# Patient Record
Sex: MALE | Race: WHITE | Employment: FULL TIME | URBAN - METROPOLITAN AREA
[De-identification: names, ages, dates, MRNs, and addresses within clinical notes are randomized per-mention and may not be internally consistent; named-entity substitution may affect disease eponyms.]

---

## 2017-01-06 ENCOUNTER — OFFICE VISIT (OUTPATIENT)
Dept: FAMILY MEDICINE CLINIC | Age: 62
End: 2017-01-06

## 2017-01-06 VITALS
WEIGHT: 217 LBS | SYSTOLIC BLOOD PRESSURE: 130 MMHG | TEMPERATURE: 97.5 F | BODY MASS INDEX: 32.99 KG/M2 | DIASTOLIC BLOOD PRESSURE: 90 MMHG

## 2017-01-06 DIAGNOSIS — J40 BRONCHITIS: Primary | ICD-10-CM

## 2017-01-06 PROCEDURE — 99213 OFFICE O/P EST LOW 20 MIN: CPT | Performed by: PHYSICIAN ASSISTANT

## 2017-01-06 RX ORDER — LEVOFLOXACIN 500 MG/1
500 TABLET, FILM COATED ORAL DAILY
Qty: 10 TABLET | Refills: 0 | Status: SHIPPED | OUTPATIENT
Start: 2017-01-06 | End: 2017-01-16

## 2017-01-06 RX ORDER — GLIMEPIRIDE 4 MG/1
4 TABLET ORAL DAILY
Qty: 30 TABLET | Refills: 2 | Status: SHIPPED | OUTPATIENT
Start: 2017-01-06 | End: 2017-03-28 | Stop reason: SDUPTHER

## 2017-01-06 ASSESSMENT — ENCOUNTER SYMPTOMS
RHINORRHEA: 0
SORE THROAT: 1
TROUBLE SWALLOWING: 0
VOMITING: 0
NAUSEA: 0
SINUS PRESSURE: 1
SHORTNESS OF BREATH: 0
COUGH: 1
WHEEZING: 0
DIARRHEA: 0

## 2017-01-10 ENCOUNTER — TELEPHONE (OUTPATIENT)
Dept: SURGERY | Age: 62
End: 2017-01-10

## 2017-01-19 ENCOUNTER — TELEPHONE (OUTPATIENT)
Dept: SLEEP MEDICINE | Age: 62
End: 2017-01-19

## 2017-01-20 ENCOUNTER — TELEPHONE (OUTPATIENT)
Dept: FAMILY MEDICINE CLINIC | Age: 62
End: 2017-01-20

## 2017-02-01 ENCOUNTER — PROCEDURE VISIT (OUTPATIENT)
Dept: SURGERY | Age: 62
End: 2017-02-01

## 2017-02-01 VITALS
SYSTOLIC BLOOD PRESSURE: 118 MMHG | HEART RATE: 54 BPM | BODY MASS INDEX: 33.6 KG/M2 | TEMPERATURE: 97.7 F | OXYGEN SATURATION: 94 % | DIASTOLIC BLOOD PRESSURE: 69 MMHG | WEIGHT: 221 LBS

## 2017-02-01 DIAGNOSIS — D03.72 MELANOMA IN SITU OF LOWER LEG, LEFT (HCC): Primary | ICD-10-CM

## 2017-02-01 PROBLEM — D03.70 MELANOMA IN SITU OF LOWER LEG (HCC): Status: ACTIVE | Noted: 2017-02-01

## 2017-02-01 PROCEDURE — 11621 EXC S/N/H/F/G MAL+MRG 0.6-1: CPT | Performed by: DERMATOLOGY

## 2017-02-02 ENCOUNTER — TELEPHONE (OUTPATIENT)
Dept: SURGERY | Age: 62
End: 2017-02-02

## 2017-02-03 ENCOUNTER — TELEPHONE (OUTPATIENT)
Dept: SURGERY | Age: 62
End: 2017-02-03

## 2017-02-06 ENCOUNTER — PROCEDURE VISIT (OUTPATIENT)
Dept: SURGERY | Age: 62
End: 2017-02-06

## 2017-02-06 DIAGNOSIS — D03.72 MELANOMA IN SITU OF LOWER LEG, LEFT (HCC): Primary | ICD-10-CM

## 2017-02-06 PROCEDURE — 99024 POSTOP FOLLOW-UP VISIT: CPT | Performed by: DERMATOLOGY

## 2017-02-08 ENCOUNTER — TELEPHONE (OUTPATIENT)
Dept: FAMILY MEDICINE CLINIC | Age: 62
End: 2017-02-08

## 2017-02-15 ENCOUNTER — OFFICE VISIT (OUTPATIENT)
Dept: FAMILY MEDICINE CLINIC | Age: 62
End: 2017-02-15

## 2017-02-15 VITALS
DIASTOLIC BLOOD PRESSURE: 78 MMHG | SYSTOLIC BLOOD PRESSURE: 135 MMHG | BODY MASS INDEX: 33.63 KG/M2 | WEIGHT: 221.2 LBS | HEART RATE: 60 BPM

## 2017-02-15 DIAGNOSIS — M11.20 PSEUDOGOUT: ICD-10-CM

## 2017-02-15 DIAGNOSIS — M15.9 GENERALIZED OSTEOARTHROSIS, INVOLVING MULTIPLE SITES: ICD-10-CM

## 2017-02-15 DIAGNOSIS — I10 ESSENTIAL HYPERTENSION: ICD-10-CM

## 2017-02-15 DIAGNOSIS — E11.9 TYPE 2 DIABETES MELLITUS WITHOUT COMPLICATION, WITHOUT LONG-TERM CURRENT USE OF INSULIN (HCC): Primary | ICD-10-CM

## 2017-02-15 PROCEDURE — 99214 OFFICE O/P EST MOD 30 MIN: CPT | Performed by: FAMILY MEDICINE

## 2017-02-15 RX ORDER — BLOOD-GLUCOSE METER
1 KIT MISCELLANEOUS 2 TIMES DAILY
Qty: 1 DEVICE | Refills: 0 | Status: SHIPPED | OUTPATIENT
Start: 2017-02-15

## 2017-02-15 RX ORDER — IPRATROPIUM BROMIDE 21 UG/1
2 SPRAY, METERED NASAL EVERY 12 HOURS
Qty: 1 BOTTLE | Refills: 5 | Status: SHIPPED | OUTPATIENT
Start: 2017-02-15 | End: 2019-03-04

## 2017-02-15 RX ORDER — LANCETS 28 GAUGE
1 EACH MISCELLANEOUS DAILY
Qty: 100 EACH | Refills: 3 | Status: SHIPPED | OUTPATIENT
Start: 2017-02-15

## 2017-02-16 ENCOUNTER — TELEPHONE (OUTPATIENT)
Dept: FAMILY MEDICINE CLINIC | Age: 62
End: 2017-02-16

## 2017-02-20 ENCOUNTER — OFFICE VISIT (OUTPATIENT)
Dept: SURGERY | Age: 62
End: 2017-02-20

## 2017-02-20 DIAGNOSIS — Z51.89 VISIT FOR WOUND CHECK: Primary | ICD-10-CM

## 2017-02-20 DIAGNOSIS — S91.109D OPEN WOUND OF TOE, SUBSEQUENT ENCOUNTER: ICD-10-CM

## 2017-02-20 PROCEDURE — 99212 OFFICE O/P EST SF 10 MIN: CPT | Performed by: DERMATOLOGY

## 2017-02-28 DIAGNOSIS — I10 ESSENTIAL HYPERTENSION: ICD-10-CM

## 2017-02-28 DIAGNOSIS — E78.00 PURE HYPERCHOLESTEROLEMIA: ICD-10-CM

## 2017-03-01 RX ORDER — RAMIPRIL 2.5 MG/1
CAPSULE ORAL
Qty: 90 CAPSULE | Refills: 1 | Status: SHIPPED | OUTPATIENT
Start: 2017-03-01 | End: 2017-08-24 | Stop reason: SDUPTHER

## 2017-03-01 RX ORDER — SIMVASTATIN 40 MG
TABLET ORAL
Qty: 90 TABLET | Refills: 1 | Status: SHIPPED | OUTPATIENT
Start: 2017-03-01 | End: 2017-08-24 | Stop reason: SDUPTHER

## 2017-03-09 DIAGNOSIS — M15.9 GENERALIZED OSTEOARTHROSIS, INVOLVING MULTIPLE SITES: ICD-10-CM

## 2017-03-09 RX ORDER — TRAMADOL HYDROCHLORIDE 50 MG/1
TABLET ORAL
Qty: 120 TABLET | Refills: 0 | OUTPATIENT
Start: 2017-03-09 | End: 2017-04-21 | Stop reason: SDUPTHER

## 2017-03-28 ENCOUNTER — INITIAL CONSULT (OUTPATIENT)
Dept: SLEEP MEDICINE | Age: 62
End: 2017-03-28

## 2017-03-28 VITALS
OXYGEN SATURATION: 98 % | DIASTOLIC BLOOD PRESSURE: 76 MMHG | HEART RATE: 48 BPM | SYSTOLIC BLOOD PRESSURE: 116 MMHG | BODY MASS INDEX: 32.29 KG/M2 | WEIGHT: 218 LBS | HEIGHT: 69 IN

## 2017-03-28 DIAGNOSIS — J30.9 ALLERGIC RHINITIS, UNSPECIFIED ALLERGIC RHINITIS TRIGGER, UNSPECIFIED RHINITIS SEASONALITY: Chronic | ICD-10-CM

## 2017-03-28 DIAGNOSIS — E11.9 TYPE 2 DIABETES MELLITUS WITHOUT COMPLICATION, WITHOUT LONG-TERM CURRENT USE OF INSULIN (HCC): Chronic | ICD-10-CM

## 2017-03-28 DIAGNOSIS — I10 ESSENTIAL HYPERTENSION: Chronic | ICD-10-CM

## 2017-03-28 DIAGNOSIS — G47.33 OBSTRUCTIVE SLEEP APNEA SYNDROME: Primary | Chronic | ICD-10-CM

## 2017-03-28 PROCEDURE — 99214 OFFICE O/P EST MOD 30 MIN: CPT | Performed by: INTERNAL MEDICINE

## 2017-03-28 ASSESSMENT — SLEEP AND FATIGUE QUESTIONNAIRES
ESS TOTAL SCORE: 1
HOW LIKELY ARE YOU TO NOD OFF OR FALL ASLEEP WHILE SITTING QUIETLY AFTER LUNCH WITHOUT ALCOHOL: 0
HOW LIKELY ARE YOU TO NOD OFF OR FALL ASLEEP WHILE LYING DOWN TO REST IN THE AFTERNOON WHEN CIRCUMSTANCES PERMIT: 0
HOW LIKELY ARE YOU TO NOD OFF OR FALL ASLEEP WHILE SITTING AND READING: 0
HOW LIKELY ARE YOU TO NOD OFF OR FALL ASLEEP WHILE SITTING INACTIVE IN A PUBLIC PLACE: 0
HOW LIKELY ARE YOU TO NOD OFF OR FALL ASLEEP WHILE WATCHING TV: 1
HOW LIKELY ARE YOU TO NOD OFF OR FALL ASLEEP WHEN YOU ARE A PASSENGER IN A CAR FOR AN HOUR WITHOUT A BREAK: 0
HOW LIKELY ARE YOU TO NOD OFF OR FALL ASLEEP WHILE SITTING AND TALKING TO SOMEONE: 0
NECK CIRCUMFERENCE (INCHES): 18.5
HOW LIKELY ARE YOU TO NOD OFF OR FALL ASLEEP IN A CAR, WHILE STOPPED FOR A FEW MINUTES IN TRAFFIC: 0

## 2017-03-28 ASSESSMENT — ENCOUNTER SYMPTOMS
PHOTOPHOBIA: 0
SINUS PRESSURE: 0
EYE PAIN: 0
CHEST TIGHTNESS: 0
SHORTNESS OF BREATH: 0
STRIDOR: 0

## 2017-03-29 RX ORDER — GLIMEPIRIDE 4 MG/1
TABLET ORAL
Qty: 30 TABLET | Refills: 3 | Status: SHIPPED | OUTPATIENT
Start: 2017-03-29 | End: 2017-08-24 | Stop reason: SDUPTHER

## 2017-04-04 ENCOUNTER — TELEPHONE (OUTPATIENT)
Dept: FAMILY MEDICINE CLINIC | Age: 62
End: 2017-04-04

## 2017-04-21 ENCOUNTER — OFFICE VISIT (OUTPATIENT)
Dept: FAMILY MEDICINE CLINIC | Age: 62
End: 2017-04-21

## 2017-04-21 VITALS
WEIGHT: 219.25 LBS | OXYGEN SATURATION: 97 % | HEART RATE: 61 BPM | DIASTOLIC BLOOD PRESSURE: 78 MMHG | RESPIRATION RATE: 16 BRPM | SYSTOLIC BLOOD PRESSURE: 118 MMHG | BODY MASS INDEX: 32.38 KG/M2

## 2017-04-21 DIAGNOSIS — J45.40 MODERATE PERSISTENT ASTHMA WITHOUT COMPLICATION: ICD-10-CM

## 2017-04-21 DIAGNOSIS — M15.9 GENERALIZED OSTEOARTHROSIS, INVOLVING MULTIPLE SITES: ICD-10-CM

## 2017-04-21 DIAGNOSIS — J30.9 ALLERGIC RHINITIS, UNSPECIFIED ALLERGIC RHINITIS TRIGGER, UNSPECIFIED RHINITIS SEASONALITY: Chronic | ICD-10-CM

## 2017-04-21 DIAGNOSIS — I10 ESSENTIAL HYPERTENSION: Chronic | ICD-10-CM

## 2017-04-21 DIAGNOSIS — E11.9 TYPE 2 DIABETES MELLITUS WITHOUT COMPLICATION, WITHOUT LONG-TERM CURRENT USE OF INSULIN (HCC): Primary | Chronic | ICD-10-CM

## 2017-04-21 PROCEDURE — 99214 OFFICE O/P EST MOD 30 MIN: CPT | Performed by: FAMILY MEDICINE

## 2017-04-21 RX ORDER — METFORMIN HYDROCHLORIDE 500 MG/1
1000 TABLET, EXTENDED RELEASE ORAL 2 TIMES DAILY
Qty: 360 TABLET | Refills: 3 | Status: SHIPPED | OUTPATIENT
Start: 2017-04-21 | End: 2018-04-26 | Stop reason: SDUPTHER

## 2017-04-21 RX ORDER — LEVOCETIRIZINE DIHYDROCHLORIDE 5 MG/1
5 TABLET, FILM COATED ORAL NIGHTLY
COMMUNITY
End: 2018-05-21

## 2017-04-21 RX ORDER — IPRATROPIUM BROMIDE AND ALBUTEROL SULFATE 2.5; .5 MG/3ML; MG/3ML
1 SOLUTION RESPIRATORY (INHALATION) EVERY 4 HOURS
Qty: 25 VIAL | Refills: 0 | Status: SHIPPED | OUTPATIENT
Start: 2017-04-21 | End: 2018-05-21

## 2017-04-21 RX ORDER — TRAMADOL HYDROCHLORIDE 50 MG/1
TABLET ORAL
Qty: 120 TABLET | Refills: 2 | Status: SHIPPED | OUTPATIENT
Start: 2017-04-21 | End: 2017-08-11 | Stop reason: SDUPTHER

## 2017-04-21 RX ORDER — IPRATROPIUM BROMIDE AND ALBUTEROL SULFATE 2.5; .5 MG/3ML; MG/3ML
1 SOLUTION RESPIRATORY (INHALATION) EVERY 4 HOURS
COMMUNITY
End: 2017-04-21 | Stop reason: SDUPTHER

## 2017-04-21 ASSESSMENT — PATIENT HEALTH QUESTIONNAIRE - PHQ9
2. FEELING DOWN, DEPRESSED OR HOPELESS: 0
SUM OF ALL RESPONSES TO PHQ QUESTIONS 1-9: 0
1. LITTLE INTEREST OR PLEASURE IN DOING THINGS: 0
SUM OF ALL RESPONSES TO PHQ9 QUESTIONS 1 & 2: 0

## 2017-04-24 ENCOUNTER — TELEPHONE (OUTPATIENT)
Dept: FAMILY MEDICINE CLINIC | Age: 62
End: 2017-04-24

## 2017-05-01 ENCOUNTER — HOSPITAL ENCOUNTER (OUTPATIENT)
Dept: PULMONOLOGY | Age: 62
Discharge: HOME | End: 2017-05-01
Attending: FAMILY MEDICINE | Admitting: FAMILY MEDICINE

## 2017-05-01 VITALS — HEART RATE: 52 BPM | OXYGEN SATURATION: 94 % | RESPIRATION RATE: 16 BRPM

## 2017-05-01 RX ORDER — ALBUTEROL SULFATE 90 UG/1
4 AEROSOL, METERED RESPIRATORY (INHALATION) ONCE
Status: COMPLETED | OUTPATIENT
Start: 2017-05-01 | End: 2017-05-01

## 2017-05-01 RX ADMIN — ALBUTEROL SULFATE 4 PUFF: 90 AEROSOL, METERED RESPIRATORY (INHALATION) at 17:15

## 2017-05-04 ENCOUNTER — TELEPHONE (OUTPATIENT)
Dept: FAMILY MEDICINE CLINIC | Age: 62
End: 2017-05-04

## 2017-06-05 RX ORDER — CEPHALEXIN 500 MG/1
500 CAPSULE ORAL 3 TIMES DAILY
Qty: 30 CAPSULE | Refills: 0 | Status: SHIPPED | OUTPATIENT
Start: 2017-06-05 | End: 2017-06-15

## 2017-06-30 ENCOUNTER — OFFICE VISIT (OUTPATIENT)
Dept: FAMILY MEDICINE CLINIC | Age: 62
End: 2017-06-30

## 2017-06-30 VITALS
DIASTOLIC BLOOD PRESSURE: 86 MMHG | BODY MASS INDEX: 31.54 KG/M2 | WEIGHT: 213.6 LBS | OXYGEN SATURATION: 98 % | HEART RATE: 60 BPM | SYSTOLIC BLOOD PRESSURE: 134 MMHG

## 2017-06-30 DIAGNOSIS — L02.93 CARBUNCLE: Primary | ICD-10-CM

## 2017-06-30 PROCEDURE — 99213 OFFICE O/P EST LOW 20 MIN: CPT | Performed by: FAMILY MEDICINE

## 2017-06-30 RX ORDER — CLINDAMYCIN HYDROCHLORIDE 300 MG/1
300 CAPSULE ORAL 3 TIMES DAILY
Qty: 30 CAPSULE | Refills: 0 | Status: SHIPPED | OUTPATIENT
Start: 2017-06-30 | End: 2017-07-10

## 2017-07-11 ENCOUNTER — TELEPHONE (OUTPATIENT)
Dept: FAMILY MEDICINE CLINIC | Age: 62
End: 2017-07-11

## 2017-07-11 DIAGNOSIS — I10 ESSENTIAL HYPERTENSION: Primary | Chronic | ICD-10-CM

## 2017-07-11 DIAGNOSIS — E78.00 PURE HYPERCHOLESTEROLEMIA: Chronic | ICD-10-CM

## 2017-07-11 DIAGNOSIS — E11.9 TYPE 2 DIABETES MELLITUS WITHOUT COMPLICATION, WITHOUT LONG-TERM CURRENT USE OF INSULIN (HCC): Chronic | ICD-10-CM

## 2017-08-08 LAB
A/G RATIO: 1.6 (CALC) (ref 1–2.5)
ALBUMIN SERPL-MCNC: 3.9 G/DL (ref 3.6–5.1)
ALP BLD-CCNC: 72 U/L (ref 40–115)
ALT SERPL-CCNC: 37 U/L (ref 9–46)
AST SERPL-CCNC: 27 U/L (ref 10–35)
BILIRUB SERPL-MCNC: 0.6 MG/DL (ref 0.2–1.2)
BUN / CREAT RATIO: 27 (CALC) (ref 6–22)
BUN BLDV-MCNC: 26 MG/DL (ref 7–25)
CALCIUM SERPL-MCNC: 9 MG/DL (ref 8.6–10.3)
CHLORIDE BLD-SCNC: 104 MMOL/L (ref 98–110)
CHOLESTEROL, TOTAL: 163 MG/DL (ref 125–200)
CHOLESTEROL/HDL RATIO: 4.7 (CALC)
CHOLESTEROL: 128 MG/DL (CALC)
CO2: 30 MMOL/L (ref 20–31)
CREAT SERPL-MCNC: 0.97 MG/DL (ref 0.7–1.25)
GFR AFRICAN AMERICAN: 97 ML/MIN/1.73M2
GFR SERPL CREATININE-BSD FRML MDRD: 83 ML/MIN/1.73M2
GLOBULIN: 2.4 G/DL (CALC) (ref 1.9–3.7)
GLUCOSE BLD-MCNC: 152 MG/DL (ref 65–99)
HBA1C MFR BLD: 6.7 % OF TOTAL HGB
HDLC SERPL-MCNC: 35 MG/DL
LDL CHOLESTEROL CALCULATED: 104 MG/DL (CALC)
POTASSIUM SERPL-SCNC: 3.9 MMOL/L (ref 3.5–5.3)
SODIUM BLD-SCNC: 141 MMOL/L (ref 135–146)
TOTAL PROTEIN: 6.3 G/DL (ref 6.1–8.1)
TRIGL SERPL-MCNC: 122 MG/DL

## 2017-08-11 ENCOUNTER — PROCEDURE VISIT (OUTPATIENT)
Dept: FAMILY MEDICINE CLINIC | Age: 62
End: 2017-08-11

## 2017-08-11 VITALS
DIASTOLIC BLOOD PRESSURE: 72 MMHG | HEART RATE: 48 BPM | WEIGHT: 216 LBS | SYSTOLIC BLOOD PRESSURE: 110 MMHG | BODY MASS INDEX: 31.9 KG/M2

## 2017-08-11 DIAGNOSIS — M15.9 GENERALIZED OSTEOARTHROSIS, INVOLVING MULTIPLE SITES: ICD-10-CM

## 2017-08-11 DIAGNOSIS — E11.42 TYPE 2 DIABETES MELLITUS WITH DIABETIC POLYNEUROPATHY, WITHOUT LONG-TERM CURRENT USE OF INSULIN (HCC): Primary | ICD-10-CM

## 2017-08-11 DIAGNOSIS — I10 ESSENTIAL HYPERTENSION: Chronic | ICD-10-CM

## 2017-08-11 DIAGNOSIS — M51.36 DDD (DEGENERATIVE DISC DISEASE), LUMBAR: ICD-10-CM

## 2017-08-11 RX ORDER — TRAMADOL HYDROCHLORIDE 50 MG/1
TABLET ORAL
Qty: 120 TABLET | Refills: 2 | Status: SHIPPED | OUTPATIENT
Start: 2017-08-11 | End: 2018-05-14 | Stop reason: SDUPTHER

## 2017-08-11 RX ORDER — SILDENAFIL 50 MG/1
50 TABLET, FILM COATED ORAL PRN
Qty: 6 TABLET | Refills: 2 | Status: SHIPPED | OUTPATIENT
Start: 2017-08-11 | End: 2017-08-24

## 2017-08-11 RX ORDER — PANTOPRAZOLE SODIUM 40 MG/1
40 TABLET, DELAYED RELEASE ORAL DAILY
Qty: 30 TABLET | Refills: 5 | Status: SHIPPED | OUTPATIENT
Start: 2017-08-11 | End: 2018-03-02 | Stop reason: SDUPTHER

## 2017-08-12 ENCOUNTER — TELEPHONE (OUTPATIENT)
Dept: FAMILY MEDICINE CLINIC | Age: 62
End: 2017-08-12

## 2017-08-12 DIAGNOSIS — M54.5 LOW BACK PAIN, UNSPECIFIED BACK PAIN LATERALITY, UNSPECIFIED CHRONICITY, WITH SCIATICA PRESENCE UNSPECIFIED: Primary | ICD-10-CM

## 2017-08-15 ENCOUNTER — TELEPHONE (OUTPATIENT)
Dept: FAMILY MEDICINE CLINIC | Age: 62
End: 2017-08-15

## 2017-08-17 ENCOUNTER — TELEPHONE (OUTPATIENT)
Dept: FAMILY MEDICINE CLINIC | Age: 62
End: 2017-08-17

## 2017-08-18 ENCOUNTER — TELEPHONE (OUTPATIENT)
Dept: FAMILY MEDICINE CLINIC | Age: 62
End: 2017-08-18

## 2017-08-22 ENCOUNTER — HOSPITAL ENCOUNTER (OUTPATIENT)
Dept: PHYSICAL THERAPY | Age: 62
Discharge: HOME | End: 2017-08-31
Admitting: FAMILY MEDICINE

## 2017-08-22 ASSESSMENT — PAIN DESCRIPTION - LOCATION: LOCATION: BACK;HIP

## 2017-08-22 ASSESSMENT — PAIN DESCRIPTION - PAIN TYPE: TYPE: CHRONIC PAIN

## 2017-08-22 ASSESSMENT — PAIN DESCRIPTION - DESCRIPTORS: DESCRIPTORS: ACHING;SHARP

## 2017-08-22 ASSESSMENT — PAIN DESCRIPTION - ORIENTATION: ORIENTATION: RIGHT;LEFT;LOWER

## 2017-08-22 ASSESSMENT — PAIN SCALES - GENERAL: PAINLEVEL_OUTOF10: 4

## 2017-08-24 ENCOUNTER — OFFICE VISIT (OUTPATIENT)
Dept: FAMILY MEDICINE CLINIC | Age: 62
End: 2017-08-24

## 2017-08-24 VITALS
BODY MASS INDEX: 32.19 KG/M2 | OXYGEN SATURATION: 98 % | SYSTOLIC BLOOD PRESSURE: 100 MMHG | HEART RATE: 46 BPM | WEIGHT: 218 LBS | DIASTOLIC BLOOD PRESSURE: 70 MMHG

## 2017-08-24 DIAGNOSIS — E11.9 TYPE 2 DIABETES MELLITUS WITHOUT COMPLICATION, WITHOUT LONG-TERM CURRENT USE OF INSULIN (HCC): Primary | Chronic | ICD-10-CM

## 2017-08-24 DIAGNOSIS — E78.00 PURE HYPERCHOLESTEROLEMIA: ICD-10-CM

## 2017-08-24 DIAGNOSIS — I10 ESSENTIAL HYPERTENSION: ICD-10-CM

## 2017-08-24 PROCEDURE — 99214 OFFICE O/P EST MOD 30 MIN: CPT | Performed by: FAMILY MEDICINE

## 2017-08-24 RX ORDER — RAMIPRIL 2.5 MG/1
CAPSULE ORAL
Qty: 30 CAPSULE | Refills: 5 | Status: SHIPPED | OUTPATIENT
Start: 2017-08-24 | End: 2018-02-16 | Stop reason: SDUPTHER

## 2017-08-24 RX ORDER — GLIMEPIRIDE 4 MG/1
TABLET ORAL
Qty: 30 TABLET | Refills: 5 | Status: SHIPPED | OUTPATIENT
Start: 2017-08-24 | End: 2018-08-16 | Stop reason: SDUPTHER

## 2017-08-24 RX ORDER — SIMVASTATIN 40 MG
TABLET ORAL
Qty: 30 TABLET | Refills: 5 | Status: SHIPPED | OUTPATIENT
Start: 2017-08-24 | End: 2018-09-04 | Stop reason: SDUPTHER

## 2017-12-13 ENCOUNTER — OFFICE VISIT (OUTPATIENT)
Dept: FAMILY MEDICINE CLINIC | Age: 62
End: 2017-12-13

## 2017-12-13 VITALS
RESPIRATION RATE: 16 BRPM | HEIGHT: 69 IN | BODY MASS INDEX: 32.46 KG/M2 | SYSTOLIC BLOOD PRESSURE: 130 MMHG | HEART RATE: 44 BPM | DIASTOLIC BLOOD PRESSURE: 80 MMHG | WEIGHT: 219.13 LBS | OXYGEN SATURATION: 98 %

## 2017-12-13 DIAGNOSIS — I10 ESSENTIAL HYPERTENSION: Chronic | ICD-10-CM

## 2017-12-13 DIAGNOSIS — E11.42 TYPE 2 DIABETES MELLITUS WITH DIABETIC POLYNEUROPATHY, WITHOUT LONG-TERM CURRENT USE OF INSULIN (HCC): Primary | ICD-10-CM

## 2017-12-13 DIAGNOSIS — M15.9 GENERALIZED OSTEOARTHROSIS, INVOLVING MULTIPLE SITES: ICD-10-CM

## 2017-12-13 DIAGNOSIS — H00.015 HORDEOLUM EXTERNUM OF LEFT LOWER EYELID: ICD-10-CM

## 2017-12-13 PROCEDURE — 99214 OFFICE O/P EST MOD 30 MIN: CPT | Performed by: FAMILY MEDICINE

## 2017-12-13 NOTE — PATIENT INSTRUCTIONS
ointment:  ¨ Tilt your head back, and pull your lower eyelid down with one finger. ¨ Drop or squirt the medicine inside the lower lid. ¨ Close your eye for 30 to 60 seconds to let the drops or ointment move around. ¨ Do not touch the ointment or dropper tip to your eyelashes or any other surface. · Do not wear eye makeup or contact lenses until the stye or chalazion heals. · Do not share towels, pillows, or washcloths while you have a stye. When should you call for help? Call your doctor now or seek immediate medical care if:  ? · You have pain in your eye.   ? · You have a change in vision or loss of vision. ? · Redness and swelling get much worse. ? Watch closely for changes in your health, and be sure to contact your doctor if:  ? · Your stye does not get better in 1 week. ? · Your chalazion does not start to get better after several weeks. Where can you learn more? Go to [email or web address]. org and sign in to your [employer] account. Enter S881 in the [facility] box to learn more about \"Styes and Chalazia: Care Instructions. \"     If you do not have an account, please click on the \"Sign Up Now\" link. Current as of: March 3, 2017  Content Version: 11.4  © 0690-4673 Healthwise, Incorporated. Care instructions adapted under license by [facility] ([facility]). If you have questions about a medical condition or this instruction, always ask your healthcare professional. [place] [address] any warranty or liability for your use of this information.

## 2017-12-13 NOTE — PROGRESS NOTES
Subjective:      Patient ID: Janine Can is a 58 y.o. male. HPI Patient presents for re-evaluation of chronic health problems. Pt will like to discuss a sore left eye ongoing for a week. Patient has been off the by medication now for over 6 months so that because of the cost.  He's been monitor blood sugars closely at home and for fasting blood sugar ranges between 1:30140. He's had no hypoglycemic episodes. Patient denies any chest pain or shortness of breath symptoms. His arthritis symptoms seem to be much better controlled with occasional use of tramadol and he rarely has to use an inhaler for asthma. Patient continues with the CPAP nightly. Patient states his insurance plan is required to change diabetic supply and for her January. Eye exam current (within one year): Yes    Checks sugars at home: yes  Home blood sugar records: patient tests 1 time(s) per week avg 134  Any episodes of hypoglycemia?  No    Current medication use: taking as prescribed  Medication side effects: none     Current diet: well balanced  Current exercise:moderately active    Review of Systems  Patient Active Problem List   Diagnosis    Essential hypertension    Generalized osteoarthrosis, involving multiple sites    Type 2 diabetes mellitus without complication ([address]    Pure hypercholesterolemia    Sleep apnea    Allergic rhinitis    Pseudogout    Melanoma in situ of lower leg ([address]    Type 2 diabetes mellitus with diabetic polyneuropathy, without long-term current use of insulin ([address]       Outpatient Prescriptions Marked as Taking for the 12/13/17 encounter (Office Visit) with Rachele Love MD   Medication Sig Dispense Refill    glimepiride (AMARYL) 4 MG tablet TAKE ONE TABLET BY MOUTH DAILY 30 tablet 5    ramipril (ALTACE) 2.5 MG capsule TAKE ONE CAPSULE BY MOUTH DAILY 30 capsule 5    diclofenac (VOLTAREN) 50 MG EC tablet TAKE ONE TABLET BY MOUTH TWICE A DAY 60 tablet 5    simvastatin (ZOCOR) 40 MG tablet TAKE ONE TABLET BY MOUTH EVERY EVENING 30 tablet 5    glucose blood VI test strips (FREESTYLE LITE) strip 1 each by In Vitro route 2 times daily As needed. 100 each 3    traMADol (ULTRAM) 50 MG tablet TAKE ONE TABLET BY MOUTH EVERY 6 HOURS AS NEEDED FOR PAIN 120 tablet 2    pantoprazole (PROTONIX) 40 MG tablet Take 1 tablet by mouth daily 30 tablet 5    ipratropium-albuterol (DUONEB) 0.5-2.5 (3) MG/3ML SOLN nebulizer solution Inhale 3 mLs into the lungs every 4 hours 25 vial 0    metFORMIN (GLUCOPHAGE-XR) 500 MG extended release tablet Take 2 tablets by mouth 2 times daily 360 tablet 3    levocetirizine (XYZAL) 5 MG tablet Take 5 mg by mouth nightly      FREESTYLE LANCETS MISC 1 each by Does not apply route daily 100 each 3    Blood Glucose Monitoring Suppl (FREESTYLE LITE) CLAUDINE 1 Device by Does not apply route 2 times daily 1 Device 0    ipratropium (ATROVENT) 0.03 % nasal spray 2 sprays by Nasal route every 12 hours 1 Bottle 5    pioglitazone (ACTOS) 30 MG tablet Take 1 tablet by mouth daily 30 tablet 5    colchicine (COLCRYS) 0.6 MG tablet Take 1 tablet by mouth daily 30 tablet 5    albuterol sulfate  (90 BASE) MCG/ACT inhaler Inhale 2 puffs into the lungs every 6 hours as needed for Wheezing         No Known Allergies    Social History   Substance Use Topics    Smoking status: Former Smoker    Smokeless tobacco: Never Used      Comment: quit 25 years ago    Alcohol use No       /80 (Site: Right Arm, Position: Sitting, Cuff Size: Large Adult)   Pulse (!) 44   Resp 16   Ht 5' 9\" (1.753 m)   Wt 219 lb 2 oz (99.4 kg)   SpO2 98%   BMI 32.36 kg/m²     Objective:   Physical Exam   Constitutional: He appears well-developed and well-nourished. He is cooperative. Eyes: Conjunctivae and EOM are normal. Pupils are equal, round, and reactive to light. Left eye exhibits hordeolum (Left outer lower lid). Left eye exhibits no discharge. Neck: Carotid bruit is not present.    Cardiovascular:

## 2018-02-16 DIAGNOSIS — I10 ESSENTIAL HYPERTENSION: ICD-10-CM

## 2018-02-16 RX ORDER — RAMIPRIL 2.5 MG/1
CAPSULE ORAL
Qty: 30 CAPSULE | Refills: 4 | Status: SHIPPED | OUTPATIENT
Start: 2018-02-16 | End: 2018-07-18 | Stop reason: SDUPTHER

## 2018-03-02 RX ORDER — PANTOPRAZOLE SODIUM 40 MG/1
40 TABLET, DELAYED RELEASE ORAL DAILY
Qty: 30 TABLET | Refills: 5 | Status: SHIPPED | OUTPATIENT
Start: 2018-03-02 | End: 2018-08-19 | Stop reason: SDUPTHER

## 2018-04-03 ENCOUNTER — OFFICE VISIT (OUTPATIENT)
Dept: SLEEP MEDICINE | Age: 63
End: 2018-04-03

## 2018-04-03 VITALS
HEIGHT: 69 IN | OXYGEN SATURATION: 96 % | SYSTOLIC BLOOD PRESSURE: 142 MMHG | BODY MASS INDEX: 31.99 KG/M2 | WEIGHT: 216 LBS | HEART RATE: 56 BPM | DIASTOLIC BLOOD PRESSURE: 70 MMHG

## 2018-04-03 DIAGNOSIS — I10 ESSENTIAL HYPERTENSION: Chronic | ICD-10-CM

## 2018-04-03 DIAGNOSIS — E11.9 TYPE 2 DIABETES MELLITUS WITHOUT COMPLICATION, WITHOUT LONG-TERM CURRENT USE OF INSULIN (HCC): Chronic | ICD-10-CM

## 2018-04-03 DIAGNOSIS — E66.9 CLASS 1 OBESITY WITHOUT SERIOUS COMORBIDITY WITH BODY MASS INDEX (BMI) OF 31.0 TO 31.9 IN ADULT, UNSPECIFIED OBESITY TYPE: Chronic | ICD-10-CM

## 2018-04-03 DIAGNOSIS — G47.33 OBSTRUCTIVE SLEEP APNEA SYNDROME: Primary | Chronic | ICD-10-CM

## 2018-04-03 PROCEDURE — 99214 OFFICE O/P EST MOD 30 MIN: CPT | Performed by: NURSE PRACTITIONER

## 2018-04-03 ASSESSMENT — SLEEP AND FATIGUE QUESTIONNAIRES
HOW LIKELY ARE YOU TO NOD OFF OR FALL ASLEEP WHEN YOU ARE A PASSENGER IN A CAR FOR AN HOUR WITHOUT A BREAK: 1
HOW LIKELY ARE YOU TO NOD OFF OR FALL ASLEEP WHILE SITTING INACTIVE IN A PUBLIC PLACE: 0
HOW LIKELY ARE YOU TO NOD OFF OR FALL ASLEEP WHILE LYING DOWN TO REST IN THE AFTERNOON WHEN CIRCUMSTANCES PERMIT: 0
HOW LIKELY ARE YOU TO NOD OFF OR FALL ASLEEP IN A CAR, WHILE STOPPED FOR A FEW MINUTES IN TRAFFIC: 0
HOW LIKELY ARE YOU TO NOD OFF OR FALL ASLEEP WHILE SITTING AND READING: 0
ESS TOTAL SCORE: 2
HOW LIKELY ARE YOU TO NOD OFF OR FALL ASLEEP WHILE WATCHING TV: 1
HOW LIKELY ARE YOU TO NOD OFF OR FALL ASLEEP WHILE SITTING QUIETLY AFTER LUNCH WITHOUT ALCOHOL: 0
HOW LIKELY ARE YOU TO NOD OFF OR FALL ASLEEP WHILE SITTING AND TALKING TO SOMEONE: 0

## 2018-04-03 ASSESSMENT — ENCOUNTER SYMPTOMS
SHORTNESS OF BREATH: 0
SINUS PRESSURE: 0
ABDOMINAL PAIN: 0
COUGH: 0
ABDOMINAL DISTENTION: 0
RHINORRHEA: 0
APNEA: 0

## 2018-04-04 ENCOUNTER — OFFICE VISIT (OUTPATIENT)
Dept: FAMILY MEDICINE CLINIC | Age: 63
End: 2018-04-04

## 2018-04-04 VITALS
WEIGHT: 219 LBS | OXYGEN SATURATION: 97 % | SYSTOLIC BLOOD PRESSURE: 132 MMHG | TEMPERATURE: 97.7 F | DIASTOLIC BLOOD PRESSURE: 88 MMHG | HEART RATE: 55 BPM | BODY MASS INDEX: 32.34 KG/M2

## 2018-04-04 DIAGNOSIS — L02.93 CARBUNCLE: Primary | ICD-10-CM

## 2018-04-04 DIAGNOSIS — L30.9 ECZEMA, UNSPECIFIED TYPE: ICD-10-CM

## 2018-04-04 PROCEDURE — 99214 OFFICE O/P EST MOD 30 MIN: CPT | Performed by: FAMILY MEDICINE

## 2018-04-04 RX ORDER — CEPHALEXIN 500 MG/1
500 CAPSULE ORAL 3 TIMES DAILY
Qty: 21 CAPSULE | Refills: 0 | Status: SHIPPED | OUTPATIENT
Start: 2018-04-04 | End: 2019-10-17 | Stop reason: SDUPTHER

## 2018-04-13 ENCOUNTER — OFFICE VISIT (OUTPATIENT)
Dept: FAMILY MEDICINE CLINIC | Age: 63
End: 2018-04-13

## 2018-04-13 VITALS
SYSTOLIC BLOOD PRESSURE: 152 MMHG | DIASTOLIC BLOOD PRESSURE: 78 MMHG | HEART RATE: 50 BPM | WEIGHT: 213 LBS | BODY MASS INDEX: 31.45 KG/M2 | OXYGEN SATURATION: 97 %

## 2018-04-13 DIAGNOSIS — L08.9 FINGER INFECTION: Primary | ICD-10-CM

## 2018-04-13 PROCEDURE — 99212 OFFICE O/P EST SF 10 MIN: CPT | Performed by: PHYSICIAN ASSISTANT

## 2018-04-26 ENCOUNTER — TELEPHONE (OUTPATIENT)
Dept: FAMILY MEDICINE CLINIC | Age: 63
End: 2018-04-26

## 2018-04-26 DIAGNOSIS — M11.20 PSEUDOGOUT: ICD-10-CM

## 2018-04-26 RX ORDER — METFORMIN HYDROCHLORIDE 500 MG/1
TABLET, EXTENDED RELEASE ORAL
Qty: 360 TABLET | Refills: 1 | Status: SHIPPED | OUTPATIENT
Start: 2018-04-26 | End: 2019-03-04

## 2018-04-26 RX ORDER — COLCHICINE 0.6 MG/1
0.6 TABLET ORAL DAILY
Qty: 30 TABLET | Refills: 5 | Status: SHIPPED | OUTPATIENT
Start: 2018-04-26 | End: 2019-03-04 | Stop reason: SDUPTHER

## 2018-05-09 DIAGNOSIS — M15.9 GENERALIZED OSTEOARTHROSIS, INVOLVING MULTIPLE SITES: ICD-10-CM

## 2018-05-10 RX ORDER — TRAMADOL HYDROCHLORIDE 50 MG/1
TABLET ORAL
Qty: 120 TABLET | Refills: 1 | OUTPATIENT
Start: 2018-05-10

## 2018-05-12 DIAGNOSIS — M15.9 GENERALIZED OSTEOARTHROSIS, INVOLVING MULTIPLE SITES: ICD-10-CM

## 2018-05-14 DIAGNOSIS — M15.9 GENERALIZED OSTEOARTHROSIS, INVOLVING MULTIPLE SITES: ICD-10-CM

## 2018-05-14 RX ORDER — TRAMADOL HYDROCHLORIDE 50 MG/1
TABLET ORAL
Qty: 120 TABLET | Refills: 1 | OUTPATIENT
Start: 2018-05-14

## 2018-05-14 RX ORDER — TRAMADOL HYDROCHLORIDE 50 MG/1
TABLET ORAL
Qty: 32 TABLET | Refills: 0 | Status: SHIPPED | OUTPATIENT
Start: 2018-05-14 | End: 2018-05-21 | Stop reason: SDUPTHER

## 2018-05-21 ENCOUNTER — OFFICE VISIT (OUTPATIENT)
Dept: FAMILY MEDICINE CLINIC | Age: 63
End: 2018-05-21

## 2018-05-21 VITALS
WEIGHT: 212.2 LBS | DIASTOLIC BLOOD PRESSURE: 90 MMHG | BODY MASS INDEX: 31.34 KG/M2 | SYSTOLIC BLOOD PRESSURE: 140 MMHG | HEART RATE: 75 BPM | OXYGEN SATURATION: 96 %

## 2018-05-21 DIAGNOSIS — M15.9 GENERALIZED OSTEOARTHROSIS, INVOLVING MULTIPLE SITES: Primary | ICD-10-CM

## 2018-05-21 DIAGNOSIS — I10 ESSENTIAL HYPERTENSION: Chronic | ICD-10-CM

## 2018-05-21 DIAGNOSIS — G47.30 SLEEP APNEA, UNSPECIFIED TYPE: Chronic | ICD-10-CM

## 2018-05-21 DIAGNOSIS — E11.9 TYPE 2 DIABETES MELLITUS WITHOUT COMPLICATION, WITHOUT LONG-TERM CURRENT USE OF INSULIN (HCC): Chronic | ICD-10-CM

## 2018-05-21 DIAGNOSIS — E78.00 PURE HYPERCHOLESTEROLEMIA: Chronic | ICD-10-CM

## 2018-05-21 PROCEDURE — 99214 OFFICE O/P EST MOD 30 MIN: CPT | Performed by: FAMILY MEDICINE

## 2018-05-21 RX ORDER — TRAMADOL HYDROCHLORIDE 50 MG/1
TABLET ORAL
Qty: 120 TABLET | Refills: 2 | Status: SHIPPED | OUTPATIENT
Start: 2018-05-21 | End: 2018-08-29 | Stop reason: SDUPTHER

## 2018-05-21 ASSESSMENT — PATIENT HEALTH QUESTIONNAIRE - PHQ9
SUM OF ALL RESPONSES TO PHQ9 QUESTIONS 1 & 2: 0
SUM OF ALL RESPONSES TO PHQ QUESTIONS 1-9: 0
1. LITTLE INTEREST OR PLEASURE IN DOING THINGS: 0
2. FEELING DOWN, DEPRESSED OR HOPELESS: 0

## 2018-06-09 LAB
A/G RATIO: 1.8 (CALC) (ref 1–2.5)
ALBUMIN SERPL-MCNC: 4.2 G/DL (ref 3.6–5.1)
ALP BLD-CCNC: 78 U/L (ref 40–115)
ALT SERPL-CCNC: 31 U/L (ref 9–46)
AST SERPL-CCNC: 24 U/L (ref 10–35)
BILIRUB SERPL-MCNC: 0.7 MG/DL (ref 0.2–1.2)
BUN / CREAT RATIO: ABNORMAL (CALC) (ref 6–22)
BUN BLDV-MCNC: 21 MG/DL (ref 7–25)
CALCIUM SERPL-MCNC: 9.3 MG/DL (ref 8.6–10.3)
CHLORIDE BLD-SCNC: 105 MMOL/L (ref 98–110)
CHOLESTEROL, TOTAL: 171 MG/DL
CHOLESTEROL/HDL RATIO: 4.3 (CALC)
CHOLESTEROL: 131 MG/DL (CALC)
CO2: 30 MMOL/L (ref 20–31)
COMMENT: NORMAL
CREAT SERPL-MCNC: 0.86 MG/DL (ref 0.7–1.25)
CREATININE URINE: 156 MG/DL (ref 20–370)
GFR AFRICAN AMERICAN: 107 ML/MIN/1.73M2
GFR SERPL CREATININE-BSD FRML MDRD: 92 ML/MIN/1.73M2
GLOBULIN: 2.3 G/DL (CALC) (ref 1.9–3.7)
GLUCOSE BLD-MCNC: 150 MG/DL (ref 65–99)
HBA1C MFR BLD: 6.7 % OF TOTAL HGB
HDLC SERPL-MCNC: 40 MG/DL
LDL CHOLESTEROL CALCULATED: 112 MG/DL (CALC)
MICROALBUMIN UR-MCNC: 4 MG/DL
MICROALBUMIN/CREAT UR-RTO: 26 MCG/MG CREAT
POTASSIUM SERPL-SCNC: 4.2 MMOL/L (ref 3.5–5.3)
SODIUM BLD-SCNC: 142 MMOL/L (ref 135–146)
TOTAL PROTEIN: 6.5 G/DL (ref 6.1–8.1)
TRIGL SERPL-MCNC: 88 MG/DL

## 2018-07-18 ENCOUNTER — OFFICE VISIT (OUTPATIENT)
Dept: ORTHOPEDIC SURGERY | Age: 63
End: 2018-07-18

## 2018-07-18 VITALS
HEIGHT: 69 IN | WEIGHT: 216 LBS | SYSTOLIC BLOOD PRESSURE: 138 MMHG | DIASTOLIC BLOOD PRESSURE: 74 MMHG | BODY MASS INDEX: 31.99 KG/M2 | HEART RATE: 53 BPM

## 2018-07-18 DIAGNOSIS — M79.642 HAND PAIN, LEFT: Primary | ICD-10-CM

## 2018-07-18 DIAGNOSIS — I10 ESSENTIAL HYPERTENSION: ICD-10-CM

## 2018-07-18 PROCEDURE — 99203 OFFICE O/P NEW LOW 30 MIN: CPT | Performed by: ORTHOPAEDIC SURGERY

## 2018-07-19 RX ORDER — RAMIPRIL 2.5 MG/1
CAPSULE ORAL
Qty: 30 CAPSULE | Refills: 3 | Status: SHIPPED | OUTPATIENT
Start: 2018-07-19 | End: 2019-03-04

## 2018-07-30 NOTE — PROGRESS NOTES
by Does not apply route daily 100 each 3    Blood Glucose Monitoring Suppl (FREESTYLE LITE) CLAUDINE 1 Device by Does not apply route 2 times daily 1 Device 0    ipratropium (ATROVENT) 0.03 % nasal spray 2 sprays by Nasal route every 12 hours 1 Bottle 5    pioglitazone (ACTOS) 30 MG tablet Take 1 tablet by mouth daily 30 tablet 5    albuterol sulfate  (90 BASE) MCG/ACT inhaler Inhale 2 puffs into the lungs every 6 hours as needed for Wheezing      ramipril (ALTACE) 2.5 MG capsule TAKE 1 CAPSULE BY MOUTH ONE TIME A DAY  30 capsule 3     No current facility-administered medications for this visit. Past Medical History:   Diagnosis Date    Abdominal pain, LLQ     Allergic rhinitis     Atopic dermatitis     [facility]     melanoma    Diabetes mellitus (HCC)     Erectile dysfunction     Generalized osteoarthrosis, involving multiple sites     GERD (gastroesophageal reflux disease)     Gynecomastia     Hyperlipidemia     Nasal congestion     Nipple pain     Pseudo-gout     Unspecified essential hypertension      Past Surgical History:   Procedure Laterality Date    CARPAL TUNNEL RELEASE Bilateral 2013    FINGER SURGERY Left 3-1-2013    Incision & Drainage of left Thumb Infection    FOOT SURGERY Right     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 Review of Systems:  Relevant review of systems reviewed and available in the his chart    Vital Signs:  /74   Pulse 53   Ht 5' 9\" (1.753 m)   Wt 216 lb (98 kg)   BMI 31.90 kg/m²     General Exam:   Constitutional: Patient is adequately groomed with no evidence of malnutrition  Mental Status: The patient is oriented to time, place and person. The patient's mood and affect are appropriate. Neurological: The patient has good coordination. There is no focal weakness or sensory deficit.     Left

## 2018-08-01 ENCOUNTER — OFFICE VISIT (OUTPATIENT)
Dept: FAMILY MEDICINE CLINIC | Age: 63
End: 2018-08-01

## 2018-08-01 VITALS
SYSTOLIC BLOOD PRESSURE: 138 MMHG | OXYGEN SATURATION: 98 % | HEART RATE: 53 BPM | WEIGHT: 215 LBS | TEMPERATURE: 97.8 F | BODY MASS INDEX: 31.75 KG/M2 | DIASTOLIC BLOOD PRESSURE: 80 MMHG

## 2018-08-01 DIAGNOSIS — N39.0 URINARY TRACT INFECTION WITH HEMATURIA, SITE UNSPECIFIED: ICD-10-CM

## 2018-08-01 DIAGNOSIS — R31.9 URINARY TRACT INFECTION WITH HEMATURIA, SITE UNSPECIFIED: ICD-10-CM

## 2018-08-01 DIAGNOSIS — R10.30 LOWER ABDOMINAL PAIN: Primary | ICD-10-CM

## 2018-08-01 LAB
BACTERIA URINE, POC: 0
BILIRUBIN URINE: 0 MG/DL
BLOOD, URINE: POSITIVE
CASTS URINE, POC: 0
CLARITY: CLEAR
COLOR: YELLOW
CRYSTALS URINE, POC: 0
EPI CELLS URINE, POC: ABNORMAL
GLUCOSE URINE: NEGATIVE
KETONES, URINE: NEGATIVE
LEUKOCYTE EST, POC: NEGATIVE
NITRITE, URINE: NEGATIVE
PH UA: 5.5 (ref 4.5–8)
PROTEIN UA: POSITIVE
RBC URINE, POC: 0
SPECIFIC GRAVITY UA: 1.02 (ref 1–1.03)
UROBILINOGEN, URINE: NORMAL
WBC URINE, POC: 0
YEAST URINE, POC: 0

## 2018-08-01 PROCEDURE — 99213 OFFICE O/P EST LOW 20 MIN: CPT | Performed by: PHYSICIAN ASSISTANT

## 2018-08-01 PROCEDURE — 81000 URINALYSIS NONAUTO W/SCOPE: CPT | Performed by: PHYSICIAN ASSISTANT

## 2018-08-01 RX ORDER — PHENAZOPYRIDINE HYDROCHLORIDE 200 MG/1
200 TABLET, FILM COATED ORAL 3 TIMES DAILY PRN
Qty: 15 TABLET | Refills: 0 | Status: SHIPPED | OUTPATIENT
Start: 2018-08-01 | End: 2020-01-02 | Stop reason: SDUPTHER

## 2018-08-01 RX ORDER — CIPROFLOXACIN 500 MG/1
500 TABLET, FILM COATED ORAL 2 TIMES DAILY
Qty: 20 TABLET | Refills: 0 | Status: SHIPPED | OUTPATIENT
Start: 2018-08-01 | End: 2018-08-11

## 2018-08-01 ASSESSMENT — ENCOUNTER SYMPTOMS
SHORTNESS OF BREATH: 0
ABDOMINAL PAIN: 1
DIARRHEA: 0
CONSTIPATION: 0
BACK PAIN: 0
NAUSEA: 0
VOMITING: 0

## 2018-08-02 LAB — URINE CULTURE, ROUTINE: NORMAL

## 2018-08-10 ENCOUNTER — NURSE ONLY (OUTPATIENT)
Dept: FAMILY MEDICINE CLINIC | Age: 63
End: 2018-08-10

## 2018-08-10 DIAGNOSIS — N39.0 URINARY TRACT INFECTION WITH HEMATURIA, SITE UNSPECIFIED: Primary | ICD-10-CM

## 2018-08-10 DIAGNOSIS — R31.9 URINARY TRACT INFECTION WITH HEMATURIA, SITE UNSPECIFIED: Primary | ICD-10-CM

## 2018-08-10 LAB
BACTERIA URINE, POC: 0
BILIRUBIN URINE: 1 MG/DL
BLOOD, URINE: POSITIVE
CASTS URINE, POC: 0
CLARITY: CLEAR
COLOR: YELLOW
CRYSTALS URINE, POC: 0
EPI CELLS URINE, POC: 0
GLUCOSE URINE: ABNORMAL
KETONES, URINE: POSITIVE
LEUKOCYTE EST, POC: 0
NITRITE, URINE: NEGATIVE
PH UA: 5.5 (ref 4.5–8)
PROTEIN UA: POSITIVE
RBC URINE, POC: 0
SPECIFIC GRAVITY UA: 1.03 (ref 1–1.03)
UROBILINOGEN, URINE: NORMAL
WBC URINE, POC: 0
YEAST URINE, POC: 0

## 2018-08-10 PROCEDURE — 81000 URINALYSIS NONAUTO W/SCOPE: CPT | Performed by: PHYSICIAN ASSISTANT

## 2018-08-10 NOTE — PROGRESS NOTES
Patient is here for a recheck urine, not having any symptoms, ATB completed. Per AJ- urine shows trace of blood, nothing else to do at this point, but next time in for a follow up with WM can have  Urine rechecked to make sure it is completley clear.

## 2018-08-14 ENCOUNTER — TELEPHONE (OUTPATIENT)
Dept: FAMILY MEDICINE CLINIC | Age: 63
End: 2018-08-14

## 2018-08-14 DIAGNOSIS — M54.5 LOW BACK PAIN, UNSPECIFIED BACK PAIN LATERALITY, UNSPECIFIED CHRONICITY, WITH SCIATICA PRESENCE UNSPECIFIED: ICD-10-CM

## 2018-08-14 DIAGNOSIS — M25.551 RIGHT HIP PAIN: Primary | ICD-10-CM

## 2018-08-17 RX ORDER — GLIMEPIRIDE 4 MG/1
TABLET ORAL
Qty: 30 TABLET | Refills: 4 | Status: SHIPPED | OUTPATIENT
Start: 2018-08-17 | End: 2019-01-15 | Stop reason: SDUPTHER

## 2018-08-20 RX ORDER — PANTOPRAZOLE SODIUM 40 MG/1
TABLET, DELAYED RELEASE ORAL
Qty: 30 TABLET | Refills: 4 | Status: SHIPPED | OUTPATIENT
Start: 2018-08-20 | End: 2018-12-21 | Stop reason: SDUPTHER

## 2018-08-22 ENCOUNTER — TELEPHONE (OUTPATIENT)
Dept: FAMILY MEDICINE CLINIC | Age: 63
End: 2018-08-22

## 2018-08-22 NOTE — TELEPHONE ENCOUNTER
Jerrodalina Mom called; would not give details; states last time Mr. Hightower saw Ann Chimera they were \"charged\" and wants to speak with Kristal Monroe only. Please call.

## 2018-08-28 ENCOUNTER — OFFICE VISIT (OUTPATIENT)
Dept: ORTHOPEDIC SURGERY | Age: 63
End: 2018-08-28

## 2018-08-28 VITALS
DIASTOLIC BLOOD PRESSURE: 81 MMHG | HEART RATE: 79 BPM | WEIGHT: 215 LBS | BODY MASS INDEX: 31.84 KG/M2 | HEIGHT: 69 IN | SYSTOLIC BLOOD PRESSURE: 132 MMHG

## 2018-08-28 DIAGNOSIS — M41.125 ADOLESCENT IDIOPATHIC SCOLIOSIS OF THORACOLUMBAR REGION: ICD-10-CM

## 2018-08-28 DIAGNOSIS — M54.50 PAIN OF LUMBAR SPINE: ICD-10-CM

## 2018-08-28 DIAGNOSIS — M47.816 SPONDYLOSIS OF LUMBAR REGION WITHOUT MYELOPATHY OR RADICULOPATHY: Primary | ICD-10-CM

## 2018-08-28 DIAGNOSIS — M51.36 DDD (DEGENERATIVE DISC DISEASE), LUMBAR: ICD-10-CM

## 2018-08-28 PROCEDURE — 99243 OFF/OP CNSLTJ NEW/EST LOW 30: CPT | Performed by: PHYSICAL MEDICINE & REHABILITATION

## 2018-08-28 RX ORDER — TRAMADOL HYDROCHLORIDE 50 MG/1
50 TABLET ORAL 4 TIMES DAILY PRN
COMMUNITY
End: 2019-03-04

## 2018-08-28 RX ORDER — METHYLPREDNISOLONE 4 MG/1
TABLET ORAL
Qty: 1 KIT | Refills: 0 | Status: SHIPPED | OUTPATIENT
Start: 2018-08-28 | End: 2018-09-03

## 2018-08-28 NOTE — PROGRESS NOTES
negative. Fortunato's testing is negative bilaterally. FADIR's testing is negative bilaterally. · Skin: There are no rashes, ulcerations or lesions. · Reflexes: Reflexes are symmetrically 2+ at the patellar and ankle tendons. Clonus absent bilaterally at the feet. · Gait & station: normal, patient ambulates without assistance  · Additional Examinations:  · RIGHT LOWER EXTREMITY: Inspection/examination of the right lower extremity does not show any tenderness, deformity or injury. Range of motion is unremarkable. There is no gross instability. There are no rashes, ulcerations or lesions. Strength and tone are normal. No atrophy or abnormal movements are noted. · LEFT LOWER EXTREMITY:  Inspection/examination of the left lower extremity does not show any tenderness, deformity or injury. Range of motion is unremarkable. There is no gross instability. There are no rashes, ulcerations or lesions. Strength and tone are normal. No atrophy or abnormal movements are noted. Diagnostic Testing:    Xrays:   AP and lateral of the lumbar spine taken today in the office show dextroscoliosis with L4-L5 degenerative disc disease and lumbar facet hypertrophy and lower lumbar spine  MRI or CT:  None  EMG:  None  Results for orders placed or performed in visit on 08/10/18   POC URINE with Microscopic   Result Value Ref Range    Color, UA Yellow     Clarity, UA Clear Clear    Glucose, Ur 100 mg /dl     Bilirubin Urine 1 mg/dL    Ketones, Urine Positive     Specific Gravity, UA 1.030 1.005 - 1.030    Blood, Urine Positive     pH, UA 5.5 4.5 - 8.0    Protein, UA Positive (A) Negative    Nitrite, Urine Negative     Leukocytes, UA 0     Urobilinogen, Urine Normal     rbc urine, poc 0     wbc urine, poc 0     bacteria urine, poc 0     yeast urine, poc 0     casts urine, poc 0     epi cells urine, poc 0     crystals urine, poc 0        Impression (Medical Decision Making):       1.  Spondylosis of lumbar region without myelopathy or

## 2018-08-29 ENCOUNTER — TELEPHONE (OUTPATIENT)
Dept: ORTHOPEDIC SURGERY | Age: 63
End: 2018-08-29

## 2018-08-29 DIAGNOSIS — M15.9 GENERALIZED OSTEOARTHROSIS, INVOLVING MULTIPLE SITES: ICD-10-CM

## 2018-08-29 RX ORDER — TRAMADOL HYDROCHLORIDE 50 MG/1
TABLET ORAL
Qty: 120 TABLET | Refills: 0 | Status: SHIPPED | OUTPATIENT
Start: 2018-08-29 | End: 2018-09-05

## 2018-09-04 ENCOUNTER — TELEPHONE (OUTPATIENT)
Dept: FAMILY MEDICINE CLINIC | Age: 63
End: 2018-09-04

## 2018-09-04 DIAGNOSIS — E78.00 PURE HYPERCHOLESTEROLEMIA: ICD-10-CM

## 2018-09-04 RX ORDER — SIMVASTATIN 40 MG
TABLET ORAL
Qty: 30 TABLET | Refills: 1 | Status: SHIPPED | OUTPATIENT
Start: 2018-09-04 | End: 2018-10-17 | Stop reason: SDUPTHER

## 2018-09-14 ENCOUNTER — OFFICE VISIT (OUTPATIENT)
Dept: ORTHOPEDIC SURGERY | Age: 63
End: 2018-09-14

## 2018-09-14 VITALS
DIASTOLIC BLOOD PRESSURE: 74 MMHG | SYSTOLIC BLOOD PRESSURE: 135 MMHG | BODY MASS INDEX: 31.84 KG/M2 | WEIGHT: 215 LBS | HEIGHT: 69 IN

## 2018-09-14 DIAGNOSIS — M51.36 DDD (DEGENERATIVE DISC DISEASE), LUMBAR: ICD-10-CM

## 2018-09-14 DIAGNOSIS — M47.816 SPONDYLOSIS OF LUMBAR REGION WITHOUT MYELOPATHY OR RADICULOPATHY: ICD-10-CM

## 2018-09-14 DIAGNOSIS — M51.26 HNP (HERNIATED NUCLEUS PULPOSUS), LUMBAR: Primary | ICD-10-CM

## 2018-09-14 PROCEDURE — 99213 OFFICE O/P EST LOW 20 MIN: CPT | Performed by: PHYSICAL MEDICINE & REHABILITATION

## 2018-09-14 NOTE — LETTER
Please schedule the following with:     Date:   2018 @ 11:20   Account: [de-identified]  Patient: Micah Quinteros    : 1955  Address:  [address]    Phone (H):  [phone] (home) [phone] (work)     ----------------------------------------------------------------------------------------------  Diagnosis:     ICD-10-CM ICD-9-CM    1. HNP (herniated nucleus pulposus), lumbar M51.26 722.10    2. DDD (degenerative disc disease), lumbar M51.36 722.52    3. Spondylosis of lumbar region without myelopathy or radiculopathy M47.816 721.3          Levels: L4/L5  on the right  Interlaminar DAVID  CPT Codes 89999    ----------------------------------------------------------------------------------------------  Injection #   [id number]    Attending Physician       Roger Fowler MD.      ----------------------------------------------------------------------------------------------  Injection Scheduled For:    At:    [address] [facility] [facility]    Pre-Cert#    2nd Insurance     Pre-Cert#    Comments:    · Infection control  · Tested positive for MRSA in past 12 months:  no  · Tested positive for MSSA \"staph infection\" in past 12 months: no  · Tested positive for VRE (Vancomycin Resistant Enterococci) in past 12 months:   no  · Currently on any antibiotics for an infection: no  · Anticoagulants:  · On a blood thinner:  no   · Any history of bleeding disorder: no   · Advanced Liver disease: no   · Advanced Renal disease: no   · Glaucoma: no   · Diabetes: yes     Sedation:  No  -----------------------------------------------------------------------------------------------  No Known Allergies

## 2018-09-17 ENCOUNTER — TELEPHONE (OUTPATIENT)
Dept: ORTHOPEDIC SURGERY | Age: 63
End: 2018-09-17

## 2018-09-17 NOTE — TELEPHONE ENCOUNTER
Auth: NPR  Date: 9/24/18  CPT: 34353, 36685 83, 10096  DX: M51.26, M51.36, M47.816   Outpatient  Procedure: DAVID  SX Location: [facility]    Insurance: [address] [place]  Physician: ODESSA

## 2018-09-24 ENCOUNTER — HOSPITAL ENCOUNTER (OUTPATIENT)
Age: 63
Discharge: HOME | End: 2018-09-24
Attending: PHYSICAL MEDICINE & REHABILITATION | Admitting: PHYSICAL MEDICINE & REHABILITATION
Payer: COMMERCIAL

## 2018-09-24 ENCOUNTER — APPOINTMENT (OUTPATIENT)
Dept: GENERAL RADIOLOGY | Age: 63
End: 2018-09-24
Attending: PHYSICAL MEDICINE & REHABILITATION
Payer: COMMERCIAL

## 2018-09-24 VITALS
SYSTOLIC BLOOD PRESSURE: 154 MMHG | RESPIRATION RATE: 16 BRPM | TEMPERATURE: 97.8 F | WEIGHT: 215 LBS | HEIGHT: 69 IN | DIASTOLIC BLOOD PRESSURE: 84 MMHG | BODY MASS INDEX: 31.84 KG/M2 | OXYGEN SATURATION: 99 % | HEART RATE: 48 BPM

## 2018-09-24 LAB
GLUCOSE BLD-MCNC: 150 MG/DL (ref 70–99)
HCT VFR BLD CALC: 46.5 % (ref 40.5–52.5)
HEMOGLOBIN: 16 G/DL (ref 13.5–17.5)
INR BLD: 1.08 (ref 0.86–1.14)
MCH RBC QN AUTO: 33 PG (ref 26–34)
MCHC RBC AUTO-ENTMCNC: 34.5 G/DL (ref 31–36)
MCV RBC AUTO: 95.7 FL (ref 80–100)
PDW BLD-RTO: 13.7 % (ref 12.4–15.4)
PERFORMED ON: ABNORMAL
PLATELET # BLD: 156 K/UL (ref 135–450)
PMV BLD AUTO: 8.4 FL (ref 5–10.5)
PROTHROMBIN TIME: 12.3 SEC (ref 9.8–13)
RBC # BLD: 4.86 M/UL (ref 4.2–5.9)
WBC # BLD: 5.9 K/UL (ref 4–11)

## 2018-09-24 PROCEDURE — 7100000011 HC PHASE II RECOVERY - ADDTL 15 MIN: Performed by: PHYSICAL MEDICINE & REHABILITATION

## 2018-09-24 PROCEDURE — 2580000003 HC RX 258: Performed by: PHYSICAL MEDICINE & REHABILITATION

## 2018-09-24 PROCEDURE — 7100000010 HC PHASE II RECOVERY - FIRST 15 MIN: Performed by: PHYSICAL MEDICINE & REHABILITATION

## 2018-09-24 PROCEDURE — 3610000054 HC PAIN LEVEL 3 BASE (NON-OR): Performed by: PHYSICAL MEDICINE & REHABILITATION

## 2018-09-24 PROCEDURE — 77003 FLUOROGUIDE FOR SPINE INJECT: CPT

## 2018-09-24 PROCEDURE — 36415 COLL VENOUS BLD VENIPUNCTURE: CPT

## 2018-09-24 PROCEDURE — 85027 COMPLETE CBC AUTOMATED: CPT

## 2018-09-24 PROCEDURE — 99152 MOD SED SAME PHYS/QHP 5/>YRS: CPT | Performed by: PHYSICAL MEDICINE & REHABILITATION

## 2018-09-24 PROCEDURE — 6360000004 HC RX CONTRAST MEDICATION: Performed by: PHYSICAL MEDICINE & REHABILITATION

## 2018-09-24 PROCEDURE — 2709999900 HC NON-CHARGEABLE SUPPLY: Performed by: PHYSICAL MEDICINE & REHABILITATION

## 2018-09-24 PROCEDURE — 2500000003 HC RX 250 WO HCPCS: Performed by: PHYSICAL MEDICINE & REHABILITATION

## 2018-09-24 PROCEDURE — 6360000002 HC RX W HCPCS: Performed by: PHYSICAL MEDICINE & REHABILITATION

## 2018-09-24 PROCEDURE — 85610 PROTHROMBIN TIME: CPT

## 2018-09-24 RX ORDER — LIDOCAINE HYDROCHLORIDE 10 MG/ML
INJECTION, SOLUTION INFILTRATION; PERINEURAL
Status: COMPLETED | OUTPATIENT
Start: 2018-09-24 | End: 2018-09-24

## 2018-09-24 RX ORDER — METHYLPREDNISOLONE ACETATE 80 MG/ML
INJECTION, SUSPENSION INTRA-ARTICULAR; INTRALESIONAL; INTRAMUSCULAR; SOFT TISSUE
Status: COMPLETED | OUTPATIENT
Start: 2018-09-24 | End: 2018-09-24

## 2018-09-24 RX ORDER — MIDAZOLAM HYDROCHLORIDE 1 MG/ML
INJECTION INTRAMUSCULAR; INTRAVENOUS
Status: COMPLETED | OUTPATIENT
Start: 2018-09-24 | End: 2018-09-24

## 2018-09-24 RX ORDER — 0.9 % SODIUM CHLORIDE 0.9 %
VIAL (ML) INJECTION
Status: COMPLETED | OUTPATIENT
Start: 2018-09-24 | End: 2018-09-24

## 2018-09-24 RX ADMIN — IOPAMIDOL 1 ML: 612 INJECTION, SOLUTION INTRATHECAL at 11:41

## 2018-09-24 ASSESSMENT — PAIN SCALES - GENERAL
PAINLEVEL_OUTOF10: 0
PAINLEVEL_OUTOF10: 0

## 2018-09-24 ASSESSMENT — PAIN - FUNCTIONAL ASSESSMENT: PAIN_FUNCTIONAL_ASSESSMENT: 0-10

## 2018-09-24 ASSESSMENT — PAIN DESCRIPTION - DESCRIPTORS: DESCRIPTORS: ACHING

## 2018-10-12 ENCOUNTER — OFFICE VISIT (OUTPATIENT)
Dept: ORTHOPEDIC SURGERY | Age: 63
End: 2018-10-12
Payer: COMMERCIAL

## 2018-10-12 VITALS
HEIGHT: 69 IN | WEIGHT: 210 LBS | BODY MASS INDEX: 31.1 KG/M2 | SYSTOLIC BLOOD PRESSURE: 129 MMHG | DIASTOLIC BLOOD PRESSURE: 80 MMHG

## 2018-10-12 DIAGNOSIS — M54.16 LUMBAR RADICULOPATHY: ICD-10-CM

## 2018-10-12 DIAGNOSIS — M51.26 LUMBAR DISC HERNIATION: Primary | ICD-10-CM

## 2018-10-12 PROCEDURE — 99213 OFFICE O/P EST LOW 20 MIN: CPT | Performed by: PHYSICAL MEDICINE & REHABILITATION

## 2018-10-12 NOTE — PROGRESS NOTES
Pt presents to ED ambulatory with complaint(s) of left-sided chest tightness and dry coughing since this morning. Pt denies any nausea, vomiting, dizziness, or shortness of breath. Pt reports she took ibuprofen this afternoon with no relief of pain. Pt is alert and oriented x4 and in no apparent distress. Emergency Department Nursing Plan of Care       The Nursing Plan of Care is developed from the Nursing assessment and Emergency Department Attending provider initial evaluation. The plan of care may be reviewed in the ED Provider note.     The Plan of Care was developed with the following considerations:   Patient / Family readiness to learn indicated by:verbalized understanding  Persons(s) to be included in education: patient  Barriers to Learning/Limitations:No    Signed     Yuki Segundo RN    6/18/2017   8:06 PM tenderness bilaterally at the paraspinal or trochanters. There is no paraspinal spasm. · Range of Motion: limited by 25% in all planes due to pain  · Strength:   Strength testing is 5/5 in all muscle groups tested. · Special Tests:   Straight leg raise and crossed SLR negative. · Skin: There are no rashes, ulcerations or lesions. · Reflexes: Reflexes are symmetrically 2+ at the patellar and ankle tendons. Clonus absent bilaterally at the feet. · Gait & station: normal, patient ambulates without assistance  · Additional Examinations:  · RIGHT LOWER EXTREMITY: Inspection/examination of the right lower extremity does not show any tenderness, deformity or injury. Range of motion is normal and pain-free. There is no gross instability. There are no rashes, ulcerations or lesions. Strength and tone are normal. No atrophy or abnormal movements are noted. · LEFT LOWER EXTREMITY:  Inspection/examination of the left lower extremity does not show any tenderness, deformity or injury. Range of motion is normal and pain-free. There is no gross instability. There are no rashes, ulcerations or lesions. Strength and tone are normal. No atrophy or abnormal movements are noted. Diagnostic Testing:    MR Lumbar spine shows  9/7/18  1. No evidence of metastatic disease is identified. 2. Broad-based disc displacement with rightward predominance, a 1.2 cm thin inferiorly    migrating right paracentral soft disc extrusion at the L4-5 level and moderate facet    hypertrophy without compressive discopathy.    3. Shallow disc displacement at the L2-3 level and a broad-based disc displacement at the L3-4    level again without compressive discopathy.           Results for orders placed or performed during the hospital encounter of 09/24/18   Protime-INR   Result Value Ref Range    Protime 12.3 9.8 - 13.0 sec    INR 1.08 0.86 - 1.14   CBC   Result Value Ref Range    WBC 5.9 4.0 - 11.0 K/uL    RBC 4.86 4.20 - 5.90 M/uL

## 2018-10-17 DIAGNOSIS — E78.00 PURE HYPERCHOLESTEROLEMIA: ICD-10-CM

## 2018-10-17 RX ORDER — SIMVASTATIN 40 MG
TABLET ORAL
Qty: 30 TABLET | Refills: 0 | Status: SHIPPED | OUTPATIENT
Start: 2018-10-17 | End: 2019-03-04 | Stop reason: SDUPTHER

## 2018-11-28 ENCOUNTER — OFFICE VISIT (OUTPATIENT)
Dept: ORTHOPEDIC SURGERY | Age: 63
End: 2018-11-28
Payer: COMMERCIAL

## 2018-11-28 VITALS
DIASTOLIC BLOOD PRESSURE: 77 MMHG | HEART RATE: 52 BPM | WEIGHT: 210.1 LBS | BODY MASS INDEX: 31.12 KG/M2 | SYSTOLIC BLOOD PRESSURE: 142 MMHG | HEIGHT: 69 IN

## 2018-11-28 DIAGNOSIS — M51.26 HNP (HERNIATED NUCLEUS PULPOSUS), LUMBAR: Primary | ICD-10-CM

## 2018-11-28 DIAGNOSIS — M51.36 DDD (DEGENERATIVE DISC DISEASE), LUMBAR: ICD-10-CM

## 2018-11-28 DIAGNOSIS — I10 ESSENTIAL HYPERTENSION: ICD-10-CM

## 2018-11-28 DIAGNOSIS — M54.16 LUMBAR RADICULOPATHY: ICD-10-CM

## 2018-11-28 PROCEDURE — 99214 OFFICE O/P EST MOD 30 MIN: CPT | Performed by: PHYSICAL MEDICINE & REHABILITATION

## 2018-11-28 NOTE — PROGRESS NOTES
any tenderness, deformity or injury. Range of motion is normal and pain-free. There is no gross instability. There are no rashes, ulcerations or lesions. Strength and tone are normal. No atrophy or abnormal movements are noted. · LEFT LOWER EXTREMITY:  Inspection/examination of the left lower extremity does not show any tenderness, deformity or injury. Range of motion is normal and pain-free. There is no gross instability. There are no rashes, ulcerations or lesions. Strength and tone are normal. No atrophy or abnormal movements are noted. Diagnostic Testing:    MR Lumbar spine shows    1. No evidence of metastatic disease is identified. 2. Broad-based disc displacement with rightward predominance, a 1.2 cm thin inferiorly    migrating right paracentral soft disc extrusion at the L4-5 level and moderate facet    hypertrophy without compressive discopathy. 3. Shallow disc displacement at the L2-3 level and a broad-based disc displacement at the L3-4    level again without compressive discopathy. Results for orders placed or performed during the hospital encounter of 09/24/18   Protime-INR   Result Value Ref Range    Protime 12.3 9.8 - 13.0 sec    INR 1.08 0.86 - 1.14   CBC   Result Value Ref Range    WBC 5.9 4.0 - 11.0 K/uL    RBC 4.86 4.20 - 5.90 M/uL    Hemoglobin 16.0 13.5 - 17.5 g/dL    Hematocrit 46.5 40.5 - 52.5 %    MCV 95.7 80.0 - 100.0 fL    MCH 33.0 26.0 - 34.0 pg    MCHC 34.5 31.0 - 36.0 g/dL    RDW 13.7 12.4 - 15.4 %    Platelets 385 571 - 170 K/uL    MPV 8.4 5.0 - 10.5 fL   POCT Glucose   Result Value Ref Range    POC Glucose 150 (H) 70 - 99 mg/dl    Performed on ACCU-CHEK      Impression:       1. HNP (herniated nucleus pulposus), lumbar    2. Lumbar radiculopathy    3. DDD (degenerative disc disease), lumbar        Plan:  Clinical Course: Above diagnoses are worsening    I discussed the diagnosis and the treatment options with Tristin Verma today.      In Summary:  The various treatment

## 2018-11-28 NOTE — LETTER
Please schedule the following with:     Date:   2018 @ 10:30     Account: [de-identified]  Patient: Shanna Pedersen    : 1955  Address:  [address] [place] [address]    Phone (H):  [phone] (home)      ----------------------------------------------------------------------------------------------  Diagnosis:     ICD-10-CM    1. HNP (herniated nucleus pulposus), lumbar M51.26    2. Lumbar radiculopathy M54.16    3. DDD (degenerative disc disease), lumbar M51.36          Levels: L4/L5  on the right  Interlaminar DAVID  CPT Codes 78530    ----------------------------------------------------------------------------------------------  Injection #   [id number]    Attending Physician       Kiki Salas.  Alessandro Taylor MD.      ----------------------------------------------------------------------------------------------  Injection Scheduled For:    At:    [address]    Pre-Cert#    2nd Insurance     Pre-Cert#    Comments:    · Infection control  · Tested positive for MRSA in past 12 months:  no  · Tested positive for MSSA \"staph infection\" in past 12 months: no  · Tested positive for VRE (Vancomycin Resistant Enterococci) in past 12 months:   no  · Currently on any antibiotics for an infection: no  · Anticoagulants:  · On a blood thinner:  no   · Any history of bleeding disorder: no   · Advanced Liver disease: no   · Advanced Renal disease: no   · Glaucoma: no   · Diabetes: YES    Sedation:  No  -----------------------------------------------------------------------------------------------  No Known Allergies

## 2018-11-29 RX ORDER — RAMIPRIL 2.5 MG/1
CAPSULE ORAL
Qty: 30 CAPSULE | Refills: 1 | OUTPATIENT
Start: 2018-11-29

## 2018-12-04 ENCOUNTER — TELEPHONE (OUTPATIENT)
Dept: ORTHOPEDIC SURGERY | Age: 63
End: 2018-12-04

## 2018-12-07 ENCOUNTER — OFFICE VISIT (OUTPATIENT)
Dept: ORTHOPEDIC SURGERY | Age: 63
End: 2018-12-07
Payer: COMMERCIAL

## 2018-12-07 VITALS — WEIGHT: 210 LBS | BODY MASS INDEX: 31.1 KG/M2 | HEIGHT: 69 IN

## 2018-12-07 DIAGNOSIS — M51.26 LUMBAR DISC HERNIATION: Primary | ICD-10-CM

## 2018-12-07 DIAGNOSIS — M51.36 DDD (DEGENERATIVE DISC DISEASE), LUMBAR: ICD-10-CM

## 2018-12-07 DIAGNOSIS — M54.16 LUMBAR RADICULOPATHY: ICD-10-CM

## 2018-12-07 DIAGNOSIS — G89.4 CHRONIC PAIN SYNDROME: ICD-10-CM

## 2018-12-07 PROCEDURE — 99214 OFFICE O/P EST MOD 30 MIN: CPT | Performed by: PHYSICAL MEDICINE & REHABILITATION

## 2018-12-07 RX ORDER — TRAMADOL HYDROCHLORIDE 50 MG/1
50 TABLET ORAL 2 TIMES DAILY PRN
Qty: 60 TABLET | Refills: 0 | Status: SHIPPED | OUTPATIENT
Start: 2018-12-07 | End: 2019-01-06

## 2018-12-07 NOTE — LETTER
supplements and oral decongestants. Dependence withdrawal symptoms may include depressed mood, loss of interest, suicidal thoughts, anxiety, fatigue, appetite changes and agitation. Testosterone replacement therapy:  Potential side effects include increased risk of stroke and heart attack, blood clots, increased blood pressure, increased cholesterol, enlarged prostate, sleep apnea, irritability/aggression and other mood disorders, and decreased fertility. Other:     1. I understand that I have the following responsibilities:  · I will take medications at the dose and frequency prescribed. · I will not increase or change how I take my medications without the approval of the health care provider who signs this Medication Agreement. · I will arrange for refills at the prescribed interval ONLY during regular office hours. I will not ask for refills earlier than agreed, after-hours, on holidays or on weekends. · I will obtain all refills for these medications at [address] [place] [address] ([phone]), with full consent for my provider and pharmacist to exchange information in writing or verbally. · I will not request any pain medications or controlled substances from other providers and will inform this provider of all other medications I am taking. · I will inform my other health care providers that I am taking these medications and of the existence of this [address]. In the event of an emergency, I will provide the same information to the emergency department providers. · I will protect my prescriptions and medications. I understand that lost or misplaced prescriptions will not be replaced. · I will keep medications only for my own use and will not share them with others. I will keep all medications away from children. · I agree to participate in any medical, psychological or psychiatric assessments recommended by my provider.

## 2018-12-12 ENCOUNTER — HOSPITAL ENCOUNTER (OUTPATIENT)
Age: 63
Setting detail: OUTPATIENT SURGERY
Discharge: HOME | End: 2018-12-12
Attending: PHYSICAL MEDICINE & REHABILITATION | Admitting: PHYSICAL MEDICINE & REHABILITATION
Payer: COMMERCIAL

## 2018-12-12 ENCOUNTER — APPOINTMENT (OUTPATIENT)
Dept: GENERAL RADIOLOGY | Age: 63
End: 2018-12-12
Attending: PHYSICAL MEDICINE & REHABILITATION
Payer: COMMERCIAL

## 2018-12-12 ENCOUNTER — TELEPHONE (OUTPATIENT)
Dept: ORTHOPEDIC SURGERY | Age: 63
End: 2018-12-12

## 2018-12-12 VITALS
HEIGHT: 69 IN | HEART RATE: 44 BPM | DIASTOLIC BLOOD PRESSURE: 72 MMHG | TEMPERATURE: 97 F | OXYGEN SATURATION: 98 % | RESPIRATION RATE: 16 BRPM | BODY MASS INDEX: 31.84 KG/M2 | SYSTOLIC BLOOD PRESSURE: 132 MMHG | WEIGHT: 215 LBS

## 2018-12-12 LAB
GLUCOSE BLD-MCNC: 163 MG/DL (ref 70–99)
PERFORMED ON: ABNORMAL

## 2018-12-12 PROCEDURE — 3610000054 HC PAIN LEVEL 3 BASE (NON-OR): Performed by: PHYSICAL MEDICINE & REHABILITATION

## 2018-12-12 PROCEDURE — 6360000002 HC RX W HCPCS: Performed by: PHYSICAL MEDICINE & REHABILITATION

## 2018-12-12 PROCEDURE — 77003 FLUOROGUIDE FOR SPINE INJECT: CPT

## 2018-12-12 PROCEDURE — 2709999900 HC NON-CHARGEABLE SUPPLY: Performed by: PHYSICAL MEDICINE & REHABILITATION

## 2018-12-12 PROCEDURE — 2500000003 HC RX 250 WO HCPCS: Performed by: PHYSICAL MEDICINE & REHABILITATION

## 2018-12-12 PROCEDURE — 99152 MOD SED SAME PHYS/QHP 5/>YRS: CPT | Performed by: PHYSICAL MEDICINE & REHABILITATION

## 2018-12-12 RX ORDER — MIDAZOLAM HYDROCHLORIDE 1 MG/ML
INJECTION INTRAMUSCULAR; INTRAVENOUS
Status: COMPLETED | OUTPATIENT
Start: 2018-12-12 | End: 2018-12-12

## 2018-12-12 RX ORDER — BUPIVACAINE HYDROCHLORIDE 5 MG/ML
INJECTION, SOLUTION PERINEURAL
Status: COMPLETED | OUTPATIENT
Start: 2018-12-12 | End: 2018-12-12

## 2018-12-12 RX ORDER — METHYLPREDNISOLONE ACETATE 80 MG/ML
INJECTION, SUSPENSION INTRA-ARTICULAR; INTRALESIONAL; INTRAMUSCULAR; SOFT TISSUE
Status: COMPLETED | OUTPATIENT
Start: 2018-12-12 | End: 2018-12-12

## 2018-12-12 RX ORDER — LIDOCAINE HYDROCHLORIDE 10 MG/ML
INJECTION, SOLUTION INFILTRATION; PERINEURAL
Status: COMPLETED | OUTPATIENT
Start: 2018-12-12 | End: 2018-12-12

## 2018-12-12 ASSESSMENT — PAIN - FUNCTIONAL ASSESSMENT: PAIN_FUNCTIONAL_ASSESSMENT: 0-10

## 2018-12-12 ASSESSMENT — PAIN SCALES - GENERAL: PAINLEVEL_OUTOF10: 1

## 2018-12-12 ASSESSMENT — PAIN DESCRIPTION - DESCRIPTORS: DESCRIPTORS: ACHING;CONSTANT

## 2018-12-12 NOTE — OP NOTE
Patient:  Мария Rand  YOB: 1955  Medical Record #:  [id number]   Place:  [address] [place] [address]  Date:  12/12/2018   Physician:  Syeda Hopper MD, JOAQUIN    Procedure:  Transforaminal Lumbar Epidural Steroid Injection -  right L4     Pre-Procedure Diagnosis: Lumbar HNP, Lumbar radiculopathy    Post-Procedure Diagnosis: Same    Sedation: Local with 1% Lidocaine 2.5 ml and 2 mg of IV Versed    EBL: None    Complications: U4/4 IL approach attempted first, patient was unable to stay still for the procedure. Possible intrathecal entry with needle alone. No CSF was aspirated. The approach was changed to the right L4 TF. Procedure Summary:    The patient was seen in the office for complaints of low back and right leg pain. Review of the imaging and physical exam of the patient confirmed the pre-procedure diagnosis. After a thorough discussion of risks, benefits and alternatives informed consent was obtained. The patient was brought to the procedure suite and placed in the prone position. The skin overlying the lumbar spine was prepped and draped in the usual sterile fashion. Using fluoroscopic guidance, the right L4 foramen was identified. Through anesthetized skin, a 22 gauge 3.5 inch curved tip spinal needle was advanced into the foramen. Isovue M 300 was instilled showing an epidurogram/nerve root outline pattern without evidence of vascular or intrathecal spread. Following which, 80 mg of depomedrol mixed with 0.5% Marcaine was instilled. The needle was removed and a band-aid applied. He was asked to watch for signs of a post dural puncture headache. The patient was transferred to the post-operative area in stable condition.

## 2018-12-18 RX ORDER — PANTOPRAZOLE SODIUM 40 MG/1
TABLET, DELAYED RELEASE ORAL
Qty: 30 TABLET | Refills: 3 | OUTPATIENT
Start: 2018-12-18

## 2018-12-21 RX ORDER — PANTOPRAZOLE SODIUM 40 MG/1
TABLET, DELAYED RELEASE ORAL
Qty: 30 TABLET | Refills: 3 | Status: SHIPPED | OUTPATIENT
Start: 2018-12-21 | End: 2019-03-04 | Stop reason: SDUPTHER

## 2018-12-27 ENCOUNTER — OFFICE VISIT (OUTPATIENT)
Dept: ORTHOPEDIC SURGERY | Age: 63
End: 2018-12-27
Payer: COMMERCIAL

## 2018-12-27 VITALS
DIASTOLIC BLOOD PRESSURE: 87 MMHG | HEIGHT: 69 IN | WEIGHT: 214.95 LBS | BODY MASS INDEX: 31.84 KG/M2 | SYSTOLIC BLOOD PRESSURE: 134 MMHG

## 2018-12-27 DIAGNOSIS — M51.26 LUMBAR DISC HERNIATION: Primary | ICD-10-CM

## 2018-12-27 DIAGNOSIS — M51.36 DDD (DEGENERATIVE DISC DISEASE), LUMBAR: ICD-10-CM

## 2018-12-27 DIAGNOSIS — G89.4 CHRONIC PAIN SYNDROME: ICD-10-CM

## 2018-12-27 DIAGNOSIS — M54.16 LUMBAR RADICULOPATHY: ICD-10-CM

## 2018-12-27 PROCEDURE — 99213 OFFICE O/P EST LOW 20 MIN: CPT | Performed by: PHYSICAL MEDICINE & REHABILITATION

## 2018-12-27 RX ORDER — TRAMADOL HYDROCHLORIDE 50 MG/1
50 TABLET ORAL 2 TIMES DAILY PRN
Qty: 60 TABLET | Refills: 1 | Status: SHIPPED | OUTPATIENT
Start: 2019-01-06 | End: 2019-02-05

## 2019-01-15 RX ORDER — GLIMEPIRIDE 4 MG/1
TABLET ORAL
Qty: 30 TABLET | Refills: 1 | Status: SHIPPED | OUTPATIENT
Start: 2019-01-15 | End: 2019-03-04

## 2019-01-23 ENCOUNTER — TELEPHONE (OUTPATIENT)
Dept: ORTHOPEDIC SURGERY | Age: 64
End: 2019-01-23

## 2019-02-12 ENCOUNTER — OFFICE VISIT (OUTPATIENT)
Dept: ORTHOPEDIC SURGERY | Age: 64
End: 2019-02-12
Payer: COMMERCIAL

## 2019-02-12 VITALS
DIASTOLIC BLOOD PRESSURE: 79 MMHG | HEART RATE: 49 BPM | HEIGHT: 69 IN | WEIGHT: 214 LBS | SYSTOLIC BLOOD PRESSURE: 138 MMHG | BODY MASS INDEX: 31.7 KG/M2

## 2019-02-12 DIAGNOSIS — M51.26 HNP (HERNIATED NUCLEUS PULPOSUS), LUMBAR: Primary | ICD-10-CM

## 2019-02-12 PROCEDURE — 99243 OFF/OP CNSLTJ NEW/EST LOW 30: CPT | Performed by: PHYSICIAN ASSISTANT

## 2019-02-26 ENCOUNTER — HOSPITAL ENCOUNTER (OUTPATIENT)
Dept: PHYSICAL THERAPY | Age: 64
Setting detail: THERAPIES SERIES
Discharge: HOME | End: 2019-02-26
Payer: COMMERCIAL

## 2019-02-26 ENCOUNTER — OFFICE VISIT (OUTPATIENT)
Dept: ORTHOPEDIC SURGERY | Age: 64
End: 2019-02-26
Payer: COMMERCIAL

## 2019-02-26 VITALS — BODY MASS INDEX: 31.71 KG/M2 | HEIGHT: 69 IN | WEIGHT: 214.07 LBS

## 2019-02-26 DIAGNOSIS — G89.4 CHRONIC PAIN SYNDROME: ICD-10-CM

## 2019-02-26 DIAGNOSIS — M48.061 LUMBAR FORAMINAL STENOSIS: ICD-10-CM

## 2019-02-26 DIAGNOSIS — M51.36 DEGENERATIVE DISC DISEASE, LUMBAR: ICD-10-CM

## 2019-02-26 DIAGNOSIS — M54.16 LUMBAR RADICULOPATHY: Primary | ICD-10-CM

## 2019-02-26 PROCEDURE — 99214 OFFICE O/P EST MOD 30 MIN: CPT | Performed by: PHYSICAL MEDICINE & REHABILITATION

## 2019-02-26 PROCEDURE — 97012 MECHANICAL TRACTION THERAPY: CPT

## 2019-02-26 PROCEDURE — 97162 PT EVAL MOD COMPLEX 30 MIN: CPT

## 2019-02-26 PROCEDURE — 97530 THERAPEUTIC ACTIVITIES: CPT

## 2019-02-26 RX ORDER — TRAMADOL HYDROCHLORIDE 50 MG/1
50 TABLET ORAL 2 TIMES DAILY
Qty: 60 TABLET | Refills: 0 | Status: SHIPPED | OUTPATIENT
Start: 2019-02-26 | End: 2019-03-04

## 2019-02-26 ASSESSMENT — PAIN DESCRIPTION - DESCRIPTORS: DESCRIPTORS: ACHING

## 2019-02-26 ASSESSMENT — PAIN DESCRIPTION - PAIN TYPE: TYPE: CHRONIC PAIN

## 2019-02-26 ASSESSMENT — PAIN DESCRIPTION - ONSET: ONSET: ON-GOING

## 2019-02-26 ASSESSMENT — PAIN DESCRIPTION - LOCATION: LOCATION: BACK

## 2019-02-26 ASSESSMENT — PAIN - FUNCTIONAL ASSESSMENT: PAIN_FUNCTIONAL_ASSESSMENT: PREVENTS OR INTERFERES SOME ACTIVE ACTIVITIES AND ADLS

## 2019-02-26 ASSESSMENT — PAIN SCALES - GENERAL: PAINLEVEL_OUTOF10: 6

## 2019-02-26 ASSESSMENT — PAIN DESCRIPTION - PROGRESSION: CLINICAL_PROGRESSION: GRADUALLY WORSENING

## 2019-02-26 ASSESSMENT — PAIN DESCRIPTION - FREQUENCY: FREQUENCY: INTERMITTENT

## 2019-02-26 ASSESSMENT — PAIN DESCRIPTION - ORIENTATION: ORIENTATION: RIGHT;LEFT

## 2019-03-04 ENCOUNTER — OFFICE VISIT (OUTPATIENT)
Dept: FAMILY MEDICINE CLINIC | Age: 64
End: 2019-03-04
Payer: COMMERCIAL

## 2019-03-04 VITALS
OXYGEN SATURATION: 97 % | RESPIRATION RATE: 16 BRPM | SYSTOLIC BLOOD PRESSURE: 130 MMHG | WEIGHT: 221.5 LBS | HEART RATE: 52 BPM | DIASTOLIC BLOOD PRESSURE: 84 MMHG | BODY MASS INDEX: 32.69 KG/M2

## 2019-03-04 DIAGNOSIS — M51.36 DDD (DEGENERATIVE DISC DISEASE), LUMBAR: ICD-10-CM

## 2019-03-04 DIAGNOSIS — E78.00 PURE HYPERCHOLESTEROLEMIA: ICD-10-CM

## 2019-03-04 DIAGNOSIS — I10 ESSENTIAL HYPERTENSION: ICD-10-CM

## 2019-03-04 DIAGNOSIS — E11.42 TYPE 2 DIABETES MELLITUS WITH DIABETIC POLYNEUROPATHY, WITHOUT LONG-TERM CURRENT USE OF INSULIN (HCC): Primary | ICD-10-CM

## 2019-03-04 DIAGNOSIS — M11.20 PSEUDOGOUT: ICD-10-CM

## 2019-03-04 PROCEDURE — 99214 OFFICE O/P EST MOD 30 MIN: CPT | Performed by: FAMILY MEDICINE

## 2019-03-04 RX ORDER — COLCHICINE 0.6 MG/1
0.6 TABLET ORAL DAILY
Qty: 90 TABLET | Refills: 1 | Status: SHIPPED | OUTPATIENT
Start: 2019-03-04 | End: 2020-01-02 | Stop reason: SDUPTHER

## 2019-03-04 RX ORDER — SIMVASTATIN 40 MG
TABLET ORAL
Qty: 90 TABLET | Refills: 1 | Status: SHIPPED | OUTPATIENT
Start: 2019-03-04 | End: 2019-03-04 | Stop reason: SDUPTHER

## 2019-03-04 RX ORDER — COLCHICINE 0.6 MG/1
0.6 TABLET ORAL DAILY
Qty: 30 TABLET | Refills: 5 | Status: SHIPPED | OUTPATIENT
Start: 2019-03-04 | End: 2019-03-04 | Stop reason: SDUPTHER

## 2019-03-04 RX ORDER — COLCHICINE 0.6 MG/1
0.6 TABLET ORAL DAILY
Qty: 90 TABLET | Refills: 1 | Status: SHIPPED | OUTPATIENT
Start: 2019-03-04 | End: 2019-03-04 | Stop reason: SDUPTHER

## 2019-03-04 RX ORDER — PANTOPRAZOLE SODIUM 40 MG/1
40 TABLET, DELAYED RELEASE ORAL DAILY
Qty: 90 TABLET | Refills: 1 | Status: SHIPPED | OUTPATIENT
Start: 2019-03-04 | End: 2019-03-04 | Stop reason: SDUPTHER

## 2019-03-04 RX ORDER — GLIMEPIRIDE 1 MG/1
1 TABLET ORAL
Qty: 90 TABLET | Refills: 1 | Status: SHIPPED | OUTPATIENT
Start: 2019-03-04 | End: 2019-03-04 | Stop reason: SDUPTHER

## 2019-03-04 RX ORDER — METFORMIN HYDROCHLORIDE 500 MG/1
TABLET, EXTENDED RELEASE ORAL
Qty: 360 TABLET | Refills: 1 | Status: CANCELLED | OUTPATIENT
Start: 2019-03-04

## 2019-03-04 RX ORDER — GLIMEPIRIDE 1 MG/1
1 TABLET ORAL
COMMUNITY
End: 2019-03-04 | Stop reason: SDUPTHER

## 2019-03-04 RX ORDER — GLIMEPIRIDE 1 MG/1
1 TABLET ORAL
Qty: 90 TABLET | Refills: 1 | Status: SHIPPED | OUTPATIENT
Start: 2019-03-04 | End: 2020-01-02 | Stop reason: SDUPTHER

## 2019-03-04 RX ORDER — SIMVASTATIN 40 MG
TABLET ORAL
Qty: 90 TABLET | Refills: 1 | Status: SHIPPED | OUTPATIENT
Start: 2019-03-04 | End: 2019-07-17 | Stop reason: SDUPTHER

## 2019-03-04 RX ORDER — RAMIPRIL 2.5 MG/1
2.5 CAPSULE ORAL DAILY
COMMUNITY
End: 2020-01-02 | Stop reason: ALTCHOICE

## 2019-03-04 RX ORDER — PANTOPRAZOLE SODIUM 40 MG/1
40 TABLET, DELAYED RELEASE ORAL DAILY
Qty: 90 TABLET | Refills: 1 | Status: SHIPPED | OUTPATIENT
Start: 2019-03-04 | End: 2020-01-02 | Stop reason: SDUPTHER

## 2019-03-04 ASSESSMENT — PATIENT HEALTH QUESTIONNAIRE - PHQ9
SUM OF ALL RESPONSES TO PHQ QUESTIONS 1-9: 0
1. LITTLE INTEREST OR PLEASURE IN DOING THINGS: 0
SUM OF ALL RESPONSES TO PHQ QUESTIONS 1-9: 0
SUM OF ALL RESPONSES TO PHQ9 QUESTIONS 1 & 2: 0
2. FEELING DOWN, DEPRESSED OR HOPELESS: 0

## 2019-03-12 ENCOUNTER — APPOINTMENT (OUTPATIENT)
Dept: PHYSICAL THERAPY | Age: 64
End: 2019-03-12
Payer: COMMERCIAL

## 2019-03-19 ENCOUNTER — TELEPHONE (OUTPATIENT)
Dept: ORTHOPEDIC SURGERY | Age: 64
End: 2019-03-19

## 2019-03-19 ENCOUNTER — HOSPITAL ENCOUNTER (OUTPATIENT)
Dept: PHYSICAL THERAPY | Age: 64
Setting detail: THERAPIES SERIES
Discharge: HOME | End: 2019-03-19
Payer: COMMERCIAL

## 2019-03-19 PROCEDURE — G0283 ELEC STIM OTHER THAN WOUND: HCPCS

## 2019-03-19 PROCEDURE — 97110 THERAPEUTIC EXERCISES: CPT

## 2019-03-26 ENCOUNTER — HOSPITAL ENCOUNTER (OUTPATIENT)
Dept: PHYSICAL THERAPY | Age: 64
Setting detail: THERAPIES SERIES
Discharge: HOME | End: 2019-03-26
Payer: COMMERCIAL

## 2019-03-26 ENCOUNTER — OFFICE VISIT (OUTPATIENT)
Dept: PULMONOLOGY | Age: 64
End: 2019-03-26
Payer: COMMERCIAL

## 2019-03-26 VITALS
BODY MASS INDEX: 32.58 KG/M2 | HEIGHT: 69 IN | HEART RATE: 48 BPM | DIASTOLIC BLOOD PRESSURE: 76 MMHG | WEIGHT: 220 LBS | SYSTOLIC BLOOD PRESSURE: 130 MMHG | OXYGEN SATURATION: 98 %

## 2019-03-26 DIAGNOSIS — G47.33 OBSTRUCTIVE SLEEP APNEA SYNDROME: Primary | Chronic | ICD-10-CM

## 2019-03-26 DIAGNOSIS — E11.42 TYPE 2 DIABETES MELLITUS WITH DIABETIC POLYNEUROPATHY, WITHOUT LONG-TERM CURRENT USE OF INSULIN (HCC): ICD-10-CM

## 2019-03-26 DIAGNOSIS — J30.9 ALLERGIC RHINITIS, UNSPECIFIED SEASONALITY, UNSPECIFIED TRIGGER: Chronic | ICD-10-CM

## 2019-03-26 DIAGNOSIS — I10 ESSENTIAL HYPERTENSION: Chronic | ICD-10-CM

## 2019-03-26 PROCEDURE — 99214 OFFICE O/P EST MOD 30 MIN: CPT | Performed by: NURSE PRACTITIONER

## 2019-03-26 PROCEDURE — G0283 ELEC STIM OTHER THAN WOUND: HCPCS

## 2019-03-26 PROCEDURE — 97110 THERAPEUTIC EXERCISES: CPT

## 2019-03-26 ASSESSMENT — ENCOUNTER SYMPTOMS
EYE REDNESS: 0
SINUS PRESSURE: 0
SHORTNESS OF BREATH: 0
RHINORRHEA: 0
EYE PAIN: 0
ABDOMINAL DISTENTION: 0
ABDOMINAL PAIN: 0
APNEA: 0
COUGH: 0

## 2019-03-26 ASSESSMENT — SLEEP AND FATIGUE QUESTIONNAIRES
HOW LIKELY ARE YOU TO NOD OFF OR FALL ASLEEP WHILE SITTING AND READING: 0
HOW LIKELY ARE YOU TO NOD OFF OR FALL ASLEEP WHILE SITTING QUIETLY AFTER LUNCH WITHOUT ALCOHOL: 0
HOW LIKELY ARE YOU TO NOD OFF OR FALL ASLEEP WHILE WATCHING TV: 1
HOW LIKELY ARE YOU TO NOD OFF OR FALL ASLEEP WHILE LYING DOWN TO REST IN THE AFTERNOON WHEN CIRCUMSTANCES PERMIT: 0
HOW LIKELY ARE YOU TO NOD OFF OR FALL ASLEEP WHILE SITTING INACTIVE IN A PUBLIC PLACE: 0
HOW LIKELY ARE YOU TO NOD OFF OR FALL ASLEEP IN A CAR, WHILE STOPPED FOR A FEW MINUTES IN TRAFFIC: 0
HOW LIKELY ARE YOU TO NOD OFF OR FALL ASLEEP WHEN YOU ARE A PASSENGER IN A CAR FOR AN HOUR WITHOUT A BREAK: 1
HOW LIKELY ARE YOU TO NOD OFF OR FALL ASLEEP WHILE SITTING AND TALKING TO SOMEONE: 0
ESS TOTAL SCORE: 2

## 2019-04-02 ENCOUNTER — HOSPITAL ENCOUNTER (OUTPATIENT)
Dept: PHYSICAL THERAPY | Age: 64
Setting detail: THERAPIES SERIES
Discharge: HOME | End: 2019-04-02
Payer: COMMERCIAL

## 2019-04-02 PROCEDURE — G0283 ELEC STIM OTHER THAN WOUND: HCPCS

## 2019-04-02 PROCEDURE — 97110 THERAPEUTIC EXERCISES: CPT

## 2019-04-02 NOTE — PROGRESS NOTES
Physical Therapy      Physical Therapy Daily Treatment Note  Date:  2019    Patient Name:  Dank Hatch    :  1955  MRN: [id number]  Restrictions/Precautions:  None  Medical/Treatment Diagnosis Information:   · Diagnosis: HNP lumbar spine  · Treatment Diagnosis: limited lumbar ROM, decreased core strength, pain with ADL's, decreased postural awareness and improper body mechanics    Tracking Information:  Physician Information Referring Practitioner: Freddie Franco MD     Plan of Care Sent Date: 19 Signed Received:    Visit Count / Total Visits      Insurance Approved Visits  /  Approved Dates:     Insurance Information PT Insurance Information: [employer] [place]  *If only certain CPT codes approved use smart phrase CPT calculator . OPPTINSURANCECPTCODECALCULATOR   Progress Note/G-codes   []  Yes  [x]  No Next Due:      Pain level: 0/10     Subjective:  :  Pt states he felt good after last session; had some soreness last night after work. 3/26:  Pt reports very minor pain on L side of low back this date. States he felt good after last session. 3/19: Pt reports no pain at the present time. States his right hip was sore for a week after last session. Pt reports he has eliminated as much heavy lifting from his job as he can.       Objective:  Observation: :  Pt demonstrating good technique with exercises this date; lumbar extension improving slightly  Test measurements:  :  Formal measurements not taken this date    Exercises:  Exercise/Equipment Resistance/Repetitions Other comments   Prone  x 1 min    KANDICE X 1 min    Press ups X 10  2 X 10 with belt stab after P/A mobs    cables Multifidus walkouts with antirotation press x 5 each rep (3 reps 3 plates)    bridging     Quadruped    Nu step   x 4 min L4   IB/HR  2 x 30\"/2 x 10    Standing hamstring stretch 2 x 30\" B     standing hip flexor stretch 2 x 20\" B                          Other Therapeutic Activities:  :  None this date Evaluation completed this date. Discussed risks and benefits of tx with pt - pt agreed with POC. Reviewed mechanics of spine using model and discussed proper posture and body mechanics. Educated pt on centralization principle. Pt instructed on proper sitting posture with towel roll. Written HEP provided for pt. Home Exercise Program:  4/2:  No new additions this date    Written HEP provided with pictures for prone, prone on elbow, press ups, standing lumbar extension, and sitting with lumbar towel roll. Centralization principle written out for pt. Manual Treatments: 4/2:   P/A mobs to lumbar spine x 5 at each level; 6 reps  3/19: P/A mobs to lumbar spine x 5 at each level; 5 sets. Pt tender to palpation over R PSIS    Modalities:  4/2:  Prone with CP and IFC (80-150pps) x 15 min to low back  3/19:  Held traction this date as pt reports hip pain after last session; Prone with MHP and IFC (80-150pps) x 15 min to low back    Prone traction 65#/50# x 10 min - shut off and bell provided for pt.   Seated with MHP to low back x 15 min after session    Timed Code Treatment Minutes:  30    Total Treatment Minutes:  47    Treatment/Activity Tolerance:  [x] Patient tolerated treatment well [] Patient limited by fatigue  [] Patient limited by pain  [] Patient limited by other medical complications  [] Other:     Prognosis: [x] Good [] Fair  [] Poor    Patient Requires Follow-up: [x] Yes  [] No    Plan:   [x] Continue per plan of care [] Alter current plan (see comments)  [] Plan of care initiated [] Hold pending MD visit [] Discharge  Plan for Next Session:  Continue with prone progression, standing lumbar extension, core strengthening when tolerated, continue with modalities as needed for pain, manual therapy     Electronically signed by:  Brendan Villagomez, PT

## 2019-04-10 ENCOUNTER — OFFICE VISIT (OUTPATIENT)
Dept: FAMILY MEDICINE CLINIC | Age: 64
End: 2019-04-10
Payer: COMMERCIAL

## 2019-04-10 VITALS
OXYGEN SATURATION: 96 % | WEIGHT: 215.2 LBS | DIASTOLIC BLOOD PRESSURE: 82 MMHG | HEART RATE: 48 BPM | BODY MASS INDEX: 31.78 KG/M2 | SYSTOLIC BLOOD PRESSURE: 124 MMHG

## 2019-04-10 DIAGNOSIS — H57.89 IRRITATION OF LEFT EYE: Primary | ICD-10-CM

## 2019-04-10 PROCEDURE — 99213 OFFICE O/P EST LOW 20 MIN: CPT | Performed by: FAMILY MEDICINE

## 2019-04-10 RX ORDER — ERYTHROMYCIN 5 MG/G
OINTMENT OPHTHALMIC
Qty: 1 TUBE | Refills: 0 | Status: SHIPPED | OUTPATIENT
Start: 2019-04-10 | End: 2019-09-11

## 2019-04-10 ASSESSMENT — ENCOUNTER SYMPTOMS
EYE REDNESS: 1
PHOTOPHOBIA: 1
EYE PAIN: 1
BLURRED VISION: 1

## 2019-04-16 ENCOUNTER — HOSPITAL ENCOUNTER (OUTPATIENT)
Dept: PHYSICAL THERAPY | Age: 64
Setting detail: THERAPIES SERIES
Discharge: HOME | End: 2019-04-16
Payer: COMMERCIAL

## 2019-04-16 PROCEDURE — 97110 THERAPEUTIC EXERCISES: CPT

## 2019-04-16 NOTE — PROGRESS NOTES
Physical Therapy      Physical Therapy Daily Treatment Note  Date:  2019    Patient Name:  Kelli De Leon    :  1955  MRN: [id number]  Restrictions/Precautions:  None  Medical/Treatment Diagnosis Information:   · Diagnosis: HNP lumbar spine  · Treatment Diagnosis: limited lumbar ROM, decreased core strength, pain with ADL's, decreased postural awareness and improper body mechanics    Tracking Information:  Physician Information Referring Practitioner: Lani Lopez MD     Plan of Care Sent Date: 19 Signed Received:    Visit Count / Total Visits      Insurance Approved Visits  /  Approved Dates:     Insurance Information PT Insurance Information: [employer] [place]  *If only certain CPT codes approved use smart phrase CPT calculator . OPPTINSURANCECPTCODECALCULATOR   Progress Note/G-codes   []  Yes  [x]  No Next Due:      Pain level: 5/10 R hip     Subjective:  :  Pt reports pain R hip this date; states he has been lifting a lot at work this week. States he felt good after last PT visit for about a week prior to pain returning. :  Pt states he felt good after last session; had some soreness last night after work. 3/26:  Pt reports very minor pain on L side of low back this date. States he felt good after last session. 3/19: Pt reports no pain at the present time. States his right hip was sore for a week after last session. Pt reports he has eliminated as much heavy lifting from his job as he can.       Objective:  Observation: :  Pt demonstrating good technique with exercises this date; lumbar extension improving slightly  Test measurements:  :  Formal measurements not taken this date    Exercises:  Exercise/Equipment Resistance/Repetitions Other comments   Prone  x 1 min    KANDICE X 1 min    Press ups X 10  2 X 10 with belt stab after P/A mobs    cables Multifidus walkouts with antirotation press x 5 each rep (3 reps 3 plates)    Diagonals low to high 2 plates x 10 B    bridging Quadruped    Nu step   x 4 min L5   IB/HR  2 x 30\"/2 x 10    Standing hamstring stretch 2 x 30\" B     standing hip flexor stretch 2 x 20\" B                          Other Therapeutic Activities:  4/16:  None this date     Evaluation completed this date. Discussed risks and benefits of tx with pt - pt agreed with POC. Reviewed mechanics of spine using model and discussed proper posture and body mechanics. Educated pt on centralization principle. Pt instructed on proper sitting posture with towel roll. Written HEP provided for pt. Home Exercise Program:  4/16:  No new additions this date    Written HEP provided with pictures for prone, prone on elbow, press ups, standing lumbar extension, and sitting with lumbar towel roll. Centralization principle written out for pt. Manual Treatments: 4/16:   P/A mobs to lumbar spine x 5 at each level; 6 reps  3/19: P/A mobs to lumbar spine x 5 at each level; 5 sets. Pt tender to palpation over R PSIS    Modalities:  4/16:  None this date; pt declined  Prone with CP and IFC (80-150pps) x 15 min to low back  3/19:  Held traction this date as pt reports hip pain after last session; Prone with MHP and IFC (80-150pps) x 15 min to low back    Prone traction 65#/50# x 10 min - shut off and bell provided for pt.   Seated with MHP to low back x 15 min after session    Timed Code Treatment Minutes:  30    Total Treatment Minutes:  30    Treatment/Activity Tolerance:  [x] Patient tolerated treatment well [] Patient limited by fatigue  [] Patient limited by pain  [] Patient limited by other medical complications  [] Other:     Prognosis: [x] Good [] Fair  [] Poor    Patient Requires Follow-up: [x] Yes  [] No    Plan:   [x] Continue per plan of care [] Alter current plan (see comments)  [] Plan of care initiated [] Hold pending MD visit [] Discharge  Plan for Next Session:  Continue with prone progression, standing lumbar extension, core strengthening when tolerated, continue with modalities as needed for pain, manual therapy     Electronically signed by:  Enrique Santillan, PT

## 2019-04-22 DIAGNOSIS — M48.061 LUMBAR FORAMINAL STENOSIS: ICD-10-CM

## 2019-04-22 DIAGNOSIS — M51.36 DEGENERATIVE DISC DISEASE, LUMBAR: ICD-10-CM

## 2019-04-22 DIAGNOSIS — M54.16 LUMBAR RADICULOPATHY: ICD-10-CM

## 2019-04-22 RX ORDER — TRAMADOL HYDROCHLORIDE 50 MG/1
50 TABLET ORAL 2 TIMES DAILY
Qty: 60 TABLET | Refills: 0 | Status: SHIPPED | OUTPATIENT
Start: 2019-04-22 | End: 2019-05-22

## 2019-04-22 NOTE — TELEPHONE ENCOUNTER
traMADol Crescencio Oliver) 50 MG tablet        Pharmacy:  [facility] [address] [facility], [facility] [phone]

## 2019-04-22 NOTE — TELEPHONE ENCOUNTER
Medication:   Requested Prescriptions     Pending Prescriptions Disp Refills    traMADol (ULTRAM) 50 MG tablet 60 tablet 0     Sig: Take 1 tablet by mouth 2 times daily for 30 days. Last Filled:  2/26/19    Patient Phone Number: [phone] (home)     Last appt: 4/10/2019   Next appt: 6/6/2019    Last OARRS:   RX Monitoring 3/4/2019   Attestation The Prescription Monitoring Report for this patient was reviewed today.    Chronic Pain Routine Monitoring -       Preferred Pharmacy:   [facility] [address] [place] [address], [address] [place] [address] [phone]  [facility]  Phone: [phone] Fax: [phone]

## 2019-04-23 ENCOUNTER — HOSPITAL ENCOUNTER (OUTPATIENT)
Dept: PHYSICAL THERAPY | Age: 64
Setting detail: THERAPIES SERIES
Discharge: HOME | End: 2019-04-23
Payer: COMMERCIAL

## 2019-04-23 PROCEDURE — 97110 THERAPEUTIC EXERCISES: CPT

## 2019-04-23 PROCEDURE — G0283 ELEC STIM OTHER THAN WOUND: HCPCS

## 2019-04-23 NOTE — PROGRESS NOTES
Physical Therapy      Physical Therapy Daily Treatment Note  Date:  2019    Patient Name:  Wojciech Arora    :  1955  MRN: [id number]  Restrictions/Precautions:  None  Medical/Treatment Diagnosis Information:   · Diagnosis: HNP lumbar spine  · Treatment Diagnosis: limited lumbar ROM, decreased core strength, pain with ADL's, decreased postural awareness and improper body mechanics    Tracking Information:  Physician Information Referring Practitioner: Jones Fuentes MD     Plan of Care Sent Date: 19 Signed Received:    Visit Count / Total Visits      Insurance Approved Visits  /  Approved Dates:     Insurance Information PT Insurance Information: [employer]  *If only certain CPT codes approved use smart phrase CPT calculator . OPPTINSURANCECPTCODECALCULATOR   Progress Note/G-codes   []  Yes  [x]  No Next Due:      Pain level: 8/10 B hips     Subjective:  :  Pt reports pain B hips this date; states it has hurt on both sides for the past few days. States he has been lifting quite a bit at work. :  Pt reports pain R hip this date; states he has been lifting a lot at work this week. States he felt good after last PT visit for about a week prior to pain returning. :  Pt states he felt good after last session; had some soreness last night after work. 3/26:  Pt reports very minor pain on L side of low back this date. States he felt good after last session. 3/19: Pt reports no pain at the present time. States his right hip was sore for a week after last session. Pt reports he has eliminated as much heavy lifting from his job as he can.       Objective:  Observation: :  Pt demonstrating good technique with exercises this date; lumbar extension improving overall  Test measurements:  :  Formal measurements not taken this date    Exercises:  Exercise/Equipment Resistance/Repetitions Other comments   Prone  x 1 min    KANDICE X 1 min    Press ups X 10  3 X 10 with belt stab; x 5 with sag (no belt)    cables       bridging     Quadruped     Bike  x 3 min    IB/HR  2 x 30\"/2 x 10    Standing hamstring stretch 2 x 30\" B     standing hip flexor stretch 2 x 20\" B                          Other Therapeutic Activities:  4/23:  Discussed the importance of stopping pain before it starts; doing exercises before he hurts and consistently. Encouraged press ups 3 times daily. Evaluation completed this date. Discussed risks and benefits of tx with pt - pt agreed with POC. Reviewed mechanics of spine using model and discussed proper posture and body mechanics. Educated pt on centralization principle. Pt instructed on proper sitting posture with towel roll. Written HEP provided for pt. Home Exercise Program:  4/23:  No new additions this date; see above    Written HEP provided with pictures for prone, prone on elbow, press ups, standing lumbar extension, and sitting with lumbar towel roll. Centralization principle written out for pt. Manual Treatments: 4/23:   P/A mobs to lumbar spine x 5 at each level; 6 reps. Prone lumbar traction 3 x 30\" hold  3/19: P/A mobs to lumbar spine x 5 at each level; 5 sets. Pt tender to palpation over R PSIS    Modalities:  4/23: Prone with CP and IFC (80-150pps) x 15 min to low back  3/19:  Held traction this date as pt reports hip pain after last session; Prone with MHP and IFC (80-150pps) x 15 min to low back    Prone traction 65#/50# x 10 min - shut off and bell provided for pt.   Seated with MHP to low back x 15 min after session    Timed Code Treatment Minutes:  31    Total Treatment Minutes:  46    Treatment/Activity Tolerance:  [x] Patient tolerated treatment well [] Patient limited by fatigue  [] Patient limited by pain  [] Patient limited by other medical complications  [] Other:     Prognosis: [x] Good [] Fair  [] Poor    Patient Requires Follow-up: [x] Yes  [] No    Plan:   [x] Continue per plan of care [] Alter current plan (see comments)  [] Plan of care initiated [] Hold pending MD visit [] Discharge  Plan for Next Session:  Continue with prone progression, standing lumbar extension, core strengthening when tolerated, continue with modalities as needed for pain, manual therapy     Electronically signed by:  Emmett Verduzco PT

## 2019-04-30 ENCOUNTER — HOSPITAL ENCOUNTER (OUTPATIENT)
Dept: PHYSICAL THERAPY | Age: 64
Setting detail: THERAPIES SERIES
Discharge: HOME | End: 2019-04-30
Payer: COMMERCIAL

## 2019-04-30 PROCEDURE — G0283 ELEC STIM OTHER THAN WOUND: HCPCS

## 2019-04-30 PROCEDURE — 97110 THERAPEUTIC EXERCISES: CPT

## 2019-04-30 NOTE — PROGRESS NOTES
Physical Therapy      Physical Therapy Daily Treatment Note  Date:  2019    Patient Name:  Kaylie Louie    :  1955  MRN: [id number]  Restrictions/Precautions:  None  Medical/Treatment Diagnosis Information:   · Diagnosis: HNP lumbar spine  · Treatment Diagnosis: limited lumbar ROM, decreased core strength, pain with ADL's, decreased postural awareness and improper body mechanics    Tracking Information:  Physician Information Referring Practitioner: Susannah Silva MD     Plan of Care Sent Date: 19 Signed Received:    Visit Count / Total Visits      Insurance Approved Visits  /  Approved Dates:     Insurance Information PT Insurance Information: [employer]  *If only certain CPT codes approved use smart phrase CPT calculator . OPPTINSURANCECPTCODECALCULATOR   Progress Note/G-codes   []  Yes  [x]  No Next Due:      Pain level: 4/10 center of low back     Subjective:  :  Pt reports minimal pain in center of low back this date. States he did a lot of lifting yesterday and tolerated it quite well. :  Pt reports pain B hips this date; states it has hurt on both sides for the past few days. States he has been lifting quite a bit at work. :  Pt reports pain R hip this date; states he has been lifting a lot at work this week. States he felt good after last PT visit for about a week prior to pain returning. :  Pt states he felt good after last session; had some soreness last night after work. 3/26:  Pt reports very minor pain on L side of low back this date. States he felt good after last session. 3/19: Pt reports no pain at the present time. States his right hip was sore for a week after last session. Pt reports he has eliminated as much heavy lifting from his job as he can.       Objective:  Observation: :  Pt demonstrating good technique with exercises this date; core strength improving  Test measurements:  :  Formal measurements not taken this date    Exercises:  Exercise/Equipment Resistance/Repetitions Other comments   Prone  x 1 min    KANDICE X 1 min    Press ups X 10  2 X 10 with manual stab; x 5 with sag (no belt)    cables Multifidus walkouts with antirotation press x 5 each rep (3 reps 3 plates)      bridging     Quadruped    Nu step  x 4 min    IB/HR  2 x 30\"/2 x 10 Second set of HR's in SLS   Standing hamstring stretch 2 x 30\" B     standing hip flexor stretch 2 x 20\" B                          Other Therapeutic Activities:  4/30:  None this date  4/23:  Discussed the importance of stopping pain before it starts; doing exercises before he hurts and consistently. Encouraged press ups 3 times daily. Evaluation completed this date. Discussed risks and benefits of tx with pt - pt agreed with POC. Reviewed mechanics of spine using model and discussed proper posture and body mechanics. Educated pt on centralization principle. Pt instructed on proper sitting posture with towel roll. Written HEP provided for pt. Home Exercise Program:  4/30:  No new additions this date    Written HEP provided with pictures for prone, prone on elbow, press ups, standing lumbar extension, and sitting with lumbar towel roll. Centralization principle written out for pt. Manual Treatments: 4/30:   P/A mobs to lumbar spine x 5 at each level; 6 reps. Prone lumbar traction 5 x 30\" hold  3/19: P/A mobs to lumbar spine x 5 at each level; 5 sets. Pt tender to palpation over R PSIS    Modalities:  4/30: Prone with CP and IFC (80-150pps) x 15 min to low back  3/19:  Held traction this date as pt reports hip pain after last session; Prone with MHP and IFC (80-150pps) x 15 min to low back    Prone traction 65#/50# x 10 min - shut off and bell provided for pt.   Seated with MHP to low back x 15 min after session    Timed Code Treatment Minutes:  32    Total Treatment Minutes:  47    Treatment/Activity Tolerance:  [x] Patient tolerated treatment well [] Patient limited by fatigue  [] Patient limited by pain  [] Patient limited by other medical complications  [] Other:     Prognosis: [x] Good [] Fair  [] Poor    Patient Requires Follow-up: [x] Yes  [] No    Plan:   [x] Continue per plan of care [] Alter current plan (see comments)  [] Plan of care initiated [] Hold pending MD visit [] Discharge  Plan for Next Session:  Continue with prone progression, standing lumbar extension, core strengthening when tolerated, continue with modalities as needed for pain, manual therapy     Electronically signed by:  Tamiko Park PT

## 2019-05-07 ENCOUNTER — HOSPITAL ENCOUNTER (OUTPATIENT)
Dept: PHYSICAL THERAPY | Age: 64
Setting detail: THERAPIES SERIES
Discharge: HOME | End: 2019-05-07
Payer: COMMERCIAL

## 2019-05-07 NOTE — FLOWSHEET NOTE
Physical Therapy  Cancellation/No-show Note  Patient Name:  Lazara Cruz  :  1955   Date:  2019  Cancelled visits to date: 0  No-shows to date: 1    Patient status for today's appointment patient:  []  Cancelled  []  Rescheduled appointment  [x]  No-show 19     Reason given by patient:  []  Patient ill  []  Conflicting appointment  []  No transportation    []  Conflict with work  [x]  No reason given  []  Other:     Comments:      Phone call information:   []  Phone call made today to patient at _ time at number provided:      []  Patient answered, conversation as follows:    []  Patient did not answer, message left as follows:  [x]  Phone call not made today    Electronically signed by:  Tamiko Park PT

## 2019-05-23 ENCOUNTER — APPOINTMENT (OUTPATIENT)
Dept: PHYSICAL THERAPY | Age: 64
End: 2019-05-23
Payer: COMMERCIAL

## 2019-05-30 ENCOUNTER — HOSPITAL ENCOUNTER (OUTPATIENT)
Dept: PHYSICAL THERAPY | Age: 64
Setting detail: THERAPIES SERIES
Discharge: HOME | End: 2019-05-30
Payer: COMMERCIAL

## 2019-05-30 PROCEDURE — G0283 ELEC STIM OTHER THAN WOUND: HCPCS

## 2019-05-30 PROCEDURE — 97110 THERAPEUTIC EXERCISES: CPT

## 2019-05-30 NOTE — PROGRESS NOTES
Physical Therapy      Physical Therapy Daily Treatment Note  Date:  2019    Patient Name:  Martha Clancy    :  1955  MRN: [id number]  Restrictions/Precautions:  None  Medical/Treatment Diagnosis Information:   · Diagnosis: HNP lumbar spine  · Treatment Diagnosis: limited lumbar ROM, decreased core strength, pain with ADL's, decreased postural awareness and improper body mechanics    Tracking Information:  Physician Information Referring Practitioner: Renelle Cranker, MD     Plan of Care Sent Date: 19 Signed Received:    Visit Count / Total Visits      Insurance Approved Visits  /  Approved Dates:     Insurance Information PT Insurance Information: [employer] [place]  *If only certain CPT codes approved use smart phrase CPT calculator . OPPTINSURANCECPTCODECALCULATOR   Progress Note/G-codes   []  Yes  [x]  No Next Due:      Pain level: 0/10 center of low back     Subjective:  :  Pt reports no pain this date. States vacation was excellent and he has been feeling much better since. Reports they hired a new  at work so he has not had to lift as much. :  Pt reports minimal pain in center of low back this date. States he did a lot of lifting yesterday and tolerated it quite well. :  Pt reports pain B hips this date; states it has hurt on both sides for the past few days. States he has been lifting quite a bit at work. :  Pt reports pain R hip this date; states he has been lifting a lot at work this week. States he felt good after last PT visit for about a week prior to pain returning. :  Pt states he felt good after last session; had some soreness last night after work. 3/26:  Pt reports very minor pain on L side of low back this date. States he felt good after last session. 3/19: Pt reports no pain at the present time. States his right hip was sore for a week after last session. Pt reports he has eliminated as much heavy lifting from his job as he can. Objective:  Observation: 5/30:  Pt ambulating well and demonstrating good posture this date  Test measurements:  5/30:  Formal measurements not taken this date    Exercises:  Exercise/Equipment Resistance/Repetitions Other comments   Prone  x 1 min    KANDICE X 1 min    Press ups    cables Multifidus walkouts with antirotation press x 5 each rep (3 reps 3 plates)      bridging     Quadruped    Nu step  x 4 min    IB/HR  2 x 30\"/2 x 10  Soleus stretch x 30\"  Second set of HR's in SLS   Standing hamstring stretch 2 x 30\" B     standing hip flexor stretch 2 x 20\" B    supine Piriformis stretch 2 x 30\" B with overpressure    LTR x 10 B                     Other Therapeutic Activities:  5/30:  Discussed continuation in HP after discharge  4/23:  Discussed the importance of stopping pain before it starts; doing exercises before he hurts and consistently. Encouraged press ups 3 times daily. Evaluation completed this date. Discussed risks and benefits of tx with pt - pt agreed with POC. Reviewed mechanics of spine using model and discussed proper posture and body mechanics. Educated pt on centralization principle. Pt instructed on proper sitting posture with towel roll. Written HEP provided for pt. Home Exercise Program:  5/30:  No new additions this date    Written HEP provided with pictures for prone, prone on elbow, press ups, standing lumbar extension, and sitting with lumbar towel roll. Centralization principle written out for pt. Manual Treatments: 5/30:  None this date  4/30:   P/A mobs to lumbar spine x 5 at each level; 6 reps. Prone lumbar traction 5 x 30\" hold  3/19: P/A mobs to lumbar spine x 5 at each level; 5 sets.   Pt tender to palpation over R PSIS    Modalities:  5/30: Prone with MHP and IFC (80-150pps) x 15 min to low back  3/19:  Held traction this date as pt reports hip pain after last session; Prone with MHP and IFC (80-150pps) x 15 min to low back    Prone traction 65#/50# x 10 min -

## 2019-05-31 ENCOUNTER — TELEPHONE (OUTPATIENT)
Dept: FAMILY MEDICINE CLINIC | Age: 64
End: 2019-05-31

## 2019-05-31 DIAGNOSIS — E11.42 TYPE 2 DIABETES MELLITUS WITH DIABETIC POLYNEUROPATHY, WITHOUT LONG-TERM CURRENT USE OF INSULIN (HCC): ICD-10-CM

## 2019-05-31 DIAGNOSIS — E11.9 TYPE 2 DIABETES MELLITUS WITHOUT COMPLICATION, WITHOUT LONG-TERM CURRENT USE OF INSULIN (HCC): Primary | Chronic | ICD-10-CM

## 2019-05-31 DIAGNOSIS — I10 ESSENTIAL HYPERTENSION: Chronic | ICD-10-CM

## 2019-05-31 DIAGNOSIS — E78.00 PURE HYPERCHOLESTEROLEMIA: Chronic | ICD-10-CM

## 2019-05-31 NOTE — TELEPHONE ENCOUNTER
Patient wife calling to see if he needs to have blood work  done before his appointment with you on 06/06/19. If so can we order so they can  to have these drawn .

## 2019-06-04 ENCOUNTER — APPOINTMENT (OUTPATIENT)
Dept: PHYSICAL THERAPY | Age: 64
End: 2019-06-04
Payer: COMMERCIAL

## 2019-06-05 LAB
A/G RATIO: 1.7 (CALC) (ref 1–2.5)
ALBUMIN SERPL-MCNC: 4 G/DL (ref 3.6–5.1)
ALP BLD-CCNC: 82 U/L (ref 40–115)
ALT SERPL-CCNC: 28 U/L (ref 9–46)
AST SERPL-CCNC: 25 U/L (ref 10–35)
BILIRUB SERPL-MCNC: 0.5 MG/DL (ref 0.2–1.2)
BUN / CREAT RATIO: 35 (CALC) (ref 6–22)
BUN BLDV-MCNC: 27 MG/DL (ref 7–25)
CALCIUM SERPL-MCNC: 8.8 MG/DL (ref 8.6–10.3)
CHLORIDE BLD-SCNC: 104 MMOL/L (ref 98–110)
CHOLESTEROL, TOTAL: 193 MG/DL
CHOLESTEROL/HDL RATIO: 4.4 (CALC)
CHOLESTEROL: 149 MG/DL (CALC)
CO2: 30 MMOL/L (ref 20–32)
COMMENT: ABNORMAL
CREAT SERPL-MCNC: 0.77 MG/DL (ref 0.7–1.25)
CREATININE URINE: 170 MG/DL (ref 20–320)
GFR AFRICAN AMERICAN: 111 ML/MIN/1.73M2
GFR, ESTIMATED: 96 ML/MIN/1.73M2
GLOBULIN: 2.3 G/DL (CALC) (ref 1.9–3.7)
GLUCOSE BLD-MCNC: 164 MG/DL (ref 65–99)
HBA1C MFR BLD: 7.5 % OF TOTAL HGB
HDLC SERPL-MCNC: 44 MG/DL
LDL CHOLESTEROL CALCULATED: 129 MG/DL (CALC)
MICROALBUMIN UR-MCNC: 10.7 MG/DL
MICROALBUMIN/CREAT UR-RTO: 63 MCG/MG CREAT
POTASSIUM SERPL-SCNC: 4 MMOL/L (ref 3.5–5.3)
SODIUM BLD-SCNC: 141 MMOL/L (ref 135–146)
TOTAL PROTEIN: 6.3 G/DL (ref 6.1–8.1)
TRIGL SERPL-MCNC: 94 MG/DL

## 2019-06-06 ENCOUNTER — OFFICE VISIT (OUTPATIENT)
Dept: FAMILY MEDICINE CLINIC | Age: 64
End: 2019-06-06
Payer: COMMERCIAL

## 2019-06-06 ENCOUNTER — HOSPITAL ENCOUNTER (OUTPATIENT)
Dept: PHYSICAL THERAPY | Age: 64
Setting detail: THERAPIES SERIES
Discharge: HOME | End: 2019-06-06
Payer: COMMERCIAL

## 2019-06-06 VITALS
BODY MASS INDEX: 32.4 KG/M2 | OXYGEN SATURATION: 96 % | DIASTOLIC BLOOD PRESSURE: 74 MMHG | SYSTOLIC BLOOD PRESSURE: 126 MMHG | WEIGHT: 219.38 LBS | HEART RATE: 60 BPM | RESPIRATION RATE: 16 BRPM

## 2019-06-06 DIAGNOSIS — M15.9 GENERALIZED OSTEOARTHROSIS, INVOLVING MULTIPLE SITES: ICD-10-CM

## 2019-06-06 DIAGNOSIS — L30.1 DYSHIDROTIC ECZEMA: ICD-10-CM

## 2019-06-06 DIAGNOSIS — E11.42 TYPE 2 DIABETES MELLITUS WITH DIABETIC POLYNEUROPATHY, WITHOUT LONG-TERM CURRENT USE OF INSULIN (HCC): Primary | ICD-10-CM

## 2019-06-06 DIAGNOSIS — E78.00 PURE HYPERCHOLESTEROLEMIA: Chronic | ICD-10-CM

## 2019-06-06 DIAGNOSIS — I10 ESSENTIAL HYPERTENSION: Chronic | ICD-10-CM

## 2019-06-06 PROCEDURE — 99214 OFFICE O/P EST MOD 30 MIN: CPT | Performed by: FAMILY MEDICINE

## 2019-06-06 RX ORDER — PIOGLITAZONEHYDROCHLORIDE 30 MG/1
15 TABLET ORAL
COMMUNITY
Start: 2018-12-26 | End: 2019-06-06 | Stop reason: SDUPTHER

## 2019-06-06 RX ORDER — CETIRIZINE HYDROCHLORIDE 10 MG/1
10 TABLET ORAL DAILY
COMMUNITY
End: 2020-07-08

## 2019-06-06 RX ORDER — TRAMADOL HYDROCHLORIDE 50 MG/1
50 TABLET ORAL
COMMUNITY
End: 2019-06-06 | Stop reason: SDUPTHER

## 2019-06-06 RX ORDER — PIOGLITAZONEHYDROCHLORIDE 30 MG/1
30 TABLET ORAL DAILY
Qty: 90 TABLET | Refills: 1 | Status: SHIPPED | OUTPATIENT
Start: 2019-06-06 | End: 2019-12-18

## 2019-06-06 RX ORDER — TRAMADOL HYDROCHLORIDE 50 MG/1
50 TABLET ORAL 2 TIMES DAILY PRN
Qty: 60 TABLET | Refills: 2 | Status: SHIPPED | OUTPATIENT
Start: 2019-06-06 | End: 2019-09-04

## 2019-06-06 NOTE — FLOWSHEET NOTE
Physical Therapy  Cancellation/No-show Note  Patient Name:  Dank Hatch  :  1955   Date:  2019  Cancelled visits to date: 1  No-shows to date: 1    Patient status for today's appointment patient:  [x]  Cancelled   []  Rescheduled appointment  []  No-show 19     Reason given by patient:  []  Patient ill  []  Conflicting appointment  []  No transportation    []  Conflict with work  []  No reason given  [x]  Other:     Comments:  Pt called stating he was having car serviced and would be unable to get free for appt.       Phone call information:   []  Phone call made today to patient at _ time at number provided:      []  Patient answered, conversation as follows:    []  Patient did not answer, message left as follows:  [x]  Phone call not made today    Electronically signed by:  Meryl Meigs, PT

## 2019-06-06 NOTE — PROGRESS NOTES
Subjective:      Patient ID: Woo Mata is a 59 y.o. male. CC: Patient presents for re-evaluation of chronic health problems including diabetes mellitus, hand peeling, hyperlipidemia and osteoarthritis. HPI pt is here for a follow up, med refill, test results, and will like to discuss bilateral hand peeling. Pt states he has been using chlorine bleach for years and doesn't know if this is why. Patient has been using either over-the-counter cortisone cream for his wife's cortisone cream and he feels this is helped his pain significantly. In regards to his diabetes mellitus he continues take his blood sugar management basis and his ranges typically 160-180. He's had no hypoglycemic episodes he is very concerned about having hypoglycemic episodes. He denies any chest pain or shortness breath symptoms. Arthritis overall is controlled taking pain medication on a once or twice a day basis. Eye exam current (within one year): Yes    Checks sugars at home: yes  Home blood sugar records: patient tests 1 time(s) per day avg 185-285  Any episodes of hypoglycemia?  No    Current medication use: taking as prescribed  Medication side effects: none     Current diet: well balanced  Current exercise:moderately active    Review of Systems  Patient Active Problem List   Diagnosis    Essential hypertension    Generalized osteoarthrosis, involving multiple sites    Type 2 diabetes mellitus without complication ([address]    Pure hypercholesterolemia    Sleep apnea    Allergic rhinitis    Pseudogout    Melanoma in situ of lower leg ([address]    Type 2 diabetes mellitus with diabetic polyneuropathy, without long-term current use of insulin ([address]    Eczema       Outpatient Medications Marked as Taking for the 6/6/19 encounter (Office Visit) with Renata Pollard MD   Medication Sig Dispense Refill    diclofenac sodium 1 % GEL APPLY 2GM TOPICALLY TO AFFECTED AREA(S) TWICE DAILY  3    pioglitazone (ACTOS) 30 MG tablet Take 15 mg by mouth      traMADol (ULTRAM) 50 MG tablet Take 50 mg by mouth.  cetirizine (ZYRTEC) 10 MG tablet Take 10 mg by mouth daily      erythromycin (ROMYCIN) 5 MG/GM ophthalmic ointment Thin ribbon BID for 3 days 1 Tube 0    ramipril (ALTACE) 2.5 MG capsule Take 2.5 mg by mouth daily      metFORMIN (GLUCOPHAGE) 1000 MG tablet Take 1 tablet by mouth 2 times daily 180 tablet 1    pantoprazole (PROTONIX) 40 MG tablet Take 1 tablet by mouth daily 90 tablet 1    simvastatin (ZOCOR) 40 MG tablet TAKE 1 TABLET BY MOUTH AT BEDTIME 90 tablet 1    colchicine (COLCRYS) 0.6 MG tablet Take 1 tablet by mouth daily 90 tablet 1    glimepiride (AMARYL) 1 MG tablet Take 1 tablet by mouth every morning (before breakfast) 90 tablet 1    diclofenac (VOLTAREN) 50 MG EC tablet Take 1 tablet by mouth 2 times daily 180 tablet 1    glucose blood VI test strips (ONETOUCH VERIO) strip 1 each by In Vitro route daily 100 each 3    FREESTYLE LANCETS MISC 1 each by Does not apply route daily 100 each 3    Blood Glucose Monitoring Suppl (FREESTYLE LITE) CLAUDINE 1 Device by Does not apply route 2 times daily 1 Device 0    albuterol sulfate  (90 BASE) MCG/ACT inhaler Inhale 2 puffs into the lungs every 6 hours as needed for Wheezing         No Known Allergies    Social History     Tobacco Use    Smoking status: Former Smoker     Packs/day: 1.00     Years: 10.00     Pack years: 10.00     Last attempt to quit: 4/3/1993     Years since quittin.1    Smokeless tobacco: Never Used    Tobacco comment: quit 25 years ago   Substance Use Topics    Alcohol use: No       /74 (Site: Left Upper Arm, Position: Sitting, Cuff Size: Large Adult)   Pulse 60   Resp 16   Wt 219 lb 6 oz (99.5 kg)   SpO2 96%   BMI 32.40 kg/m²     Objective:   Physical Exam   Constitutional: He appears well-developed and well-nourished. He is cooperative. Neck: Carotid bruit is not present.    Cardiovascular: Normal rate, regular rhythm and prescribed medications. Barriers to medication compliance addressed. All patient questions answered. Pt voiced understanding. Patient needs RTC in 3 months. Please note that this chart was generated using Dragon dictation software. Although every effort was made to ensure the accuracy of this automated transcription, some errors in transcription may have occurred.

## 2019-06-11 ENCOUNTER — APPOINTMENT (OUTPATIENT)
Dept: PHYSICAL THERAPY | Age: 64
End: 2019-06-11
Payer: COMMERCIAL

## 2019-06-18 ENCOUNTER — APPOINTMENT (OUTPATIENT)
Dept: PHYSICAL THERAPY | Age: 64
End: 2019-06-18
Payer: COMMERCIAL

## 2019-06-20 ENCOUNTER — HOSPITAL ENCOUNTER (OUTPATIENT)
Dept: PHYSICAL THERAPY | Age: 64
Setting detail: THERAPIES SERIES
Discharge: HOME | End: 2019-06-20
Payer: COMMERCIAL

## 2019-06-20 PROCEDURE — 97110 THERAPEUTIC EXERCISES: CPT

## 2019-06-20 NOTE — PROGRESS NOTES
Physical Therapy      Physical Therapy Daily Treatment Note  Date:  2019    Patient Name:  Dasia Logan    :  1955  MRN: [id number]  Restrictions/Precautions:  None  Medical/Treatment Diagnosis Information:   · Diagnosis: HNP lumbar spine  · Treatment Diagnosis: limited lumbar ROM, decreased core strength, pain with ADL's, decreased postural awareness and improper body mechanics    Tracking Information:  Physician Information Referring Practitioner: Carl Moura MD     Plan of Care Sent Date: 19 Signed Received:    Visit Count / Total Visits      Insurance Approved Visits  /  Approved Dates:     Insurance Information PT Insurance Information: [employer]BS [place] PPO  *If only certain CPT codes approved use smart phrase CPT calculator . OPPTINSURANCECPTCODECALCULATOR   Progress Note/G-codes   []  Yes  [x]  No Next Due:      Pain level: 0/10 center of low back     Subjective:  :  Pt reports no pain this date; states he is very surprised because typically when it rains he is achy.    :  Pt reports no pain this date. States vacation was excellent and he has been feeling much better since. Reports they hired a new  at work so he has not had to lift as much. :  Pt reports minimal pain in center of low back this date. States he did a lot of lifting yesterday and tolerated it quite well. :  Pt reports pain B hips this date; states it has hurt on both sides for the past few days. States he has been lifting quite a bit at work. :  Pt reports pain R hip this date; states he has been lifting a lot at work this week. States he felt good after last PT visit for about a week prior to pain returning. :  Pt states he felt good after last session; had some soreness last night after work. 3/26:  Pt reports very minor pain on L side of low back this date. States he felt good after last session. 3/19: Pt reports no pain at the present time.   States his right hip was sore for a week after last session. Pt reports he has eliminated as much heavy lifting from his job as he can. Objective:  Observation: 6/20:  Pt ambulating well and demonstrating good posture this date  Test measurements:  6/20:  Formal measurements not taken this date    Exercises:  Exercise/Equipment Resistance/Repetitions Other comments   Prone  x 1 min    KANDICE X 1 min    Press ups 2 X 10; 2nd set with belt stab   cables Multifidus walkouts with antirotation press x 5 each rep (3 reps 3 plates)    3 way kicks:  2 plates x 10 ea direction  BLE's      bridging     Quadruped    Nu step  x 4 min    IB/HR  2 x 30\"/2 x 10  Soleus stretch x 30\"  Second set of HR's in SLS   Standing hamstring stretch 2 x 30\" B     standing hip flexor stretch  x 20\" B    supine Piriformis stretch 2 x 30\" B with overpressure    LTR x 10 B  Hip abduction with blue band x 10                      Other Therapeutic Activities: 6/20:  None this date   5/30:  Discussed continuation in HP after discharge  4/23:  Discussed the importance of stopping pain before it starts; doing exercises before he hurts and consistently. Encouraged press ups 3 times daily. Evaluation completed this date. Discussed risks and benefits of tx with pt - pt agreed with POC. Reviewed mechanics of spine using model and discussed proper posture and body mechanics. Educated pt on centralization principle. Pt instructed on proper sitting posture with towel roll. Written HEP provided for pt. Home Exercise Program:  6/20:  No new additions this date    Written HEP provided with pictures for prone, prone on elbow, press ups, standing lumbar extension, and sitting with lumbar towel roll. Centralization principle written out for pt. Manual Treatments: 6/20:  None this date  4/30:   P/A mobs to lumbar spine x 5 at each level; 6 reps. Prone lumbar traction 5 x 30\" hold  3/19: P/A mobs to lumbar spine x 5 at each level; 5 sets.   Pt tender to palpation over R PSIS    Modalities:  6/20:  None this date  5/30: Prone with MHP and IFC (80-150pps) x 15 min to low back  3/19:  Held traction this date as pt reports hip pain after last session; Prone with MHP and IFC (80-150pps) x 15 min to low back    Prone traction 65#/50# x 10 min - shut off and bell provided for pt.   Seated with MHP to low back x 15 min after session    Timed Code Treatment Minutes:  32    Total Treatment Minutes:  32    Treatment/Activity Tolerance:  [x] Patient tolerated treatment well [] Patient limited by fatigue  [] Patient limited by pain  [] Patient limited by other medical complications  [] Other:     Prognosis: [x] Good [] Fair  [] Poor    Patient Requires Follow-up: [x] Yes  [] No    Plan:   [x] Continue per plan of care [] Alter current plan (see comments)  [] Plan of care initiated [] Hold pending MD visit [] Discharge  Plan for Next Session:  Continue with prone progression, standing lumbar extension, core strengthening when tolerated, continue with modalities as needed for pain, manual therapy     Electronically signed by:  Yolanda Ventura, PT

## 2019-06-21 ENCOUNTER — TELEPHONE (OUTPATIENT)
Dept: FAMILY MEDICINE CLINIC | Age: 64
End: 2019-06-21

## 2019-07-02 ENCOUNTER — APPOINTMENT (OUTPATIENT)
Dept: PHYSICAL THERAPY | Age: 64
End: 2019-07-02
Payer: COMMERCIAL

## 2019-07-03 ENCOUNTER — HOSPITAL ENCOUNTER (OUTPATIENT)
Dept: PHYSICAL THERAPY | Age: 64
Setting detail: THERAPIES SERIES
Discharge: HOME | End: 2019-07-03
Payer: COMMERCIAL

## 2019-07-03 NOTE — FLOWSHEET NOTE
Physical Therapy  Cancellation/No-show Note  Patient Name:  Wilder Record  :  1955   Date:  7/3/2019  Cancelled visits to date: 2  No-shows to date: 1    Patient status for today's appointment patient:  [x]  Cancelled , 7/3  []  Rescheduled appointment  []  No-show 19     Reason given by patient:  []  Patient ill  []  Conflicting appointment  []  No transportation    []  Conflict with work  []  No reason given  [x]  Other:     Comments:  Pt's wife is ill this date      Phone call information:   []  Phone call made today to patient at _ time at number provided:      []  Patient answered, conversation as follows:    []  Patient did not answer, message left as follows:  [x]  Phone call not made today    Electronically signed by:  Shawn Robb PT

## 2019-07-09 ENCOUNTER — APPOINTMENT (OUTPATIENT)
Dept: PHYSICAL THERAPY | Age: 64
End: 2019-07-09
Payer: COMMERCIAL

## 2019-07-11 ENCOUNTER — HOSPITAL ENCOUNTER (OUTPATIENT)
Dept: PHYSICAL THERAPY | Age: 64
Setting detail: THERAPIES SERIES
Discharge: HOME | End: 2019-07-11
Payer: COMMERCIAL

## 2019-07-11 PROCEDURE — 97110 THERAPEUTIC EXERCISES: CPT

## 2019-07-11 PROCEDURE — G0283 ELEC STIM OTHER THAN WOUND: HCPCS

## 2019-07-11 NOTE — PROGRESS NOTES
Physical Therapy      Outpatient Physical Therapy    Phone: [phone] Fax: [phone]    Physical Therapy Progress/Discharge Note  Date: 2019        Patient Name:  Aman Arriaga    :  1955  MRN: [id number]  Restrictions/Precautions:  None  Medical/Treatment Diagnosis Information:   · Diagnosis: HNP lumbar spine  · Treatment Diagnosis: limited lumbar ROM, decreased core strength, pain with ADL's, decreased postural awareness and improper body mechanics     Tracking Information:       Physician Information Referring Practitioner: Ellouise Dance, MD     Plan of Care Sent Date: 19 Signed Received:    Visit Count / Total Visits  10/12     Insurance Approved Visits  /  Approved Dates:     Insurance Information PT Insurance Information: [employer] [place]PO  *If only certain CPT codes approved use smart phrase CPT calculator . OPPTINSURANCECPTCODECALCULATOR   Progress Note/G-codes   [x]  Yes                 []  No Next Due:       Pain level:      0/10 center of low back               Time Period for Report:  From initial eval to 19  Cancels/No-shows to date:  2    Plan of Care/Treatment to date:  [x] Therapeutic Exercise      [x] Modalities:  [x] Therapeutic Activity       [] Ultrasound    [] Gait Training        [] Cervical Traction   [] Neuromuscular Re-education      [x] Cold/hotpack    [x] Instruction in HEP        [x] Lumbar Traction  [x] Manual Therapy        [x] Electrical Stimulation            [] Aquatic Therapy        [] Iontophoresis        ? [] Lymphedema management  [] Women's Health     Other:  [] Vestibular Rehab        []              ? Significant Findings At Last Visit/Comments:    Subjective:  Pt reports 80% improvement since initiation of PT. States pain is intermittent at this point and primarily occurs when lifting heavy things at work. Reports no pain at the present time. Feels that he will be ready for discharge at the completion of current script. Objective:  Observation: Pt demonstrating good posture however still with slightly flattened lumbar spine. Lumbar ROM improved from initial eval.    Test measurements:  Lumbar ROM:  Flexion to 75%, extension to 50%, SB B to 75%  MMT BLE's:  5/5 throughout     Assessment:  Summary: Pt progressing well overall  Patient's response to treatment: Tolerates tx well without complaints; painfree after sessions    Progress towards goals:    Short term goals  Time Frame for Short term goals: 3 weeks  Short term goal 1: Pt will be independent with HEP in order to improve lumbar ROM and decrease overall pain. - MET   Long term goals  Time Frame for Long term goals : 6 weeks  Long term goal 1: Pt will report an overall decrease of pain of at least 50% in order to tolerate work day without increased pain. - MET  Long term goal 2: Pt will demonstrate increased ROM of lumbar spine to at least 75% of full ROM in order to decrease pain and improve body mechanics - MET  Long term goal 3: Pt will report the ability to stand through his workday without increased pain. - MET at times. Patient Goals   Patient goals : To decrease pain and improve endurance           Current Frequency/Duration:  # Days per week: [] 1 day # Weeks: [] 1 week [] 4 weeks      [x] 2 days? [] 2 weeks [] 5 weeks      [] 3 days   [] 3 weeks [] 6 weeks     Rehab Potential: [x] Excellent [] Good [] Fair  [] Poor     Goal Status:  [] Achieved [x] Partially Achieved  [] Not Achieved     Patient Status: [x] Continue per initial plan of Care     [] Patient now discharged     [] Additional visits requested, Please re-certify for additional visits:      Requested frequency/duration:  X/week for weeks    Electronically signed by: Jesus Gutierrez, PT, DPT  [id number]            If you have any questions or concerns, please don't hesitate to call.   Thank you for your referral.    Physician Signature:________________________________Date:__________________  By signing above, therapists plan is approved by physician

## 2019-07-17 DIAGNOSIS — E78.00 PURE HYPERCHOLESTEROLEMIA: ICD-10-CM

## 2019-07-17 RX ORDER — SIMVASTATIN 40 MG
TABLET ORAL
Qty: 90 TABLET | Refills: 1 | Status: SHIPPED | OUTPATIENT
Start: 2019-07-17 | End: 2019-12-18

## 2019-07-25 ENCOUNTER — HOSPITAL ENCOUNTER (OUTPATIENT)
Dept: PHYSICAL THERAPY | Age: 64
Setting detail: THERAPIES SERIES
Discharge: HOME | End: 2019-07-25
Payer: COMMERCIAL

## 2019-07-25 PROCEDURE — 97110 THERAPEUTIC EXERCISES: CPT

## 2019-07-25 NOTE — PROGRESS NOTES
returning. 4/2:  Pt states he felt good after last session; had some soreness last night after work. 3/26:  Pt reports very minor pain on L side of low back this date. States he felt good after last session. 3/19: Pt reports no pain at the present time. States his right hip was sore for a week after last session. Pt reports he has eliminated as much heavy lifting from his job as he can. Objective:  Observation: 7/25:  Pt ambulating well and demonstrating good posture this date; good technique with exercises this date. Test measurements: 7/25: Formal measurements not taken this date    Exercises:  Exercise/Equipment Resistance/Repetitions Other comments   Prone  x 1 min    KANDICE X 1 min    Press ups 2 X 10; 2nd set with manual stab      cables       bridging     Quadruped    Nu step  x 5 min    IB/HR  2 x 30\"/2 x 10  Soleus stretch x 30\"  Second set of HR's in SLS   Standing hamstring stretch 2 x 30\" B     standing hip flexor stretch  x 20\" B    supine Piriformis stretch 2 x 30\" B with overpressure     LTR x 5 B                       Other Therapeutic Activities: 7/25:  Reviewed stretches for home and stressed importance of performing daily. Pt interested in dry needling - had PT that performs dry needling come and talk to pt and wife after session. 7/11: MMT and ROM assessed this date for PN   5/30:  Discussed continuation in HP after discharge  4/23:  Discussed the importance of stopping pain before it starts; doing exercises before he hurts and consistently. Encouraged press ups 3 times daily. Evaluation completed this date. Discussed risks and benefits of tx with pt - pt agreed with POC. Reviewed mechanics of spine using model and discussed proper posture and body mechanics. Educated pt on centralization principle. Pt instructed on proper sitting posture with towel roll. Written HEP provided for pt.        Home Exercise Program:  7/25:  Reviewed stretches and stressed importance of

## 2019-08-06 ENCOUNTER — TELEPHONE (OUTPATIENT)
Dept: ORTHOPEDIC SURGERY | Age: 64
End: 2019-08-06

## 2019-08-07 ENCOUNTER — OFFICE VISIT (OUTPATIENT)
Dept: FAMILY MEDICINE CLINIC | Age: 64
End: 2019-08-07
Payer: COMMERCIAL

## 2019-08-07 VITALS
BODY MASS INDEX: 32.54 KG/M2 | OXYGEN SATURATION: 96 % | HEART RATE: 50 BPM | DIASTOLIC BLOOD PRESSURE: 78 MMHG | WEIGHT: 220.38 LBS | RESPIRATION RATE: 16 BRPM | SYSTOLIC BLOOD PRESSURE: 132 MMHG

## 2019-08-07 DIAGNOSIS — H61.23 BILATERAL IMPACTED CERUMEN: Primary | ICD-10-CM

## 2019-08-07 PROCEDURE — 99212 OFFICE O/P EST SF 10 MIN: CPT | Performed by: FAMILY MEDICINE

## 2019-08-07 NOTE — PROGRESS NOTES
Subjective:      Patient ID: Mignon Martinez is a 59 y.o. male. CC: Patient presents for acute medical problem-difficulty hearing of the right ear. Medical assistant notes reviewed. HPI pt is here to have his ear right ear look at as he thinks he has wax build up. Pt states he has not been able to hear for a few days out of that ear now. Pt states he has been using q-tips    Review of Systems  No Known Allergies  Objective:   Physical Exam   Constitutional: He appears well-developed and well-nourished. No distress. HENT:   Right Ear: Tympanic membrane normal.   Left Ear: Tympanic membrane normal.   Cerumen impaction bilaterally right greater than left. After removal of the cerumen mechanically tympanic membranes are normal.   Neurological: He is alert. Nursing note and vitals reviewed. Assessment:      Carmen Pereira was seen today for ear fullness.     Diagnoses and all orders for this visit:    Bilateral impacted cerumen            Plan:      Advised patient to stop using any Q-tips  RTC at regular appointment time

## 2019-08-20 ENCOUNTER — TELEPHONE (OUTPATIENT)
Dept: ORTHOPEDIC SURGERY | Age: 64
End: 2019-08-20

## 2019-09-04 ENCOUNTER — OFFICE VISIT (OUTPATIENT)
Dept: PULMONOLOGY | Age: 64
End: 2019-09-04
Payer: COMMERCIAL

## 2019-09-04 VITALS
BODY MASS INDEX: 32.44 KG/M2 | HEIGHT: 69 IN | DIASTOLIC BLOOD PRESSURE: 82 MMHG | WEIGHT: 219 LBS | OXYGEN SATURATION: 98 % | HEART RATE: 42 BPM | SYSTOLIC BLOOD PRESSURE: 123 MMHG

## 2019-09-04 DIAGNOSIS — G47.33 OBSTRUCTIVE SLEEP APNEA SYNDROME: Primary | Chronic | ICD-10-CM

## 2019-09-04 DIAGNOSIS — K21.9 GERD WITHOUT ESOPHAGITIS: ICD-10-CM

## 2019-09-04 DIAGNOSIS — E11.42 TYPE 2 DIABETES MELLITUS WITH DIABETIC POLYNEUROPATHY, WITHOUT LONG-TERM CURRENT USE OF INSULIN (HCC): Chronic | ICD-10-CM

## 2019-09-04 DIAGNOSIS — I10 ESSENTIAL HYPERTENSION: Chronic | ICD-10-CM

## 2019-09-04 DIAGNOSIS — E66.9 NON MORBID OBESITY, UNSPECIFIED OBESITY TYPE: ICD-10-CM

## 2019-09-04 PROCEDURE — 99214 OFFICE O/P EST MOD 30 MIN: CPT | Performed by: INTERNAL MEDICINE

## 2019-09-04 ASSESSMENT — ENCOUNTER SYMPTOMS
SHORTNESS OF BREATH: 0
STRIDOR: 0
CHEST TIGHTNESS: 0
SINUS PRESSURE: 0
EYE PAIN: 0
PHOTOPHOBIA: 0

## 2019-09-04 ASSESSMENT — SLEEP AND FATIGUE QUESTIONNAIRES
HOW LIKELY ARE YOU TO NOD OFF OR FALL ASLEEP WHILE SITTING QUIETLY AFTER LUNCH WITHOUT ALCOHOL: 0
HOW LIKELY ARE YOU TO NOD OFF OR FALL ASLEEP IN A CAR, WHILE STOPPED FOR A FEW MINUTES IN TRAFFIC: 0
HOW LIKELY ARE YOU TO NOD OFF OR FALL ASLEEP WHILE SITTING AND READING: 0
ESS TOTAL SCORE: 0
HOW LIKELY ARE YOU TO NOD OFF OR FALL ASLEEP WHILE SITTING INACTIVE IN A PUBLIC PLACE: 0
HOW LIKELY ARE YOU TO NOD OFF OR FALL ASLEEP WHILE WATCHING TV: 0
HOW LIKELY ARE YOU TO NOD OFF OR FALL ASLEEP WHILE SITTING AND TALKING TO SOMEONE: 0
HOW LIKELY ARE YOU TO NOD OFF OR FALL ASLEEP WHILE LYING DOWN TO REST IN THE AFTERNOON WHEN CIRCUMSTANCES PERMIT: 0
HOW LIKELY ARE YOU TO NOD OFF OR FALL ASLEEP WHEN YOU ARE A PASSENGER IN A CAR FOR AN HOUR WITHOUT A BREAK: 0

## 2019-09-04 NOTE — PROGRESS NOTES
of clubs or organizations: Not on file     Relationship status: Not on file    Intimate partner violence:     Fear of current or ex partner: Not on file     Emotionally abused: Not on file     Physically abused: Not on file     Forced sexual activity: Not on file   Other Topics Concern    Not on file   Social History Narrative    Not on file       Current Outpatient Medications   Medication Sig Dispense Refill    diclofenac sodium 1 % GEL APPLY 2GM TOPICALLY TO AFFECTED AREA(S) TWICE DAILY 1 Tube 3    simvastatin (ZOCOR) 40 MG tablet TAKE 1 TABLET BY MOUTH AT BEDTIME 90 tablet 1    cetirizine (ZYRTEC) 10 MG tablet Take 10 mg by mouth daily      pioglitazone (ACTOS) 30 MG tablet Take 1 tablet by mouth daily 90 tablet 1    traMADol (ULTRAM) 50 MG tablet Take 1 tablet by mouth 2 times daily as needed for Pain for up to 90 days. 60 tablet 2    erythromycin (ROMYCIN) 5 MG/GM ophthalmic ointment Thin ribbon BID for 3 days 1 Tube 0    ramipril (ALTACE) 2.5 MG capsule Take 2.5 mg by mouth daily      metFORMIN (GLUCOPHAGE) 1000 MG tablet Take 1 tablet by mouth 2 times daily 180 tablet 1    pantoprazole (PROTONIX) 40 MG tablet Take 1 tablet by mouth daily 90 tablet 1    colchicine (COLCRYS) 0.6 MG tablet Take 1 tablet by mouth daily 90 tablet 1    glimepiride (AMARYL) 1 MG tablet Take 1 tablet by mouth every morning (before breakfast) 90 tablet 1    diclofenac (VOLTAREN) 50 MG EC tablet Take 1 tablet by mouth 2 times daily 180 tablet 1    glucose blood VI test strips (ONETOUCH VERIO) strip 1 each by In Vitro route daily 100 each 3    FREESTYLE LANCETS MISC 1 each by Does not apply route daily 100 each 3    Blood Glucose Monitoring Suppl (FREESTYLE LITE) CLAUDINE 1 Device by Does not apply route 2 times daily 1 Device 0    albuterol sulfate  (90 BASE) MCG/ACT inhaler Inhale 2 puffs into the lungs every 6 hours as needed for Wheezing       No current facility-administered medications for this visit. Allergies as of 09/04/2019    (No Known Allergies)       Patient Active Problem List   Diagnosis    Essential hypertension    Generalized osteoarthrosis, involving multiple sites    Pure hypercholesterolemia    Obstructive sleep apnea syndrome    Allergic rhinitis    Pseudogout    [facility]anoma in situ of lower leg ([facility]    Type 2 diabetes mellitus with diabetic polyneuropathy, without long-term current use of insulin ([facility])    Eczema    Dyshidrotic eczema    GERD without esophagitis    Non morbid obesity, unspecified obesity type       Past Medical History:   Diagnosis Date    Abdominal pain, LLQ     Allergic rhinitis     Asthma     Atopic dermatitis     Cancer ([facility]     melanoma    Diabetes mellitus ([facility]     Erectile dysfunction     Generalized osteoarthrosis, involving multiple sites     GERD (gastroesophageal reflux disease)     Gynecomastia     Hyperlipidemia     Nasal congestion     Nipple pain     Obstructive sleep apnea syndrome 10/5/2012    Pseudo-gout     Unspecified essential hypertension        Past Surgical History:   Procedure Laterality Date    CARPAL TUNNEL RELEASE Bilateral 2013    FINGER SURGERY Left 3-1-2013    Incision & Drainage of left Thumb Infection    FOOT SURGERY Right     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    HAND SURGERY  2011    LUMBAR NERVE BLOCK Right 12/12/2018    RIGHT L4/L5 LUMBAR INTERLAMINAR EPIDURAL STEROID INJECTION WITH FLUOROSCOPY performed by Lashaun Bhatia MD at [facility]  2002    melenoma right side post    NH NJX DX/THER SBST INTRLMNR LMBR/SAC W/IMG GDN Right 9/24/2018    RIGHT L4/L5 INTERLAMINAR EPIDURAL STEROID INJECTION WITH FLUOROSCOPY performed by Lashaun Bhatia MD at [facility]       Family History   Problem Relation Age of Onset    Arthritis Mother     High Blood Pressure Mother     Emphysema Mother     Osteoporosis Mother     Diabetes Father     Heart Disease Father     High Cholesterol Father     Parkinsonism

## 2019-09-06 LAB
A/G RATIO: 1.6 (CALC) (ref 1–2.5)
ALBUMIN SERPL-MCNC: 3.9 G/DL (ref 3.6–5.1)
ALP BLD-CCNC: 84 U/L (ref 40–115)
ALT SERPL-CCNC: 28 U/L (ref 9–46)
AST SERPL-CCNC: 22 U/L (ref 10–35)
BILIRUB SERPL-MCNC: 0.4 MG/DL (ref 0.2–1.2)
BUN / CREAT RATIO: ABNORMAL (CALC) (ref 6–22)
BUN BLDV-MCNC: 23 MG/DL (ref 7–25)
CALCIUM SERPL-MCNC: 9.5 MG/DL (ref 8.6–10.3)
CHLORIDE BLD-SCNC: 107 MMOL/L (ref 98–110)
CHOLESTEROL, TOTAL: 172 MG/DL
CHOLESTEROL/HDL RATIO: 4.1 (CALC)
CHOLESTEROL: 130 MG/DL (CALC)
CO2: 27 MMOL/L (ref 20–32)
CREAT SERPL-MCNC: 0.85 MG/DL (ref 0.7–1.25)
GFR AFRICAN AMERICAN: 107 ML/MIN/1.73M2
GFR, ESTIMATED: 92 ML/MIN/1.73M2
GLOBULIN: 2.5 G/DL (CALC) (ref 1.9–3.7)
GLUCOSE BLD-MCNC: 124 MG/DL (ref 65–99)
HBA1C MFR BLD: 7.1 % OF TOTAL HGB
HDLC SERPL-MCNC: 42 MG/DL
LDL CHOLESTEROL CALCULATED: 101 MG/DL (CALC)
POTASSIUM SERPL-SCNC: 4.1 MMOL/L (ref 3.5–5.3)
PROSTATE SPECIFIC ANTIGEN: 0.6 NG/ML
SODIUM BLD-SCNC: 141 MMOL/L (ref 135–146)
TOTAL PROTEIN: 6.4 G/DL (ref 6.1–8.1)
TRIGL SERPL-MCNC: 172 MG/DL

## 2019-09-11 ENCOUNTER — OFFICE VISIT (OUTPATIENT)
Dept: FAMILY MEDICINE CLINIC | Age: 64
End: 2019-09-11
Payer: COMMERCIAL

## 2019-09-11 VITALS
BODY MASS INDEX: 32.36 KG/M2 | DIASTOLIC BLOOD PRESSURE: 72 MMHG | WEIGHT: 219.13 LBS | HEART RATE: 44 BPM | OXYGEN SATURATION: 94 % | SYSTOLIC BLOOD PRESSURE: 118 MMHG | RESPIRATION RATE: 12 BRPM

## 2019-09-11 DIAGNOSIS — M15.9 GENERALIZED OSTEOARTHROSIS, INVOLVING MULTIPLE SITES: Primary | ICD-10-CM

## 2019-09-11 DIAGNOSIS — G47.33 OBSTRUCTIVE SLEEP APNEA SYNDROME: Chronic | ICD-10-CM

## 2019-09-11 DIAGNOSIS — I10 ESSENTIAL HYPERTENSION: Chronic | ICD-10-CM

## 2019-09-11 DIAGNOSIS — E11.42 TYPE 2 DIABETES MELLITUS WITH DIABETIC POLYNEUROPATHY, WITHOUT LONG-TERM CURRENT USE OF INSULIN (HCC): Chronic | ICD-10-CM

## 2019-09-11 PROCEDURE — 99214 OFFICE O/P EST MOD 30 MIN: CPT | Performed by: FAMILY MEDICINE

## 2019-09-11 RX ORDER — TRAMADOL HYDROCHLORIDE 50 MG/1
50 TABLET ORAL 2 TIMES DAILY
Qty: 60 TABLET | Refills: 2 | Status: SHIPPED | OUTPATIENT
Start: 2019-09-11 | End: 2020-01-02 | Stop reason: SDUPTHER

## 2019-09-11 RX ORDER — TRAMADOL HYDROCHLORIDE 50 MG/1
50 TABLET ORAL EVERY 6 HOURS PRN
COMMUNITY
End: 2019-09-11 | Stop reason: SDUPTHER

## 2019-10-05 DIAGNOSIS — E11.42 TYPE 2 DIABETES MELLITUS WITH DIABETIC POLYNEUROPATHY, WITHOUT LONG-TERM CURRENT USE OF INSULIN (HCC): ICD-10-CM

## 2019-10-05 DIAGNOSIS — M11.20 PSEUDOGOUT: ICD-10-CM

## 2019-10-07 RX ORDER — COLCHICINE 0.6 MG/1
TABLET, FILM COATED ORAL
Qty: 30 TABLET | Refills: 2 | Status: SHIPPED | OUTPATIENT
Start: 2019-10-07 | End: 2019-12-17

## 2019-10-17 ENCOUNTER — OFFICE VISIT (OUTPATIENT)
Dept: FAMILY MEDICINE CLINIC | Age: 64
End: 2019-10-17
Payer: COMMERCIAL

## 2019-10-17 VITALS
HEART RATE: 47 BPM | OXYGEN SATURATION: 97 % | BODY MASS INDEX: 33.17 KG/M2 | SYSTOLIC BLOOD PRESSURE: 108 MMHG | TEMPERATURE: 98.2 F | DIASTOLIC BLOOD PRESSURE: 80 MMHG | WEIGHT: 224.6 LBS

## 2019-10-17 DIAGNOSIS — G90.01 CAROTIDYNIA: Primary | ICD-10-CM

## 2019-10-17 DIAGNOSIS — H66.001 ACUTE SUPPURATIVE OTITIS MEDIA OF RIGHT EAR WITHOUT SPONTANEOUS RUPTURE OF TYMPANIC MEMBRANE, RECURRENCE NOT SPECIFIED: ICD-10-CM

## 2019-10-17 PROCEDURE — 99213 OFFICE O/P EST LOW 20 MIN: CPT | Performed by: FAMILY MEDICINE

## 2019-10-17 RX ORDER — CEPHALEXIN 500 MG/1
500 CAPSULE ORAL 3 TIMES DAILY
Qty: 21 CAPSULE | Refills: 0 | Status: SHIPPED | OUTPATIENT
Start: 2019-10-17 | End: 2019-10-24

## 2019-11-26 RX ORDER — ALBUTEROL SULFATE 90 UG/1
2 AEROSOL, METERED RESPIRATORY (INHALATION) EVERY 6 HOURS PRN
Qty: 1 INHALER | Refills: 0 | Status: SHIPPED | OUTPATIENT
Start: 2019-11-26 | End: 2020-01-02 | Stop reason: SDUPTHER

## 2019-12-04 ENCOUNTER — OFFICE VISIT (OUTPATIENT)
Dept: FAMILY MEDICINE CLINIC | Age: 64
End: 2019-12-04
Payer: COMMERCIAL

## 2019-12-04 VITALS
TEMPERATURE: 98.3 F | SYSTOLIC BLOOD PRESSURE: 124 MMHG | DIASTOLIC BLOOD PRESSURE: 70 MMHG | BODY MASS INDEX: 33.46 KG/M2 | HEART RATE: 51 BPM | WEIGHT: 226.6 LBS | OXYGEN SATURATION: 97 %

## 2019-12-04 DIAGNOSIS — J20.9 ACUTE BRONCHITIS, UNSPECIFIED ORGANISM: Primary | ICD-10-CM

## 2019-12-04 PROCEDURE — 99213 OFFICE O/P EST LOW 20 MIN: CPT | Performed by: FAMILY MEDICINE

## 2019-12-04 RX ORDER — CEPHALEXIN 500 MG/1
500 CAPSULE ORAL 3 TIMES DAILY
Qty: 21 CAPSULE | Refills: 0 | Status: SHIPPED | OUTPATIENT
Start: 2019-12-04 | End: 2019-12-11

## 2019-12-04 RX ORDER — IPRATROPIUM BROMIDE AND ALBUTEROL SULFATE 2.5; .5 MG/3ML; MG/3ML
1 SOLUTION RESPIRATORY (INHALATION) EVERY 4 HOURS PRN
Qty: 120 VIAL | Refills: 0 | Status: SHIPPED | OUTPATIENT
Start: 2019-12-04 | End: 2020-01-02 | Stop reason: SDUPTHER

## 2019-12-04 ASSESSMENT — ENCOUNTER SYMPTOMS
SINUS PAIN: 1
COUGH: 1
WHEEZING: 1

## 2019-12-13 ENCOUNTER — TELEPHONE (OUTPATIENT)
Dept: FAMILY MEDICINE CLINIC | Age: 64
End: 2019-12-13

## 2019-12-16 ENCOUNTER — TELEPHONE (OUTPATIENT)
Dept: FAMILY MEDICINE CLINIC | Age: 64
End: 2019-12-16

## 2019-12-17 ENCOUNTER — HOSPITAL ENCOUNTER (OUTPATIENT)
Age: 64
Discharge: HOME | End: 2019-12-17
Payer: COMMERCIAL

## 2019-12-17 ENCOUNTER — OFFICE VISIT (OUTPATIENT)
Dept: FAMILY MEDICINE CLINIC | Age: 64
End: 2019-12-17
Payer: COMMERCIAL

## 2019-12-17 ENCOUNTER — HOSPITAL ENCOUNTER (OUTPATIENT)
Dept: GENERAL RADIOLOGY | Age: 64
Discharge: HOME | End: 2019-12-17
Payer: COMMERCIAL

## 2019-12-17 VITALS
SYSTOLIC BLOOD PRESSURE: 142 MMHG | WEIGHT: 223.2 LBS | OXYGEN SATURATION: 99 % | BODY MASS INDEX: 32.96 KG/M2 | HEART RATE: 59 BPM | TEMPERATURE: 97.6 F | DIASTOLIC BLOOD PRESSURE: 70 MMHG

## 2019-12-17 DIAGNOSIS — J40 BRONCHITIS: ICD-10-CM

## 2019-12-17 DIAGNOSIS — J40 BRONCHITIS: Primary | ICD-10-CM

## 2019-12-17 PROCEDURE — 99213 OFFICE O/P EST LOW 20 MIN: CPT | Performed by: PHYSICIAN ASSISTANT

## 2019-12-17 PROCEDURE — 71046 X-RAY EXAM CHEST 2 VIEWS: CPT

## 2019-12-17 RX ORDER — LEVOFLOXACIN 500 MG/1
500 TABLET, FILM COATED ORAL DAILY
Qty: 10 TABLET | Refills: 0 | Status: SHIPPED | OUTPATIENT
Start: 2019-12-17 | End: 2019-12-27

## 2019-12-17 ASSESSMENT — ENCOUNTER SYMPTOMS
VOMITING: 0
NAUSEA: 0
EYE PAIN: 0
DIARRHEA: 0
SORE THROAT: 0
SHORTNESS OF BREATH: 1
WHEEZING: 0
COUGH: 1

## 2019-12-18 DIAGNOSIS — E78.00 PURE HYPERCHOLESTEROLEMIA: ICD-10-CM

## 2019-12-18 RX ORDER — SIMVASTATIN 40 MG
TABLET ORAL
Qty: 90 TABLET | Refills: 0 | Status: SHIPPED | OUTPATIENT
Start: 2019-12-18 | End: 2020-01-02 | Stop reason: SDUPTHER

## 2019-12-18 RX ORDER — PIOGLITAZONEHYDROCHLORIDE 30 MG/1
TABLET ORAL
Qty: 90 TABLET | Refills: 0 | Status: SHIPPED | OUTPATIENT
Start: 2019-12-18 | End: 2020-01-02 | Stop reason: SDUPTHER

## 2019-12-26 ENCOUNTER — TELEPHONE (OUTPATIENT)
Dept: FAMILY MEDICINE CLINIC | Age: 64
End: 2019-12-26

## 2019-12-26 DIAGNOSIS — E11.42 TYPE 2 DIABETES MELLITUS WITH DIABETIC POLYNEUROPATHY, WITHOUT LONG-TERM CURRENT USE OF INSULIN (HCC): Chronic | ICD-10-CM

## 2019-12-26 DIAGNOSIS — I10 ESSENTIAL HYPERTENSION: Primary | Chronic | ICD-10-CM

## 2019-12-26 DIAGNOSIS — E78.00 PURE HYPERCHOLESTEROLEMIA: Chronic | ICD-10-CM

## 2020-01-01 LAB
BUN / CREAT RATIO: 36 (CALC) (ref 6–22)
BUN BLDV-MCNC: 29 MG/DL (ref 7–25)
CALCIUM SERPL-MCNC: 8.9 MG/DL (ref 8.6–10.3)
CHLORIDE BLD-SCNC: 105 MMOL/L (ref 98–110)
CO2: 28 MMOL/L (ref 20–32)
CREAT SERPL-MCNC: 0.8 MG/DL (ref 0.7–1.25)
GFR AFRICAN AMERICAN: 109 ML/MIN/1.73M2
GFR, ESTIMATED: 94 ML/MIN/1.73M2
GLUCOSE BLD-MCNC: 140 MG/DL (ref 65–99)
HBA1C MFR BLD: 7.2 % OF TOTAL HGB
POTASSIUM SERPL-SCNC: 4.2 MMOL/L (ref 3.5–5.3)
SODIUM BLD-SCNC: 141 MMOL/L (ref 135–146)

## 2020-01-02 ENCOUNTER — OFFICE VISIT (OUTPATIENT)
Dept: FAMILY MEDICINE CLINIC | Age: 65
End: 2020-01-02
Payer: COMMERCIAL

## 2020-01-02 VITALS
OXYGEN SATURATION: 96 % | HEART RATE: 50 BPM | WEIGHT: 223.6 LBS | BODY MASS INDEX: 33.12 KG/M2 | SYSTOLIC BLOOD PRESSURE: 110 MMHG | HEIGHT: 69 IN | DIASTOLIC BLOOD PRESSURE: 60 MMHG

## 2020-01-02 LAB
BACTERIA URINE, POC: ABNORMAL
BILIRUBIN URINE: 0 MG/DL
BLOOD, URINE: POSITIVE
CASTS URINE, POC: ABNORMAL
CLARITY: CLEAR
COLOR: YELLOW
CRYSTALS URINE, POC: ABNORMAL
EPI CELLS URINE, POC: ABNORMAL
GLUCOSE URINE: ABNORMAL
KETONES, URINE: NEGATIVE
LEUKOCYTE EST, POC: ABNORMAL
NITRITE, URINE: NEGATIVE
PH UA: 7 (ref 4.5–8)
PROTEIN UA: POSITIVE
RBC URINE, POC: ABNORMAL
SPECIFIC GRAVITY UA: 1.02 (ref 1–1.03)
UROBILINOGEN, URINE: NORMAL
WBC URINE, POC: ABNORMAL
YEAST URINE, POC: ABNORMAL

## 2020-01-02 PROCEDURE — 99214 OFFICE O/P EST MOD 30 MIN: CPT | Performed by: FAMILY MEDICINE

## 2020-01-02 PROCEDURE — 81000 URINALYSIS NONAUTO W/SCOPE: CPT | Performed by: FAMILY MEDICINE

## 2020-01-02 RX ORDER — GLIMEPIRIDE 1 MG/1
1 TABLET ORAL
Qty: 90 TABLET | Refills: 1 | Status: SHIPPED | OUTPATIENT
Start: 2020-01-02 | End: 2020-07-07

## 2020-01-02 RX ORDER — PIOGLITAZONEHYDROCHLORIDE 30 MG/1
TABLET ORAL
Qty: 90 TABLET | Refills: 1 | Status: SHIPPED | OUTPATIENT
Start: 2020-01-02 | End: 2020-09-15

## 2020-01-02 RX ORDER — IPRATROPIUM BROMIDE AND ALBUTEROL SULFATE 2.5; .5 MG/3ML; MG/3ML
1 SOLUTION RESPIRATORY (INHALATION) EVERY 4 HOURS PRN
Qty: 120 VIAL | Refills: 0 | Status: SHIPPED | OUTPATIENT
Start: 2020-01-02 | End: 2020-11-25 | Stop reason: SDUPTHER

## 2020-01-02 RX ORDER — CIPROFLOXACIN 500 MG/1
500 TABLET, FILM COATED ORAL 2 TIMES DAILY
Qty: 28 TABLET | Refills: 0 | Status: SHIPPED | OUTPATIENT
Start: 2020-01-02 | End: 2020-01-16

## 2020-01-02 RX ORDER — PHENAZOPYRIDINE HYDROCHLORIDE 200 MG/1
200 TABLET, FILM COATED ORAL 3 TIMES DAILY PRN
Qty: 15 TABLET | Refills: 0 | Status: SHIPPED | OUTPATIENT
Start: 2020-01-02 | End: 2020-01-07

## 2020-01-02 RX ORDER — PANTOPRAZOLE SODIUM 40 MG/1
40 TABLET, DELAYED RELEASE ORAL DAILY
Qty: 90 TABLET | Refills: 1 | Status: SHIPPED | OUTPATIENT
Start: 2020-01-02 | End: 2020-07-08 | Stop reason: SDUPTHER

## 2020-01-02 RX ORDER — ALBUTEROL SULFATE 90 UG/1
2 AEROSOL, METERED RESPIRATORY (INHALATION) EVERY 6 HOURS PRN
Qty: 1 INHALER | Refills: 0 | Status: SHIPPED | OUTPATIENT
Start: 2020-01-02 | End: 2020-10-16 | Stop reason: SDUPTHER

## 2020-01-02 RX ORDER — COLCHICINE 0.6 MG/1
0.6 TABLET ORAL DAILY
Qty: 90 TABLET | Refills: 1 | Status: SHIPPED | OUTPATIENT
Start: 2020-01-02 | End: 2020-04-08 | Stop reason: SDUPTHER

## 2020-01-02 RX ORDER — SIMVASTATIN 40 MG
TABLET ORAL
Qty: 90 TABLET | Refills: 0 | Status: SHIPPED | OUTPATIENT
Start: 2020-01-02 | End: 2020-03-31 | Stop reason: SDUPTHER

## 2020-01-02 RX ORDER — TRAMADOL HYDROCHLORIDE 50 MG/1
50 TABLET ORAL 2 TIMES DAILY
Qty: 60 TABLET | Refills: 2 | Status: SHIPPED | OUTPATIENT
Start: 2020-01-02 | End: 2020-01-30 | Stop reason: SDUPTHER

## 2020-01-02 RX ORDER — VALSARTAN 80 MG/1
80 TABLET ORAL DAILY
Qty: 30 TABLET | Refills: 3 | Status: SHIPPED | OUTPATIENT
Start: 2020-01-02 | End: 2020-01-30 | Stop reason: ALTCHOICE

## 2020-01-02 ASSESSMENT — PATIENT HEALTH QUESTIONNAIRE - PHQ9
1. LITTLE INTEREST OR PLEASURE IN DOING THINGS: 0
SUM OF ALL RESPONSES TO PHQ QUESTIONS 1-9: 0
SUM OF ALL RESPONSES TO PHQ QUESTIONS 1-9: 0
SUM OF ALL RESPONSES TO PHQ9 QUESTIONS 1 & 2: 0
2. FEELING DOWN, DEPRESSED OR HOPELESS: 0

## 2020-01-02 NOTE — PROGRESS NOTES
Subjective:      Patient ID: Ryan Mcmanus is a 59 y.o. male. CC: Patient presents for re-evaluation of chronic health problems including diabetes mellitus, urinary frequency and urgency, persistent cough, chronic back pain and sleep apnea. Cristi Winter HPI Patient presents today for a follow-up on chronic medications and medical conditions. Patient has had burning with urination and urinary frequency for the past 2 weeks. Patient denies any fevers or chills. He is having normal bowel movements. Patient and his wife want to talk about him starting up physical therapy for his back. Patient has known chronic back pain and typically does not do any back exercises. He takes tramadol about twice a day. Patient has known arthritis of the spine. He continues with sleep apnea CPAP treatment. Patient is had no flareup of pseudogout for some time. Patient is also having persistent cough that is been going on now for over a month. He has been on different antibiotic therapy with no improvement. Eye exam current (within one year): Yes- about 6 months ago    Checks sugars at home: yes  Home blood sugar records: patient tests 2 time(s) per day  Any episodes of hypoglycemia?  Yes- one time     Current medication use: taking as prescribed  Medication side effects: none     Current diet: well balanced, on average, 6 meals per day  Current exercise:6000 steps each day    Review of Systems     Patient Active Problem List   Diagnosis    Essential hypertension    Generalized osteoarthrosis, involving multiple sites    Pure hypercholesterolemia    Obstructive sleep apnea syndrome    Allergic rhinitis    Pseudogout    Melanoma in situ of lower leg ([facility] [address]    Type 2 diabetes mellitus with diabetic polyneuropathy, without long-term current use of insulin (HCC)    Eczema    Dyshidrotic eczema    GERD without esophagitis    Non morbid obesity, unspecified obesity type       Outpatient Medications Marked as Taking for the 20 encounter (Office Visit) with Frankey Buoy, MD   Medication Sig Dispense Refill    pioglitazone (ACTOS) 30 MG tablet TAKE 1 TABLET BY MOUTH ONE TIME A DAY  90 tablet 0    simvastatin (ZOCOR) 40 MG tablet TAKE 1 TABLET BY MOUTH AT BEDTIME  90 tablet 0    diclofenac sodium 1 % GEL APPLY 2 GRAMS TOPICALLY TO THE AFFECTED AREA(S) 2 TIMES DAILY 100 g 0    ipratropium-albuterol (DUONEB) 0.5-2.5 (3) MG/3ML SOLN nebulizer solution Inhale 3 mLs into the lungs every 4 hours as needed for Shortness of Breath 120 vial 0    albuterol sulfate  (90 Base) MCG/ACT inhaler Inhale 2 puffs into the lungs every 6 hours as needed for Wheezing 1 Inhaler 0    cetirizine (ZYRTEC) 10 MG tablet Take 10 mg by mouth daily      ramipril (ALTACE) 2.5 MG capsule Take 2.5 mg by mouth daily      metFORMIN (GLUCOPHAGE) 1000 MG tablet Take 1 tablet by mouth 2 times daily 180 tablet 1    pantoprazole (PROTONIX) 40 MG tablet Take 1 tablet by mouth daily 90 tablet 1    colchicine (COLCRYS) 0.6 MG tablet Take 1 tablet by mouth daily 90 tablet 1    glimepiride (AMARYL) 1 MG tablet Take 1 tablet by mouth every morning (before breakfast) 90 tablet 1    diclofenac (VOLTAREN) 50 MG EC tablet Take 1 tablet by mouth 2 times daily 180 tablet 1    glucose blood VI test strips (ONETOUCH VERIO) strip 1 each by In Vitro route daily 100 each 3    FREESTYLE LANCETS MISC 1 each by Does not apply route daily 100 each 3    Blood Glucose Monitoring Suppl (FREESTYLE LITE) CLAUDINE 1 Device by Does not apply route 2 times daily 1 Device 0       No Known Allergies    Social History     Tobacco Use    Smoking status: Former Smoker     Packs/day: 1.00     Years: 10.00     Pack years: 10.00     Last attempt to quit: 4/3/1993     Years since quittin.7    Smokeless tobacco: Never Used    Tobacco comment: quit 25 years ago   Substance Use Topics    Alcohol use: No       /60 (Site: Left Upper Arm, Position: Sitting, Cuff Size: Large simvastatin (ZOCOR) 40 MG tablet; TAKE 1 TABLET BY MOUTH AT BEDTIME    Pseudogout  -     colchicine (COLCRYS) 0.6 MG tablet; Take 1 tablet by mouth daily    Dysuria  -     POC URINE with Microscopic    Prostatitis, acute  -     phenazopyridine (PYRIDIUM) 200 MG tablet; Take 1 tablet by mouth 3 times daily as needed for Pain    Generalized osteoarthrosis, involving multiple sites  -     traMADol (ULTRAM) 50 MG tablet; Take 1 tablet by mouth 2 times daily for 90 days. -     [address] [place] [address]    Obstructive sleep apnea syndrome    Cough due to ACE inhibitor    Other orders  -     pioglitazone (ACTOS) 30 MG tablet; TAKE 1 TABLET BY MOUTH ONE TIME A DAY  -     diclofenac sodium 1 % GEL; APPLY 2 GRAMS TOPICALLY TO THE AFFECTED AREA(S) 2 TIMES DAILY  -     ipratropium-albuterol (DUONEB) 0.5-2.5 (3) MG/3ML SOLN nebulizer solution; Inhale 3 mLs into the lungs every 4 hours as needed for Shortness of Breath  -     albuterol sulfate  (90 Base) MCG/ACT inhaler; Inhale 2 puffs into the lungs every 6 hours as needed for Wheezing  -     metFORMIN (GLUCOPHAGE) 1000 MG tablet; Take 1 tablet by mouth 2 times daily  -     pantoprazole (PROTONIX) 40 MG tablet; Take 1 tablet by mouth daily  -     glimepiride (AMARYL) 1 MG tablet; Take 1 tablet by mouth every morning (before breakfast)  -     diclofenac (VOLTAREN) 50 MG EC tablet; Take 1 tablet by mouth 2 times daily  -     valsartan (DIOVAN) 80 MG tablet; Take 1 tablet by mouth daily  -     ciprofloxacin (CIPRO) 500 MG tablet; Take 1 tablet by mouth 2 times daily for 14 days    OARRS report checked          Plan:      Pt appears stable & reviewed labs with patient. Maintain current medications for diabetes mellitus. For the cough go ahead and discontinue Altace and start patient on valsartan for the next month. Patient request a physical therapy consultation for back pain.      Patient received counseling on the following healthy behaviors: nutrition and exercise     Patient given educational materials     Health maintenance updated    Discussed use, benefit, and side effects of prescribed medications. Barriers to medication compliance addressed. All patient questions answered. Pt voiced understanding. Patient needs RTC in 3 months. Please note that this chart was generated using Dragon dictation software. Although every effort was made to ensure the accuracy of this automated transcription, some errors in transcription may have occurred.

## 2020-01-16 ENCOUNTER — HOSPITAL ENCOUNTER (OUTPATIENT)
Dept: PHYSICAL THERAPY | Age: 65
Setting detail: THERAPIES SERIES
Discharge: HOME | End: 2020-01-16
Payer: COMMERCIAL

## 2020-01-16 PROCEDURE — 97110 THERAPEUTIC EXERCISES: CPT

## 2020-01-16 PROCEDURE — 97140 MANUAL THERAPY 1/> REGIONS: CPT

## 2020-01-16 PROCEDURE — 97162 PT EVAL MOD COMPLEX 30 MIN: CPT

## 2020-01-16 NOTE — FLOWSHEET NOTE
[address] [address], [address] [place] [address] Drive  Phone: [phone]   Fax: [phone]     Physical Therapy Certification    Dear Referring Practitioner: Clem Uribe MD,    We had the pleasure of evaluating the following patient for physical therapy services at [address] [place] [address]. A summary of our findings can be found in the initial assessment below. This includes our plan of care. If you have any questions or concerns regarding these findings, please do not hesitate to contact me at the office phone number checked above. Thank you for the referral.       Physician Signature:_______________________________Date:__________________  By signing above (or electronic signature), therapists plan is approved by physician      Patient: Sherman Lyles   : 1955   MRN: [id number]  Referring Physician: Referring Practitioner: Clem Uribe MD      Evaluation Date: 2020      Medical Diagnosis Information:  Diagnosis: OA spine       Treatment diagnosis:  Pain with ADL's                                             Insurance information: PT Insurance Information: BCBS PPO - medical necessity     Precautions/ Contra-indications: None  Latex Allergy:  [x]NO      []YES  Preferred Language for Healthcare:   [x]English       []other:    SUBJECTIVE: Pt reports having back pain for many years. States he had a flare up just before this past [place] - reports he was lifting heavy boxes at work. Pt is a  at [employer] and has to lift heavy cuts of meat. Most lifting goes from floor to waist height. Pt with pain currently in low back and extending down L leg to mid calf. Pain in leg primarily occurs when laying down per pt. Pt with no complaints of numbness or tingling. Pt states aching increases when sitting and if he is in the recliner with legs up it becomes even worse.   Pt states he continues to do exercises from previous PT sessions. Relevant Medical History:  Medical History     Diagnosis Date Comment Source   Unspecified essential hypertension   Provider   Pseudo-gout   Provider   Obstructive sleep apnea syndrome 10/5/2012  Provider   Nipple pain   Provider   Nasal congestion   Provider   Hyperlipidemia   Provider   Gynecomastia   Provider   GERD (gastroesophageal reflux disease)   Provider   Generalized osteoarthrosis, involving multiple sites   Provider   Erectile dysfunction   Provider   Diabetes mellitus ([facility] [address]   Provider   Cancer [facility])  melanoma Provider   Atopic dermatitis   Provider   Asthma   Provider   Allergic rhinitis   Provider   Abdominal pain, LLQ   Provider   Surgical History     Procedure Laterality Date Comment Source   IN NJX DX/THER SBST INTRLMNR LMBR/SAC W/IMG GDN Right 9/24/2018 RIGHT L4/L5 INTERLAMINAR EPIDURAL STEROID INJECTION WITH FLUOROSCOPY performed by Nery Coker MD at [address] Provider   OTHER SURGICAL HISTORY  2002 melenoma right side post Provider   LUMBAR NERVE BLOCK Right 12/12/2018 RIGHT L4/L5 LUMBAR INTERLAMINAR EPIDURAL STEROID INJECTION WITH FLUOROSCOPY performed by Nery Coker MD at [address] Provider   [address] SURGERY  2011  Provider   FOOT SURGERY Right  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 Provider   FINGER SURGERY Left 3-1-2013 Incision & Drainage of left Thumb Infection Provider   CARPAL TUNNEL RELEASE Bilateral 2013  Provider    Family History     Problem Relation Age of Onset Comments   Arthritis Mother     Diabetes Father     Diabetes Sister     Emphysema Mother     Heart Disease Father     High Blood Pressure Mother     High Cholesterol Father     Osteoporosis Mother     Parkinsonism Father     Tobacco Use     Former Smoker; Quit 4/3/1993; Smoked an average of 1 pack/day for 10 years. Smokeless Tobacco: Never used smokeless tobacco.   Comments: quit 25 years ago   Alcohol Use     No.   Drug Use     No.   Sexual Activity     Sexually active; Partners: Female.       E-Cigarettes Vaping or Juuling to/and or personal factors that will affect rehab potential:              []Age  []Sex    []Smoker              [x]Motivation/Lack of Motivation  - Pt motivated to participate with PT                      []Co-Morbidities              []Cognitive Function, education/learning barriers              []Environmental, home barriers              []profession/work barriers  []past PT/medical experience  []other:  Justification: Must continue with heavy lifting at workplace as he often has no assistance available    Falls Risk Assessment (30 days):   [x] Falls Risk assessed and no intervention required. [] Falls Risk assessed and Patient requires intervention due to being higher risk   TUG score (>12s at risk):     [] Falls education provided, including         ASSESSMENT: Pt with long history of low back pain with recent exacerbation secondary to lifting heavy loads. Will benefit from skilled PT for improved lumbar ROM, core strength and hamstring flexibility as well as decreased overall pain and improved posture and body mechanics.     Functional Impairments:     [x]Noted lumbar/proximal hip hypomobility   []Noted lumbosacral and/or generalized hypermobility   []Decreased Lumbosacral/hip/LE functional ROM   [x]Decreased core/proximal hip strength and neuromuscular control    []Decreased LE functional strength    []Abnormal reflexes/sensation/myotomal/dermatomal deficits  []Reduced balance/proprioceptive control    []other:      Functional Activity Limitations (from functional questionnaire and intake)   []Reduced ability to tolerate prolonged functional positions   []Reduced ability or difficulty with changes of positions or transfers between positions   [x]Reduced ability to maintain good posture and demonstrate good body mechanics with sitting, bending, and lifting   []Reduced ability to sleep   [] Reduced ability or tolerance with driving and/or computer work   [x]Reduced ability to perform lifting, reaching, carrying Patient will have a decrease in pain to facilitate improvement in movement, function, and ADLs as indicated by Functional Deficits. [] Progressing: [] Met: [] Not Met: [] Adjusted    Long Term Goals: To be achieved in: 6 weeks  1. Pt will report an overall decrease in pain of at least 50% in order to perform work duties without difficulty. [] Progressing: [] Met: [] Not Met: [] Adjusted  2. Patient will demonstrate increased AROM to WNL, good LS mobility, good hip ROM to allow for proper joint functioning as indicated by patients Functional Deficits. [] Progressing: [] Met: [] Not Met: [] Adjusted  3. Patient will demonstrate an increase in Strength to good proximal hip and core activation to allow for proper functional mobility as indicated by patients Functional Deficits. [] Progressing: [] Met: [] Not Met: [] Adjusted  4. Patient will return to functional activities including all work duties without increased symptoms or restriction.    [] Progressing: [] Met: [] Not Met: [] Adjusted         Electronically signed by:  Michael Rico, DPT  [id number]

## 2020-01-17 NOTE — FLOWSHEET NOTE
[address] [facility] Physical Therapy  Phone: [phone]   Fax: [phone]    Physical Therapy Daily Treatment Note  Date:  2020    Patient Name:  Mar Choi    :  1955  MRN: [id number]  Medical/Treatment Diagnosis Information:  · Diagnosis: OA spine       · Treatment diagnosis:  Pain with ADL's     Insurance/Certification information:  PT Insurance Information: BCBS PPO - medical necessity  Physician Information:  Referring Practitioner: Terrell Colvin MD  Plan of care signed (Y/N): faxed     Date of Patient follow up with Physician:     Functional scale[de-identified] Oswestry    Progress Note: [x]  Yes  []  No  Next due by: Visit #10       Latex Allergy:  [x]NO      []YES  Preferred Language for Healthcare:   [x]English       []other:    Visit # Insurance Allowable Date Range (if applicable)          Pain level:  2/10     SUBJECTIVE:  See yanelis    OBJECTIVE: See eval      RESTRICTIONS/PRECAUTIONS: None    Exercises/Interventions:     Therapeutic Exercises (63798) Resistance / level Sets/sec Reps Notes   Nu step       IB/HR       Standing hamstring stretch       Standing hip flexor stretch       Cables for core activation              Bridging   1 10    LTR  1 10 B    Hip adduction with ball squeeze  1 10    Hip abduction with blue band  1 10    Therapeutic Activities (35376)                                          Neuromuscular Re-ed (11236)                                                 Manual Intervention (25162)       IASTM:  hawkgrip 8 for scanning, hawkgrip 7 for scanning, sweeping and fanning, hawkgrip 9 for brushing and sweeping, framing at each lumbar vertebrae. Gentle manual P/A glides at each lumbar level, DTM to B lumbar paraspinals  X 25                                            Pt. Education: Evaluation completed this date. Discussed risks and benefits of tx with pt - pt agreed with POC.   Reviewed mechanics of spine using model and discussed proper posture and body mechanics. Educated pt on centralization principle. Pt instructed on proper sitting posture with towel roll. Written HEP provided for pt.        -patient educated on diagnosis, prognosis and expectations for rehab  -all patient questions were answered    HEP instruction:  -patient to continue performing prone, prone on elbows, press ups, standing lateral shift with R hand on wall and standing lumbar extension    Therapeutic Exercise and NMR EXR  [x] (52585) Provided verbal/tactile cueing for activities related to strengthening, flexibility, endurance, ROM for improvements in  [x] LE / Lumbar: LE, proximal hip, and core control with self care, mobility, lifting, ambulation. [] UE / Cervical: cervical, postural, scapular, scapulothoracic and UE control with self care, reaching, carrying, lifting, house/yardwork, driving, computer work.  [] (97125) Provided verbal/tactile cueing for activities related to improving balance, coordination, kinesthetic sense, posture, motor skill, proprioception to assist with   [] LE / lumbar: LE, proximal hip, and core control in self care, mobility, lifting, ambulation and eccentric single leg control. [] UE / cervical: cervical, scapular, scapulothoracic and UE control with self care, reaching, carrying, lifting, house/yardwork, driving, computer work.   [] (52418) Therapist is in constant attendance of 2 or more patients providing skilled therapy interventions, but not providing any significant amount of measurable one-on-one time to either patient, for improvements in  [] LE / lumbar: LE, proximal hip, and core control in self care, mobility, lifting, ambulation and eccentric single leg control. [] UE / cervical: cervical, scapular, scapulothoracic and UE control with self care, reaching, carrying, lifting, house/yardwork, driving, computer work.      NMR and Therapeutic Activities:    [x] (01268 or 39334) Provided verbal/tactile cueing for activities achieved in: 6 weeks  1. Pt will report an overall decrease in pain of at least 50% in order to perform work duties without difficulty. []? Progressing: []? Met: []? Not Met: []? Adjusted  2. Patient will demonstrate increased AROM to WNL, good LS mobility, good hip ROM to allow for proper joint functioning as indicated by patients Functional Deficits. []? Progressing: []? Met: []? Not Met: []? Adjusted  3. Patient will demonstrate an increase in Strength to good proximal hip and core activation to allow for proper functional mobility as indicated by patients Functional Deficits. []? Progressing: []? Met: []? Not Met: []? Adjusted  4. Patient will return to functional activities including all work duties without increased symptoms or restriction. []? Progressing: []? Met: []? Not Met: []? Adjusted         Overall Progression Towards Functional goals/ Treatment Progress Update:  [] Patient is progressing as expected   functional goals listed. [] Progression is slowed due to complexities/Impairments listed. [] Progression has been slowed due to co-morbidities. [x] Plan just implemented, too soon to assess goals progression <30days   [] Goals require adjustment due to lack of progress  [] Patient is not progressing as expected and requires additional follow up with physician  [] Other    Persisting Functional Limitations/Impairments:  []Sleeping []Sitting               []Standing []Transfers        [x]Walking []Kneeling               []Stairs [x]Squatting / bending   [x]ADLs [x]Reaching  [x]Lifting  []Housework  []Driving [x]Job related tasks  [x]Sports/Recreation []Other:        ASSESSMENT:  Pt tolerated IASTM well this date; will benefit from continuation.   Stress core strengthening and posture correction/proper body mechanics  Treatment/Activity Tolerance:  [x] Patient able to complete tx [] Patient limited by fatigue  [] Patient limited by pain  [] Patient limited by other medical complications  [] Other:     Prognosis: [x] Good [] Fair  [] Poor    Patient Requires Follow-up: [x] Yes  [] No    Plan for next treatment session: Begin per training diary above and progress as tolerated. Continue with IASTM and modalities as needed to decrease pain. PLAN: See eval. PT 1-2x / week for 6 weeks. [] Continue per plan of care [] Alter current plan (see comments)  [x] Plan of care initiated [] Hold pending MD visit [] Discharge    Electronically signed by: Oren Rutledge PT, DPT    Note: If patient does not return for scheduled/ recommended follow up visits, his note will serve as a discharge from care along with most recent update on progress.

## 2020-01-30 ENCOUNTER — HOSPITAL ENCOUNTER (OUTPATIENT)
Dept: PHYSICAL THERAPY | Age: 65
Setting detail: THERAPIES SERIES
Discharge: HOME | End: 2020-01-30
Payer: COMMERCIAL

## 2020-01-30 ENCOUNTER — OFFICE VISIT (OUTPATIENT)
Dept: FAMILY MEDICINE CLINIC | Age: 65
End: 2020-01-30
Payer: COMMERCIAL

## 2020-01-30 VITALS
HEART RATE: 51 BPM | DIASTOLIC BLOOD PRESSURE: 80 MMHG | BODY MASS INDEX: 33.77 KG/M2 | RESPIRATION RATE: 16 BRPM | OXYGEN SATURATION: 96 % | SYSTOLIC BLOOD PRESSURE: 148 MMHG | WEIGHT: 225.38 LBS

## 2020-01-30 PROCEDURE — 97110 THERAPEUTIC EXERCISES: CPT

## 2020-01-30 PROCEDURE — 99214 OFFICE O/P EST MOD 30 MIN: CPT | Performed by: FAMILY MEDICINE

## 2020-01-30 PROCEDURE — 97140 MANUAL THERAPY 1/> REGIONS: CPT

## 2020-01-30 RX ORDER — RAMIPRIL 2.5 MG/1
CAPSULE ORAL
Qty: 30 CAPSULE | Refills: 11 | Status: SHIPPED | OUTPATIENT
Start: 2020-01-30 | End: 2020-09-29 | Stop reason: SDUPTHER

## 2020-01-30 RX ORDER — TRAMADOL HYDROCHLORIDE 50 MG/1
50 TABLET ORAL 2 TIMES DAILY
Qty: 60 TABLET | Refills: 2 | Status: SHIPPED | OUTPATIENT
Start: 2020-01-30 | End: 2020-04-08 | Stop reason: SDUPTHER

## 2020-01-30 NOTE — PROGRESS NOTES
Subjective:      Patient ID: Jaden Simmons is a 59 y.o. male. CC: Patient presents for reevaluation of blood pressure and cough    HPI pt is here due to BP issues. Pt states he was taken of the ramipril and has been having elevated BP since then. Patient has been checking blood pressure at home typically having high systolic numbers where is in the past with the Altace medication his blood pressure is well controlled. Pt also states his cough has not gone away. Pt states this has been ongoing for months now. He has noted if he uses the DuoNeb nebulizer his cough symptoms improved for about 6 hours. He has noted certain smells and aromas will trigger his coughing episodes as well. He has had problems like this for quite some time but worsened in the last 3 months. He had pulmonary function testing done in 2017 demonstrating normal no obstructive disease but symptoms consistent with reactive airway disease. Patient states even when he was in [facility] he was given an Advair inhaler for cough problems. Review of Systems  No Known Allergies  Objective:   Physical Exam  Constitutional:       General: He is not in acute distress. Appearance: He is well-developed. HENT:      Right Ear: Tympanic membrane normal.      Left Ear: Tympanic membrane normal.      Nose: Nose normal.      Mouth/Throat:      Pharynx: Uvula midline. Neck:      Musculoskeletal: Neck supple. Vascular: No carotid bruit. Cardiovascular:      Rate and Rhythm: Normal rate and regular rhythm. Pulses:           Dorsalis pedis pulses are 2+ on the right side and 2+ on the left side. Posterior tibial pulses are 2+ on the right side and 2+ on the left side. Heart sounds: Normal heart sounds. No murmur. No friction rub. No gallop. Pulmonary:      Effort: Pulmonary effort is normal.      Breath sounds: Normal breath sounds. No decreased breath sounds, wheezing or rhonchi.    Musculoskeletal:      Right lower leg: No edema. Left lower leg: No edema. Lymphadenopathy:      Cervical: No cervical adenopathy. Neurological:      Mental Status: He is alert. Psychiatric:         Behavior: Behavior is cooperative. Assessment:      Rosario Lamb was seen today for hypertension and cough. Diagnoses and all orders for this visit:    Moderate persistent asthma, unspecified whether complicated  -     [facility]monology    Essential hypertension  -     ramipril (ALTACE) 2.5 MG capsule; TAKE 1 CAPSULE BY MOUTH ONE TIME A DAY    Generalized osteoarthrosis, involving multiple sites  -     traMADol (ULTRAM) 50 MG tablet; Take 1 tablet by mouth 2 times daily for 90 days. Other orders  -     fluticasone-salmeterol (ADVAIR) 250-50 MCG/DOSE AEPB; Inhale 1 puff into the lungs every 12 hours      Patient was instructed to discontinue valsartan medication restart Altace medication. Plan:      Patient was referred to pulmonology for consultation but in meantime start an Advair inhaler twice daily and he still may use a rescue inhaler on a as needed basis. Information are provided in regards to asthma  RTC at normal appointment time    Please note that this chart was generated using Dragon dictation software. Although every effort was made to ensure the accuracy of this automated transcription, some errors in transcription may have occurred.

## 2020-01-30 NOTE — FLOWSHEET NOTE
[facility] Physical Therapy  Phone: [phone]   Fax: [phone]    Physical Therapy Daily Treatment Note  Date:  2020    Patient Name:  Drake Becker    :  1955  MRN: [id number]  Medical/Treatment Diagnosis Information:  · Diagnosis: OA spine       · Treatment diagnosis:  Pain with ADL's     Insurance/Certification information:  PT Insurance Information: BCBS PPO - medical necessity  Physician Information:  Referring Practitioner: Adrián Lu MD  Plan of care signed (Y/N): faxed     Date of Patient follow up with Physician:     Functional scale[de-identified] Oswestry    Progress Note: [x]  Yes  []  No  Next due by: Visit #10       Latex Allergy:  [x]NO      []YES  Preferred Language for Healthcare:   [x]English       []other:    Visit # Insurance Allowable Date Range (if applicable)   3/76       Pain level:  610     SUBJECTIVE:  :  Pt reports soreness across low back and in R shoulder this date    OBJECTIVE: :  Demonstrating fair posture; able to correct with cues. RESTRICTIONS/PRECAUTIONS: None    Exercises/Interventions:     Therapeutic Exercises (30179) Resistance / level Sets/sec Reps Notes   Nu step   X 4 min    IB/HR  2 30\"/10    Standing hamstring stretch  2  30\"    Standing hip flexor stretch       Cables for core activation              Bridging   1 10    LTR  1 10 B    Hip adduction with ball squeeze  1 10    Hip abduction with blue band  1 10    Therapeutic Activities (99808)                                          Neuromuscular Re-ed (95170)                                                 Manual Intervention (21000)       IASTM:  hawkgrip 8 for scanning, hawkgrip 7 for scanning, sweeping and fanning, hawkgrip 9 for brushing and sweeping, framing at each lumbar vertebrae.   Gentle manual P/A glides at each lumbar level, DTM to B lumbar paraspinals  X 17                                            Pt. Education: :  Reviewed HEP control with self care, reaching, carrying, lifting, house/yardwork, driving, computer work. NMR and Therapeutic Activities:    [x] (59910 or 60072) Provided verbal/tactile cueing for activities related to improving balance, coordination, kinesthetic sense, posture, motor skill, proprioception and motor activation to allow for proper function of   [x] LE: / Lumbar core, proximal hip and LE with self care and ADLs  [] UE / Cervical: cervical, postural, scapular, scapulothoracic and UE control with self care, carrying, lifting, driving, computer work.   [] (84911) Gait Re-education- Provided training and instruction to the patient for proper LE, core and proximal hip recruitment and positioning and eccentric body weight control with ambulation re-education including up and down stairs     Home Management Training / Self Care:  [] (18183) Provided self-care/home management training related to activities of daily living and compensatory training, and/or use of adaptive equipment for improvement with: ADLs and compensatory training, meal preparation, safety procedures and instruction in use of adaptive equipment, including bathing, grooming, dressing, personal hygiene, basic household cleaning and chores.      Home Exercise Program:    [x] (24654) Reviewed/Progressed HEP activities related to strengthening, flexibility, endurance, ROM of   [x] LE / Lumbar: core, proximal hip and LE for functional self-care, mobility, lifting and ambulation/stair navigation   [] UE / Cervical: cervical, postural, scapular, scapulothoracic and UE control with self care, reaching, carrying, lifting, house/yardwork, driving, computer work  [] (01774)Reviewed/Progressed HEP activities related to improving balance, coordination, kinesthetic sense, posture, motor skill, proprioception of   [] LE: core, proximal hip and LE for self care, mobility, lifting, and ambulation/stair navigation    [] UE / Cervical: cervical, postural,  scapular, core strengthening and posture correction/proper body mechanics  Treatment/Activity Tolerance:  [x] Patient able to complete tx [] Patient limited by fatigue  [] Patient limited by pain  [] Patient limited by other medical complications  [] Other:     Prognosis: [x] Good [] Fair  [] Poor    Patient Requires Follow-up: [x] Yes  [] No    Plan for next treatment session: Begin per training diary above and progress as tolerated. Continue with IASTM and modalities as needed to decrease pain. PLAN: See eval. PT 1-2x / week for 6 weeks. [] Continue per plan of care [] Alter current plan (see comments)  [x] Plan of care initiated [] Hold pending MD visit [] Discharge    Electronically signed by: Raymundo Mcdonnell PT, DPT    Note: If patient does not return for scheduled/ recommended follow up visits, his note will serve as a discharge from care along with most recent update on progress.

## 2020-01-30 NOTE — PATIENT INSTRUCTIONS
Patient Education        Asthma in Adults: Care Instructions  Your Care Instructions    During an asthma attack, your airways swell and narrow as a reaction to certain things (triggers). This makes it hard to breathe. You may be able to prevent asthma attacks if you avoid the things that set off your asthma symptoms. Keeping your asthma under control and treating symptoms before they get bad can help you avoid severe attacks. If you can control your asthma, you may be able to do all of your normal daily activities. You may also avoid asthma attacks and trips to the hospital.  Follow-up care is a key part of your treatment and safety. Be sure to make and go to all appointments, and call your doctor if you are having problems. It's also a good idea to know your test results and keep a list of the medicines you take. How can you care for yourself at home? · Follow your asthma action plan so you can manage your symptoms at home. An asthma action plan will help you prevent and control airway reactions and will tell you what to do during an asthma attack. If you do not have an asthma action plan, work with your doctor to build one. · Take your asthma medicine exactly as prescribed. Medicine plays an important role in controlling asthma. Talk to your doctor right away if you have any questions about what to take and how to take it. ? Use your quick-relief medicine when you have symptoms of an attack. Quick-relief medicine often is an albuterol inhaler. Some people need to use quick-relief medicine before they exercise. ? Take your controller medicine every day, not just when you have symptoms. Controller medicine is usually an inhaled corticosteroid. The goal is to prevent problems before they occur. Do not use your controller medicine to try to treat an attack that has already started. It does not work fast enough to help.   ? If your doctor prescribed corticosteroid pills to use during an attack, take them as directed. They may take hours to work, but they may shorten the attack and help you breathe better. ? Keep your quick-relief medicine with you at all times. · Talk to your doctor before using other medicines. Some medicines, such as aspirin, can cause asthma attacks in some people. · Check yourself for asthma symptoms to know which step to follow in your action plan. Watch for things like being short of breath, having chest tightness, coughing, and wheezing. Also notice if symptoms wake you up at night or if you get tired quickly when you exercise. · If you have a peak flow meter, use it to check how well you are breathing. This can help you predict when an asthma attack is going to occur. Then you can take medicine to prevent the asthma attack or make it less severe. · See your doctor regularly. These visits will help you learn more about asthma and what you can do to control it. Your doctor will monitor your treatment to make sure the medicine is helping you. · Keep track of your asthma attacks and your treatment. After you have had an attack, write down what triggered it, what helped end it, and any concerns you have about your asthma action plan. Take your diary when you see your doctor. You can then review your asthma action plan and decide if it is working. · Do not smoke or allow others to smoke around you. Avoid smoky places. Smoking makes asthma worse. If you need help quitting, talk to your doctor about stop-smoking programs and medicines. These can increase your chances of quitting for good. · Learn what triggers an asthma attack for you, and avoid the triggers when you can. Common triggers include colds, smoke, air pollution, dust, pollen, mold, pets, cockroaches, stress, and cold air. · Avoid colds and the flu. Get a pneumococcal vaccine shot. If you have had one before, ask your doctor whether you need a second dose. Get a flu vaccine every fall.  If you must be around people with colds or the flu, wash your hands often. When should you call for help? Call 911 anytime you think you may need emergency care. For example, call if:    · You have severe trouble breathing.    Call your doctor now or seek immediate medical care if:    · Your symptoms do not get better after you have followed your asthma action plan.     · You cough up yellow, dark brown, or bloody mucus (sputum).    Watch closely for changes in your health, and be sure to contact your doctor if:    · Your coughing and wheezing get worse.     · You need to use quick-relief medicine on more than 2 days a week (unless it is just for exercise).     · You need help figuring out what is triggering your asthma attacks. Where can you learn more? Go to https://[email or web address] [place].GoPollGo. org and sign in to your [employer] account. Enter P597 in the Insticator box to learn more about \"Asthma in Adults: Care Instructions. \"     If you do not have an account, please click on the \"Sign Up Now\" link. Current as of: June 9, 2019  Content Version: 12.3  © 6216-1826 [employer]wise, Incorporated. Care instructions adapted under license by [facility] ([facility]). If you have questions about a medical condition or this instruction, always ask your healthcare professional. [address] any warranty or liability for your use of this information.

## 2020-02-05 ENCOUNTER — TELEPHONE (OUTPATIENT)
Dept: FAMILY MEDICINE CLINIC | Age: 65
End: 2020-02-05

## 2020-02-06 ENCOUNTER — HOSPITAL ENCOUNTER (OUTPATIENT)
Dept: PHYSICAL THERAPY | Age: 65
Setting detail: THERAPIES SERIES
Discharge: HOME | End: 2020-02-06
Payer: COMMERCIAL

## 2020-02-06 PROCEDURE — G0283 ELEC STIM OTHER THAN WOUND: HCPCS

## 2020-02-06 PROCEDURE — 97140 MANUAL THERAPY 1/> REGIONS: CPT

## 2020-02-06 NOTE — FLOWSHEET NOTE
leg control. [] UE / cervical: cervical, scapular, scapulothoracic and UE control with self care, reaching, carrying, lifting, house/yardwork, driving, computer work. NMR and Therapeutic Activities:    [x] (88235 or 98725) Provided verbal/tactile cueing for activities related to improving balance, coordination, kinesthetic sense, posture, motor skill, proprioception and motor activation to allow for proper function of   [x] LE: / Lumbar core, proximal hip and LE with self care and ADLs  [] UE / Cervical: cervical, postural, scapular, scapulothoracic and UE control with self care, carrying, lifting, driving, computer work.   [] (74299) Gait Re-education- Provided training and instruction to the patient for proper LE, core and proximal hip recruitment and positioning and eccentric body weight control with ambulation re-education including up and down stairs     Home Management Training / Self Care:  [] (88673) Provided self-care/home management training related to activities of daily living and compensatory training, and/or use of adaptive equipment for improvement with: ADLs and compensatory training, meal preparation, safety procedures and instruction in use of adaptive equipment, including bathing, grooming, dressing, personal hygiene, basic household cleaning and chores.      Home Exercise Program:    [x] (01962) Reviewed/Progressed HEP activities related to strengthening, flexibility, endurance, ROM of   [x] LE / Lumbar: core, proximal hip and LE for functional self-care, mobility, lifting and ambulation/stair navigation   [] UE / Cervical: cervical, postural, scapular, scapulothoracic and UE control with self care, reaching, carrying, lifting, house/yardwork, driving, computer work  [] (44881)Reviewed/Progressed HEP activities related to improving balance, coordination, kinesthetic sense, posture, motor skill, proprioception of   [] LE: core, proximal hip and LE for self care, mobility, lifting, and ambulation/stair navigation    [] UE / Cervical: cervical, postural,  scapular, scapulothoracic and UE control with self care, reaching, carrying, lifting, house/yardwork, driving, computer work    Manual Treatments:  PROM / STM / Oscillations-Mobs:  G-I, II, III, IV (PA's, Inf., Post.)  [x] (36295) Provided manual therapy to mobilize LE, proximal hip and/or LS spine soft tissue/joints for the purpose of modulating pain, promoting relaxation,  increasing ROM, reducing/eliminating soft tissue swelling/inflammation/restriction, improving soft tissue extensibility and allowing for proper ROM for normal function with   [x] LE / lumbar: self care, mobility, lifting and ambulation. [] UE / Cervical: self care, reaching, carrying, lifting, house/yardwork, driving, computer work. Modalities:  [] (60539) Vasopneumatic compression: Utilized vasopneumatic compression to decrease edema / swelling for the purpose of improving mobility and quad tone / recruitment which will allow for increased overall function including but not limited to self-care, transfers, ambulation, and ascending / descending stairs. Modalities:  2/6:  Prone with MHP and IFC(80-150pps) to low back x 15 min  Charges:  Timed Code Treatment Minutes: 28   Total Treatment Minutes: 46     [] EVAL - LOW (84422)   [] EVAL - MOD (98557)  [] EVAL - HIGH (06077)  [] RE-EVAL (82554)  [] OA(24605) x  1     [] Ionto  [] NMR (51451) x       [] Vaso  [x] Manual (26296) x   1   [] Ultrasound  [] TA x        [] Mech Traction (42835)  [] Aquatic Therapy x     [x] ES (un) (03153): 1  [] Home Management Training x  [] ES(attended) (72883)   [] Dry Needling 1-2 muscles (88092):  [] Dry Needling 3+ muscles (873200  [] Group:      [] Other:     GOALS: Patient stated goal: To decrease pain  []? Progressing: []? Met: []? Not Met: []? Adjusted     Therapist goals for Patient:   Short Term Goals: To be achieved in: 2 weeks  1.  Independent in HEP and progression per patient tolerance, in order to prevent re-injury. []? Progressing: []? Met: []? Not Met: []? Adjusted  2. Patient will have a decrease in pain to facilitate improvement in movement, function, and ADLs as indicated by Functional Deficits. []? Progressing: []? Met: []? Not Met: []? Adjusted     Long Term Goals: To be achieved in: 6 weeks  1. Pt will report an overall decrease in pain of at least 50% in order to perform work duties without difficulty. []? Progressing: []? Met: []? Not Met: []? Adjusted  2. Patient will demonstrate increased AROM to WNL, good LS mobility, good hip ROM to allow for proper joint functioning as indicated by patients Functional Deficits. []? Progressing: []? Met: []? Not Met: []? Adjusted  3. Patient will demonstrate an increase in Strength to good proximal hip and core activation to allow for proper functional mobility as indicated by patients Functional Deficits. []? Progressing: []? Met: []? Not Met: []? Adjusted  4. Patient will return to functional activities including all work duties without increased symptoms or restriction. []? Progressing: []? Met: []? Not Met: []? Adjusted         Overall Progression Towards Functional goals/ Treatment Progress Update:  [] Patient is progressing as expected   functional goals listed. [] Progression is slowed due to complexities/Impairments listed. [] Progression has been slowed due to co-morbidities.   [x] Plan just implemented, too soon to assess goals progression <30days   [] Goals require adjustment due to lack of progress  [] Patient is not progressing as expected and requires additional follow up with physician  [] Other    Persisting Functional Limitations/Impairments:  []Sleeping []Sitting               []Standing []Transfers        [x]Walking []Kneeling               []Stairs [x]Squatting / bending   [x]ADLs [x]Reaching  [x]Lifting  []Housework  []Driving [x]Job related tasks  [x]Sports/Recreation []Other:        ASSESSMENT:

## 2020-02-13 ENCOUNTER — OFFICE VISIT (OUTPATIENT)
Dept: FAMILY MEDICINE CLINIC | Age: 65
End: 2020-02-13
Payer: COMMERCIAL

## 2020-02-13 ENCOUNTER — HOSPITAL ENCOUNTER (OUTPATIENT)
Dept: PHYSICAL THERAPY | Age: 65
Setting detail: THERAPIES SERIES
Discharge: HOME | End: 2020-02-13
Payer: COMMERCIAL

## 2020-02-13 VITALS
WEIGHT: 221.4 LBS | DIASTOLIC BLOOD PRESSURE: 90 MMHG | BODY MASS INDEX: 33.17 KG/M2 | HEART RATE: 58 BPM | SYSTOLIC BLOOD PRESSURE: 138 MMHG | OXYGEN SATURATION: 98 %

## 2020-02-13 LAB
BACTERIA URINE, POC: 0
BILIRUBIN URINE: 0 MG/DL
BLOOD, URINE: POSITIVE
CASTS URINE, POC: 0
CLARITY: ABNORMAL
COLOR: YELLOW
CRYSTALS URINE, POC: 0
EPI CELLS URINE, POC: 0
GLUCOSE URINE: ABNORMAL
KETONES, URINE: NEGATIVE
LEUKOCYTE EST, POC: NEGATIVE
NITRITE, URINE: NEGATIVE
PH UA: 5.5 (ref 4.5–8)
PROTEIN UA: POSITIVE
RBC URINE, POC: 0
SPECIFIC GRAVITY UA: 1.02 (ref 1–1.03)
UROBILINOGEN, URINE: NORMAL
WBC URINE, POC: ABNORMAL
YEAST URINE, POC: 0

## 2020-02-13 PROCEDURE — 97140 MANUAL THERAPY 1/> REGIONS: CPT

## 2020-02-13 PROCEDURE — 81000 URINALYSIS NONAUTO W/SCOPE: CPT | Performed by: NURSE PRACTITIONER

## 2020-02-13 PROCEDURE — 99213 OFFICE O/P EST LOW 20 MIN: CPT | Performed by: NURSE PRACTITIONER

## 2020-02-13 PROCEDURE — 97110 THERAPEUTIC EXERCISES: CPT

## 2020-02-13 RX ORDER — SULFAMETHOXAZOLE AND TRIMETHOPRIM 800; 160 MG/1; MG/1
1 TABLET ORAL 2 TIMES DAILY
Qty: 28 TABLET | Refills: 0 | Status: SHIPPED | OUTPATIENT
Start: 2020-02-13 | End: 2020-02-27

## 2020-02-13 ASSESSMENT — ENCOUNTER SYMPTOMS
VOMITING: 0
CONSTIPATION: 0
RECTAL PAIN: 0
ABDOMINAL PAIN: 0
NAUSEA: 0
DIARRHEA: 0
BACK PAIN: 0

## 2020-02-13 NOTE — PROGRESS NOTES
Yarelis Hightower  : 1955  Encounter date: 2020    This javi 59 y.o. male who presents with  Chief Complaint   Patient presents with    Urinary Tract Infection     c/o painful urination. Patient states he has to go more than normal. Patient had this same issue in [address]. History of present illness:    HPI Pt is 59year old male reports painful urination that started one week ago. Pt reports 3rd complaint in less than one year. Last PSA- 0.6. Pt denies fevers, back pain or history of kidney stone. Pt denies nausea or diarrhea, denies constipation. Current Outpatient Medications on File Prior to Visit   Medication Sig Dispense Refill    diclofenac sodium 1 % GEL APPLY 2 GRAMS TOPICALLY TO THE AFFECTED AREA(S) 2 TIMES DAILY 100 g 0    ramipril (ALTACE) 2.5 MG capsule TAKE 1 CAPSULE BY MOUTH ONE TIME A DAY 30 capsule 11    fluticasone-salmeterol (ADVAIR) 250-50 MCG/DOSE AEPB Inhale 1 puff into the lungs every 12 hours 60 each 3    traMADol (ULTRAM) 50 MG tablet Take 1 tablet by mouth 2 times daily for 90 days.  60 tablet 2    pioglitazone (ACTOS) 30 MG tablet TAKE 1 TABLET BY MOUTH ONE TIME A DAY 90 tablet 1    simvastatin (ZOCOR) 40 MG tablet TAKE 1 TABLET BY MOUTH AT BEDTIME 90 tablet 0    ipratropium-albuterol (DUONEB) 0.5-2.5 (3) MG/3ML SOLN nebulizer solution Inhale 3 mLs into the lungs every 4 hours as needed for Shortness of Breath 120 vial 0    albuterol sulfate  (90 Base) MCG/ACT inhaler Inhale 2 puffs into the lungs every 6 hours as needed for Wheezing 1 Inhaler 0    metFORMIN (GLUCOPHAGE) 1000 MG tablet Take 1 tablet by mouth 2 times daily 180 tablet 1    pantoprazole (PROTONIX) 40 MG tablet Take 1 tablet by mouth daily 90 tablet 1    colchicine (COLCRYS) 0.6 MG tablet Take 1 tablet by mouth daily 90 tablet 1    glimepiride (AMARYL) 1 MG tablet Take 1 tablet by mouth every morning (before breakfast) 90 tablet 1    diclofenac (VOLTAREN) 50 MG EC tablet Take 1 tablet by mouth 2 times daily 180 tablet 1    cetirizine (ZYRTEC) 10 MG tablet Take 10 mg by mouth daily      glucose blood VI test strips (ONETOUCH VERIO) strip 1 each by In Vitro route daily 100 each 3    FREESTYLE LANCETS MISC 1 each by Does not apply route daily 100 each 3    Blood Glucose Monitoring Suppl (FREESTYLE LITE) CLAUDINE 1 Device by Does not apply route 2 times daily 1 Device 0     No current facility-administered medications on file prior to visit.        No Known Allergies  Past Medical History:   Diagnosis Date    Abdominal pain, LLQ     Allergic rhinitis     Asthma     Atopic dermatitis     Cancer ([facility] [address]     melanoma    Diabetes mellitus ([facility] [address]     Erectile dysfunction     Generalized osteoarthrosis, involving multiple sites     GERD (gastroesophageal reflux disease)     Gynecomastia     Hyperlipidemia     Nasal congestion     Nipple pain     Obstructive sleep apnea syndrome 10/5/2012    Pseudo-gout     Unspecified essential hypertension       Past Surgical History:   Procedure Laterality Date    CARPAL TUNNEL RELEASE Bilateral 2013    FINGER SURGERY Left 3-1-2013    Incision & Drainage of left Thumb Infection    FOOT SURGERY Right     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    HAND SURGERY  2011    LUMBAR NERVE BLOCK Right 12/12/2018    RIGHT L4/L5 LUMBAR INTERLAMINAR EPIDURAL STEROID INJECTION WITH FLUOROSCOPY performed by Barak Delarosa MD at [address] [place] [address]  2002    melenoma right side post    [facility] [address] [facility] DX/THER SBST INTRLMNR LMBR/SAC W/IMG GDN Right 9/24/2018    RIGHT L4/L5 INTERLAMINAR EPIDURAL STEROID INJECTION WITH FLUOROSCOPY performed by Barak Delarosa MD at [place] [address] History   Problem Relation Age of Onset    Arthritis Mother     High Blood Pressure Mother     Emphysema Mother     Osteoporosis Mother     Diabetes Father     Heart Disease Father     High Cholesterol Father     Parkinsonism Father     Diabetes Sister       Social History     Tobacco Use Mental Status: He is alert and oriented to person, place, and time. Assessment/Plan    1. Dysuria  Advised increased water  Discussed differentials including kidney stone, prostatitis  - POCT Urine with Microscopic  - sulfamethoxazole-trimethoprim (BACTRIM DS;SEPTRA DS) 800-160 MG per tablet; Take 1 tablet by mouth 2 times daily for 14 days  Dispense: 28 tablet; Refill: 0  - DULCE MARIA Lawler [facility], [facility]    2. Gross hematuria  - sulfamethoxazole-trimethoprim (BACTRIM DS;SEPTRA DS) 800-160 MG per tablet; Take 1 tablet by mouth 2 times daily for 14 days  Dispense: 28 tablet; Refill: 0  - DULCE MARIA Lawler [facility], [facility]      Return if symptoms worsen or fail to improve, for unresolved symptoms. This dictation was generated by voice recognition computer software. Although all attempts are made to edit the dictation for accuracy, there may be errors in the transcription that are not intended.

## 2020-02-13 NOTE — FLOWSHEET NOTE
[address] [facility] Physical Therapy  Phone: [phone]   Fax: [phone]    Physical Therapy Daily Treatment Note  Date:  2020    Patient Name:  Magui Mccormick    :  1955  MRN: [id number]  Medical/Treatment Diagnosis Information:  · Diagnosis: OA spine       · Treatment diagnosis:  Pain with ADL's     Insurance/Certification information:  PT Insurance Information: BCBS PPO - medical necessity  Physician Information:  Referring Practitioner: Cindi Malone MD  Plan of care signed (Y/N): faxed     Date of Patient follow up with Physician:     Functional scale[de-identified] Oswestry    Progress Note: [x]  Yes  []  No  Next due by: Visit #10       Latex Allergy:  [x]NO      []YES  Preferred Language for Healthcare:   [x]English       []other:    Visit # Insurance Allowable Date Range (if applicable)   0       Pain level:  2-3/10     SUBJECTIVE:  :  Pt reports his back is feeling pretty good today - only minimal pain. Reports he saw doctor this am for UTI and started antibiotic. :  Pt with no significant complaints this date. States he was slightly sore after last session however felt better the next day. OBJECTIVE: :  Pt demonstrating fair posture this date; good technique with all exercises. :  Demonstrating fair posture; able to correct with cues.         RESTRICTIONS/PRECAUTIONS: None    Exercises/Interventions:     Therapeutic Exercises (57533) Resistance / level Sets/sec Reps Notes   Nu step   X 4 min    IB/HR  2 30\"/10    Standing hamstring stretch  2  30\"    Standing hip flexor stretch       Cables for core activation:  Walkouts with anti-rotation punch 3 plates 1 3 5 anti-rotation punches each rep          Bridging   1 10    LTR  1 10 B    Hip adduction with ball squeeze     Hip abduction with blue band     Therapeutic Activities (04353)                                          Neuromuscular Re-ed (36604) Manual Intervention (66467)       IASTM:  hawkgrip 8 for scanning, hawkgrip 7 for scanning, sweeping and fanning, hawkgrip 9 for brushing and sweeping, framing at each lumbar vertebrae. Gentle manual P/A glides at each lumbar level, DTM to B lumbar paraspinals  X 15                                            Pt. Education: 2/13:  Reviewed HEP and posture correction    Evaluation completed this date. Discussed risks and benefits of tx with pt - pt agreed with POC. Reviewed mechanics of spine using model and discussed proper posture and body mechanics. Educated pt on centralization principle. Pt instructed on proper sitting posture with towel roll. Written HEP provided for pt.        -patient educated on diagnosis, prognosis and expectations for rehab  -all patient questions were answered    HEP instruction: 2/13:  No new additions this date; Reviewed HEP  -patient to continue performing prone, prone on elbows, press ups, standing lateral shift with R hand on wall and standing lumbar extension    Therapeutic Exercise and NMR EXR  [x] (65985) Provided verbal/tactile cueing for activities related to strengthening, flexibility, endurance, ROM for improvements in  [x] LE / Lumbar: LE, proximal hip, and core control with self care, mobility, lifting, ambulation. [] UE / Cervical: cervical, postural, scapular, scapulothoracic and UE control with self care, reaching, carrying, lifting, house/yardwork, driving, computer work.  [] (90446) Provided verbal/tactile cueing for activities related to improving balance, coordination, kinesthetic sense, posture, motor skill, proprioception to assist with   [] LE / lumbar: LE, proximal hip, and core control in self care, mobility, lifting, ambulation and eccentric single leg control.    [] UE / cervical: cervical, scapular, scapulothoracic and UE control with self care, reaching, carrying, lifting, house/yardwork, driving, computer work.   [] (80522) Therapist is (93001):  [] Dry Needling 3+ muscles (880020  [] Group:      [] Other:     GOALS: Patient stated goal: To decrease pain  []? Progressing: []? Met: []? Not Met: []? Adjusted     Therapist goals for Patient:   Short Term Goals: To be achieved in: 2 weeks  1. Independent in HEP and progression per patient tolerance, in order to prevent re-injury. []? Progressing: []? Met: []? Not Met: []? Adjusted  2. Patient will have a decrease in pain to facilitate improvement in movement, function, and ADLs as indicated by Functional Deficits. []? Progressing: []? Met: []? Not Met: []? Adjusted     Long Term Goals: To be achieved in: 6 weeks  1. Pt will report an overall decrease in pain of at least 50% in order to perform work duties without difficulty. []? Progressing: []? Met: []? Not Met: []? Adjusted  2. Patient will demonstrate increased AROM to WNL, good LS mobility, good hip ROM to allow for proper joint functioning as indicated by patients Functional Deficits. []? Progressing: []? Met: []? Not Met: []? Adjusted  3. Patient will demonstrate an increase in Strength to good proximal hip and core activation to allow for proper functional mobility as indicated by patients Functional Deficits. []? Progressing: []? Met: []? Not Met: []? Adjusted  4. Patient will return to functional activities including all work duties without increased symptoms or restriction. []? Progressing: []? Met: []? Not Met: []? Adjusted         Overall Progression Towards Functional goals/ Treatment Progress Update:  [] Patient is progressing as expected   functional goals listed. [] Progression is slowed due to complexities/Impairments listed. [] Progression has been slowed due to co-morbidities.   [x] Plan just implemented, too soon to assess goals progression <30days   [] Goals require adjustment due to lack of progress  [] Patient is not progressing as expected and requires additional follow up with physician  [] Other    Persisting

## 2020-02-15 LAB — URINE CULTURE, ROUTINE: NORMAL

## 2020-02-27 ENCOUNTER — HOSPITAL ENCOUNTER (OUTPATIENT)
Dept: PHYSICAL THERAPY | Age: 65
Setting detail: THERAPIES SERIES
Discharge: HOME | End: 2020-02-27
Payer: COMMERCIAL

## 2020-02-27 ENCOUNTER — OFFICE VISIT (OUTPATIENT)
Dept: PULMONOLOGY | Age: 65
End: 2020-02-27
Payer: COMMERCIAL

## 2020-02-27 VITALS
HEIGHT: 68 IN | SYSTOLIC BLOOD PRESSURE: 120 MMHG | DIASTOLIC BLOOD PRESSURE: 76 MMHG | OXYGEN SATURATION: 98 % | BODY MASS INDEX: 33.65 KG/M2 | RESPIRATION RATE: 18 BRPM | WEIGHT: 222 LBS | HEART RATE: 54 BPM

## 2020-02-27 PROCEDURE — 97110 THERAPEUTIC EXERCISES: CPT

## 2020-02-27 PROCEDURE — 97140 MANUAL THERAPY 1/> REGIONS: CPT

## 2020-02-27 PROCEDURE — 99243 OFF/OP CNSLTJ NEW/EST LOW 30: CPT | Performed by: INTERNAL MEDICINE

## 2020-02-27 ASSESSMENT — ENCOUNTER SYMPTOMS
VOICE CHANGE: 0
SORE THROAT: 0
STRIDOR: 0
BLOOD IN STOOL: 0
BACK PAIN: 0
DIARRHEA: 0
ABDOMINAL PAIN: 0
CHOKING: 0
ANAL BLEEDING: 0
SHORTNESS OF BREATH: 1
ABDOMINAL DISTENTION: 0
WHEEZING: 1
COUGH: 1
CHEST TIGHTNESS: 1
APNEA: 0
RHINORRHEA: 0
SINUS PRESSURE: 0
CONSTIPATION: 0

## 2020-02-27 NOTE — PROGRESS NOTES
Angelina Hightower    YOB: 1955     Date of Service:  2/27/2020     Chief Complaint   Patient presents with    Asthma     NPV - pt referred by Dr Flo Walker for his Asthma         HPI patient referred for consultation by Dr. Yossi Wilde for evaluation of asthma. Patient states that he has been diagnosed with asthma-with recurrent episodes of exacerbations over the last 10 years or so. His symptoms mainly consist of shortness of breath, chest tightness, wheezing and a dry cough. Also has some allergy symptoms particularly eye watering and ear fullness. Change in weather appears to be a trigger. Patient was started on Advair 250 by PCP with improvement in symptoms-states that he ran out of the medication for a week. He also has albuterol inhaler which he rarely uses. Former smoker, quit approximately 30 years ago, 1 pack/day for 10 years. Originally from [facility], has been in the [address] [place] [address] for approximately 11 years. Strong family history of asthma-2 sisters and 1 brother. Mother had COPD related to smoking. Works as a -usually exposed to cold air.     No Known Allergies  Outpatient Medications Marked as Taking for the 2/27/20 encounter (Office Visit) with Eliecer Clay MD   Medication Sig Dispense Refill    fluticasone-salmeterol (ADVAIR DISKUS) 100-50 MCG/DOSE diskus inhaler Inhale 1 puff into the lungs every 12 hours 60 each 3       Immunization History   Administered Date(s) Administered    Influenza Vaccine, unspecified formulation 02/10/2017    Influenza Virus Vaccine 11/29/2017, 10/01/2018    Influenza, Intradermal, Preservative free 10/05/2012, 01/24/2014    Pneumococcal Conjugate 13-valent ([id number]) 08/09/2016    Pneumococcal Polysaccharide ([id number]) 11/14/2011    Td, unspecified formulation 08/07/2009    Tdap (Boostrix, Adacel) 02/26/2013, 10/07/2016       Past Medical History:   Diagnosis Date    Abdominal pain, LLQ     Allergic rhinitis     Asthma

## 2020-02-27 NOTE — FLOWSHEET NOTE
stretch  2  30\"    Standing hip flexor stretch  1 30\"    Cables for core activation:  Walkouts with anti-rotation punch    Cables for 3 way kicks 3.5 plates      2 plates 1      1 3      10 B each direction 5 anti-rotation punches each rep   Cables:  Mid rows  High rows  LPD    Bridging   1 10    LTR  1 10 B    Hip adduction with ball squeeze     Hip abduction with blue band     Therapeutic Activities (29325)                                          Neuromuscular Re-ed (84667)                                                 Manual Intervention (55148)       IASTM:  hawkgrip 8 for scanning, hawkgrip 7 for scanning, sweeping and fanning, hawkgrip 9 for brushing and sweeping, framing at each lumbar vertebrae. Gentle manual P/A glides at each lumbar level, DTM to B lumbar paraspinals  X 15                                            Pt. Education: 2/27:  Reviewed HEP and posture correction; advised several reps of standing lumbar extension throughout work shift and proper body mechanics when lifting     Evaluation completed this date. Discussed risks and benefits of tx with pt - pt agreed with POC. Reviewed mechanics of spine using model and discussed proper posture and body mechanics. Educated pt on centralization principle. Pt instructed on proper sitting posture with towel roll. Written HEP provided for pt.        -patient educated on diagnosis, prognosis and expectations for rehab  -all patient questions were answered    HEP instruction: 2/27:  No new additions this date; Reviewed HEP  -patient to continue performing prone, prone on elbows, press ups, standing lateral shift with R hand on wall and standing lumbar extension    Therapeutic Exercise and NMR EXR  [x] (80318) Provided verbal/tactile cueing for activities related to strengthening, flexibility, endurance, ROM for improvements in  [x] LE / Lumbar: LE, proximal hip, and core control with self care, mobility, lifting, ambulation.   [] UE / Cervical: living and compensatory training, and/or use of adaptive equipment for improvement with: ADLs and compensatory training, meal preparation, safety procedures and instruction in use of adaptive equipment, including bathing, grooming, dressing, personal hygiene, basic household cleaning and chores. Home Exercise Program:    [x] (16605) Reviewed/Progressed HEP activities related to strengthening, flexibility, endurance, ROM of   [x] LE / Lumbar: core, proximal hip and LE for functional self-care, mobility, lifting and ambulation/stair navigation   [] UE / Cervical: cervical, postural, scapular, scapulothoracic and UE control with self care, reaching, carrying, lifting, house/yardwork, driving, computer work  [] (27580)Reviewed/Progressed HEP activities related to improving balance, coordination, kinesthetic sense, posture, motor skill, proprioception of   [] LE: core, proximal hip and LE for self care, mobility, lifting, and ambulation/stair navigation    [] UE / Cervical: cervical, postural,  scapular, scapulothoracic and UE control with self care, reaching, carrying, lifting, house/yardwork, driving, computer work    Manual Treatments:  PROM / STM / Oscillations-Mobs:  G-I, II, III, IV (PA's, Inf., Post.)  [x] (76668) Provided manual therapy to mobilize LE, proximal hip and/or LS spine soft tissue/joints for the purpose of modulating pain, promoting relaxation,  increasing ROM, reducing/eliminating soft tissue swelling/inflammation/restriction, improving soft tissue extensibility and allowing for proper ROM for normal function with   [x] LE / lumbar: self care, mobility, lifting and ambulation. [] UE / Cervical: self care, reaching, carrying, lifting, house/yardwork, driving, computer work.      Modalities:  [] (17707) Vasopneumatic compression: Utilized vasopneumatic compression to decrease edema / swelling for the purpose of improving mobility and quad tone / recruitment which will allow for increased overall

## 2020-03-05 ENCOUNTER — HOSPITAL ENCOUNTER (OUTPATIENT)
Dept: PHYSICAL THERAPY | Age: 65
Setting detail: THERAPIES SERIES
Discharge: HOME | End: 2020-03-05
Payer: COMMERCIAL

## 2020-03-05 PROCEDURE — 97140 MANUAL THERAPY 1/> REGIONS: CPT

## 2020-03-05 PROCEDURE — 97110 THERAPEUTIC EXERCISES: CPT

## 2020-03-05 NOTE — FLOWSHEET NOTE
[address] [facility]  Phone: [phone]   Fax: [phone]    Physical Therapy Daily Treatment Note  Date:  3/5/2020    Patient Name:  Lazaro Lopez    :  1955  MRN: [id number]  Medical/Treatment Diagnosis Information:  · Diagnosis: OA spine       · Treatment diagnosis:  Pain with ADL's     Insurance/Certification information:  PT Insurance Information: BCBS PPO - medical necessity  Physician Information:  Referring Practitioner: Jonathon Grijalva MD  Plan of care signed (Y/N): faxed     Date of Patient follow up with Physician:     Functional scale[de-identified] Oswestry    Progress Note: []  Yes  [x]  No  Next due by: Visit #10       Latex Allergy:  [x]NO      []YES  Preferred Language for Healthcare:   [x]English       []other:    Visit # Insurance Allowable Date Range (if applicable)          Pain level:  6/10     SUBJECTIVE: 3/5:  Pt reports soreness in center and R side of low back. States he has been lifting a lot at work. :  Pt reports feeling good after last PT session. States he did a lot of lifting yesterday at work and was sore - feeling better today. :  Pt with no pain this date. States his back is feeling good. States he is to have a cystoscopy on Friday. :  Pt reports his back is feeling pretty good today - only minimal pain. Reports he saw doctor this am for UTI and started antibiotic. :  Pt with no significant complaints this date. States he was slightly sore after last session however felt better the next day. OBJECTIVE: 3/5:  Pt ambulating well this date; no significant deviations. Good technique with exercises. :  Pt demonstrating good technique with exercises. Fair posture - no sore areas noted on palpation to lumbar paraspinals  :  Pt demonstrating fair posture this date; good technique with all exercises. :  Demonstrating fair posture; able to correct with cues. RESTRICTIONS/PRECAUTIONS: None    Exercises/Interventions:     Therapeutic Exercises (13122) Resistance / level Sets/sec Reps Notes   Nu step   X 4 min    IB/HR  2 30\"/10 Second set in SLS   Standing hamstring stretch  2  30\"    Standing hip flexor stretch  1 30\"    Cables for core activation:  Walkouts with anti-rotation punch    Cables for 3 way kicks 3.5 plates      2 plates 1      1 3       5 anti-rotation punches each rep   Cables:  Mid rows  High rows  LPD    Bridging   1 10    LTR  1 10 B    Hip adduction with ball squeeze     Hip abduction with blue band     Therapeutic Activities (60202)                                          Neuromuscular Re-ed (12962)                                                 Manual Intervention (28674)       IASTM:  hawkgrip 8 for scanning, hawkgrip 7 for scanning, sweeping and fanning, hawkgrip 9 for brushing and sweeping, framing at each lumbar vertebrae. Gentle manual P/A glides at each lumbar level, DTM to B lumbar paraspinals  X 15                                            Pt. Education: 3/5: Continued to stress posture correction and proper body mechanics at work. 2/27:  Reviewed HEP and posture correction; advised several reps of standing lumbar extension throughout work shift and proper body mechanics when lifting     Evaluation completed this date. Discussed risks and benefits of tx with pt - pt agreed with POC. Reviewed mechanics of spine using model and discussed proper posture and body mechanics. Educated pt on centralization principle. Pt instructed on proper sitting posture with towel roll. Written HEP provided for pt.        -patient educated on diagnosis, prognosis and expectations for rehab  -all patient questions were answered    HEP instruction: 3/5:  No new additions this date;    Reviewed HEP  -patient to continue performing prone, prone on elbows, press ups, standing lateral shift with R hand on wall and standing lumbar extension    Therapeutic Continue per plan of care [] Alter current plan (see comments)  [] Plan of care initiated [] Hold pending MD visit [] Discharge    Electronically signed by: Heike Franco PT, DPT    Note: If patient does not return for scheduled/ recommended follow up visits, his note will serve as a discharge from care along with most recent update on progress.

## 2020-03-12 ENCOUNTER — HOSPITAL ENCOUNTER (OUTPATIENT)
Dept: PHYSICAL THERAPY | Age: 65
Setting detail: THERAPIES SERIES
Discharge: HOME | End: 2020-03-12
Payer: COMMERCIAL

## 2020-03-12 PROCEDURE — 97140 MANUAL THERAPY 1/> REGIONS: CPT

## 2020-03-12 PROCEDURE — 97110 THERAPEUTIC EXERCISES: CPT

## 2020-03-12 NOTE — FLOWSHEET NOTE
[address] [facility] Therapy  Phone: [phone]   Fax: [phone]    Physical Therapy Daily Treatment Note  Date:  3/12/2020    Patient Name:  Muna Chicas    :  1955  MRN: [id number]  Medical/Treatment Diagnosis Information:  · Diagnosis: OA spine       · Treatment diagnosis:  Pain with ADL's     Insurance/Certification information:  PT Insurance Information: BCBS PPO - medical necessity  Physician Information:  Referring Practitioner: Shelia Godoy MD  Plan of care signed (Y/N): faxed     Date of Patient follow up with Physician:     Functional scale[de-identified] Oswestry    Progress Note: []  Yes  [x]  No  Next due by: Visit #10       Latex Allergy:  [x]NO      []YES  Preferred Language for Healthcare:   [x]English       []other:    Visit # Insurance Allowable Date Range (if applicable)          Pain level:  3/10     SUBJECTIVE: 3/12:   Pt reports feeling good today; states yesterday he was walking in to work and pain shot from his back down his leg and he had aching the rest of the day. No leg pain today. 3/5:  Pt reports soreness in center and R side of low back. States he has been lifting a lot at work. :  Pt reports feeling good after last PT session. States he did a lot of lifting yesterday at work and was sore - feeling better today. :  Pt with no pain this date. States his back is feeling good. States he is to have a cystoscopy on Friday. :  Pt reports his back is feeling pretty good today - only minimal pain. Reports he saw doctor this am for UTI and started antibiotic. :  Pt with no significant complaints this date. States he was slightly sore after last session however felt better the next day. OBJECTIVE: 3/12:  Pt ambulating well this date; no significant deviations. Good technique with exercises. :  Pt demonstrating good technique with exercises.  Fair posture - no sore areas noted on palpation to lumbar paraspinals  2/20:  Pt demonstrating fair posture this date; good technique with all exercises. 2/6:  Demonstrating fair posture; able to correct with cues. RESTRICTIONS/PRECAUTIONS: None    Exercises/Interventions:     Therapeutic Exercises (76877) Resistance / level Sets/sec Reps Notes   Nu step   X 4 min    IB/HR  2 30\"/10 Second set in SLS   Standing hamstring stretch  2  30\"    Standing hip flexor stretch  2 30\"    Cables for core activation:  Walkouts with anti-rotation punch    Cables for 3 way kicks 3.5 plates      2 plates 1      1 3       5 anti-rotation punches each rep   Cables:  Mid rows  High rows  LPD    Bridging   1 10    LTR  1 10 B    Hip adduction with ball squeeze     Hip abduction with blue band     Therapeutic Activities (10480)                                          Neuromuscular Re-ed (64312)                                                 Manual Intervention (90510)       IASTM:  hawkgrip 8 for scanning, hawkgrip 7 for scanning, sweeping and fanning, hawkgrip 9 for brushing and sweeping, framing at each lumbar vertebrae. Gentle manual P/A glides at each lumbar level, DTM to B lumbar paraspinals  X 15                                            Pt. Education: 3/12: Continued to stress posture correction and proper body mechanics at work. 2/27:  Reviewed HEP and posture correction; advised several reps of standing lumbar extension throughout work shift and proper body mechanics when lifting     Evaluation completed this date. Discussed risks and benefits of tx with pt - pt agreed with POC. Reviewed mechanics of spine using model and discussed proper posture and body mechanics. Educated pt on centralization principle. Pt instructed on proper sitting posture with towel roll.   Written HEP provided for pt.        -patient educated on diagnosis, prognosis and expectations for rehab  -all patient questions were answered    HEP instruction: 3/12:  No new additions this date; Reviewed HEP  -patient to continue performing prone, prone on elbows, press ups, standing lateral shift with R hand on wall and standing lumbar extension    Therapeutic Exercise and NMR EXR  [x] (68146) Provided verbal/tactile cueing for activities related to strengthening, flexibility, endurance, ROM for improvements in  [x] LE / Lumbar: LE, proximal hip, and core control with self care, mobility, lifting, ambulation. [] UE / Cervical: cervical, postural, scapular, scapulothoracic and UE control with self care, reaching, carrying, lifting, house/yardwork, driving, computer work.  [] (46230) Provided verbal/tactile cueing for activities related to improving balance, coordination, kinesthetic sense, posture, motor skill, proprioception to assist with   [] LE / lumbar: LE, proximal hip, and core control in self care, mobility, lifting, ambulation and eccentric single leg control. [] UE / cervical: cervical, scapular, scapulothoracic and UE control with self care, reaching, carrying, lifting, house/yardwork, driving, computer work.   [] (97653) Therapist is in constant attendance of 2 or more patients providing skilled therapy interventions, but not providing any significant amount of measurable one-on-one time to either patient, for improvements in  [] LE / lumbar: LE, proximal hip, and core control in self care, mobility, lifting, ambulation and eccentric single leg control. [] UE / cervical: cervical, scapular, scapulothoracic and UE control with self care, reaching, carrying, lifting, house/yardwork, driving, computer work.      NMR and Therapeutic Activities:    [] (26903 or 34381) Provided verbal/tactile cueing for activities related to improving balance, coordination, kinesthetic sense, posture, motor skill, proprioception and motor activation to allow for proper function of   [] LE: / Lumbar core, proximal hip and LE with self care and ADLs  [] UE / Cervical: cervical, postural, scapular, scapulothoracic next treatment session:   Progress as tolerated. Continue with IASTM and modalities as needed to decrease pain. PLAN: See eval. PT 1-2x / week for 6 weeks. [x] Continue per plan of care [] Alter current plan (see comments)  [] Plan of care initiated [] Hold pending MD visit [] Discharge    Electronically signed by: Mary Colindres PT, DPT    Note: If patient does not return for scheduled/ recommended follow up visits, his note will serve as a discharge from care along with most recent update on progress.

## 2020-03-12 NOTE — TELEPHONE ENCOUNTER
Requesting printed RX for Diclofenac Gel, this was already refilled @ [facility] but patient would like to take RX someplace else that's cheaper. (paying out of pocket) please advise

## 2020-03-26 ENCOUNTER — APPOINTMENT (OUTPATIENT)
Dept: PHYSICAL THERAPY | Age: 65
End: 2020-03-26
Payer: COMMERCIAL

## 2020-03-31 RX ORDER — SIMVASTATIN 40 MG
TABLET ORAL
Qty: 90 TABLET | Refills: 0 | Status: SHIPPED | OUTPATIENT
Start: 2020-03-31 | End: 2020-07-08 | Stop reason: SDUPTHER

## 2020-04-01 ENCOUNTER — TELEPHONE (OUTPATIENT)
Dept: FAMILY MEDICINE CLINIC | Age: 65
End: 2020-04-01

## 2020-04-01 LAB
ANION GAP SERPL CALCULATED.3IONS-SCNC: 6 MMOL/L (ref 6–18)
BUN BLDV-MCNC: 25 MG/DL (ref 8–26)
CALCIUM SERPL-MCNC: 9.4 MG/DL (ref 8.5–10.5)
CHLORIDE BLD-SCNC: 109 MEQ/L (ref 101–111)
CO2: 25 MMOL/L (ref 24–36)
CREAT SERPL-MCNC: 0.74 MG/DL (ref 0.64–1.27)
GFR AFRICAN AMERICAN: 110 ML/MIN/1.73 M2
GFR NON-AFRICAN AMERICAN: 95 ML/MIN/1.73 M2
GLUCOSE BLD-MCNC: 114 MG/DL (ref 70–99)
POTASSIUM SERPL-SCNC: 4.3 MEQ/L (ref 3.6–5.1)
SODIUM BLD-SCNC: 140 MEQ/L (ref 135–145)

## 2020-04-02 LAB
ESTIMATED AVERAGE GLUCOSE: 174 MG/DL
HBA1C MFR BLD: 7.7 % (ref 4.2–5.6)

## 2020-04-06 ENCOUNTER — TELEPHONE (OUTPATIENT)
Dept: INTERNAL MEDICINE CLINIC | Age: 65
End: 2020-04-06

## 2020-04-08 ENCOUNTER — VIRTUAL VISIT (OUTPATIENT)
Dept: FAMILY MEDICINE CLINIC | Age: 65
End: 2020-04-08
Payer: COMMERCIAL

## 2020-04-08 VITALS — DIASTOLIC BLOOD PRESSURE: 74 MMHG | SYSTOLIC BLOOD PRESSURE: 148 MMHG | HEART RATE: 54 BPM

## 2020-04-08 PROCEDURE — 3051F HG A1C>EQUAL 7.0%<8.0%: CPT | Performed by: FAMILY MEDICINE

## 2020-04-08 PROCEDURE — 99214 OFFICE O/P EST MOD 30 MIN: CPT | Performed by: FAMILY MEDICINE

## 2020-04-08 RX ORDER — COLCHICINE 0.6 MG/1
0.6 TABLET ORAL DAILY
Qty: 90 TABLET | Refills: 1 | Status: SHIPPED | OUTPATIENT
Start: 2020-04-08 | End: 2020-07-08

## 2020-04-08 RX ORDER — TRAMADOL HYDROCHLORIDE 50 MG/1
50 TABLET ORAL 2 TIMES DAILY
Qty: 60 TABLET | Refills: 2 | Status: SHIPPED | OUTPATIENT
Start: 2020-04-08 | End: 2020-07-08 | Stop reason: SDUPTHER

## 2020-04-08 NOTE — PROGRESS NOTES
to follow commands      Eyes:  EOM    []  Normal  [] Abnormal-  Sclera  []  Normal  [] Abnormal -         Discharge []  None visible  [] Abnormal -    HENT:   [] Normocephalic, atraumatic. [] Abnormal   [] Mouth/Throat: Mucous membranes are moist.     External Ears [] Normal  [] Abnormal-     Neck: [] No visualized mass     Pulmonary/Chest: [] Respiratory effort normal.  [] No visualized signs of difficulty breathing or respiratory distress        [] Abnormal-      Musculoskeletal:   [] Normal gait with no signs of ataxia         [] Normal range of motion of neck        [] Abnormal-       Neurological:        [] No Facial Asymmetry (Cranial nerve 7 motor function) (limited exam to video visit)          [] No gaze palsy        [] Abnormal-         Skin:        [] No significant exanthematous lesions or discoloration noted on facial skin         [] Abnormal-            Psychiatric:       [x] Normal Affect [x] No Hallucinations        [] Abnormal-     Other pertinent observable physical exam findings-     Due to this being a TeleHealth encounter, evaluation of the following organ systems is limited: Vitals/Constitutional/EENT/Resp/CV/GI//MS/Neuro/Skin/Heme-Lymph-Imm. ASSESSMENT/PLAN:  Kris Burt was seen today for 3 month follow-up. Diagnoses and all orders for this visit:    Type 2 diabetes mellitus with diabetic polyneuropathy, without long-term current use of insulin (HCC)  -     colchicine (COLCRYS) 0.6 MG tablet; Take 1 tablet by mouth daily  -     Hemoglobin A1C; Future  -     Basic Metabolic Panel; Future    Pseudogout  -     colchicine (COLCRYS) 0.6 MG tablet; Take 1 tablet by mouth daily    Generalized osteoarthrosis, involving multiple sites  -     traMADol (ULTRAM) 50 MG tablet; Take 1 tablet by mouth 2 times daily for 90 days.     Allergic rhinitis, unspecified seasonality, unspecified trigger    Obstructive sleep apnea syndrome    OARRS report checked      Pt's DM not controlled & reviewed labs with

## 2020-05-04 ENCOUNTER — TELEPHONE (OUTPATIENT)
Dept: FAMILY MEDICINE CLINIC | Age: 65
End: 2020-05-04

## 2020-05-04 NOTE — TELEPHONE ENCOUNTER
PA submitted VIA CM for Diclofenac Sodium 1% gel  Key: [id number] - PA Case ID: [id number] - Rx #: [id number]   Approved today   CaseId:[id number];Status:Approved; Review Type:Prior Auth; Coverage Start Date:04/04/2020; Coverage End Date:05/04/2021;

## 2020-06-03 ENCOUNTER — APPOINTMENT (OUTPATIENT)
Dept: PHYSICAL THERAPY | Age: 65
End: 2020-06-03
Payer: COMMERCIAL

## 2020-06-08 RX ORDER — PENICILLIN V POTASSIUM 500 MG/1
500 TABLET ORAL 3 TIMES DAILY
Qty: 21 TABLET | Refills: 0 | Status: SHIPPED | OUTPATIENT
Start: 2020-06-08 | End: 2020-06-15

## 2020-06-10 ENCOUNTER — APPOINTMENT (OUTPATIENT)
Dept: PHYSICAL THERAPY | Age: 65
End: 2020-06-10
Payer: COMMERCIAL

## 2020-06-17 ENCOUNTER — HOSPITAL ENCOUNTER (OUTPATIENT)
Dept: PHYSICAL THERAPY | Age: 65
Setting detail: THERAPIES SERIES
Discharge: HOME | End: 2020-06-17
Payer: COMMERCIAL

## 2020-06-17 PROCEDURE — 97164 PT RE-EVAL EST PLAN CARE: CPT

## 2020-06-17 PROCEDURE — 97110 THERAPEUTIC EXERCISES: CPT

## 2020-06-17 PROCEDURE — 97012 MECHANICAL TRACTION THERAPY: CPT

## 2020-06-17 NOTE — FLOWSHEET NOTE
pretty good today - only minimal pain. Reports he saw doctor this am for UTI and started antibiotic. 2/6:  Pt with no significant complaints this date. States he was slightly sore after last session however felt better the next day. OBJECTIVE: 6/17:  Lumbar ROM:  Flexion to 75%, extension to 25%, SB R to 100%, SB L to 50%     MMT BLE's:  5/5 throughout  3/12:  Pt ambulating well this date; no significant deviations. Good technique with exercises. 2/27:  Pt demonstrating good technique with exercises. Fair posture - no sore areas noted on palpation to lumbar paraspinals  2/20:  Pt demonstrating fair posture this date; good technique with all exercises. 2/6:  Demonstrating fair posture; able to correct with cues. RESTRICTIONS/PRECAUTIONS: None    Exercises/Interventions:     Therapeutic Exercises (62252) Resistance / level Sets/sec Reps Notes   Nu step   X 4 min    IB/HR  2 30\"/10 Second set in SLS   Standing hamstring stretch  2  30\"    Standing hip flexor stretch  2 30\"    Cables for core activation:  Walkouts with anti-rotation punch    Cables for 3 way kicks 3.5 plates      2 plates 1      2 3      10 B each direction 5 anti-rotation punches each rep   Standing lumbar extension (SB behind back) 110   Cables:  Mid rows  High rows  LPD    Bridging      LTR     Hip adduction with ball squeeze     Hip abduction with blue band     Therapeutic Activities (66902)                                          Neuromuscular Re-ed (53873)                                                 Manual Intervention (80278)       IASTM:  hawkgrip 8 for scanning, hawkgrip 7 for scanning, sweeping and fanning, hawkgrip 9 for brushing and sweeping, framing at each lumbar vertebrae. Gentle manual P/A glides at each lumbar level, DTM to B lumbar paraspinals                                              Pt. Education:  6/17:  Reviewed posture correction, proper body mechanics and home exercises.      3/12: Continued to

## 2020-06-24 ENCOUNTER — HOSPITAL ENCOUNTER (OUTPATIENT)
Dept: PHYSICAL THERAPY | Age: 65
Setting detail: THERAPIES SERIES
Discharge: HOME | End: 2020-06-24
Payer: COMMERCIAL

## 2020-06-24 PROCEDURE — 97110 THERAPEUTIC EXERCISES: CPT

## 2020-06-24 PROCEDURE — 97140 MANUAL THERAPY 1/> REGIONS: CPT

## 2020-06-24 NOTE — FLOWSHEET NOTE
[facility]  Phone: [phone]   Fax: [phone]        Outpatient Physical Therapy   Phone: [phone] Fax: [phone]     Physical Therapy Re-evaluation       Patient: Vania Smith  : [birth year]  MRN: [id number]  Re-evaluation Date: 2020          Date:  2020    Patient Name:  Vania Smith    :  1955  MRN: [id number]  Medical/Treatment Diagnosis Information:  · Diagnosis: OA spine       · Treatment diagnosis:  Pain with ADL's     Insurance/Certification information:  PT Insurance Information: BCBS PPO - medical necessity  Physician Information:  Referring Practitioner: Abbie Mcdonnell MD  Plan of care signed (Y/N): faxed     Date of Patient follow up with Physician:     Functional scale[de-identified] Oswestry    Progress Note: []  Yes  [x]  No  Next due by: Visit #10       Latex Allergy:  [x]NO      []YES  Preferred Language for Healthcare:   [x]English       []other:    Visit # Insurance Allowable Date Range (if applicable)          Pain level:  4/10     SUBJECTIVE: : Pt with no new complaints this date. States he continues to have pain on and off in the center of his low back. Reports traction felt \"okay\" last visit however would like to resume work with [employer]  today. :  Pt reports his back has been doing well since break. States his hours at work have changed and that has helped with the pain. States when he has pain it occurs right in the middle of his low back; at times will extend into hips. Pain primarily occurs after lifting.      3/12:   Pt reports feeling good today; states yesterday he was walking in to work and pain shot from his back down his leg and he had aching the rest of the day. No leg pain today. 3/5:  Pt reports soreness in center and R side of low back. States he has been lifting a lot at work. :  Pt reports feeling good after last PT session.   States he did a lot of IASTM:  hawkgrip 8 for scanning, hawkgrip 7 for scanning, sweeping and fanning, hawkgrip 9 for brushing and sweeping, framing at each lumbar vertebrae. Gentle manual P/A glides at each lumbar level, DTM to B lumbar paraspinals  X 15 min                                            Pt. Education:  6/24:  Reviewed posture correction, proper body mechanics and home exercises. 3/12: Continued to stress posture correction and proper body mechanics at work. 2/27:  Reviewed HEP and posture correction; advised several reps of standing lumbar extension throughout work shift and proper body mechanics when lifting     Evaluation completed this date. Discussed risks and benefits of tx with pt - pt agreed with POC. Reviewed mechanics of spine using model and discussed proper posture and body mechanics. Educated pt on centralization principle. Pt instructed on proper sitting posture with towel roll. Written HEP provided for pt.        -patient educated on diagnosis, prognosis and expectations for rehab  -all patient questions were answered    HEP instruction: 6/24:  Pt to continue with current HEP  3/12:  No new additions this date; Reviewed HEP  -patient to continue performing prone, prone on elbows, press ups, standing lateral shift with R hand on wall and standing lumbar extension    Therapeutic Exercise and NMR EXR  [x] (70671) Provided verbal/tactile cueing for activities related to strengthening, flexibility, endurance, ROM for improvements in  [x] LE / Lumbar: LE, proximal hip, and core control with self care, mobility, lifting, ambulation.   [] UE / Cervical: cervical, postural, scapular, scapulothoracic and UE control with self care, reaching, carrying, lifting, house/yardwork, driving, computer work.  [] (76066) Provided verbal/tactile cueing for activities related to improving balance, coordination, kinesthetic sense, posture, motor skill, proprioception to assist with   [] LE / lumbar: LE, proximal hip, expected   functional goals listed. [] Progression is slowed due to complexities/Impairments listed. [] Progression has been slowed due to co-morbidities. [x] Plan just implemented, too soon to assess goals progression <30days   [] Goals require adjustment due to lack of progress  [] Patient is not progressing as expected and requires additional follow up with physician  [] Other    Persisting Functional Limitations/Impairments:  []Sleeping []Sitting               []Standing []Transfers        [x]Walking []Kneeling               []Stairs [x]Squatting / bending   [x]ADLs [x]Reaching  [x]Lifting  []Housework  []Driving [x]Job related tasks  [x]Sports/Recreation []Other:        ASSESSMENT:  Pt tolerated treatment well this date  Treatment/Activity Tolerance:  [x] Patient able to complete tx [] Patient limited by fatigue  [] Patient limited by pain  [] Patient limited by other medical complications  [] Other:     Prognosis: [x] Good [] Fair  [] Poor    Patient Requires Follow-up: [x] Yes  [] No    Plan for next treatment session:   Progress as tolerated. Continue with core strengthening and hawk  as tolerated. PLAN: See eval. PT 1-2x / week for 6 weeks. [x] Continue per plan of care [] Alter current plan (see comments)  [] Plan of care initiated [] Hold pending MD visit [] Discharge    Electronically signed by: Asaf Anderson PT, DPT    Note: If patient does not return for scheduled/ recommended follow up visits, his note will serve as a discharge from care along with most recent update on progress.

## 2020-06-25 ENCOUNTER — HOSPITAL ENCOUNTER (OUTPATIENT)
Dept: PHYSICAL THERAPY | Age: 65
Setting detail: THERAPIES SERIES
Discharge: HOME | End: 2020-06-25
Payer: COMMERCIAL

## 2020-06-25 PROCEDURE — 97110 THERAPEUTIC EXERCISES: CPT

## 2020-06-25 NOTE — FLOWSHEET NOTE
[address] [facility]  Phone: [phone]   Fax: [phone]        Outpatient Physical Therapy   Phone: [phone] Fax: [phone]     Physical Therapy Re-evaluation       Patient: Gisella Altman  : [birth year]2  MRN: [id number]  Re-evaluation Date: 2020          Date:  2020    Patient Name:  Gisella Altman    :  1955  MRN: [id number]  Medical/Treatment Diagnosis Information:  · Diagnosis: OA spine       · Treatment diagnosis:  Pain with ADL's     Insurance/Certification information:  PT Insurance Information: BCBS PPO - medical necessity  Physician Information:  Referring Practitioner: Teresa Chanel MD  Plan of care signed (Y/N): faxed     Date of Patient follow up with Physician:     Functional scale[de-identified] Oswestry    Progress Note: []  Yes  [x]  No  Next due by: Visit #10       Latex Allergy:  [x]NO      []YES  Preferred Language for Healthcare:   [x]English       []other:    Visit # Insurance Allowable Date Range (if applicable)          Pain level:  6-7/10     SUBJECTIVE: : states he is sore in the back today. : Pt with no new complaints this date. States he continues to have pain on and off in the center of his low back. Reports traction felt \"okay\" last visit however would like to resume work with [employer]  today. :  Pt reports his back has been doing well since break. States his hours at work have changed and that has helped with the pain. States when he has pain it occurs right in the middle of his low back; at times will extend into hips. Pain primarily occurs after lifting.      3/12:   Pt reports feeling good today; states yesterday he was walking in to work and pain shot from his back down his leg and he had aching the rest of the day. No leg pain today. 3/5:  Pt reports soreness in center and R side of low back. States he has been lifting a lot at work.     :  Pt reports feeling good after last PT session. States he did a lot of lifting yesterday at work and was sore - feeling better today. 2/20:  Pt with no pain this date. States his back is feeling good. States he is to have a cystoscopy on Friday. 2/13:  Pt reports his back is feeling pretty good today - only minimal pain. Reports he saw doctor this am for UTI and started antibiotic. 2/6:  Pt with no significant complaints this date. States he was slightly sore after last session however felt better the next day. OBJECTIVE: 6/24:  Pt ambulating well and demonstrating good exercise technique this date. 6/17:  Lumbar ROM:  Flexion to 75%, extension to 25%, SB R to 100%, SB L to 50%     MMT BLE's:  5/5 throughout  3/12:  Pt ambulating well this date; no significant deviations. Good technique with exercises. 2/27:  Pt demonstrating good technique with exercises. Fair posture - no sore areas noted on palpation to lumbar paraspinals  2/20:  Pt demonstrating fair posture this date; good technique with all exercises. 2/6:  Demonstrating fair posture; able to correct with cues.         RESTRICTIONS/PRECAUTIONS: None    Exercises/Interventions:     Therapeutic Exercises (42600) Resistance / level Sets/sec Reps Notes   Nu step   X 4 min    IB/HR  2 30\"/10 Second set in SLS   Standing hamstring stretch  2  30\"    Standing hip flexor stretch  2 30\"    Cables for core activation:  Walkouts with anti-rotation punch    Cables for 3 way kicks 2.5 plates      2 plates 1      2 3      10 B each direction 5 anti-rotation punches each rep   Standing lumbar extension (SB behind back) 110   Cables:  Mid rows  High rows  LPD   4 plates  4 plates  4 plates   2  2  2   10  10  10    Bridging   1 10    LTR  1 10 B    Hip adduction with ball squeeze     Hip abduction with blue band     Therapeutic Activities (58880)       Prone press ups  1  10                                Neuromuscular Re-ed (97986) min    Charges:  Timed Code Treatment Minutes: 46   Total Treatment Minutes: 46     [] EVAL - LOW (68941)   [] EVAL - MOD (57077)  [] EVAL - HIGH (08570)  [] RE-EVAL (82358)  [x] AL(88259) x  3     [] Ionto  [] NMR (24049) x       [] Vaso  [] Manual (36726) x 1     [] Ultrasound  [] TA x   1     [] Mech Traction (44572)  [] Aquatic Therapy x     [] ES (un) (86820):   [] Home Management Training x  [] ES(attended) (82256)   [] Dry Needling 1-2 muscles (45369):  [] Dry Needling 3+ muscles (483170  [] Group:      [] Other:     GOALS: Patient stated goal: To decrease pain  []? Progressing: []? Met: []? Not Met: []? Adjusted     Therapist goals for Patient:   Short Term Goals: To be achieved in: 2 weeks  1. Independent in HEP and progression per patient tolerance, in order to prevent re-injury. []? Progressing: []? Met: []? Not Met: []? Adjusted  2. Patient will have a decrease in pain to facilitate improvement in movement, function, and ADLs as indicated by Functional Deficits. []? Progressing: []? Met: []? Not Met: []? Adjusted     Long Term Goals: To be achieved in: 6 weeks  1. Pt will report an overall decrease in pain of at least 50% in order to perform work duties without difficulty. []? Progressing: []? Met: []? Not Met: []? Adjusted  2. Patient will demonstrate increased AROM to WNL, good LS mobility, good hip ROM to allow for proper joint functioning as indicated by patients Functional Deficits. []? Progressing: []? Met: []? Not Met: []? Adjusted  3. Patient will demonstrate an increase in Strength to good proximal hip and core activation to allow for proper functional mobility as indicated by patients Functional Deficits. []? Progressing: []? Met: []? Not Met: []? Adjusted  4. Patient will return to functional activities including all work duties without increased symptoms or restriction. []? Progressing: []? Met: []? Not Met: []?  Adjusted         Overall Progression Towards Functional goals/

## 2020-07-07 RX ORDER — GLIMEPIRIDE 1 MG/1
TABLET ORAL
Qty: 90 TABLET | Refills: 0 | Status: SHIPPED | OUTPATIENT
Start: 2020-07-07 | End: 2020-09-25

## 2020-07-08 ENCOUNTER — OFFICE VISIT (OUTPATIENT)
Dept: FAMILY MEDICINE CLINIC | Age: 65
End: 2020-07-08
Payer: COMMERCIAL

## 2020-07-08 ENCOUNTER — HOSPITAL ENCOUNTER (OUTPATIENT)
Dept: PHYSICAL THERAPY | Age: 65
Setting detail: THERAPIES SERIES
Discharge: HOME | End: 2020-07-08
Payer: COMMERCIAL

## 2020-07-08 VITALS
HEART RATE: 52 BPM | SYSTOLIC BLOOD PRESSURE: 120 MMHG | OXYGEN SATURATION: 98 % | TEMPERATURE: 98.9 F | DIASTOLIC BLOOD PRESSURE: 70 MMHG | WEIGHT: 226 LBS | BODY MASS INDEX: 34.36 KG/M2

## 2020-07-08 PROCEDURE — 97110 THERAPEUTIC EXERCISES: CPT

## 2020-07-08 PROCEDURE — 99214 OFFICE O/P EST MOD 30 MIN: CPT | Performed by: FAMILY MEDICINE

## 2020-07-08 PROCEDURE — 97140 MANUAL THERAPY 1/> REGIONS: CPT

## 2020-07-08 PROCEDURE — 17000 DESTRUCT PREMALG LESION: CPT | Performed by: FAMILY MEDICINE

## 2020-07-08 PROCEDURE — 17003 DESTRUCT PREMALG LES 2-14: CPT | Performed by: FAMILY MEDICINE

## 2020-07-08 RX ORDER — FINASTERIDE 5 MG/1
5 TABLET, FILM COATED ORAL DAILY
COMMUNITY

## 2020-07-08 RX ORDER — TRAMADOL HYDROCHLORIDE 50 MG/1
50 TABLET ORAL 2 TIMES DAILY
Qty: 60 TABLET | Refills: 2 | Status: SHIPPED | OUTPATIENT
Start: 2020-07-08 | End: 2020-10-21 | Stop reason: SDUPTHER

## 2020-07-08 RX ORDER — TAMSULOSIN HYDROCHLORIDE 0.4 MG/1
0.4 CAPSULE ORAL DAILY
COMMUNITY

## 2020-07-08 RX ORDER — SIMVASTATIN 40 MG
TABLET ORAL
Qty: 90 TABLET | Refills: 1 | Status: ON HOLD | OUTPATIENT
Start: 2020-07-08 | End: 2021-02-08 | Stop reason: HOSPADM

## 2020-07-08 RX ORDER — MELATONIN
1000 DAILY
COMMUNITY
End: 2020-12-08

## 2020-07-08 RX ORDER — TRAMADOL HYDROCHLORIDE 50 MG/1
50 TABLET ORAL 2 TIMES DAILY PRN
COMMUNITY
End: 2020-12-08

## 2020-07-08 RX ORDER — PANTOPRAZOLE SODIUM 40 MG/1
40 TABLET, DELAYED RELEASE ORAL DAILY
Qty: 90 TABLET | Refills: 1 | Status: SHIPPED | OUTPATIENT
Start: 2020-07-08 | End: 2021-01-11

## 2020-07-08 RX ORDER — COLCHICINE 0.6 MG/1
0.6 CAPSULE ORAL DAILY
Qty: 90 CAPSULE | Refills: 1 | Status: SHIPPED | OUTPATIENT
Start: 2020-07-08 | End: 2020-10-21

## 2020-07-08 NOTE — PROGRESS NOTES
Cryotherapy Procedure Note    Pre-operative Diagnosis:actinic keratosis 3    Post-operative Diagnosis: same    Locations: Crown of head x2 and right eyebrow    Procedure Details   2 cycles of liquid nitrogen applied to affected sites. Complications: none. Plan:  1. Patient was educated regarding the potential risks of blister formation, discomfort, hypopigmentation, and scar. 2. Wound care was discussed. 3. Recommended that the patient use OTC analgesics as needed for pain. 4. Plan for RTC in 3-4 weeks for re-treatment if needed.

## 2020-07-08 NOTE — PROGRESS NOTES
Subjective:      Patient ID: Ti Vogel is a 72 y.o. male. CC: Patient presents for re-evaluation of chronic health problems including diabetes mellitus with peripheral neuropathy, gout, osteoarthritis, skin lesion on her right eyebrow and hyperlipidemia. HPI Patient presents today for a follow-up on chronic medications and medical conditions. Overall he feels he is doing well at this point time. He is continue to work through the coronavirus pandemic. He has several skin lesions that he is concerned about mainly on his right eyebrow area and crown of his head. Eye exam current (within one year): Yes    Checks sugars at home: yes  Home blood sugar records: patient tests 3 time(s) per week- (157-165)  Any episodes of hypoglycemia? No    Current medication use: taking as prescribed  Medication side effects: none     Current diet: well balanced, on average, 3 meals per day  Current exercise:very active    Review of Systems     Patient Active Problem List   Diagnosis    Essential hypertension    Generalized osteoarthrosis, involving multiple sites    Pure hypercholesterolemia    Obstructive sleep apnea syndrome    Allergic rhinitis    Pseudogout    Melanoma in situ of lower leg ([address]    Type 2 diabetes mellitus with diabetic polyneuropathy, without long-term current use of insulin (HCC)    Eczema    Dyshidrotic eczema    GERD without esophagitis    Non morbid obesity, unspecified obesity type       Outpatient Medications Marked as Taking for the 7/8/20 encounter (Office Visit) with Lucia Blanco MD   Medication Sig Dispense Refill    UNABLE TO FIND AllerLife 2 capsules daily      traMADol (ULTRAM) 50 MG tablet Take 50 mg by mouth 2 times daily as needed for Pain.       vitamin D3 (CHOLECALCIFEROL) 25 MCG (1000 UT) TABS tablet Take 1,000 Units by mouth daily      finasteride (PROSCAR) 5 MG tablet Take 5 mg by mouth daily      tamsulosin (FLOMAX) 0.4 MG capsule Take 0.4 mg by mouth daily  glimepiride (AMARYL) 1 MG tablet TAKE 1 TABLET BY MOUTH IN THE MORNING BEFORE breakfast 90 tablet 0    ADVAIR DISKUS 100-50 MCG/DOSE diskus inhaler INHALE 1 PUFF BY MOUTH EVERY TWELVE HOURS  1 Inhaler 5    diclofenac sodium (VOLTAREN) 1 % GEL APPLY 2 GRAMS TO THE AFFECTED AREA TWICE DAILY 1 Tube 1    colchicine (COLCRYS) 0.6 MG tablet Take 1 tablet by mouth daily 90 tablet 1    simvastatin (ZOCOR) 40 MG tablet TAKE 1 TABLET BY MOUTH AT BEDTIME 90 tablet 0    ramipril (ALTACE) 2.5 MG capsule TAKE 1 CAPSULE BY MOUTH ONE TIME A DAY 30 capsule 11    pioglitazone (ACTOS) 30 MG tablet TAKE 1 TABLET BY MOUTH ONE TIME A DAY 90 tablet 1    ipratropium-albuterol (DUONEB) 0.5-2.5 (3) MG/3ML SOLN nebulizer solution Inhale 3 mLs into the lungs every 4 hours as needed for Shortness of Breath 120 vial 0    albuterol sulfate  (90 Base) MCG/ACT inhaler Inhale 2 puffs into the lungs every 6 hours as needed for Wheezing 1 Inhaler 0    metFORMIN (GLUCOPHAGE) 1000 MG tablet Take 1 tablet by mouth 2 times daily 180 tablet 1    pantoprazole (PROTONIX) 40 MG tablet Take 1 tablet by mouth daily 90 tablet 1    diclofenac (VOLTAREN) 50 MG EC tablet Take 1 tablet by mouth 2 times daily 180 tablet 1    FREESTYLE LANCETS MISC 1 each by Does not apply route daily 100 each 3    Blood Glucose Monitoring Suppl (FREESTYLE LITE) CLAUDINE 1 Device by Does not apply route 2 times daily 1 Device 0       No Known Allergies    Social History     Tobacco Use    Smoking status: Former Smoker     Packs/day: 1.00     Years: 10.00     Pack years: 10.00     Types: Cigarettes     Last attempt to quit: 4/3/1993     Years since quittin.2    Smokeless tobacco: Never Used    Tobacco comment: quit 25 years ago   Substance Use Topics    Alcohol use: No       /70 (Site: Left Upper Arm, Position: Sitting, Cuff Size: Large Adult)   Pulse 52   Temp 98.9 °F (37.2 °C) (Infrared)   Wt 226 lb (102.5 kg)   SpO2 98%   BMI 34.36 kg/m² NE DESTRUC PREMALIGNANT,2-14 LESIONS    Other orders  -     colchicine (MITIGARE) 0.6 MG capsule; Take 1 capsule by mouth daily  -     diclofenac (VOLTAREN) 50 MG EC tablet; Take 1 tablet by mouth 2 times daily  -     pantoprazole (PROTONIX) 40 MG tablet; Take 1 tablet by mouth daily  -     metFORMIN (GLUCOPHAGE) 1000 MG tablet; Take 1 tablet by mouth 2 times daily    OARRS report checked          Plan:      Pt appears stable & reviewed labs with patient. Maintain current medications. Patient received counseling on the following healthy behaviors: nutrition and exercise     Patient given educational materials     Health maintenance updated    Discussed use, benefit, and side effects of prescribed medications. Barriers to medication compliance addressed. All patient questions answered. Pt voiced understanding. Patient needs RTC in 3 months. Please note that this chart was generated using Dragon dictation software. Although every effort was made to ensure the accuracy of this automated transcription, some errors in transcription may have occurred.

## 2020-07-08 NOTE — FLOWSHEET NOTE
[facility] Physical Therapy  Phone: [phone]   Fax: [phone]        Outpatient Physical Therapy   Phone: [phone] Fax: [phone]     Physical Therapy Note       Patient: Madina Cardoza  :   MRN: [id number]  Re-evaluation Date: 20          Date:  2020    Patient Name:  Madina Cardoza    :  1955  MRN: [id number]  Medical/Treatment Diagnosis Information:  · Diagnosis: OA spine       · Treatment diagnosis:  Pain with ADL's    Insurance/Certification information:  PT Insurance Information: BCBS PPO - medical necessity  Physician Information:  Referring Practitioner: Familia Polanco MD  Plan of care signed (Y/N): faxed     Date of Patient follow up with Physician:     Functional scale[de-identified] Oswestry    Progress Note: []  Yes  [x]  No  Next due by: Visit #10       Latex Allergy:  [x]NO      []YES  Preferred Language for Healthcare:   [x]English       []other:    Visit # Insurance Allowable Date Range (if applicable)          Pain level:  3/10     SUBJECTIVE: : states pain isn't bad because he has Voltaren cream that helps so much  : states he is sore in the back today. : Pt with no new complaints this date. States he continues to have pain on and off in the center of his low back. Reports traction felt \"okay\" last visit however would like to resume work with [employer]  today. :  Pt reports his back has been doing well since break. States his hours at work have changed and that has helped with the pain. States when he has pain it occurs right in the middle of his low back; at times will extend into hips. Pain primarily occurs after lifting.      3/12:   Pt reports feeling good today; states yesterday he was walking in to work and pain shot from his back down his leg and he had aching the rest of the day. No leg pain today. 3/5:  Pt reports soreness in center and R side of low back.   States he has been lifting a lot at work. 2/27:  Pt reports feeling good after last PT session. States he did a lot of lifting yesterday at work and was sore - feeling better today. 2/20:  Pt with no pain this date. States his back is feeling good. States he is to have a cystoscopy on Friday. 2/13:  Pt reports his back is feeling pretty good today - only minimal pain. Reports he saw doctor this am for UTI and started antibiotic. 2/6:  Pt with no significant complaints this date. States he was slightly sore after last session however felt better the next day. OBJECTIVE: 6/24:  Pt ambulating well and demonstrating good exercise technique this date. 6/17:  Lumbar ROM:  Flexion to 75%, extension to 25%, SB R to 100%, SB L to 50%     MMT BLE's:  5/5 throughout  3/12:  Pt ambulating well this date; no significant deviations. Good technique with exercises. 2/27:  Pt demonstrating good technique with exercises. Fair posture - no sore areas noted on palpation to lumbar paraspinals  2/20:  Pt demonstrating fair posture this date; good technique with all exercises. 2/6:  Demonstrating fair posture; able to correct with cues.         RESTRICTIONS/PRECAUTIONS: None    Exercises/Interventions:     Therapeutic Exercises (65217) Resistance / level Sets/sec Reps Notes   Nu step   X 4 min    IB/HR  2 30\"/10 Second set in SLS   Standing hamstring stretch  2  30\"    Standing hip flexor stretch  2 30\"    Cables for core activation:  Walkouts with anti-rotation punch    Cables for 3 way kicks 2.5 plates      2 plates 1      2 3      10 B each direction 5 anti-rotation punches each rep   Standing lumbar extension (SB behind back) 110   Cables:  Mid rows  High rows  LPD   4.5 plates  4.5 plates  4.5 plates   2  2  2   10  10  10    Bridging   1 10    LTR  1 10 B    Hip adduction with ball squeeze     Hip abduction with blue band     Therapeutic Activities (93646)       Prone press ups  1  10 Neuromuscular Re-ed (68431)                                                 Manual Intervention (82909)       IASTM:  hawkgrip 8 for scanning, hawkgrip 7 for scanning, sweeping and fanning, hawkgrip 9 for brushing and sweeping, framing at each lumbar vertebrae. Gentle manual P/A glides at each lumbar level, DTM to B lumbar paraspinals. R SIJ elevated - prone leg pull   X 16 min                                            Pt. Education:  6/24:  Reviewed posture correction, proper body mechanics and home exercises. 3/12: Continued to stress posture correction and proper body mechanics at work. 2/27:  Reviewed HEP and posture correction; advised several reps of standing lumbar extension throughout work shift and proper body mechanics when lifting     Evaluation completed this date. Discussed risks and benefits of tx with pt - pt agreed with POC. Reviewed mechanics of spine using model and discussed proper posture and body mechanics. Educated pt on centralization principle. Pt instructed on proper sitting posture with towel roll. Written HEP provided for pt.        -patient educated on diagnosis, prognosis and expectations for rehab  -all patient questions were answered    HEP instruction: 6/24:  Pt to continue with current HEP  3/12:  No new additions this date; Reviewed HEP  -patient to continue performing prone, prone on elbows, press ups, standing lateral shift with R hand on wall and standing lumbar extension    Therapeutic Exercise and NMR EXR  [x] (85570) Provided verbal/tactile cueing for activities related to strengthening, flexibility, endurance, ROM for improvements in  [x] LE / Lumbar: LE, proximal hip, and core control with self care, mobility, lifting, ambulation.   [] UE / Cervical: cervical, postural, scapular, scapulothoracic and UE control with self care, reaching, carrying, lifting, house/yardwork, driving, computer work.  [] (19549) Provided verbal/tactile cueing for activities related to improving balance, coordination, kinesthetic sense, posture, motor skill, proprioception to assist with   [] LE / lumbar: LE, proximal hip, and core control in self care, mobility, lifting, ambulation and eccentric single leg control. [] UE / cervical: cervical, scapular, scapulothoracic and UE control with self care, reaching, carrying, lifting, house/yardwork, driving, computer work.   [] (94841) Therapist is in constant attendance of 2 or more patients providing skilled therapy interventions, but not providing any significant amount of measurable one-on-one time to either patient, for improvements in  [] LE / lumbar: LE, proximal hip, and core control in self care, mobility, lifting, ambulation and eccentric single leg control. [] UE / cervical: cervical, scapular, scapulothoracic and UE control with self care, reaching, carrying, lifting, house/yardwork, driving, computer work.      NMR and Therapeutic Activities:    [] (78763 or 44517) Provided verbal/tactile cueing for activities related to improving balance, coordination, kinesthetic sense, posture, motor skill, proprioception and motor activation to allow for proper function of   [] LE: / Lumbar core, proximal hip and LE with self care and ADLs  [] UE / Cervical: cervical, postural, scapular, scapulothoracic and UE control with self care, carrying, lifting, driving, computer work.   [] (18464) Gait Re-education- Provided training and instruction to the patient for proper LE, core and proximal hip recruitment and positioning and eccentric body weight control with ambulation re-education including up and down stairs     Home Management Training / Self Care:  [] (10028) Provided self-care/home management training related to activities of daily living and compensatory training, and/or use of adaptive equipment for improvement with: ADLs and compensatory training, meal preparation, safety procedures and instruction in use of adaptive equipment, including bathing, grooming, dressing, personal hygiene, basic household cleaning and chores. Home Exercise Program:    [x] (75763) Reviewed/Progressed HEP activities related to strengthening, flexibility, endurance, ROM of   [x] LE / Lumbar: core, proximal hip and LE for functional self-care, mobility, lifting and ambulation/stair navigation   [] UE / Cervical: cervical, postural, scapular, scapulothoracic and UE control with self care, reaching, carrying, lifting, house/yardwork, driving, computer work  [] (58574)Reviewed/Progressed HEP activities related to improving balance, coordination, kinesthetic sense, posture, motor skill, proprioception of   [] LE: core, proximal hip and LE for self care, mobility, lifting, and ambulation/stair navigation    [] UE / Cervical: cervical, postural,  scapular, scapulothoracic and UE control with self care, reaching, carrying, lifting, house/yardwork, driving, computer work    Manual Treatments:  PROM / STM / Oscillations-Mobs:  G-I, II, III, IV (PA's, Inf., Post.)  [x] (54857) Provided manual therapy to mobilize LE, proximal hip and/or LS spine soft tissue/joints for the purpose of modulating pain, promoting relaxation,  increasing ROM, reducing/eliminating soft tissue swelling/inflammation/restriction, improving soft tissue extensibility and allowing for proper ROM for normal function with   [x] LE / lumbar: self care, mobility, lifting and ambulation. [] UE / Cervical: self care, reaching, carrying, lifting, house/yardwork, driving, computer work. Modalities:  [] (32970) Vasopneumatic compression: Utilized vasopneumatic compression to decrease edema / swelling for the purpose of improving mobility and quad tone / recruitment which will allow for increased overall function including but not limited to self-care, transfers, ambulation, and ascending / descending stairs.        Modalities: 6/24:  None this date  Supine traction with legs extended:  Table unlocked 65#/40# x 15

## 2020-07-09 ENCOUNTER — VIRTUAL VISIT (OUTPATIENT)
Dept: PULMONOLOGY | Age: 65
End: 2020-07-09
Payer: COMMERCIAL

## 2020-07-09 ENCOUNTER — OFFICE VISIT (OUTPATIENT)
Dept: PRIMARY CARE CLINIC | Age: 65
End: 2020-07-09
Payer: COMMERCIAL

## 2020-07-09 PROCEDURE — 99213 OFFICE O/P EST LOW 20 MIN: CPT | Performed by: INTERNAL MEDICINE

## 2020-07-09 PROCEDURE — 99211 OFF/OP EST MAY X REQ PHY/QHP: CPT | Performed by: NURSE PRACTITIONER

## 2020-07-09 ASSESSMENT — ENCOUNTER SYMPTOMS
COUGH: 0
CHOKING: 0
STRIDOR: 0
ABDOMINAL PAIN: 0
BLOOD IN STOOL: 0
RHINORRHEA: 0
BACK PAIN: 0
SINUS PRESSURE: 0
SHORTNESS OF BREATH: 0
WHEEZING: 0
ABDOMINAL DISTENTION: 0
APNEA: 0
SORE THROAT: 0
VOICE CHANGE: 0
ANAL BLEEDING: 0
CHEST TIGHTNESS: 0
CONSTIPATION: 0
DIARRHEA: 0

## 2020-07-09 NOTE — PROGRESS NOTES
Annelise Hightower received a viral test for COVID-19. They were educated on isolation and quarantine as appropriate. For any symptoms, they were directed to seek care from their PCP, given contact information to establish with a doctor, directed to an urgent care or the emergency room.

## 2020-07-09 NOTE — PROGRESS NOTES
Deepak Hightower     Pulmonology Video Visit    Pursuant to the emergency declaration under the [facility] [address] waiver authority and the Coronavirus Preparedness and Response Supplemental Appropriations Act this Video Visit was insisted, with patient's consent, to reduce the patient's risk of exposure to COVID-19 and provide continuity of care for an established patient. The patient was at home, while the provider was at the clinic. Services were provided through a synchronous discussion through a Video Visit to substitute for in-person clinic visit, and coded as such. YOB: 1955     Date of Service:  7/9/2020     Chief Complaint   Patient presents with    Asthma         HPI patient has been doing quite well, with no significant issues related to his asthma. Denies any significant wheezing or cough. No dyspnea with exertion. States that he has rarely had to use albuterol inhaler.     No Known Allergies  No outpatient medications have been marked as taking for the 7/9/20 encounter (Virtual Visit) with Jerri Robb MD.       Immunization History   Administered Date(s) Administered    Influenza Vaccine, unspecified formulation 02/10/2017    Influenza Virus Vaccine 11/29/2017, 10/01/2018    Influenza, Intradermal, Preservative free 10/05/2012, 01/24/2014    Influenza, MDCK Quadv, IM, PF (Flucelvax 4 yrs and older) 01/15/2020    Pneumococcal Conjugate 13-valent ([id number]) 08/09/2016    Pneumococcal Polysaccharide ([id number]) 11/14/2011    Td, unspecified formulation 08/07/2009    Tdap (Boostrix, Adacel) 02/26/2013, 10/07/2016       Past Medical History:   Diagnosis Date    Abdominal pain, LLQ     Allergic rhinitis     Asthma     Atopic dermatitis     Cancer ([facility] [address]     melanoma    Diabetes mellitus ([facility] [address]     Erectile dysfunction     Generalized osteoarthrosis, involving multiple sites     GERD (gastroesophageal reflux disease)  Gynecomastia     Hyperlipidemia     Nasal congestion     Nipple pain     Obstructive sleep apnea syndrome 10/5/2012    Pseudo-gout     Unspecified essential hypertension      Past Surgical History:   Procedure Laterality Date    CARPAL TUNNEL RELEASE Bilateral 2013    FINGER SURGERY Left 3-1-2013    Incision & Drainage of left Thumb Infection    FOOT SURGERY Right     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    HAND SURGERY  2011    LUMBAR NERVE BLOCK Right 12/12/2018    RIGHT L4/L5 LUMBAR INTERLAMINAR EPIDURAL STEROID INJECTION WITH FLUOROSCOPY performed by Melissa Diamond MD at [address]  2002    melenoma right side post    SD [facility] [address] DX/THER SBST INTRLMNR LMBR/SAC W/IMG GDN Right 9/24/2018    RIGHT L4/L5 INTERLAMINAR EPIDURAL STEROID INJECTION WITH FLUOROSCOPY performed by Melissa Diamond MD at [address] History   Problem Relation Age of Onset    Arthritis Mother     High Blood Pressure Mother     Emphysema Mother     Osteoporosis Mother     Diabetes Father     Heart Disease Father     High Cholesterol Father     Parkinsonism Father     Diabetes Sister        Review of Systems:  Review of Systems   Constitutional: Negative for activity change, appetite change, fatigue and fever. HENT: Negative for congestion, ear discharge, ear pain, postnasal drip, rhinorrhea, sinus pressure, sneezing, sore throat, tinnitus and voice change. Respiratory: Negative for apnea, cough, choking, chest tightness, shortness of breath, wheezing and stridor. Cardiovascular: Negative for chest pain, palpitations and leg swelling. Gastrointestinal: Negative for abdominal distention, abdominal pain, anal bleeding, blood in stool, constipation and diarrhea. Musculoskeletal: Negative for arthralgias, back pain and gait problem. Skin: Negative for pallor and rash. Allergic/Immunologic: Negative for environmental allergies.    Neurological: Negative for dizziness, tremors, seizures, syncope, speech difficulty, weakness, light-headedness, numbness and headaches. Hematological: Negative for adenopathy. Does not bruise/bleed easily. Psychiatric/Behavioral: Negative for sleep disturbance. There were no vitals filed for this visit. No flowsheet data found. There is no height or weight on file to calculate BMI. Wt Readings from Last 3 Encounters:   07/08/20 226 lb (102.5 kg)   02/27/20 222 lb (100.7 kg)   02/13/20 221 lb 6.4 oz (100.4 kg)     BP Readings from Last 3 Encounters:   07/08/20 120/70   04/08/20 (!) 148/74   02/27/20 120/76         Physical Exam  Constitutional:       General: He is not in acute distress. Appearance: He is well-developed. He is not diaphoretic. HENT:      Mouth/Throat:      Pharynx: No oropharyngeal exudate. Cardiovascular:      Rate and Rhythm: Normal rate and regular rhythm. Heart sounds: Normal heart sounds. No murmur. Pulmonary:      Effort: No respiratory distress. Breath sounds: Normal breath sounds. No wheezing or rales. Chest:      Chest wall: No tenderness. Abdominal:      General: There is no distension. Palpations: There is no mass. Tenderness: There is no abdominal tenderness. There is no guarding or rebound. Musculoskeletal:         General: No swelling, tenderness or deformity. Skin:     Coloration: Skin is not pale. Findings: No erythema or rash. Neurological:      Mental Status: He is alert and oriented to person, place, and time. Cranial Nerves: No cranial nerve deficit. Motor: No abnormal muscle tone.       Coordination: Coordination normal.      Deep Tendon Reflexes: Reflexes normal.             Health Maintenance   Topic Date Due    Hepatitis C screen  1955    HIV screen  05/19/1970    Shingles Vaccine (1 of 2) 05/19/2005    Diabetic retinal exam  10/01/2016    Pneumococcal 65+ years Vaccine (2 of 2 - PPSV23) 05/19/2020    Diabetic microalbuminuria test  06/04/2020    Flu vaccine

## 2020-07-09 NOTE — PATIENT INSTRUCTIONS
You have received a viral test for COVID-19. Below is education on quarantine per the CDC guidelines. For any symptoms, seek care from your PCP, call [phone] to establish care with a doctor, or go directly to an urgent care or the emergency room. Test results will take 2-7 days and will be sent to you in your [employer] account. If you test positive, you will be contacted via phone. If you test negative, the ONLY communication will be through [address] GO TO [employer] AND SIGN UP FOR [employer]  (LOWER LEFT OF THE HOME PAGE)  No test is 100%. If you have symptoms, you should follow the guidance of quarantine as previously stated. You can still be contagious if you have symptoms. Your [facility] will reach out to you if you have a positive result. They will provide you with a return to work date and note. If you were tested for a pre-op, then you should remain in quarantine until your procedure. How do I know if I need to be in quarantine? If you live in a community where COVID-19 is or might be spreading (currently, that is virtually everywhere in the [place])  Be alert for symptoms. Watch for fever, cough, shortness of breath, or other symptoms of COVID-19.  Take your temperature if symptoms develop.  Practice social distancing. Maintain 6 feet of distance from others and stay out of crowded places.  Follow CDC guidance if symptoms develop. If you feel healthy but:   Recently had close contact with a person with COVID-19 you need to Quarantine:   Stay home until 14 days after your last exposure.  Check your temperature twice a day and watch for symptoms of COVID-19.  If possible, stay away from people who are at higher-risk for getting very sick from COVID-19.   Stay Home and Monitor Your Health if you:   Have been diagnosed with COVID-19, or   Are waiting for test results, or   Have cough, fever, or shortness of breath, or symptoms of COVID-19      When You Can

## 2020-07-13 ENCOUNTER — TELEPHONE (OUTPATIENT)
Dept: FAMILY MEDICINE CLINIC | Age: 65
End: 2020-07-13

## 2020-07-13 RX ORDER — ALLOPURINOL 300 MG/1
300 TABLET ORAL DAILY
Qty: 30 TABLET | Status: CANCELLED | OUTPATIENT
Start: 2020-07-13

## 2020-07-13 RX ORDER — ALLOPURINOL 300 MG/1
300 TABLET ORAL DAILY
Qty: 90 TABLET | Refills: 1 | Status: SHIPPED | OUTPATIENT
Start: 2020-07-13 | End: 2021-01-11

## 2020-07-13 NOTE — TELEPHONE ENCOUNTER
Good morning Dr Mikhail Russell, I would like to know if there is another  prescription for Bill . The one that took over [facility] is [facility] which [facility]audel [facility] told me that would cost $128.00. So I was wondering if there is something that is under his insurance that wouldn't cost so much. Good Rx is higher . Also  we took the covid test on 7/9/20. They informed me mine was clear was just wondering if you heard regarding Bill. I appreciate it. Thank you. Nadine Hightower      Per Dr. Mikhail Russell could try  Allopurinol 300mg, need to send script to Raudel Yuan.

## 2020-07-15 LAB
SARS-COV-2: NOT DETECTED
SOURCE: NORMAL

## 2020-07-16 ENCOUNTER — HOSPITAL ENCOUNTER (OUTPATIENT)
Dept: PHYSICAL THERAPY | Age: 65
Setting detail: THERAPIES SERIES
Discharge: HOME | End: 2020-07-16
Payer: COMMERCIAL

## 2020-07-16 PROCEDURE — 97110 THERAPEUTIC EXERCISES: CPT

## 2020-07-16 PROCEDURE — 97140 MANUAL THERAPY 1/> REGIONS: CPT

## 2020-07-16 NOTE — FLOWSHEET NOTE
[facility] Physical Therapy  Phone: [phone]   Fax: [phone]        Outpatient Physical Therapy   Phone: [phone] Fax: [phone]      Physical Therapy Re-Certification Plan of Care    Dear Nazario Torrez,    We had the pleasure of treating the following patient for physical therapy services at [address] [place] [address]. A summary of our findings can be found in the updated assessment below. This includes our plan of care. If you have any questions or concerns regarding these findings, please do not hesitate to contact me at the office phone number checked above. Thank you for the referral.     Physician Signature:________________________________Date:__________________  By signing above (or electronic signature), therapists plan is approved by physician          Overall Response to Treatment:   [x]Patient is responding well to treatment and improvement is noted with regards  to goals   []Patient should continue to improve in reasonable time if they continue HEP   []Patient has plateaued and is no longer responding to skilled PT intervention    []Patient is getting worse and would benefit from return to referring MD   []Patient unable to adhere to initial POC   []Other:     Date range of Visits:  -   Total Visits: 12 ( 4 completed after quarantine secondary to covid-19)    Recommendation:    [x]Continue PT 1-2x / wk for 6 weeks.     []Hold PT, pending MD visit      Physical Therapy Re-evaluation       Patient: Ely Hansen  : 1955  MRN: [id number]  Re-evaluation Date: 2020          Date:  2020    Patient Name:  Ely Hansen    :  1955  MRN: [id number]  Medical/Treatment Diagnosis Information:  · Diagnosis: OA spine       · Treatment diagnosis:  Pain with ADL's     Insurance/Certification information:  PT Insurance Information: BCBS PPO - medical necessity  Physician Information:  Referring Practitioner: Aly Jackson completed this date. Discussed risks and benefits of tx with pt - pt agreed with POC. Reviewed mechanics of spine using model and discussed proper posture and body mechanics. Educated pt on centralization principle. Pt instructed on proper sitting posture with towel roll. Written HEP provided for pt.        -patient educated on diagnosis, prognosis and expectations for rehab  -all patient questions were answered    HEP instruction: 6/24:  Pt to continue with current HEP  3/12:  No new additions this date; Reviewed HEP  -patient to continue performing prone, prone on elbows, press ups, standing lateral shift with R hand on wall and standing lumbar extension    Therapeutic Exercise and NMR EXR  [x] (63797) Provided verbal/tactile cueing for activities related to strengthening, flexibility, endurance, ROM for improvements in  [x] LE / Lumbar: LE, proximal hip, and core control with self care, mobility, lifting, ambulation. [] UE / Cervical: cervical, postural, scapular, scapulothoracic and UE control with self care, reaching, carrying, lifting, house/yardwork, driving, computer work.  [] (01123) Provided verbal/tactile cueing for activities related to improving balance, coordination, kinesthetic sense, posture, motor skill, proprioception to assist with   [] LE / lumbar: LE, proximal hip, and core control in self care, mobility, lifting, ambulation and eccentric single leg control. [] UE / cervical: cervical, scapular, scapulothoracic and UE control with self care, reaching, carrying, lifting, house/yardwork, driving, computer work.   [] (81955) Therapist is in constant attendance of 2 or more patients providing skilled therapy interventions, but not providing any significant amount of measurable one-on-one time to either patient, for improvements in  [] LE / lumbar: LE, proximal hip, and core control in self care, mobility, lifting, ambulation and eccentric single leg control.    [] UE / cervical: cervical, achieved in: 2 weeks  1. Independent in HEP and progression per patient tolerance, in order to prevent re-injury. []? Progressing: []? Met: []? Not Met: []? Adjusted  2. Patient will have a decrease in pain to facilitate improvement in movement, function, and ADLs as indicated by Functional Deficits. []? Progressing: []? Met: []? Not Met: []? Adjusted     Long Term Goals: To be achieved in: 6 weeks  1. Pt will report an overall decrease in pain of at least 50% in order to perform work duties without difficulty. []? Progressing: []? Met: []? Not Met: []? Adjusted  2. Patient will demonstrate increased AROM to WNL, good LS mobility, good hip ROM to allow for proper joint functioning as indicated by patients Functional Deficits. []? Progressing: []? Met: []? Not Met: []? Adjusted  3. Patient will demonstrate an increase in Strength to good proximal hip and core activation to allow for proper functional mobility as indicated by patients Functional Deficits. []? Progressing: []? Met: []? Not Met: []? Adjusted  4. Patient will return to functional activities including all work duties without increased symptoms or restriction. []? Progressing: []? Met: []? Not Met: []? Adjusted         Overall Progression Towards Functional goals/ Treatment Progress Update:  [] Patient is progressing as expected   functional goals listed. [] Progression is slowed due to complexities/Impairments listed. [] Progression has been slowed due to co-morbidities.   [x] Plan just implemented, too soon to assess goals progression <30days   [] Goals require adjustment due to lack of progress  [] Patient is not progressing as expected and requires additional follow up with physician  [] Other    Persisting Functional Limitations/Impairments:  []Sleeping []Sitting               []Standing []Transfers        [x]Walking []Kneeling               []Stairs [x]Squatting / bending   [x]ADLs [x]Reaching  [x]Lifting  []Housework  []Driving [x]Job related tasks  [x]Sports/Recreation []Other:        ASSESSMENT:  Pt tolerated treatment well this date  Treatment/Activity Tolerance:  [x] Patient able to complete tx [] Patient limited by fatigue  [] Patient limited by pain  [] Patient limited by other medical complications  [] Other:     Prognosis: [x] Good [] Fair  [] Poor    Patient Requires Follow-up: [x] Yes  [] No    Plan for next treatment session:   Progress as tolerated. Continue with core strengthening and hawk  as tolerated. PLAN: See eval. PT 1-2x / week for 6 weeks. [x] Continue per plan of care [] Alter current plan (see comments)  [] Plan of care initiated [] Hold pending MD visit [] Discharge    Electronically signed by: Marilee Blank PT, DPT    Note: If patient does not return for scheduled/ recommended follow up visits, his note will serve as a discharge from care along with most recent update on progress.

## 2020-07-16 NOTE — FLOWSHEET NOTE
[facility] Physical Therapy  Phone: [phone]   Fax: [phone]        Outpatient Physical Therapy   Phone: [phone] Fax: [phone]     Physical Therapy Note       Patient: Slava Fuentes  :   MRN: [id number]  Re-evaluation Date: 20          Date:  2020    Patient Name:  Slava Fuentes    :  1955  MRN: [id number]  Medical/Treatment Diagnosis Information:  · Diagnosis: OA spine       · Treatment diagnosis:  Pain with ADL's    Insurance/Certification information:  PT Insurance Information: BCBS PPO - medical necessity  Physician Information:  Referring Practitioner: Eh Carty MD  Plan of care signed (Y/N): faxed     Date of Patient follow up with Physician:     Functional scale[de-identified] Oswestry    Progress Note: []  Yes  [x]  No  Next due by: Visit #10       Latex Allergy:  [x]NO      []YES  Preferred Language for Healthcare:   [x]English       []other:    Visit # Insurance Allowable Date Range (if applicable)   37/79; 060       Pain level:  3/10     SUBJECTIVE: : Pt with no new complaints this date. States back is feeling good overall. : states pain isn't bad because he has Voltaren cream that helps so much  : states he is sore in the back today. : Pt with no new complaints this date. States he continues to have pain on and off in the center of his low back. Reports traction felt \"okay\" last visit however would like to resume work with [employer]  today. :  Pt reports his back has been doing well since break. States his hours at work have changed and that has helped with the pain. States when he has pain it occurs right in the middle of his low back; at times will extend into hips. Pain primarily occurs after lifting.      3/12:   Pt reports feeling good today; states yesterday he was walking in to work and pain shot from his back down his leg and he had aching the rest of the day.   No leg pain today. 3/5:  Pt reports soreness in center and R side of low back. States he has been lifting a lot at work. 2/27:  Pt reports feeling good after last PT session. States he did a lot of lifting yesterday at work and was sore - feeling better today. 2/20:  Pt with no pain this date. States his back is feeling good. States he is to have a cystoscopy on Friday. 2/13:  Pt reports his back is feeling pretty good today - only minimal pain. Reports he saw doctor this am for UTI and started antibiotic. 2/6:  Pt with no significant complaints this date. States he was slightly sore after last session however felt better the next day. OBJECTIVE: 7/16:  Pt ambulating well and demonstrating good exercise technique this date. 6/17:  Lumbar ROM:  Flexion to 75%, extension to 25%, SB R to 100%, SB L to 50%     MMT BLE's:  5/5 throughout  3/12:  Pt ambulating well this date; no significant deviations. Good technique with exercises. 2/27:  Pt demonstrating good technique with exercises. Fair posture - no sore areas noted on palpation to lumbar paraspinals  2/20:  Pt demonstrating fair posture this date; good technique with all exercises. 2/6:  Demonstrating fair posture; able to correct with cues.         RESTRICTIONS/PRECAUTIONS: None    Exercises/Interventions:     Therapeutic Exercises (11709) Resistance / level Sets/sec Reps Notes   Nu step   X 4 min    IB/HR  2 30\"/10 Second set in SLS   Standing hamstring stretch  2  30\"    Standing hip flexor stretch  2 30\"    Cables for core activation:  Walkouts with anti-rotation punch    Cables for 3 way kicks 3 plates       1       3       5 anti-rotation punches each rep   Standing lumbar extension (SB behind back) 110   Cables:  Mid rows  High rows  LPD   4.5 plates  4.5 plates  4.5 plates   2  2  2   10  10  10    Bridging   1 10    LTR  1 10 B    Hip adduction with ball squeeze     Hip abduction with blue band     Therapeutic Activities (99288) Prone press ups  1  10                                Neuromuscular Re-ed (05024)                                                 Manual Intervention (97741)       IASTM:  hawkgrip 8 for scanning, hawkgrip 7 for scanning, sweeping and fanning, hawkgrip 9 for brushing and sweeping, framing at each lumbar vertebrae. Gentle manual P/A glides at each lumbar level, DTM to B lumbar paraspinals. R SIJ elevated - prone leg pull   X 15 min                                            Pt. Education:  7/16:  Reviewed posture correction, proper body mechanics and home exercises. 3/12: Continued to stress posture correction and proper body mechanics at work. 2/27:  Reviewed HEP and posture correction; advised several reps of standing lumbar extension throughout work shift and proper body mechanics when lifting     Evaluation completed this date. Discussed risks and benefits of tx with pt - pt agreed with POC. Reviewed mechanics of spine using model and discussed proper posture and body mechanics. Educated pt on centralization principle. Pt instructed on proper sitting posture with towel roll. Written HEP provided for pt.        -patient educated on diagnosis, prognosis and expectations for rehab  -all patient questions were answered    HEP instruction: 7/15:  Pt to continue with current HEP  3/12:  No new additions this date; Reviewed HEP  -patient to continue performing prone, prone on elbows, press ups, standing lateral shift with R hand on wall and standing lumbar extension    Therapeutic Exercise and NMR EXR  [x] (32291) Provided verbal/tactile cueing for activities related to strengthening, flexibility, endurance, ROM for improvements in  [x] LE / Lumbar: LE, proximal hip, and core control with self care, mobility, lifting, ambulation. [] UE / Cervical: cervical, postural, scapular, scapulothoracic and UE control with self care, reaching, carrying, lifting, house/yardwork, driving, computer work.   [] (95653) Provided verbal/tactile cueing for activities related to improving balance, coordination, kinesthetic sense, posture, motor skill, proprioception to assist with   [] LE / lumbar: LE, proximal hip, and core control in self care, mobility, lifting, ambulation and eccentric single leg control. [] UE / cervical: cervical, scapular, scapulothoracic and UE control with self care, reaching, carrying, lifting, house/yardwork, driving, computer work.   [] (73007) Therapist is in constant attendance of 2 or more patients providing skilled therapy interventions, but not providing any significant amount of measurable one-on-one time to either patient, for improvements in  [] LE / lumbar: LE, proximal hip, and core control in self care, mobility, lifting, ambulation and eccentric single leg control. [] UE / cervical: cervical, scapular, scapulothoracic and UE control with self care, reaching, carrying, lifting, house/yardwork, driving, computer work.      NMR and Therapeutic Activities:    [] (42784 or 42755) Provided verbal/tactile cueing for activities related to improving balance, coordination, kinesthetic sense, posture, motor skill, proprioception and motor activation to allow for proper function of   [] LE: / Lumbar core, proximal hip and LE with self care and ADLs  [] UE / Cervical: cervical, postural, scapular, scapulothoracic and UE control with self care, carrying, lifting, driving, computer work.   [] (06643) Gait Re-education- Provided training and instruction to the patient for proper LE, core and proximal hip recruitment and positioning and eccentric body weight control with ambulation re-education including up and down stairs     Home Management Training / Self Care:  [] (79735) Provided self-care/home management training related to activities of daily living and compensatory training, and/or use of adaptive equipment for improvement with: ADLs and compensatory training, meal preparation, safety procedures and instruction in use of adaptive equipment, including bathing, grooming, dressing, personal hygiene, basic household cleaning and chores. Home Exercise Program:    [x] (32627) Reviewed/Progressed HEP activities related to strengthening, flexibility, endurance, ROM of   [x] LE / Lumbar: core, proximal hip and LE for functional self-care, mobility, lifting and ambulation/stair navigation   [] UE / Cervical: cervical, postural, scapular, scapulothoracic and UE control with self care, reaching, carrying, lifting, house/yardwork, driving, computer work  [] (04185)Reviewed/Progressed HEP activities related to improving balance, coordination, kinesthetic sense, posture, motor skill, proprioception of   [] LE: core, proximal hip and LE for self care, mobility, lifting, and ambulation/stair navigation    [] UE / Cervical: cervical, postural,  scapular, scapulothoracic and UE control with self care, reaching, carrying, lifting, house/yardwork, driving, computer work    Manual Treatments:  PROM / STM / Oscillations-Mobs:  G-I, II, III, IV (PA's, Inf., Post.)  [x] (28726) Provided manual therapy to mobilize LE, proximal hip and/or LS spine soft tissue/joints for the purpose of modulating pain, promoting relaxation,  increasing ROM, reducing/eliminating soft tissue swelling/inflammation/restriction, improving soft tissue extensibility and allowing for proper ROM for normal function with   [x] LE / lumbar: self care, mobility, lifting and ambulation. [] UE / Cervical: self care, reaching, carrying, lifting, house/yardwork, driving, computer work. Modalities:  [] (64196) Vasopneumatic compression: Utilized vasopneumatic compression to decrease edema / swelling for the purpose of improving mobility and quad tone / recruitment which will allow for increased overall function including but not limited to self-care, transfers, ambulation, and ascending / descending stairs.        Modalities: 7/16:  None this without increased symptoms or restriction. []? Progressing: []? Met: []? Not Met: []? Adjusted         Overall Progression Towards Functional goals/ Treatment Progress Update:  [x] Patient is progressing as expected   functional goals listed. [] Progression is slowed due to complexities/Impairments listed. [] Progression has been slowed due to co-morbidities. [x] Plan just implemented, too soon to assess goals progression <30days   [] Goals require adjustment due to lack of progress  [] Patient is not progressing as expected and requires additional follow up with physician  [] Other    Persisting Functional Limitations/Impairments:  []Sleeping []Sitting               []Standing []Transfers        [x]Walking []Kneeling               []Stairs [x]Squatting / bending   [x]ADLs [x]Reaching  [x]Lifting  []Housework  []Driving [x]Job related tasks  [x]Sports/Recreation []Other:        ASSESSMENT:  Pt tolerated treatment well this date  Treatment/Activity Tolerance:  [x] Patient able to complete tx [] Patient limited by fatigue  [] Patient limited by pain  [] Patient limited by other medical complications  [] Other:     Prognosis: [x] Good [] Fair  [] Poor    Patient Requires Follow-up: [x] Yes  [] No    Plan for next treatment session:   Progress as tolerated. Continue with core strengthening and hawk  as tolerated. PLAN: See eval. PT 1-2x / week for 6 weeks. [x] Continue per plan of care [] Alter current plan (see comments)  [] Plan of care initiated [] Hold pending MD visit [] Discharge    Electronically signed by: Marina Byrnes PT, DPT    Note: If patient does not return for scheduled/ recommended follow up visits, his note will serve as a discharge from care along with most recent update on progress.

## 2020-09-15 RX ORDER — PIOGLITAZONEHYDROCHLORIDE 30 MG/1
TABLET ORAL
Qty: 90 TABLET | Refills: 0 | Status: SHIPPED | OUTPATIENT
Start: 2020-09-15 | End: 2020-11-25

## 2020-09-25 RX ORDER — GLIMEPIRIDE 1 MG/1
TABLET ORAL
Qty: 90 TABLET | Refills: 0 | Status: SHIPPED | OUTPATIENT
Start: 2020-09-25 | End: 2020-09-29 | Stop reason: SDUPTHER

## 2020-09-29 DIAGNOSIS — I10 ESSENTIAL HYPERTENSION: ICD-10-CM

## 2020-09-29 RX ORDER — GLIMEPIRIDE 1 MG/1
TABLET ORAL
Qty: 90 TABLET | Refills: 0 | Status: SHIPPED | OUTPATIENT
Start: 2020-09-29 | End: 2020-12-18

## 2020-09-29 RX ORDER — RAMIPRIL 2.5 MG/1
CAPSULE ORAL
Qty: 30 CAPSULE | Refills: 1 | Status: SHIPPED | OUTPATIENT
Start: 2020-09-29 | End: 2020-11-23

## 2020-09-29 NOTE — TELEPHONE ENCOUNTER
ramipril (ALTACE) 2.5 MG capsule  30 capsule  11  1/30/2020      Sig: TAKE 1 CAPSULE BY MOUTH ONE TIME A DAY          glimepiride (AMARYL) 1 MG tablet  90 tablet  0  9/25/2020      Sig: TAKE 1 TABLET BY MOUTH IN THE MORNING before breakfast     Sent to pharmacy as: Glimepiride 1 MG Oral Tablet (AMARYL)

## 2020-10-06 ENCOUNTER — TELEPHONE (OUTPATIENT)
Dept: PULMONOLOGY | Age: 65
End: 2020-10-06

## 2020-10-06 NOTE — TELEPHONE ENCOUNTER
Spoke with spouse and S/D card to be brought to the [facility] office for downloading around 11am 10-07/20. Pt has a virtual appt at 1:40pm . Pt needs to get a new unit.

## 2020-10-07 ENCOUNTER — VIRTUAL VISIT (OUTPATIENT)
Dept: PULMONOLOGY | Age: 65
End: 2020-10-07
Payer: COMMERCIAL

## 2020-10-07 PROBLEM — J45.40 MODERATE PERSISTENT ASTHMA WITHOUT COMPLICATION: Status: ACTIVE | Noted: 2020-10-07

## 2020-10-07 PROBLEM — J45.40 MODERATE PERSISTENT ASTHMA WITHOUT COMPLICATION: Chronic | Status: ACTIVE | Noted: 2020-10-07

## 2020-10-07 PROCEDURE — 99214 OFFICE O/P EST MOD 30 MIN: CPT | Performed by: INTERNAL MEDICINE

## 2020-10-07 ASSESSMENT — SLEEP AND FATIGUE QUESTIONNAIRES
HOW LIKELY ARE YOU TO NOD OFF OR FALL ASLEEP WHILE SITTING AND READING: 0
HOW LIKELY ARE YOU TO NOD OFF OR FALL ASLEEP WHILE SITTING AND TALKING TO SOMEONE: 0
HOW LIKELY ARE YOU TO NOD OFF OR FALL ASLEEP IN A CAR, WHILE STOPPED FOR A FEW MINUTES IN TRAFFIC: 0
ESS TOTAL SCORE: 3
HOW LIKELY ARE YOU TO NOD OFF OR FALL ASLEEP WHILE LYING DOWN TO REST IN THE AFTERNOON WHEN CIRCUMSTANCES PERMIT: 2
HOW LIKELY ARE YOU TO NOD OFF OR FALL ASLEEP WHILE SITTING INACTIVE IN A PUBLIC PLACE: 1
HOW LIKELY ARE YOU TO NOD OFF OR FALL ASLEEP WHILE WATCHING TV: 0
HOW LIKELY ARE YOU TO NOD OFF OR FALL ASLEEP WHEN YOU ARE A PASSENGER IN A CAR FOR AN HOUR WITHOUT A BREAK: 0
HOW LIKELY ARE YOU TO NOD OFF OR FALL ASLEEP WHILE SITTING QUIETLY AFTER LUNCH WITHOUT ALCOHOL: 0

## 2020-10-07 NOTE — PROGRESS NOTES
Solomon Kaur MD, [facility], [facility]  Tiffanie Kehrrachele [address]  3rd Floor, [address] [facility], [facility] [place] [address] [phone]   [facility] [address] Faith Baez. [address] [place] [address]. Sophy Aguilar 37 [phone]     Video Visit- Follow up    Pursuant to the emergency declaration under the [address] [place] [address], [facility] waiver authority and the [employer] and [employer] Act, this Virtual  Visit was conducted, with patient's consent, to reduce the patient's risk of exposure to COVID-19 and provide continuity of care for an established patient. Services were provided through a video synchronous discussion virtually to substitute for in-person clinic visit. Patient was located in their home. Assessment/Plan:     Obstructive sleep apnea syndrome  Chronic- worsening. Reviewed compliance download with pt. Supplies and parts as needed for his machine. These are medically necessary. Continue medications per his PCP and other physicians. Limit caffeine use after 3pm.  Will replace machine first, if number are not better will need in lab titration. 8 wk f/u. Moderate persistent asthma without complication  Chronic- Stable. Cont meds per PCP and other physicians. Essential hypertension  Chronic- Stable. Cont meds per PCP and other physicians. Type 2 diabetes mellitus with diabetic polyneuropathy, without long-term current use of insulin ([facility])  Chronic- Stable. Cont meds per PCP and other physicians. Non morbid obesity, unspecified obesity type  Chronic-Stable. Encouraged him to work on weight loss through diet and exercise. The primary encounter diagnosis was Moderate persistent asthma without complication.  Diagnoses of Obstructive sleep apnea syndrome, Essential hypertension, Type 2 diabetes mellitus with diabetic polyneuropathy, without long-term current use of insulin ([facility] [address] and Non morbid obesity, unspecified obesity type were also pertinent to this visit. The chronic medical conditions listed are directly related to the primary diagnosis listed above. The management of the primary diagnosis affects the secondary diagnosis and vice versa. Subjective:     Patient ID: Camila Love is a 72 y.o. male. Chief Complaint   Patient presents with    Sleep Apnea     Subjective   HPI:    Machine Modem/Download Info:  Compliance (hours/night): 8.5 hrs/night  Download AHI (/hour): 9.3 /HR  Average IPAP Pressure: 12.2 cmH2O  Average EPAP Pressure: 10.1 cmH2O AUTO BIPAP - Settings (Marisol)  IPAP Max: 18 cmH2O  EPAP Min: 7 cmH2O  Pressure Support Min: 2  Pressure Support Max: 5   Comfort Settings  Humidity Level (0-8): 3  Flex/EPR (0-3): 3       SYLVIE: Machine is over 11years old and making more noise and data is showing AHI worse but machine is not adjusting pressures to compensate. Not sleeping as well recently. [facility]    [place] - Total score: 3    Current Outpatient Medications   Medication Sig Dispense Refill    ramipril (ALTACE) 2.5 MG capsule TAKE 1 CAPSULE BY MOUTH ONE TIME A DAY 30 capsule 1    glimepiride (AMARYL) 1 MG tablet TAKE 1 TABLET BY MOUTH IN THE MORNING before breakfast 90 tablet 0    pioglitazone (ACTOS) 30 MG tablet TAKE 1 TABLET BY MOUTH ONE TIME A DAY  90 tablet 0    allopurinol (ZYLOPRIM) 300 MG tablet Take 1 tablet by mouth daily 90 tablet 1    traMADol (ULTRAM) 50 MG tablet Take 50 mg by mouth 2 times daily as needed for Pain.       vitamin D3 (CHOLECALCIFEROL) 25 MCG (1000 UT) TABS tablet Take 1,000 Units by mouth daily      finasteride (PROSCAR) 5 MG tablet Take 5 mg by mouth daily      tamsulosin (FLOMAX) 0.4 MG capsule Take 0.4 mg by mouth daily      colchicine (MITIGARE) 0.6 MG capsule Take 1 capsule by mouth daily 90 capsule 1    pantoprazole (PROTONIX) 40 MG tablet Take 1 tablet by mouth daily 90 tablet 1    metFORMIN (GLUCOPHAGE) 1000 MG tablet Take 1 tablet by mouth 2 times daily 180 tablet 1    simvastatin (ZOCOR) 40 MG tablet TAKE 1 TABLET BY MOUTH AT BEDTIME 90 tablet 1    ADVAIR DISKUS 100-50 MCG/DOSE diskus inhaler INHALE 1 PUFF BY MOUTH EVERY TWELVE HOURS  1 Inhaler 5    diclofenac sodium (VOLTAREN) 1 % GEL APPLY 2 GRAMS TO THE AFFECTED AREA TWICE DAILY 1 Tube 1    ipratropium-albuterol (DUONEB) 0.5-2.5 (3) MG/3ML SOLN nebulizer solution Inhale 3 mLs into the lungs every 4 hours as needed for Shortness of Breath 120 vial 0    albuterol sulfate  (90 Base) MCG/ACT inhaler Inhale 2 puffs into the lungs every 6 hours as needed for Wheezing 1 Inhaler 0    FREESTYLE LANCETS MISC 1 each by Does not apply route daily 100 each 3    Blood Glucose Monitoring Suppl (FREESTYLE LITE) CLAUDINE 1 Device by Does not apply route 2 times daily 1 Device 0    diclofenac (VOLTAREN) 50 MG EC tablet Take 1 tablet by mouth 2 times daily (Patient not taking: Reported on 10/7/2020) 180 tablet 1     No current facility-administered medications for this visit.         Allergies as of 10/07/2020    (No Known Allergies)         Electronically signed by Karla Alonzo MD on 10/7/2020 at 1:58 PM

## 2020-10-07 NOTE — PROGRESS NOTES
Avinash Hightower         : 1955    Diagnosis: [x] SYLVIE (G47.33) [] CSA (G47.31) [] Apnea (G47.30)   Length of Need: [x] 12 Months [] 99 Months [] Other:    Machine (GUIDO!): [x] Respironics Dream Station      Auto [] ResMed AirSense     Auto [] Other:     []  CPAP () [x] Bilevel ()   Mode: [] Auto [] Spontaneous    Mode: [x] Auto [] Spontaneous            IPAPmax-25  EPAPmin-7  PSmin-3  PSmax-7     Comfort Settings:   - Ramp Pressure: 4 cmH2O                                        - Ramp time: 15 min                                     -  Flex/EPR - 3 full time                                    - For ResMed Bilevel (TiMax-4 sec   TiMin- 0.2 sec)     Humidifier: [x] Heated ()        [x] Water chamber replacement ()/ 1 per 6 months        Mask:   [] Nasal () /1 per 3 months [x] Full Face () /1 per 3 months   [] Patient choice -Size and fit mask [x] Patient Choice - Size and fit mask   [] Dispense:  [] Dispense:    [] Headgear () / 1 per 3 months [x] Headgear () / 1 per 3 months   [] Replacement Nasal Cushion ()/2 per month [x] Interface Replacement ()/1 per month   [] Replacement Nasal Pillows ()/2 per month         Tubing: [x] Heated ()/1 per 3 months    [] Standard ()/1 per 3 months [] Other:           Filters: [x] Non-disposable ()/1 per 6 months     [x] Ultra-Fine, Disposable ()/2 per month        Miscellaneous: [] Chin Strap ()/ 1 per 6 months [] O2 bleed-in:       LPM   [] Oximetry on CPAP/Bilevel []  Other:    [x] Modem: ()         Start Order Date: 10/07/20    MEDICAL JUSTIFICATION:  I, the undersigned, certify that the above prescribed supplies are medically necessary for this patients wellbeing. In my opinion, the supplies are both reasonable and necessary in reference to accepted standards of medicalpractice in treatment of this patients condition.     Xavier Estrada MD      NPI: [id number]       Order Signed Date: 10/07/20    Electronically signed by Tyson Neumann MD on 10/7/2020 at 1:52 PM

## 2020-10-07 NOTE — ASSESSMENT & PLAN NOTE
Chronic- worsening. Reviewed compliance download with pt. Supplies and parts as needed for his machine. These are medically necessary. Continue medications per his PCP and other physicians. Limit caffeine use after 3pm.  Will replace machine first, if number are not better will need in lab titration. 8 wk f/u.

## 2020-10-07 NOTE — LETTER
[facility] Sleep Medicine  [facility] [place] [address] [address] [address]  Phone: [phone]  Fax: [phone]    October 7, 2020       Patient: Claudy Campos   MR Number: [id number]   YOB: 1955   Date of Visit: 10/7/2020       Sharri Lott was seen for a follow up visit today. Here is my assessment and plan as well as an attached copy of his visit today:    Obstructive sleep apnea syndrome  Chronic- worsening. Reviewed compliance download with pt. Supplies and parts as needed for his machine. These are medically necessary. Continue medications per his PCP and other physicians. Limit caffeine use after 3pm.  Will replace machine first, if number are not better will need in lab titration. 8 wk f/u. Moderate persistent asthma without complication  Chronic- Stable. Cont meds per PCP and other physicians. Essential hypertension  Chronic- Stable. Cont meds per PCP and other physicians. Type 2 diabetes mellitus with diabetic polyneuropathy, without long-term current use of insulin (HCC)  Chronic- Stable. Cont meds per PCP and other physicians. Non morbid obesity, unspecified obesity type  Chronic-Stable. Encouraged him to work on weight loss through diet and exercise. If you have questions or concerns, please do not hesitate to call me. I look forward to following Jeny Wolf along with you.     Sincerely,    Tyson Neumann MD    CC providers:  Jasiel Hatch MD  [address] [place] [address]

## 2020-10-16 ENCOUNTER — TELEPHONE (OUTPATIENT)
Dept: FAMILY MEDICINE CLINIC | Age: 65
End: 2020-10-16

## 2020-10-16 RX ORDER — ALBUTEROL SULFATE 90 UG/1
2 AEROSOL, METERED RESPIRATORY (INHALATION) EVERY 6 HOURS PRN
Qty: 1 INHALER | Refills: 0 | Status: SHIPPED | OUTPATIENT
Start: 2020-10-16 | End: 2020-11-25 | Stop reason: SDUPTHER

## 2020-10-16 NOTE — TELEPHONE ENCOUNTER
FYI    Wife called in requesting Rx for patient having respiratory infection symptoms. Advised her, we are unable to send Rx to patient without them being seen but we would gladly see them for a Red visit so he can be treated. She states he has appt on 10/21 for routine visit and thought he could be taken care of then. I advised her, due to symptoms, patient will not be able to be in office until symptoms are gone. She declined making Red Visit appt, states they will take some meds at home. So that he can get better before appt on 10/21.      Please advise 99

## 2020-10-16 NOTE — TELEPHONE ENCOUNTER
Disp  Refills  Start  End     albuterol sulfate  (90 Base) MCG/ACT inhaler  1 Inhaler  0  1/2/2020      Sig - Route: Inhale 2 puffs into the lungs every 6 hours as needed for Wheezing - Inhalation       Pharmacy:  [address], [address] [phone] - F [phone]     Provider out of office    Please advise

## 2020-10-19 RX ORDER — CEPHALEXIN 500 MG/1
500 CAPSULE ORAL 3 TIMES DAILY
Qty: 21 CAPSULE | Refills: 0 | Status: SHIPPED | OUTPATIENT
Start: 2020-10-19 | End: 2021-01-05 | Stop reason: SDUPTHER

## 2020-10-21 ENCOUNTER — TELEPHONE (OUTPATIENT)
Dept: FAMILY MEDICINE CLINIC | Age: 65
End: 2020-10-21

## 2020-10-21 ENCOUNTER — OFFICE VISIT (OUTPATIENT)
Dept: FAMILY MEDICINE CLINIC | Age: 65
End: 2020-10-21
Payer: COMMERCIAL

## 2020-10-21 VITALS
WEIGHT: 230.38 LBS | DIASTOLIC BLOOD PRESSURE: 78 MMHG | SYSTOLIC BLOOD PRESSURE: 132 MMHG | HEART RATE: 50 BPM | BODY MASS INDEX: 35.03 KG/M2 | TEMPERATURE: 98.1 F | RESPIRATION RATE: 16 BRPM | OXYGEN SATURATION: 98 %

## 2020-10-21 PROCEDURE — 99214 OFFICE O/P EST MOD 30 MIN: CPT | Performed by: FAMILY MEDICINE

## 2020-10-21 PROCEDURE — 90471 IMMUNIZATION ADMIN: CPT | Performed by: FAMILY MEDICINE

## 2020-10-21 PROCEDURE — 90472 IMMUNIZATION ADMIN EACH ADD: CPT | Performed by: FAMILY MEDICINE

## 2020-10-21 PROCEDURE — 90694 VACC AIIV4 NO PRSRV 0.5ML IM: CPT | Performed by: FAMILY MEDICINE

## 2020-10-21 PROCEDURE — 90732 PPSV23 VACC 2 YRS+ SUBQ/IM: CPT | Performed by: FAMILY MEDICINE

## 2020-10-21 RX ORDER — TRAMADOL HYDROCHLORIDE 50 MG/1
50 TABLET ORAL 2 TIMES DAILY
Qty: 60 TABLET | Refills: 2 | Status: ON HOLD | OUTPATIENT
Start: 2020-10-21 | End: 2020-12-03 | Stop reason: HOSPADM

## 2020-10-21 NOTE — TELEPHONE ENCOUNTER
To prevent gout flareups he would be best off on allopurinol.   The colchicine medication can be used when he has a gout flareup

## 2020-10-21 NOTE — PROGRESS NOTES
Vaccine Information Sheet, \"Influenza - Inactivated\"  given to Vernard Fleischer, or parent/legal guardian of  Vernard Fleischer and verbalized understanding. Patient responses:    Have you ever had a reaction to a flu vaccine? No  Are you able to eat eggs without adverse effects? Yes  Do you have any current illness? No  Have you ever had [place] Syndrome? No    Flu vaccine given per order. Please see immunization tab.     Immunization(s) given during visit:     Immunizations Administered     Name Date Dose Route    Influenza, Quadv, adjuvanted, 65 yrs +, IM, PF (Fluad) 10/21/2020 0.5 mL Intramuscular    Site: Deltoid- Right    Lot: [id number]    NDC: 09374-625-65    Pneumococcal Polysaccharide ([id number]) 10/21/2020 0.5 mL Intramuscular    Site: Deltoid- Left    Lot: [id number]    NDC: 0001-1874-38

## 2020-10-21 NOTE — PROGRESS NOTES
Subjective:      Patient ID: Vernard Fleischer is a 72 y.o. male. CC: Patient presents for re-evaluation of chronic health problems including diabetes mellitus with neuropathy, generalized osteoarthritis, allergic rhinitis, sleep apnea and asthma. Catrina REDDING Pt is here for a follow up. Patient called in recently as he was increasing respiratory difficulty with starting antibiotic therapy. He feels he is significant improved in that regards. He is to start a new CPAP equipment as his other CPAP machine was started to wear out. He has been under care of pulmonology and he feels his asthma is significantly improved with current treatment plan. He has not had his laboratory testing done as of yet. Denies any chest pain or shortness of breath. Feels his legs are generally tired with stand up walking throughout the day. Patient feels he is done well taking 2 pain pills a day for his arthritis symptoms. Eye exam current (within one year): Yes    Checks sugars at home: yes  Home blood sugar records: patient tests 4 time(s) per week  Any episodes of hypoglycemia?  No    Current medication use: taking as prescribed  Medication side effects: none     Current diet: well balanced  Current exercise:moderately active    Review of Systems  Patient Active Problem List   Diagnosis    Essential hypertension    Generalized osteoarthrosis, involving multiple sites    Pure hypercholesterolemia    Obstructive sleep apnea syndrome    Allergic rhinitis    Pseudogout    Melanoma in situ of lower leg ([address]    Type 2 diabetes mellitus with diabetic polyneuropathy, without long-term current use of insulin (HCC)    Eczema    Dyshidrotic eczema    GERD without esophagitis    Non morbid obesity, unspecified obesity type    Moderate persistent asthma without complication       Outpatient Medications Marked as Taking for the 10/21/20 encounter (Office Visit) with Geoffrey Salcido MD   Medication Sig Dispense Refill    cephALEXin (KEFLEX) 500 MG capsule Take 1 capsule by mouth 3 times daily for 7 days 21 capsule 0    albuterol sulfate  (90 Base) MCG/ACT inhaler Inhale 2 puffs into the lungs every 6 hours as needed for Wheezing 1 Inhaler 0    ramipril (ALTACE) 2.5 MG capsule TAKE 1 CAPSULE BY MOUTH ONE TIME A DAY 30 capsule 1    glimepiride (AMARYL) 1 MG tablet TAKE 1 TABLET BY MOUTH IN THE MORNING before breakfast 90 tablet 0    pioglitazone (ACTOS) 30 MG tablet TAKE 1 TABLET BY MOUTH ONE TIME A DAY  90 tablet 0    allopurinol (ZYLOPRIM) 300 MG tablet Take 1 tablet by mouth daily 90 tablet 1    traMADol (ULTRAM) 50 MG tablet Take 50 mg by mouth 2 times daily as needed for Pain.       vitamin D3 (CHOLECALCIFEROL) 25 MCG (1000 UT) TABS tablet Take 1,000 Units by mouth daily      finasteride (PROSCAR) 5 MG tablet Take 5 mg by mouth daily      tamsulosin (FLOMAX) 0.4 MG capsule Take 0.4 mg by mouth daily      colchicine (MITIGARE) 0.6 MG capsule Take 1 capsule by mouth daily 90 capsule 1    diclofenac (VOLTAREN) 50 MG EC tablet Take 1 tablet by mouth 2 times daily 180 tablet 1    pantoprazole (PROTONIX) 40 MG tablet Take 1 tablet by mouth daily 90 tablet 1    metFORMIN (GLUCOPHAGE) 1000 MG tablet Take 1 tablet by mouth 2 times daily 180 tablet 1    simvastatin (ZOCOR) 40 MG tablet TAKE 1 TABLET BY MOUTH AT BEDTIME 90 tablet 1    ADVAIR DISKUS 100-50 MCG/DOSE diskus inhaler INHALE 1 PUFF BY MOUTH EVERY TWELVE HOURS  1 Inhaler 5    diclofenac sodium (VOLTAREN) 1 % GEL APPLY 2 GRAMS TO THE AFFECTED AREA TWICE DAILY 1 Tube 1    ipratropium-albuterol (DUONEB) 0.5-2.5 (3) MG/3ML SOLN nebulizer solution Inhale 3 mLs into the lungs every 4 hours as needed for Shortness of Breath 120 vial 0    FREESTYLE LANCETS MISC 1 each by Does not apply route daily 100 each 3    Blood Glucose Monitoring Suppl (FREESTYLE LITE) CLAUDINE 1 Device by Does not apply route 2 times daily 1 Device 0       No Known Allergies    Social History     Tobacco Use    Smoking status: Former Smoker     Packs/day: 1.00     Years: 10.00     Pack years: 10.00     Types: Cigarettes     Last attempt to quit: 4/3/1993     Years since quittin.5    Smokeless tobacco: Never Used    Tobacco comment: quit 25 years ago   Substance Use Topics    Alcohol use: No       /78 (Site: Right Upper Arm, Position: Sitting, Cuff Size: Medium Adult)   Pulse 50   Temp 98.1 °F (36.7 °C) (Tympanic)   Resp 16   Wt 230 lb 6 oz (104.5 kg)   SpO2 98%   BMI 35.03 kg/m²     Objective:   Physical Exam    Assessment:      Briana Zhao was seen today for follow-up, diabetes and hypertension. Diagnoses and all orders for this visit:    Type 2 diabetes mellitus with diabetic polyneuropathy, without long-term current use of insulin (HCC)  -     Comprehensive Metabolic Panel - Vitros; Future  -     Hemoglobin A1C; Future  -     Lipid Panel; Future  -     Microalbumin / Creatinine Urine Ratio; Future    Need for influenza vaccination  -     INFLUENZA, QUADV, ADJUVANTED, 72 YRS =, IM, PF, PREFILL SYR, 0.5ML (FLUAD)    Need for pneumococcal vaccination  -     Pneumococcal polysaccharide vaccine 23-valent greater than or equal to 1yo subcutaneous/IM    Screening PSA (prostate specific antigen)  -     Psa screening; Future    Generalized osteoarthrosis, involving multiple sites  -     traMADol (ULTRAM) 50 MG tablet; Take 1 tablet by mouth 2 times daily for 90 days. Allergic rhinitis, unspecified seasonality, unspecified trigger    Obstructive sleep apnea syndrome    Moderate persistent asthma without complication    OARRS report checked          Plan:      Pt appears stable & labs ordered. Adjustments of medication will be done after laboratory testing results available.       Complete current antibiotic therapy    Patient received counseling on the following healthy behaviors: nutrition and exercise     Patient given educational materials     Health maintenance updated    Discussed use, benefit, and side effects of prescribed medications. Barriers to medication compliance addressed. All patient questions answered. Pt voiced understanding. Patient needs RTC in 3 months. Please note that this chart was generated using Dragon dictation software. Although every effort was made to ensure the accuracy of this automated transcription, some errors in transcription may have occurred.

## 2020-10-21 NOTE — TELEPHONE ENCOUNTER
Wife wanted to let [place] know patient is on 2 gouts meds. He is currently taking allopurinol (ZYLOPRIM) 300 MG tablet  and has not started colchicine (MITIGARE) 0.6 MG capsule. She wasn't sure if he needed to take both but believes colchicine may not be covered under his insurance which is why he was on the other gout medication.       She would like a call back      Please advise

## 2020-11-04 ENCOUNTER — OFFICE VISIT (OUTPATIENT)
Dept: PULMONOLOGY | Age: 65
End: 2020-11-04
Payer: COMMERCIAL

## 2020-11-04 ENCOUNTER — HOSPITAL ENCOUNTER (OUTPATIENT)
Age: 65
Discharge: HOME | End: 2020-11-04
Payer: COMMERCIAL

## 2020-11-04 ENCOUNTER — HOSPITAL ENCOUNTER (OUTPATIENT)
Dept: GENERAL RADIOLOGY | Age: 65
Discharge: HOME | End: 2020-11-04
Payer: COMMERCIAL

## 2020-11-04 VITALS — OXYGEN SATURATION: 96 % | SYSTOLIC BLOOD PRESSURE: 130 MMHG | HEART RATE: 57 BPM | DIASTOLIC BLOOD PRESSURE: 68 MMHG

## 2020-11-04 PROCEDURE — 71046 X-RAY EXAM CHEST 2 VIEWS: CPT

## 2020-11-04 PROCEDURE — 99213 OFFICE O/P EST LOW 20 MIN: CPT | Performed by: INTERNAL MEDICINE

## 2020-11-04 RX ORDER — PREDNISONE 10 MG/1
TABLET ORAL
Qty: 30 TABLET | Refills: 0 | Status: SHIPPED | OUTPATIENT
Start: 2020-11-04 | End: 2020-11-14

## 2020-11-04 RX ORDER — DOXYCYCLINE HYCLATE 100 MG
100 TABLET ORAL 2 TIMES DAILY
Qty: 14 TABLET | Refills: 0 | Status: SHIPPED | OUTPATIENT
Start: 2020-11-04 | End: 2020-11-11

## 2020-11-04 ASSESSMENT — ENCOUNTER SYMPTOMS
CHEST TIGHTNESS: 1
COUGH: 0
CHOKING: 0
BACK PAIN: 0
BLOOD IN STOOL: 0
DIARRHEA: 0
WHEEZING: 0
STRIDOR: 0
CONSTIPATION: 0
APNEA: 0
ABDOMINAL PAIN: 0
VOICE CHANGE: 0
SORE THROAT: 0
SINUS PRESSURE: 0
ANAL BLEEDING: 0
RHINORRHEA: 0
ABDOMINAL DISTENTION: 0
SHORTNESS OF BREATH: 1

## 2020-11-04 NOTE — PROGRESS NOTES
Maye Hightower    YOB: 1955     Date of Service:  11/4/2020     Chief Complaint   Patient presents with    Shortness of Breath     pt states his lungs have been actting up for a month now Has > shortness of breath         HPI patient is here for an acute visit for asthma exacerbation. States that he has been short of breath for at least 1 month now-no clear triggers ? Change in season. No URI symptoms. Denies any cough or phlegm. Denies fever or flulike symptoms. Denies GI symptoms. No recent COVID-19 contacts. No Known Allergies  Outpatient Medications Marked as Taking for the 11/4/20 encounter (Office Visit) with [place] Reed MD   Medication Sig Dispense Refill    predniSONE (DELTASONE) 10 MG tablet Take 40 mg by mouth for 3 days 30 mg for 3 days 20 mg for 3 days 10 mg for 3 days.  30 tablet 0       Immunization History   Administered Date(s) Administered    Influenza Vaccine, unspecified formulation 02/10/2017    Influenza Virus Vaccine 11/29/2017, 10/01/2018    Influenza, Intradermal, Preservative free 10/05/2012, 01/24/2014    Influenza, MDCK Quadv, IM, PF (Flucelvax 4 yrs and older) 01/15/2020    Influenza, Quadv, adjuvanted, 65 yrs +, IM, PF (Fluad) 10/21/2020    Pneumococcal Conjugate 13-valent ([id number]) 08/09/2016    Pneumococcal Polysaccharide ([id number]) 11/14/2011, 10/21/2020    Td, unspecified formulation 08/07/2009    Tdap (Boostrix, Adacel) 02/26/2013, 10/07/2016       Past Medical History:   Diagnosis Date    Abdominal pain, LLQ     Allergic rhinitis     Asthma     Atopic dermatitis     [facility] [address] [address]     melanoma    Diabetes mellitus ([facility] [address]     Erectile dysfunction     Generalized osteoarthrosis, involving multiple sites     GERD (gastroesophageal reflux disease)     Gynecomastia     Hyperlipidemia     Nasal congestion     Nipple pain     Obstructive sleep apnea syndrome 10/5/2012    Pseudo-gout     Unspecified essential hypertension Past Surgical History:   Procedure Laterality Date    CARPAL TUNNEL RELEASE Bilateral 2013    FINGER SURGERY Left 3-1-2013    Incision & Drainage of left Thumb Infection    FOOT SURGERY Right     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    HAND SURGERY  2011    LUMBAR NERVE BLOCK Right 12/12/2018    RIGHT L4/L5 LUMBAR INTERLAMINAR EPIDURAL STEROID INJECTION WITH FLUOROSCOPY performed by North Saab MD at [address]  2002    melenoma right side post    CA [place] [address] DX/THER SBST INTRLMNR LMBR/SAC W/IMG GDN Right 9/24/2018    RIGHT L4/L5 INTERLAMINAR EPIDURAL STEROID INJECTION WITH FLUOROSCOPY performed by North Saab MD at [address] [place] [address] History   Problem Relation Age of Onset    Arthritis Mother     High Blood Pressure Mother     Emphysema Mother     Osteoporosis Mother     Diabetes Father     Heart Disease Father     High Cholesterol Father     Parkinsonism Father     Diabetes Sister        Review of Systems:  Review of Systems   Constitutional: Negative for activity change, appetite change, fatigue and fever. HENT: Negative for congestion, ear discharge, ear pain, postnasal drip, rhinorrhea, sinus pressure, sneezing, sore throat, tinnitus and voice change. Respiratory: Positive for chest tightness and shortness of breath. Negative for apnea, cough, choking, wheezing and stridor. Cardiovascular: Negative for chest pain, palpitations and leg swelling. Gastrointestinal: Negative for abdominal distention, abdominal pain, anal bleeding, blood in stool, constipation and diarrhea. Musculoskeletal: Negative for arthralgias, back pain and gait problem. Skin: Negative for pallor and rash. Allergic/Immunologic: Negative for environmental allergies. Neurological: Negative for dizziness, tremors, seizures, syncope, speech difficulty, weakness, light-headedness, numbness and headaches. Hematological: Negative for adenopathy. Does not bruise/bleed easily.    Psychiatric/Behavioral: Negative for sleep disturbance. Vitals:    11/04/20 1422   BP: 130/68   Pulse: 57   SpO2: 96%     Patient-Reported Vitals 10/7/2020   Patient-Reported Weight 215#   Patient-Reported Height 5'9\"   Patient-Reported Systolic -   Patient-Reported Diastolic -   Patient-Reported Pulse -      There is no height or weight on file to calculate BMI. Wt Readings from Last 3 Encounters:   10/21/20 230 lb 6 oz (104.5 kg)   07/08/20 226 lb (102.5 kg)   02/27/20 222 lb (100.7 kg)     BP Readings from Last 3 Encounters:   11/04/20 130/68   10/21/20 132/78   07/08/20 120/70         Physical Exam  Constitutional:       General: He is not in acute distress. Appearance: He is well-developed. He is not diaphoretic. HENT:      Mouth/Throat:      Pharynx: No oropharyngeal exudate. Cardiovascular:      Rate and Rhythm: Normal rate and regular rhythm. Heart sounds: Normal heart sounds. No murmur. Pulmonary:      Effort: No respiratory distress. Breath sounds: Normal breath sounds. No wheezing or rales. Chest:      Chest wall: No tenderness. Abdominal:      General: There is no distension. Palpations: There is no mass. Tenderness: There is no abdominal tenderness. There is no guarding or rebound. Musculoskeletal:         General: No swelling, tenderness or deformity. Skin:     Coloration: Skin is not pale. Findings: No erythema or rash. Neurological:      Mental Status: He is alert and oriented to person, place, and time. Cranial Nerves: No cranial nerve deficit. Motor: No abnormal muscle tone.       Coordination: Coordination normal.      Deep Tendon Reflexes: Reflexes normal.             Health Maintenance   Topic Date Due    Hepatitis C screen  1955    HIV screen  05/19/1970    Diabetic retinal exam  10/01/2016    Diabetic microalbuminuria test  06/04/2020    Lipid screen  09/05/2020    Shingles Vaccine (1 of 2) 10/21/2021 (Originally 5/19/2005)    A1C test (Diabetic or Prediabetic)  06/03/2021    Potassium monitoring  06/03/2021    Creatinine monitoring  06/03/2021    Diabetic foot exam  07/08/2021    Colon cancer screen colonoscopy  02/08/2026    DTaP/Tdap/Td vaccine (3 - Td) 10/07/2026    Flu vaccine  Completed    Pneumococcal 65+ years Vaccine  Completed    AAA screen  Completed    Hepatitis A vaccine  Aged Out    Hib vaccine  Aged Out    Meningococcal (ACWY) vaccine  Aged Out          Assessment/Plan:     Diagnosis Orders   1. Mild persistent asthma with acute exacerbation  XR CHEST STANDARD (2 VW)        Mild persistent asthma-continue with Advair 100 and albuterol as needed-currently using 2 puffs 4 times daily. Will prescribe a 12-day taper of prednisone. Obtain chest x-ray due to the prolonged duration of symptoms. Patient has already received influenza and Pneumovax 23 in October. Requested patient to contact us if symptoms do not resolve. Patient already has an appointment to see me in January 2021.

## 2020-11-09 ENCOUNTER — TELEPHONE (OUTPATIENT)
Dept: PULMONOLOGY | Age: 65
End: 2020-11-09

## 2020-11-09 NOTE — TELEPHONE ENCOUNTER
Order stat CT chest with and without contrast to r/o pulmonary embolism.  Pt will need Cr check prior to test.

## 2020-11-11 ENCOUNTER — HOSPITAL ENCOUNTER (OUTPATIENT)
Dept: CT IMAGING | Age: 65
Discharge: HOME | End: 2020-11-11
Payer: COMMERCIAL

## 2020-11-11 ENCOUNTER — HOSPITAL ENCOUNTER (OUTPATIENT)
Age: 65
Discharge: HOME | End: 2020-11-11
Payer: COMMERCIAL

## 2020-11-11 LAB
ALBUMIN SERPL-MCNC: 4.1 G/DL (ref 3.4–5)
ANION GAP SERPL CALCULATED.3IONS-SCNC: 8 MMOL/L (ref 3–16)
BUN BLDV-MCNC: 20 MG/DL (ref 7–20)
CALCIUM SERPL-MCNC: 8.9 MG/DL (ref 8.3–10.6)
CHLORIDE BLD-SCNC: 103 MMOL/L (ref 99–110)
CO2: 29 MMOL/L (ref 21–32)
CREAT SERPL-MCNC: 0.7 MG/DL (ref 0.8–1.3)
GFR AFRICAN AMERICAN: >60
GFR NON-AFRICAN AMERICAN: >60
GLUCOSE BLD-MCNC: 288 MG/DL (ref 70–99)
PHOSPHORUS: 2.6 MG/DL (ref 2.5–4.9)
POTASSIUM SERPL-SCNC: 4.4 MMOL/L (ref 3.5–5.1)
SODIUM BLD-SCNC: 140 MMOL/L (ref 136–145)

## 2020-11-11 PROCEDURE — 6360000004 HC RX CONTRAST MEDICATION: Performed by: INTERNAL MEDICINE

## 2020-11-11 PROCEDURE — 36415 COLL VENOUS BLD VENIPUNCTURE: CPT

## 2020-11-11 PROCEDURE — 71275 CT ANGIOGRAPHY CHEST: CPT

## 2020-11-11 PROCEDURE — 80069 RENAL FUNCTION PANEL: CPT

## 2020-11-11 RX ADMIN — IOPAMIDOL 75 ML: 755 INJECTION, SOLUTION INTRAVENOUS at 08:51

## 2020-11-12 ENCOUNTER — TELEPHONE (OUTPATIENT)
Dept: PULMONOLOGY | Age: 65
End: 2020-11-12

## 2020-11-12 RX ORDER — LEVOFLOXACIN 750 MG/1
750 TABLET ORAL DAILY
Qty: 10 TABLET | Refills: 0 | Status: SHIPPED | OUTPATIENT
Start: 2020-11-12 | End: 2020-11-22

## 2020-11-23 RX ORDER — RAMIPRIL 2.5 MG/1
CAPSULE ORAL
Qty: 30 CAPSULE | Refills: 5 | Status: ON HOLD | OUTPATIENT
Start: 2020-11-23 | End: 2020-12-03 | Stop reason: HOSPADM

## 2020-11-23 NOTE — TELEPHONE ENCOUNTER
Medication:   Requested Prescriptions     Pending Prescriptions Disp Refills    ramipril (ALTACE) 2.5 MG capsule [Pharmacy Med Name: Ramipril Oral Capsule 2.5 MG] 30 capsule      Sig: TAKE 1 CAPSULE BY MOUTH EVERY DAY          Patient Phone Number: [phone] (home)     Last appt: 10/21/2020   Next appt: 1/20/2021    Last OARRS:   RX Monitoring 3/4/2019   Attestation The Prescription Monitoring Report for this patient was reviewed today.    Periodic Controlled Substance Monitoring -     PDMP Monitoring:    Last PDMP Iesha Montilla as Reviewed [facility]):  Review User Review Instant Review Result   Camila Moore 10/21/2020  1:16 PM Reviewed PDMP [1]     Preferred Pharmacy:   [address] [phone] [facility] [phone]  [address]  [place] [address]  Phone: [phone] Fax: [phone]    [facility], [address]  [place] [address]  Phone: [phone] Fax: [phone]

## 2020-11-25 ENCOUNTER — VIRTUAL VISIT (OUTPATIENT)
Dept: PULMONOLOGY | Age: 65
End: 2020-11-25
Payer: COMMERCIAL

## 2020-11-25 ENCOUNTER — OFFICE VISIT (OUTPATIENT)
Dept: PRIMARY CARE CLINIC | Age: 65
End: 2020-11-25
Payer: COMMERCIAL

## 2020-11-25 PROCEDURE — 99211 OFF/OP EST MAY X REQ PHY/QHP: CPT | Performed by: NURSE PRACTITIONER

## 2020-11-25 PROCEDURE — 99214 OFFICE O/P EST MOD 30 MIN: CPT | Performed by: INTERNAL MEDICINE

## 2020-11-25 RX ORDER — IPRATROPIUM BROMIDE AND ALBUTEROL SULFATE 2.5; .5 MG/3ML; MG/3ML
1 SOLUTION RESPIRATORY (INHALATION) EVERY 4 HOURS PRN
Qty: 120 VIAL | Refills: 5 | Status: SHIPPED | OUTPATIENT
Start: 2020-11-25

## 2020-11-25 RX ORDER — ALBUTEROL SULFATE 90 UG/1
2 AEROSOL, METERED RESPIRATORY (INHALATION) EVERY 6 HOURS PRN
Qty: 1 INHALER | Refills: 3 | Status: SHIPPED | OUTPATIENT
Start: 2020-11-25 | End: 2020-11-30 | Stop reason: SINTOL

## 2020-11-25 RX ORDER — PIOGLITAZONEHYDROCHLORIDE 30 MG/1
TABLET ORAL
Qty: 90 TABLET | Refills: 1 | Status: SHIPPED | OUTPATIENT
Start: 2020-11-25 | End: 2020-12-24 | Stop reason: ALTCHOICE

## 2020-11-25 ASSESSMENT — ENCOUNTER SYMPTOMS
ABDOMINAL PAIN: 0
CHOKING: 0
ABDOMINAL DISTENTION: 0
WHEEZING: 1
BACK PAIN: 0
SHORTNESS OF BREATH: 1
DIARRHEA: 0
STRIDOR: 0
SINUS PRESSURE: 0
CONSTIPATION: 0
ANAL BLEEDING: 0
BLOOD IN STOOL: 0
SORE THROAT: 0
VOICE CHANGE: 0
CHEST TIGHTNESS: 1
RHINORRHEA: 0
COUGH: 1
APNEA: 0

## 2020-11-25 NOTE — PROGRESS NOTES
Maye Hightower received a viral test for COVID-19. They were educated on isolation and quarantine as appropriate. For any symptoms, they were directed to seek care from their PCP, given contact information to establish with a doctor, directed to an urgent care or the emergency room.

## 2020-11-25 NOTE — TELEPHONE ENCOUNTER
Medication:   Requested Prescriptions     Pending Prescriptions Disp Refills    pioglitazone (ACTOS) 30 MG tablet [Pharmacy Med Name: Pioglitazone HCl Oral Tablet 30 MG] 90 tablet 0     Sig: TAKE 1 TABLET BY MOUTH EVERY DAY          Patient Phone Number: [phone] (home)     Last appt: 10/21/2020   Next appt: 1/20/2021    Last OARRS:   RX Monitoring 3/4/2019   Attestation The Prescription Monitoring Report for this patient was reviewed today.    Periodic Controlled Substance Monitoring -     PDMP Monitoring:    Last PDMP Keenan Hernandez as Reviewed [facility]):  Review User Review Instant Review Result   Fatuma Clive 10/21/2020  1:16 PM Reviewed PDMP [1]     Preferred Pharmacy:   [facility] [address] [place] [address], [address] [place] [address] [phone] [facility] [phone]  [address]  [place] [address]  Phone: [phone] Fax: [phone]    [facility] [address], [address]  [place] [address]  Phone: [phone] Fax: [phone]

## 2020-11-25 NOTE — PROGRESS NOTES
Alexi White [facility]     Pulmonology Video Visit    Pursuant to the emergency declaration under the [address] [facility], [address]01 waiver authority and the Coronavirus Preparedness and Response Supplemental Appropriations Act this Video Visit was insisted, with patient's consent, to reduce the patient's risk of exposure to COVID-19 and provide continuity of care for an established patient. The patient was at home, while the provider was at the clinic. Services were provided through a synchronous discussion through a Video Visit to substitute for in-person clinic visit, and coded as such. YOB: 1955     Date of Service:  11/25/2020     Chief Complaint   Patient presents with    Asthma     Doxy Visit - pt states that his breathing is a little better / he did have his CT Chest         HPI doxy visit. Patient continues to be dyspneic with chest tightness and cough with mucoid phlegm. Was prescribed a course of prednisone and Levaquin with no clear benefit in symptoms. 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   Patient wants to know the results of the recent CT chest.    No Known Allergies  No outpatient medications have been marked as taking for the 11/25/20 encounter (Virtual Visit) with Kadi Hernandze MD.       Immunization History   Administered Date(s) Administered    Influenza Vaccine, unspecified formulation 02/10/2017    Influenza Virus Vaccine 11/29/2017, 10/01/2018    Influenza, Intradermal, Preservative free 10/05/2012, 01/24/2014    Influenza, MDCK Quadv, IM, PF (Flucelvax 4 yrs and older) 01/15/2020    Influenza, Quadv, adjuvanted, 65 yrs +, IM, PF (Fluad) 10/21/2020    Pneumococcal Conjugate 13-valent ([id number]) 08/09/2016    Pneumococcal Polysaccharide ([id number]) 11/14/2011, 10/21/2020    Td, unspecified formulation 08/07/2009    Tdap (Boostrix, Adacel) 02/26/2013, 10/07/2016       Past Medical History:   Diagnosis Date    Abdominal pain, LLQ     Allergic rhinitis     Asthma     Atopic dermatitis     Cancer ([address]     melanoma    Diabetes mellitus (HCC)     Erectile dysfunction     Generalized osteoarthrosis, involving multiple sites     GERD (gastroesophageal reflux disease)     Gynecomastia     Hyperlipidemia     Nasal congestion     Nipple pain     Obstructive sleep apnea syndrome 10/5/2012    Pseudo-gout     Unspecified essential hypertension      Past Surgical History:   Procedure Laterality Date    CARPAL TUNNEL RELEASE Bilateral 2013    FINGER SURGERY Left 3-1-2013    Incision & Drainage of left Thumb Infection    FOOT SURGERY Right     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    HAND SURGERY  2011    LUMBAR NERVE BLOCK Right 12/12/2018    RIGHT L4/L5 LUMBAR INTERLAMINAR EPIDURAL STEROID INJECTION WITH FLUOROSCOPY performed by Susy Holm MD at [address]  2002    melenoma right side post    MD [facility] [address] DX/THER SBST INTRLMNR LMBR/SAC W/IMG GDN Right 9/24/2018    RIGHT L4/L5 INTERLAMINAR EPIDURAL STEROID INJECTION WITH FLUOROSCOPY performed by Susy Holm MD at [address] History   Problem Relation Age of Onset    Arthritis Mother     High Blood Pressure Mother     Emphysema Mother     Osteoporosis Mother     Diabetes Father     Heart Disease Father     High Cholesterol Father     Parkinsonism Father     Diabetes Sister        Review of Systems:  Review of Systems   Constitutional: Negative for activity change, appetite change, fatigue and fever. HENT: Negative for congestion, ear discharge, ear pain, postnasal drip, rhinorrhea, sinus pressure, sneezing, sore throat, tinnitus and voice change. Respiratory: Positive for cough, chest tightness, shortness of breath and wheezing. Negative for apnea, choking and stridor. Cardiovascular: Negative for chest pain, palpitations and leg swelling.    Gastrointestinal: Negative for abdominal distention, abdominal pain, anal bleeding, blood in stool, constipation and diarrhea. Musculoskeletal: Negative for arthralgias, back pain and gait problem. Skin: Negative for pallor and rash. Allergic/Immunologic: Negative for environmental allergies. Neurological: Negative for dizziness, tremors, seizures, syncope, speech difficulty, weakness, light-headedness, numbness and headaches. Hematological: Negative for adenopathy. Does not bruise/bleed easily. Psychiatric/Behavioral: Negative for sleep disturbance. There were no vitals filed for this visit. Patient-Reported Vitals 11/25/2020   Patient-Reported Weight 220lb   Patient-Reported Height 5' 9\"   Patient-Reported Systolic -   Patient-Reported Diastolic -   Patient-Reported Pulse -      There is no height or weight on file to calculate BMI. Wt Readings from Last 3 Encounters:   10/21/20 230 lb 6 oz (104.5 kg)   07/08/20 226 lb (102.5 kg)   02/27/20 222 lb (100.7 kg)     BP Readings from Last 3 Encounters:   11/04/20 130/68   10/21/20 132/78   07/08/20 120/70         Physical Exam    Due to the current efforts to prevent transmission of COVID-19 and also the need to preserve PPE for other caregivers, a face-to-face encounter with the patient was not performed. That being said, all relevant records and diagnostic tests were reviewed, including laboratory results and imaging. Please reference any relevant documentation elsewhere. Care will be coordinated with the primary service.       Health Maintenance   Topic Date Due    Hepatitis C screen  1955    HIV screen  05/19/1970    Diabetic retinal exam  10/01/2016    Diabetic microalbuminuria test  06/04/2020    Lipid screen  09/05/2020    Shingles Vaccine (1 of 2) 10/21/2021 (Originally 5/19/2005)    A1C test (Diabetic or Prediabetic)  06/03/2021    Diabetic foot exam  07/08/2021    Potassium monitoring  11/11/2021    Creatinine monitoring  11/11/2021    Colon cancer screen colonoscopy  02/08/2026    DTaP/Tdap/Td vaccine (3 - Td) 10/07/2026    Flu vaccine  Completed    Pneumococcal 65+ years Vaccine  Completed    AAA screen  Completed    Hepatitis A vaccine  Aged Out    Hib vaccine  Aged Out    Meningococcal (ACWY) vaccine  Aged Out          Assessment/Plan:    Mild persistent asthma with continued symptoms suggestive of exacerbation despite course of antibiotics and prednisone. CT chest from 11/11 showed no evidence of PE, multiple pulmonary nodules-bilateral with hilar and mediastinal adenopathy-primarily right hilar, right paratracheal and ? Subcarinal.  Suspect infectious process, could also signify granulomatous lung disease. Will plan for bronchoscopy with EBUS for sampling of lymph node. Discussed this with the patient, states that he has already obtained COVID-19 swab performed today. Hold off on antibiotics and prednisone. Continue with Advair, albuterol and DuoNeb breathing treatments. Suggested patient to use DuoNeb breathing treatments more frequently. Follow-up in 2 weeks.

## 2020-11-27 NOTE — PROGRESS NOTES
Patient not reached. Preop instructions left on voice mail. Number__[phone]_____________    -Date__12/2/2020_____time_0730______arrival__0600  hosp-endo__________  -Nothing to eat or drink after midnight  -Responsible adult 25 or older to stay on site while you are here and drive you home and stay with you after  -Follow any instructions your doctors office has given you  -Bring a complete list of all your medications and supplements  -If you normally take the following medications in the morning please do so with a small    sip of water-heart,blood pressure,seizure,breathing or thyroid-avoid water pilll Do not take blood pressure medications ending in \"umer\" or \"pril\" the AM of surgery or the brenda prior  -You may use your inhalers  -Take half of your normal dose of any long acting insulins the night before-do not take    any diabetic medications in the morning  -Follow your doctors instructions regarding blood thinners  -Any questions call your surgeons office  Anesthesia attempts to review all Endo charts prior to surgery and will place any PAT orders,Surgery patients will have orders placed based on history in chart which may not be complete  ENDOSCOPY PATIENTS ONLY-FOLLOW YOUR DOCTORS BOWEL PREP INSTRUCTIONS,THIS MAY [address] MIDNIGHT    There is a one visitor policy at [facility] for all surgeries and endoscopies. Whether the visitor can stay or will be asked to wait in the car will depend on the current policy and if social distancing can be maintained. The policy is subject to change at any time. Please make sure the visitor has a cell phone that is on,charged and able to accept calls, as this may be the way that the staff communicates with them. Pain management is NO VISITOR policyThe patients ride is expected to remain in the car with a cell phone for communication. If the ride is leaving the hospital grounds please make sure they are back in time for pickup.  Have the patient inform the staff on arrival what their rides plans are while the patient is in the facility. At the MAIN there is one visitor allowed. Please note that the visitor policy is subject to change. Patient instructed to get their COVID-19 test done as directed by their doctor (5-7 days prior to procedure)  or patient states will get on  11/25/2020__________. Patient was notified that they need to have an appointment,number to call provided. The day the COVID test is done is considered day one. Instructed to self quarantine after test until DOS.

## 2020-11-28 LAB — SARS-COV-2, NAA: NOT DETECTED

## 2020-11-30 ENCOUNTER — TELEPHONE (OUTPATIENT)
Dept: PULMONOLOGY | Age: 65
End: 2020-11-30

## 2020-11-30 NOTE — TELEPHONE ENCOUNTER
Pt's wife Sanigta Hdz called in stating that Pt was given a purple rescue inhaler that he picked up over the weekend. Sangita Hdz stated Pt had to stay home yesterday because after taking it his chest was really tight.      Sangita Hdz # [phone]

## 2020-12-02 ENCOUNTER — ANESTHESIA (OUTPATIENT)
Dept: ENDOSCOPY | Age: 65
DRG: 919 | End: 2020-12-02
Payer: COMMERCIAL

## 2020-12-02 ENCOUNTER — HOSPITAL ENCOUNTER (INPATIENT)
Age: 65
LOS: 1 days | Discharge: HOME | DRG: 919 | End: 2020-12-03
Attending: INTERNAL MEDICINE | Admitting: INTERNAL MEDICINE
Payer: COMMERCIAL

## 2020-12-02 ENCOUNTER — ANESTHESIA EVENT (OUTPATIENT)
Dept: ENDOSCOPY | Age: 65
DRG: 919 | End: 2020-12-02
Payer: COMMERCIAL

## 2020-12-02 VITALS
DIASTOLIC BLOOD PRESSURE: 64 MMHG | OXYGEN SATURATION: 97 % | SYSTOLIC BLOOD PRESSURE: 138 MMHG | RESPIRATION RATE: 1 BRPM

## 2020-12-02 PROBLEM — I46.9 CARDIAC ARREST (HCC): Status: ACTIVE | Noted: 2020-12-02

## 2020-12-02 LAB
ANION GAP SERPL CALCULATED.3IONS-SCNC: 11 MMOL/L (ref 3–16)
APPEARANCE BAL (LAVAGE): CLEAR
APTT: 31.4 SEC (ref 24.2–36.2)
BUN BLDV-MCNC: 19 MG/DL (ref 7–20)
CALCIUM SERPL-MCNC: 8.8 MG/DL (ref 8.3–10.6)
CHLORIDE BLD-SCNC: 103 MMOL/L (ref 99–110)
CLOT EVALUATION BAL: ABNORMAL
CO2: 22 MMOL/L (ref 21–32)
COLOR LAVAGE: COLORLESS
CREAT SERPL-MCNC: 0.6 MG/DL (ref 0.8–1.3)
EKG ATRIAL RATE: 117 BPM
EKG DIAGNOSIS: NORMAL
EKG Q-T INTERVAL: 366 MS
EKG QRS DURATION: 124 MS
EKG QTC CALCULATION (BAZETT): 495 MS
EKG R AXIS: -23 DEGREES
EKG T AXIS: 187 DEGREES
EKG VENTRICULAR RATE: 110 BPM
GFR AFRICAN AMERICAN: >60
GFR NON-AFRICAN AMERICAN: >60
GLUCOSE BLD-MCNC: 178 MG/DL (ref 70–99)
GLUCOSE BLD-MCNC: 204 MG/DL (ref 70–99)
GLUCOSE BLD-MCNC: 254 MG/DL (ref 70–99)
GLUCOSE BLD-MCNC: 362 MG/DL (ref 70–99)
GLUCOSE BLD-MCNC: 398 MG/DL (ref 70–99)
HCT VFR BLD CALC: 34 % (ref 40.5–52.5)
HEMOGLOBIN: 11.1 G/DL (ref 13.5–17.5)
INR BLD: 1.13 (ref 0.86–1.14)
LEFT VENTRICULAR EJECTION FRACTION MODE: NORMAL
LV EF: 40 %
LV EF: 40 %
LVEF MODALITY: NORMAL
LYMPHOCYTES, BAL: 7 % (ref 5–10)
MACROPHAGES, BAL: 89 % (ref 90–95)
MCH RBC QN AUTO: 28.3 PG (ref 26–34)
MCHC RBC AUTO-ENTMCNC: 32.6 G/DL (ref 31–36)
MCV RBC AUTO: 86.6 FL (ref 80–100)
NUMBER OF CELLS COUNTED BAL (LAVAGE): 100
PDW BLD-RTO: 16.4 % (ref 12.4–15.4)
PERFORMED ON: ABNORMAL
PLATELET # BLD: 222 K/UL (ref 135–450)
PMV BLD AUTO: 8 FL (ref 5–10.5)
POTASSIUM REFLEX MAGNESIUM: 4.5 MMOL/L (ref 3.5–5.1)
PROTHROMBIN TIME: 13.1 SEC (ref 10–13.2)
RBC # BLD: 3.93 M/UL (ref 4.2–5.9)
RBC, BAL: 3300 /CUMM
SEGMENTED NEUTROPHILS, BAL: 4 % (ref 5–10)
SODIUM BLD-SCNC: 136 MMOL/L (ref 136–145)
WBC # BLD: 6.8 K/UL (ref 4–11)
WBC/EPI CELLS BAL: 55 /CUMM

## 2020-12-02 PROCEDURE — 85027 COMPLETE CBC AUTOMATED: CPT

## 2020-12-02 PROCEDURE — 87116 MYCOBACTERIA CULTURE: CPT

## 2020-12-02 PROCEDURE — 2709999900 HC NON-CHARGEABLE SUPPLY: Performed by: INTERNAL MEDICINE

## 2020-12-02 PROCEDURE — 85730 THROMBOPLASTIN TIME PARTIAL: CPT

## 2020-12-02 PROCEDURE — B2111ZZ FLUOROSCOPY OF MULTIPLE CORONARY ARTERIES USING LOW OSMOLAR CONTRAST: ICD-10-PCS | Performed by: INTERNAL MEDICINE

## 2020-12-02 PROCEDURE — 87015 SPECIMEN INFECT AGNT CONCNTJ: CPT

## 2020-12-02 PROCEDURE — 6360000004 HC RX CONTRAST MEDICATION: Performed by: INTERNAL MEDICINE

## 2020-12-02 PROCEDURE — 89051 BODY FLUID CELL COUNT: CPT

## 2020-12-02 PROCEDURE — 7100000001 HC PACU RECOVERY - ADDTL 15 MIN: Performed by: INTERNAL MEDICINE

## 2020-12-02 PROCEDURE — 99223 1ST HOSP IP/OBS HIGH 75: CPT | Performed by: INTERNAL MEDICINE

## 2020-12-02 PROCEDURE — 87040 BLOOD CULTURE FOR BACTERIA: CPT

## 2020-12-02 PROCEDURE — 6360000002 HC RX W HCPCS: Performed by: ANESTHESIOLOGY

## 2020-12-02 PROCEDURE — B2151ZZ FLUOROSCOPY OF LEFT HEART USING LOW OSMOLAR CONTRAST: ICD-10-PCS | Performed by: INTERNAL MEDICINE

## 2020-12-02 PROCEDURE — 87206 SMEAR FLUORESCENT/ACID STAI: CPT

## 2020-12-02 PROCEDURE — 87205 SMEAR GRAM STAIN: CPT

## 2020-12-02 PROCEDURE — 2580000003 HC RX 258: Performed by: ANESTHESIOLOGY

## 2020-12-02 PROCEDURE — 4A023N7 MEASUREMENT OF CARDIAC SAMPLING AND PRESSURE, LEFT HEART, PERCUTANEOUS APPROACH: ICD-10-PCS | Performed by: INTERNAL MEDICINE

## 2020-12-02 PROCEDURE — 85610 PROTHROMBIN TIME: CPT

## 2020-12-02 PROCEDURE — 88112 CYTOPATH CELL ENHANCE TECH: CPT

## 2020-12-02 PROCEDURE — 2500000003 HC RX 250 WO HCPCS: Performed by: NURSE ANESTHETIST, CERTIFIED REGISTERED

## 2020-12-02 PROCEDURE — 2580000003 HC RX 258: Performed by: INTERNAL MEDICINE

## 2020-12-02 PROCEDURE — 93005 ELECTROCARDIOGRAM TRACING: CPT | Performed by: ANESTHESIOLOGY

## 2020-12-02 PROCEDURE — 80048 BASIC METABOLIC PNL TOTAL CA: CPT

## 2020-12-02 PROCEDURE — 3700000000 HC ANESTHESIA ATTENDED CARE: Performed by: INTERNAL MEDICINE

## 2020-12-02 PROCEDURE — 6370000000 HC RX 637 (ALT 250 FOR IP): Performed by: INTERNAL MEDICINE

## 2020-12-02 PROCEDURE — 6370000000 HC RX 637 (ALT 250 FOR IP): Performed by: ANESTHESIOLOGY

## 2020-12-02 PROCEDURE — 0B9D8ZX DRAINAGE OF RIGHT MIDDLE LUNG LOBE, VIA NATURAL OR ARTIFICIAL OPENING ENDOSCOPIC, DIAGNOSTIC: ICD-10-PCS | Performed by: INTERNAL MEDICINE

## 2020-12-02 PROCEDURE — 7100000000 HC PACU RECOVERY - FIRST 15 MIN: Performed by: INTERNAL MEDICINE

## 2020-12-02 PROCEDURE — 3609010800 HC BRONCHOSCOPY ALVEOLAR LAVAGE: Performed by: INTERNAL MEDICINE

## 2020-12-02 PROCEDURE — 87106 FUNGI IDENTIFICATION YEAST: CPT

## 2020-12-02 PROCEDURE — 3700000001 HC ADD 15 MINUTES (ANESTHESIA): Performed by: INTERNAL MEDICINE

## 2020-12-02 PROCEDURE — 2580000003 HC RX 258: Performed by: NURSE ANESTHETIST, CERTIFIED REGISTERED

## 2020-12-02 PROCEDURE — 6360000002 HC RX W HCPCS

## 2020-12-02 PROCEDURE — 1200000000 HC SEMI PRIVATE

## 2020-12-02 PROCEDURE — 93306 TTE W/DOPPLER COMPLETE: CPT

## 2020-12-02 PROCEDURE — C1887 CATHETER, GUIDING: HCPCS

## 2020-12-02 PROCEDURE — 2709999900 HC NON-CHARGEABLE SUPPLY

## 2020-12-02 PROCEDURE — 99152 MOD SED SAME PHYS/QHP 5/>YRS: CPT

## 2020-12-02 PROCEDURE — 99254 IP/OBS CNSLTJ NEW/EST MOD 60: CPT | Performed by: INTERNAL MEDICINE

## 2020-12-02 PROCEDURE — 93010 ELECTROCARDIOGRAM REPORT: CPT | Performed by: INTERNAL MEDICINE

## 2020-12-02 PROCEDURE — 87070 CULTURE OTHR SPECIMN AEROBIC: CPT

## 2020-12-02 PROCEDURE — 87102 FUNGUS ISOLATION CULTURE: CPT

## 2020-12-02 PROCEDURE — 99153 MOD SED SAME PHYS/QHP EA: CPT

## 2020-12-02 PROCEDURE — 93458 L HRT ARTERY/VENTRICLE ANGIO: CPT

## 2020-12-02 PROCEDURE — 88305 TISSUE EXAM BY PATHOLOGIST: CPT

## 2020-12-02 PROCEDURE — 31624 DX BRONCHOSCOPE/LAVAGE: CPT | Performed by: INTERNAL MEDICINE

## 2020-12-02 PROCEDURE — 2500000003 HC RX 250 WO HCPCS

## 2020-12-02 PROCEDURE — 94761 N-INVAS EAR/PLS OXIMETRY MLT: CPT

## 2020-12-02 PROCEDURE — 94640 AIRWAY INHALATION TREATMENT: CPT

## 2020-12-02 PROCEDURE — 2580000003 HC RX 258

## 2020-12-02 PROCEDURE — 36415 COLL VENOUS BLD VENIPUNCTURE: CPT

## 2020-12-02 PROCEDURE — C1894 INTRO/SHEATH, NON-LASER: HCPCS

## 2020-12-02 PROCEDURE — C1769 GUIDE WIRE: HCPCS

## 2020-12-02 PROCEDURE — 6360000002 HC RX W HCPCS: Performed by: NURSE ANESTHETIST, CERTIFIED REGISTERED

## 2020-12-02 PROCEDURE — 2500000003 HC RX 250 WO HCPCS: Performed by: ANESTHESIOLOGY

## 2020-12-02 RX ORDER — NICOTINE POLACRILEX 4 MG
15 LOZENGE BUCCAL PRN
Status: DISCONTINUED | OUTPATIENT
Start: 2020-12-02 | End: 2020-12-03 | Stop reason: HOSPADM

## 2020-12-02 RX ORDER — PROPOFOL 10 MG/ML
INJECTION, EMULSION INTRAVENOUS PRN
Status: DISCONTINUED | OUTPATIENT
Start: 2020-12-02 | End: 2020-12-02 | Stop reason: SDUPTHER

## 2020-12-02 RX ORDER — METOPROLOL TARTRATE 5 MG/5ML
INJECTION INTRAVENOUS
Status: DISPENSED
Start: 2020-12-02 | End: 2020-12-02

## 2020-12-02 RX ORDER — POLYETHYLENE GLYCOL 3350 17 G/17G
17 POWDER, FOR SOLUTION ORAL DAILY PRN
Status: DISCONTINUED | OUTPATIENT
Start: 2020-12-02 | End: 2020-12-03 | Stop reason: HOSPADM

## 2020-12-02 RX ORDER — DEXTROSE MONOHYDRATE 25 G/50ML
12.5 INJECTION, SOLUTION INTRAVENOUS PRN
Status: DISCONTINUED | OUTPATIENT
Start: 2020-12-02 | End: 2020-12-02

## 2020-12-02 RX ORDER — LIDOCAINE HYDROCHLORIDE 20 MG/ML
INJECTION, SOLUTION INFILTRATION; PERINEURAL PRN
Status: DISCONTINUED | OUTPATIENT
Start: 2020-12-02 | End: 2020-12-02 | Stop reason: SDUPTHER

## 2020-12-02 RX ORDER — PANTOPRAZOLE SODIUM 40 MG/1
40 TABLET, DELAYED RELEASE ORAL DAILY
Status: DISCONTINUED | OUTPATIENT
Start: 2020-12-02 | End: 2020-12-03 | Stop reason: HOSPADM

## 2020-12-02 RX ORDER — NICOTINE POLACRILEX 4 MG
15 LOZENGE BUCCAL PRN
Status: DISCONTINUED | OUTPATIENT
Start: 2020-12-02 | End: 2020-12-02

## 2020-12-02 RX ORDER — FENTANYL CITRATE 50 UG/ML
INJECTION, SOLUTION INTRAMUSCULAR; INTRAVENOUS PRN
Status: DISCONTINUED | OUTPATIENT
Start: 2020-12-02 | End: 2020-12-02 | Stop reason: SDUPTHER

## 2020-12-02 RX ORDER — SODIUM CHLORIDE 0.9 % (FLUSH) 0.9 %
10 SYRINGE (ML) INJECTION EVERY 12 HOURS SCHEDULED
Status: DISCONTINUED | OUTPATIENT
Start: 2020-12-02 | End: 2020-12-03 | Stop reason: HOSPADM

## 2020-12-02 RX ORDER — IPRATROPIUM BROMIDE AND ALBUTEROL SULFATE 2.5; .5 MG/3ML; MG/3ML
1 SOLUTION RESPIRATORY (INHALATION) EVERY 4 HOURS PRN
Status: DISCONTINUED | OUTPATIENT
Start: 2020-12-02 | End: 2020-12-03 | Stop reason: HOSPADM

## 2020-12-02 RX ORDER — SODIUM CHLORIDE 9 MG/ML
INJECTION, SOLUTION INTRAVENOUS
Status: COMPLETED
Start: 2020-12-02 | End: 2020-12-02

## 2020-12-02 RX ORDER — POTASSIUM CHLORIDE 20 MEQ/1
40 TABLET, EXTENDED RELEASE ORAL PRN
Status: DISCONTINUED | OUTPATIENT
Start: 2020-12-02 | End: 2020-12-03 | Stop reason: HOSPADM

## 2020-12-02 RX ORDER — INSULIN LISPRO 100 [IU]/ML
0-12 INJECTION, SOLUTION INTRAVENOUS; SUBCUTANEOUS
Status: DISCONTINUED | OUTPATIENT
Start: 2020-12-02 | End: 2020-12-03 | Stop reason: HOSPADM

## 2020-12-02 RX ORDER — SUCCINYLCHOLINE CHLORIDE 20 MG/ML
INJECTION INTRAMUSCULAR; INTRAVENOUS PRN
Status: DISCONTINUED | OUTPATIENT
Start: 2020-12-02 | End: 2020-12-02 | Stop reason: SDUPTHER

## 2020-12-02 RX ORDER — PREDNISONE 20 MG/1
40 TABLET ORAL DAILY
Status: DISCONTINUED | OUTPATIENT
Start: 2020-12-02 | End: 2020-12-03 | Stop reason: HOSPADM

## 2020-12-02 RX ORDER — DEXAMETHASONE SODIUM PHOSPHATE 4 MG/ML
INJECTION, SOLUTION INTRA-ARTICULAR; INTRALESIONAL; INTRAMUSCULAR; INTRAVENOUS; SOFT TISSUE PRN
Status: DISCONTINUED | OUTPATIENT
Start: 2020-12-02 | End: 2020-12-02 | Stop reason: SDUPTHER

## 2020-12-02 RX ORDER — TAMSULOSIN HYDROCHLORIDE 0.4 MG/1
0.4 CAPSULE ORAL DAILY
Status: DISCONTINUED | OUTPATIENT
Start: 2020-12-02 | End: 2020-12-03 | Stop reason: HOSPADM

## 2020-12-02 RX ORDER — GLIMEPIRIDE 2 MG/1
1 TABLET ORAL
Status: DISCONTINUED | OUTPATIENT
Start: 2020-12-03 | End: 2020-12-03 | Stop reason: HOSPADM

## 2020-12-02 RX ORDER — DEXTROSE MONOHYDRATE 25 G/50ML
12.5 INJECTION, SOLUTION INTRAVENOUS PRN
Status: DISCONTINUED | OUTPATIENT
Start: 2020-12-02 | End: 2020-12-03 | Stop reason: HOSPADM

## 2020-12-02 RX ORDER — FINASTERIDE 5 MG/1
5 TABLET, FILM COATED ORAL DAILY
Status: DISCONTINUED | OUTPATIENT
Start: 2020-12-02 | End: 2020-12-03 | Stop reason: HOSPADM

## 2020-12-02 RX ORDER — DEXTROSE MONOHYDRATE 50 MG/ML
100 INJECTION, SOLUTION INTRAVENOUS PRN
Status: DISCONTINUED | OUTPATIENT
Start: 2020-12-02 | End: 2020-12-03 | Stop reason: HOSPADM

## 2020-12-02 RX ORDER — GLYCOPYRROLATE 0.2 MG/ML
INJECTION INTRAMUSCULAR; INTRAVENOUS PRN
Status: DISCONTINUED | OUTPATIENT
Start: 2020-12-02 | End: 2020-12-02 | Stop reason: SDUPTHER

## 2020-12-02 RX ORDER — EPINEPHRINE 0.1 MG/ML
SYRINGE (ML) INJECTION
Status: COMPLETED | OUTPATIENT
Start: 2020-12-02 | End: 2020-12-02

## 2020-12-02 RX ORDER — DILTIAZEM HYDROCHLORIDE 5 MG/ML
5 INJECTION INTRAVENOUS ONCE
Status: COMPLETED | OUTPATIENT
Start: 2020-12-02 | End: 2020-12-02

## 2020-12-02 RX ORDER — MAGNESIUM SULFATE IN WATER 40 MG/ML
2 INJECTION, SOLUTION INTRAVENOUS PRN
Status: DISCONTINUED | OUTPATIENT
Start: 2020-12-02 | End: 2020-12-03 | Stop reason: HOSPADM

## 2020-12-02 RX ORDER — SODIUM CHLORIDE 9 MG/ML
INJECTION, SOLUTION INTRAVENOUS CONTINUOUS PRN
Status: DISCONTINUED | OUTPATIENT
Start: 2020-12-02 | End: 2020-12-02 | Stop reason: SDUPTHER

## 2020-12-02 RX ORDER — FENTANYL CITRATE 50 UG/ML
INJECTION, SOLUTION INTRAMUSCULAR; INTRAVENOUS
Status: DISPENSED
Start: 2020-12-02 | End: 2020-12-02

## 2020-12-02 RX ORDER — LISINOPRIL 5 MG/1
5 TABLET ORAL DAILY
Status: DISCONTINUED | OUTPATIENT
Start: 2020-12-03 | End: 2020-12-03 | Stop reason: HOSPADM

## 2020-12-02 RX ORDER — LIDOCAINE HYDROCHLORIDE 40 MG/ML
SOLUTION TOPICAL PRN
Status: DISCONTINUED | OUTPATIENT
Start: 2020-12-02 | End: 2020-12-02 | Stop reason: HOSPADM

## 2020-12-02 RX ORDER — ONDANSETRON 2 MG/ML
INJECTION INTRAMUSCULAR; INTRAVENOUS PRN
Status: DISCONTINUED | OUTPATIENT
Start: 2020-12-02 | End: 2020-12-02 | Stop reason: SDUPTHER

## 2020-12-02 RX ORDER — PIOGLITAZONEHYDROCHLORIDE 15 MG/1
30 TABLET ORAL DAILY
Status: DISCONTINUED | OUTPATIENT
Start: 2020-12-02 | End: 2020-12-03 | Stop reason: HOSPADM

## 2020-12-02 RX ORDER — ALLOPURINOL 300 MG/1
300 TABLET ORAL DAILY
Status: DISCONTINUED | OUTPATIENT
Start: 2020-12-02 | End: 2020-12-03 | Stop reason: HOSPADM

## 2020-12-02 RX ORDER — TRAMADOL HYDROCHLORIDE 50 MG/1
50 TABLET ORAL 2 TIMES DAILY PRN
Status: DISCONTINUED | OUTPATIENT
Start: 2020-12-02 | End: 2020-12-03 | Stop reason: HOSPADM

## 2020-12-02 RX ORDER — PROMETHAZINE HYDROCHLORIDE 25 MG/1
12.5 TABLET ORAL EVERY 6 HOURS PRN
Status: DISCONTINUED | OUTPATIENT
Start: 2020-12-02 | End: 2020-12-03 | Stop reason: HOSPADM

## 2020-12-02 RX ORDER — INSULIN LISPRO 100 [IU]/ML
0-6 INJECTION, SOLUTION INTRAVENOUS; SUBCUTANEOUS NIGHTLY
Status: DISCONTINUED | OUTPATIENT
Start: 2020-12-02 | End: 2020-12-03 | Stop reason: HOSPADM

## 2020-12-02 RX ORDER — ONDANSETRON 2 MG/ML
4 INJECTION INTRAMUSCULAR; INTRAVENOUS EVERY 6 HOURS PRN
Status: DISCONTINUED | OUTPATIENT
Start: 2020-12-02 | End: 2020-12-03 | Stop reason: HOSPADM

## 2020-12-02 RX ORDER — BUDESONIDE AND FORMOTEROL FUMARATE DIHYDRATE 80; 4.5 UG/1; UG/1
2 AEROSOL RESPIRATORY (INHALATION) 2 TIMES DAILY
Status: DISCONTINUED | OUTPATIENT
Start: 2020-12-02 | End: 2020-12-03 | Stop reason: HOSPADM

## 2020-12-02 RX ORDER — ALBUTEROL SULFATE 90 UG/1
1 AEROSOL, METERED RESPIRATORY (INHALATION) EVERY 4 HOURS PRN
Status: DISCONTINUED | OUTPATIENT
Start: 2020-12-02 | End: 2020-12-03 | Stop reason: HOSPADM

## 2020-12-02 RX ORDER — ACETAMINOPHEN 650 MG/1
650 SUPPOSITORY RECTAL EVERY 6 HOURS PRN
Status: DISCONTINUED | OUTPATIENT
Start: 2020-12-02 | End: 2020-12-03 | Stop reason: HOSPADM

## 2020-12-02 RX ORDER — VITAMIN B COMPLEX
1000 TABLET ORAL DAILY
Status: DISCONTINUED | OUTPATIENT
Start: 2020-12-02 | End: 2020-12-03 | Stop reason: HOSPADM

## 2020-12-02 RX ORDER — FENTANYL CITRATE 50 UG/ML
25 INJECTION, SOLUTION INTRAMUSCULAR; INTRAVENOUS EVERY 5 MIN PRN
Status: DISCONTINUED | OUTPATIENT
Start: 2020-12-02 | End: 2020-12-02 | Stop reason: HOSPADM

## 2020-12-02 RX ORDER — ACETAMINOPHEN 325 MG/1
650 TABLET ORAL EVERY 6 HOURS PRN
Status: DISCONTINUED | OUTPATIENT
Start: 2020-12-02 | End: 2020-12-03 | Stop reason: HOSPADM

## 2020-12-02 RX ORDER — SODIUM CHLORIDE 0.9 % (FLUSH) 0.9 %
10 SYRINGE (ML) INJECTION PRN
Status: DISCONTINUED | OUTPATIENT
Start: 2020-12-02 | End: 2020-12-03 | Stop reason: HOSPADM

## 2020-12-02 RX ORDER — SODIUM CHLORIDE 9 MG/ML
INJECTION, SOLUTION INTRAVENOUS CONTINUOUS
Status: DISCONTINUED | OUTPATIENT
Start: 2020-12-02 | End: 2020-12-03 | Stop reason: HOSPADM

## 2020-12-02 RX ORDER — POTASSIUM CHLORIDE 7.45 MG/ML
10 INJECTION INTRAVENOUS PRN
Status: DISCONTINUED | OUTPATIENT
Start: 2020-12-02 | End: 2020-12-03 | Stop reason: HOSPADM

## 2020-12-02 RX ADMIN — FENTANYL CITRATE 25 MCG: 50 INJECTION, SOLUTION INTRAMUSCULAR; INTRAVENOUS at 09:44

## 2020-12-02 RX ADMIN — Medication 2 PUFF: at 20:48

## 2020-12-02 RX ADMIN — SODIUM CHLORIDE 1000 ML: 9 INJECTION, SOLUTION INTRAVENOUS at 10:00

## 2020-12-02 RX ADMIN — PHENYLEPHRINE HYDROCHLORIDE 200 MCG: 10 INJECTION INTRAVENOUS at 08:17

## 2020-12-02 RX ADMIN — ONDANSETRON 4 MG: 2 INJECTION INTRAMUSCULAR; INTRAVENOUS at 07:58

## 2020-12-02 RX ADMIN — PROPOFOL 200 MG: 10 INJECTION, EMULSION INTRAVENOUS at 07:51

## 2020-12-02 RX ADMIN — DEXAMETHASONE SODIUM PHOSPHATE 8 MG: 4 INJECTION, SOLUTION INTRAMUSCULAR; INTRAVENOUS at 08:06

## 2020-12-02 RX ADMIN — PHENYLEPHRINE HYDROCHLORIDE 200 MCG: 10 INJECTION INTRAVENOUS at 08:02

## 2020-12-02 RX ADMIN — PROPOFOL 100 MG: 10 INJECTION, EMULSION INTRAVENOUS at 07:43

## 2020-12-02 RX ADMIN — FENTANYL CITRATE 25 MCG: 50 INJECTION, SOLUTION INTRAMUSCULAR; INTRAVENOUS at 09:08

## 2020-12-02 RX ADMIN — TAMSULOSIN HYDROCHLORIDE 0.4 MG: 0.4 CAPSULE ORAL at 21:30

## 2020-12-02 RX ADMIN — GLYCOPYRROLATE 0.2 MG: 0.2 INJECTION INTRAMUSCULAR; INTRAVENOUS at 07:41

## 2020-12-02 RX ADMIN — Medication 10 ML: at 21:31

## 2020-12-02 RX ADMIN — RACEPINEPHRINE HYDROCHLORIDE 11.25 MG: 11.25 SOLUTION RESPIRATORY (INHALATION) at 09:14

## 2020-12-02 RX ADMIN — SODIUM CHLORIDE: 9 INJECTION, SOLUTION INTRAVENOUS at 06:35

## 2020-12-02 RX ADMIN — PHENYLEPHRINE HYDROCHLORIDE 200 MCG: 10 INJECTION INTRAVENOUS at 07:59

## 2020-12-02 RX ADMIN — EPINEPHRINE 1 MG: 0.1 INJECTION, SOLUTION ENDOTRACHEAL; INTRACARDIAC; INTRAVENOUS at 07:51

## 2020-12-02 RX ADMIN — FINASTERIDE 5 MG: 5 TABLET, FILM COATED ORAL at 21:30

## 2020-12-02 RX ADMIN — PIOGLITAZONE 30 MG: 15 TABLET ORAL at 21:30

## 2020-12-02 RX ADMIN — PHENYLEPHRINE HYDROCHLORIDE 200 MCG: 10 INJECTION INTRAVENOUS at 08:05

## 2020-12-02 RX ADMIN — PREDNISONE 40 MG: 20 TABLET ORAL at 19:33

## 2020-12-02 RX ADMIN — SUCCINYLCHOLINE CHLORIDE 60 MG: 20 INJECTION, SOLUTION INTRAMUSCULAR; INTRAVENOUS at 07:45

## 2020-12-02 RX ADMIN — FENTANYL CITRATE 50 MCG: 50 INJECTION INTRAMUSCULAR; INTRAVENOUS at 07:42

## 2020-12-02 RX ADMIN — PANTOPRAZOLE SODIUM 40 MG: 40 TABLET, DELAYED RELEASE ORAL at 21:30

## 2020-12-02 RX ADMIN — FAMOTIDINE 20 MG: 10 INJECTION INTRAVENOUS at 07:40

## 2020-12-02 RX ADMIN — INSULIN LISPRO 10 UNITS: 100 INJECTION, SOLUTION INTRAVENOUS; SUBCUTANEOUS at 18:23

## 2020-12-02 RX ADMIN — PHENYLEPHRINE HYDROCHLORIDE 200 MCG: 10 INJECTION INTRAVENOUS at 08:11

## 2020-12-02 RX ADMIN — LIDOCAINE HYDROCHLORIDE 100 MG: 20 INJECTION, SOLUTION INFILTRATION; PERINEURAL at 07:42

## 2020-12-02 RX ADMIN — Medication 1000 UNITS: at 21:29

## 2020-12-02 RX ADMIN — SODIUM CHLORIDE: 9 INJECTION, SOLUTION INTRAVENOUS at 07:35

## 2020-12-02 RX ADMIN — IOPAMIDOL 108 ML: 755 INJECTION, SOLUTION INTRAVENOUS at 14:33

## 2020-12-02 RX ADMIN — ALLOPURINOL 300 MG: 300 TABLET ORAL at 21:29

## 2020-12-02 RX ADMIN — INSULIN LISPRO 3 UNITS: 100 INJECTION, SOLUTION INTRAVENOUS; SUBCUTANEOUS at 21:31

## 2020-12-02 RX ADMIN — PHENYLEPHRINE HYDROCHLORIDE 200 MCG: 10 INJECTION INTRAVENOUS at 08:08

## 2020-12-02 RX ADMIN — PHENYLEPHRINE HYDROCHLORIDE 200 MCG: 10 INJECTION INTRAVENOUS at 08:14

## 2020-12-02 RX ADMIN — SUCCINYLCHOLINE CHLORIDE 140 MG: 20 INJECTION, SOLUTION INTRAMUSCULAR; INTRAVENOUS at 07:51

## 2020-12-02 RX ADMIN — DILTIAZEM HYDROCHLORIDE 5 MG: 5 INJECTION INTRAVENOUS at 08:53

## 2020-12-02 ASSESSMENT — PAIN SCALES - GENERAL
PAINLEVEL_OUTOF10: 6
PAINLEVEL_OUTOF10: 0
PAINLEVEL_OUTOF10: 5

## 2020-12-02 ASSESSMENT — PULMONARY FUNCTION TESTS
PIF_VALUE: 30
PIF_VALUE: 22
PIF_VALUE: 5
PIF_VALUE: 1
PIF_VALUE: 21
PIF_VALUE: 22
PIF_VALUE: 16
PIF_VALUE: 5
PIF_VALUE: 3
PIF_VALUE: 1
PIF_VALUE: 30
PIF_VALUE: 4
PIF_VALUE: 15
PIF_VALUE: 4
PIF_VALUE: 18
PIF_VALUE: 33
PIF_VALUE: 16
PIF_VALUE: 21
PIF_VALUE: 16
PIF_VALUE: 21
PIF_VALUE: 1
PIF_VALUE: 5
PIF_VALUE: 5
PIF_VALUE: 6
PIF_VALUE: 69
PIF_VALUE: 6
PIF_VALUE: 22
PIF_VALUE: 28
PIF_VALUE: 19
PIF_VALUE: 1
PIF_VALUE: 1
PIF_VALUE: 15
PIF_VALUE: 4
PIF_VALUE: 1
PIF_VALUE: 4
PIF_VALUE: 15
PIF_VALUE: 16
PIF_VALUE: 1
PIF_VALUE: 6
PIF_VALUE: 1
PIF_VALUE: 5
PIF_VALUE: 7
PIF_VALUE: 4
PIF_VALUE: 15

## 2020-12-02 ASSESSMENT — PAIN DESCRIPTION - DESCRIPTORS: DESCRIPTORS: PRESSURE

## 2020-12-02 ASSESSMENT — PAIN - FUNCTIONAL ASSESSMENT: PAIN_FUNCTIONAL_ASSESSMENT: 0-10

## 2020-12-02 ASSESSMENT — PAIN DESCRIPTION - PAIN TYPE: TYPE: ACUTE PAIN

## 2020-12-02 NOTE — H&P
Pt seen and examined. Dyspnea and chest tightness with mediastinal/hilar adenopathy + lung nodules on CT ?granulomatous lung disease. Bronchoscopy with lavage planned. Please see note from 11/25 for details, no changes.

## 2020-12-02 NOTE — ANESTHESIA PRE PROCEDURE
Department of Anesthesiology  Preprocedure Note       Name:  Rodger Mondragon   Age:  72 y.o.  :  1955                                          MRN:  [id number]         Date:  2020      Surgeon: Bernadette Olmedo):  Elie White MD    Procedure: Procedure(s):  BRONCHOSCOPY ENDOBRONCHIAL ULTRASOUND    Medications prior to admission:   Prior to Admission medications    Medication Sig Start Date End Date Taking? Authorizing Provider   ALBUTEROL SULFATE HFA IN Inhale into the lungs as needed   Yes Historical Provider, MD   pioglitazone (ACTOS) 30 MG tablet TAKE 1 TABLET BY MOUTH EVERY DAY 20  Yes Santa Vang MD   ramipril (ALTACE) 2.5 MG capsule TAKE 1 CAPSULE BY MOUTH EVERY DAY 20  Yes Santa Vang MD   fluticasone-salmeterol (ADVAIR DISKUS) 100-50 MCG/DOSE diskus inhaler Inhale 1 puff into the lungs 2 times daily 20  Yes Elie White MD   glimepiride (AMARYL) 1 MG tablet TAKE 1 TABLET BY MOUTH IN THE MORNING before breakfast 20  Yes Santa Vang MD   allopurinol (ZYLOPRIM) 300 MG tablet Take 1 tablet by mouth daily 20  Yes Santa Vang MD   traMADol (ULTRAM) 50 MG tablet Take 50 mg by mouth 2 times daily as needed for Pain.    Yes Historical Provider, MD   vitamin D3 (CHOLECALCIFEROL) 25 MCG (1000 UT) TABS tablet Take 1,000 Units by mouth daily   Yes Historical Provider, MD   finasteride (PROSCAR) 5 MG tablet Take 5 mg by mouth daily   Yes Historical Provider, MD   tamsulosin (FLOMAX) 0.4 MG capsule Take 0.4 mg by mouth daily   Yes Historical Provider, MD   diclofenac (VOLTAREN) 50 MG EC tablet Take 1 tablet by mouth 2 times daily 20  Yes Santa Vang MD   pantoprazole (PROTONIX) 40 MG tablet Take 1 tablet by mouth daily 20  Yes Santa Vang MD   metFORMIN (GLUCOPHAGE) 1000 MG tablet Take 1 tablet by mouth 2 times daily 20  Yes Santa Vang MD   simvastatin (ZOCOR) 40 MG tablet TAKE 1 TABLET BY MOUTH AT BEDTIME 7/8/20  Yes Eloisa Miranda MD   diclofenac sodium (VOLTAREN) 1 % GEL APPLY 2 GRAMS TO THE AFFECTED AREA TWICE DAILY 4/20/20  Yes Eloisa Miranda MD   ipratropium-albuterol (DUONEB) 0.5-2.5 (3) MG/3ML SOLN nebulizer solution Inhale 3 mLs into the lungs every 4 hours as needed for Shortness of Breath Dx: Asthma  ICD-10: J45.909 11/25/20   Veronica Parish MD   traMADol (ULTRAM) 50 MG tablet Take 1 tablet by mouth 2 times daily for 90 days. 10/21/20 1/19/21  Eloisa Miranda MD   FREESTYLE LANCETS MISC 1 each by Does not apply route daily 2/15/17   Eloisa Miranda MD   Blood Glucose Monitoring Suppl (FREESTYLE LITE) CLAUDINE 1 Device by Does not apply route 2 times daily 2/15/17   Eloisa Miranda MD       Current medications:    No current facility-administered medications for this encounter.         Allergies:  No Known Allergies    Problem List:    Patient Active Problem List   Diagnosis Code    Essential hypertension I10    Generalized osteoarthrosis, involving multiple sites M15.9    Pure hypercholesterolemia E78.00    Obstructive sleep apnea syndrome G47.33    Allergic rhinitis J30.9    Pseudogout M11.20    Melanoma in situ of lower leg ([facility] D03.70    Type 2 diabetes mellitus with diabetic polyneuropathy, without long-term current use of insulin ([facility]) E11.42    Eczema L30.9    Dyshidrotic eczema L30.1    GERD without esophagitis K21.9    Non morbid obesity, unspecified obesity type E66.9    Moderate persistent asthma without complication W76.50       Past Medical History:        Diagnosis Date    Abdominal pain, LLQ     Allergic rhinitis     Asthma     Atopic dermatitis     Cancer ([address]     melanoma    Diabetes mellitus ([address]     Erectile dysfunction     Generalized osteoarthrosis, involving multiple sites     GERD (gastroesophageal reflux disease)     Gynecomastia     Hyperlipidemia     Nasal congestion     Nipple pain     Obstructive sleep apnea syndrome 12/02/2020    MCV 86.6 12/02/2020    RDW 16.4 12/02/2020     12/02/2020       CMP:   Lab Results   Component Value Date     11/11/2020    K 4.4 11/11/2020     11/11/2020    CO2 29 11/11/2020    BUN 20 11/11/2020    CREATININE 0.7 11/11/2020    GFRAA >60 11/11/2020    AGRATIO 1.6 09/05/2019    LABGLOM >60 11/11/2020    LABGLOM 92 06/08/2018    GLUCOSE 288 11/11/2020    PROT 6.4 09/05/2019    PROT 6.2 12/08/2012    CALCIUM 8.9 11/11/2020    BILITOT 0.4 09/05/2019    ALKPHOS 84 09/05/2019    AST 22 09/05/2019    ALT 28 09/05/2019       POC Tests:   Recent Labs     12/02/20  0653   POCGLU 178*       Coags:   Lab Results   Component Value Date    PROTIME 12.3 09/24/2018    INR 1.08 09/24/2018       HCG (If Applicable): No results found for: PREGTESTUR, PREGSERUM, HCG, HCGQUANT     ABGs: No results found for: PHART, PO2ART, LCE5OVL, MHZ9BYV, BEART, E0HXKGRN     Type & Screen (If Applicable):  No results found for: LABABO, LABRH    Drug/Infectious Status (If Applicable):  No results found for: HIV, HEPCAB    COVID-19 Screening (If Applicable):   Lab Results   Component Value Date    COVID19 NOT DETECTED 11/25/2020         Anesthesia Evaluation  Patient summary reviewed and Nursing notes reviewed no history of anesthetic complications:   Airway: Mallampati: II        Dental:          Pulmonary:   (+) COPD:  sleep apnea:  asthma:                            Cardiovascular:  Exercise tolerance: poor (<4 METS),   (+) hypertension:,                   Neuro/Psych:   (+) neuromuscular disease:,             GI/Hepatic/Renal:   (+) GERD:,           Endo/Other:    (+) Diabetes, .                  Abdominal:           Vascular:                                        Anesthesia Plan      MAC     ASA 3                                 Meghna Milton MD   12/2/2020

## 2020-12-02 NOTE — CODE DOCUMENTATION
During intubation, patient turned blue and lost pulse. CPR initiated at [phone] for one minute before pulse returned. One round of epi given. Patient now stable, with 7.5 ET tube in place. Plan to proceed to with outpatient EBUS. See anesthesia charting.

## 2020-12-02 NOTE — PRE SEDATION
Left 3-1-2013    Incision & Drainage of left Thumb Infection    FOOT SURGERY Right     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    HAND SURGERY  2011    LUMBAR NERVE BLOCK Right 12/12/2018    RIGHT L4/L5 LUMBAR INTERLAMINAR EPIDURAL STEROID INJECTION WITH FLUOROSCOPY performed by Susy Holm MD at [address] [place] [address] Way right side post    [place] NJX DX/THER SBST INTRLMNR LMBR/SAC W/IMG GDN Right 9/24/2018    RIGHT L4/L5 INTERLAMINAR EPIDURAL STEROID INJECTION WITH FLUOROSCOPY performed by [facility]y Holm MD at [facility]         Medications:  Current Facility-Administered Medications   Medication Dose Route Frequency Provider Last Rate Last Dose    0.9 % sodium chloride infusion   Intravenous Continuous Logan Bird MD   Stopped at 12/02/20 1000    racepinephrine HCl (VAPONEFPRIN) 2.25 % nebulizer solution NEBU             racepinephrine HCl (VAPONEFPRIN) 2.25 % nebulizer solution NEBU 11.25 mg  11.25 mg Nebulization Q4H PRN Logan Bird MD   11.25 mg at 12/02/20 0914    metoprolol (LOPRESSOR) 5 MG/5ML injection             fentaNYL (SUBLIMAZE) 100 MCG/2ML injection             albuterol sulfate  (90 Base) MCG/ACT inhaler 1 puff  1 puff Inhalation Q4H PRN Terra Rios MD        allopurinol (ZYLOPRIM) tablet 300 mg  300 mg Oral Daily Terra Rios MD        finasteride (PROSCAR) tablet 5 mg  5 mg Oral Daily Terra Rios MD        budesonide-formoterol (SYMBICORT) 80-4.5 MCG/ACT inhaler 2 puff  2 puff Inhalation BID MD Heri Watti Ashly Tavera ON 12/3/2020] glimepiride (AMARYL) tablet 1 mg  1 mg Oral Daily with breakfast Terra Rios MD        ipratropium-albuterol (DUONEB) nebulizer solution 3 mL  1 vial Inhalation Q4H PRN Terra Rios MD        pantoprazole (PROTONIX) tablet 40 mg  40 mg Oral Daily Terra Rios MD        tamsulosin (FLOMAX) capsule 0.4 mg  0.4 mg Oral Daily Terra Rios MD        traMADol (ULTRAM) tablet 50 mg  50 mg Oral BID PRN Terra Rios MD

## 2020-12-02 NOTE — CONSULTS
father; High Blood Pressure in his mother; High Cholesterol in his father; Osteoporosis in his mother; Parkinsonism in his father. Home Medications:  Were reviewed and are listed in nursing record. and/or listed below  Prior to Admission medications    Medication Sig Start Date End Date Taking? Authorizing Provider   ALBUTEROL SULFATE HFA IN Inhale into the lungs as needed   Yes Historical Provider, MD   pioglitazone (ACTOS) 30 MG tablet TAKE 1 TABLET BY MOUTH EVERY DAY 11/25/20  Yes Albaro Yanes MD   ramipril (ALTACE) 2.5 MG capsule TAKE 1 CAPSULE BY MOUTH EVERY DAY 11/23/20  Yes Albaro Yanes MD   fluticasone-salmeterol (ADVAIR DISKUS) 100-50 MCG/DOSE diskus inhaler Inhale 1 puff into the lungs 2 times daily 11/4/20  Yes Toma Medeiros MD   glimepiride (AMARYL) 1 MG tablet TAKE 1 TABLET BY MOUTH IN THE MORNING before breakfast 9/29/20  Yes Albaro Yanes MD   allopurinol (ZYLOPRIM) 300 MG tablet Take 1 tablet by mouth daily 7/13/20  Yes Albaro Yanes MD   traMADol (ULTRAM) 50 MG tablet Take 50 mg by mouth 2 times daily as needed for Pain.    Yes Historical Provider, MD   vitamin D3 (CHOLECALCIFEROL) 25 MCG (1000 UT) TABS tablet Take 1,000 Units by mouth daily   Yes Historical Provider, MD   finasteride (PROSCAR) 5 MG tablet Take 5 mg by mouth daily   Yes Historical Provider, MD   tamsulosin (FLOMAX) 0.4 MG capsule Take 0.4 mg by mouth daily   Yes Historical Provider, MD   diclofenac (VOLTAREN) 50 MG EC tablet Take 1 tablet by mouth 2 times daily 7/8/20  Yes Albaro Yanes MD   pantoprazole (PROTONIX) 40 MG tablet Take 1 tablet by mouth daily 7/8/20  Yes Albaro Yanes MD   metFORMIN (GLUCOPHAGE) 1000 MG tablet Take 1 tablet by mouth 2 times daily 7/8/20  Yes Albaro Yanes MD   simvastatin (ZOCOR) 40 MG tablet TAKE 1 TABLET BY MOUTH AT BEDTIME 7/8/20  Yes Albaro Yanes MD   diclofenac sodium (VOLTAREN) 1 % GEL APPLY 2 GRAMS TO THE AFFECTED AREA TWICE DAILY 4/20/20  Yes Rhiannon Campos MD   ipratropium-albuterol (DUONEB) 0.5-2.5 (3) MG/3ML SOLN nebulizer solution Inhale 3 mLs into the lungs every 4 hours as needed for Shortness of Breath Dx: Asthma  ICD-10: J45.909 11/25/20   Saida Howell MD   traMADol (ULTRAM) 50 MG tablet Take 1 tablet by mouth 2 times daily for 90 days. 10/21/20 1/19/21  Rhiannon Campos MD   FREESTYLE LANCETS MISC 1 each by Does not apply route daily 2/15/17   Rhiannon Campos MD   Blood Glucose Monitoring Suppl (FREESTYLE LITE) CLAUDINE 1 Device by Does not apply route 2 times daily 2/15/17   Rhiannon Campos MD        Current Medications:  Current Facility-Administered Medications   Medication Dose Route Frequency Provider Last Rate Last Dose    0.9 % sodium chloride infusion   Intravenous Continuous Rachelle Rowland MD   Stopped at 12/02/20 1000    lidocaine (XYLOCAINE) 4 % external solution    PRN Saida Howell MD   5 mL at 12/02/20 0812    racepinephrine HCl (VAPONEFPRIN) 2.25 % nebulizer solution NEBU             racepinephrine HCl (VAPONEFPRIN) 2.25 % nebulizer solution NEBU 11.25 mg  11.25 mg Nebulization Q4H PRN Rachelle Rowland MD   11.25 mg at 12/02/20 0914    metoprolol (LOPRESSOR) 5 MG/5ML injection             fentaNYL (SUBLIMAZE) injection 25 mcg  25 mcg Intravenous Q5 Min PRN Rachelle Rowland MD   25 mcg at 12/02/20 0944    fentaNYL (SUBLIMAZE) 100 MCG/2ML injection             racepinephrine HCl (VAPONEFPRIN) 2.25 % nebulizer solution NEBU 11.25 mg  11.25 mg Nebulization Once Rachelle Rowland MD            Allergies:  Patient has no known allergies. Review of Systems:     · Constitutional: there has been no unanticipated weight loss. There's been no change in energy level, sleep pattern, or activity level. · Eyes: No visual changes or diplopia. No scleral icterus. · ENT: No Headaches, hearing loss or vertigo. No mouth sores or sore throat.   · Cardiovascular: Reviewed in HPI  · Respiratory: No cough or wheezing, no sputum production. No hematemesis. · Gastrointestinal: No abdominal pain, appetite loss, blood in stools. No change in bowel or bladder habits. · Genitourinary: No dysuria, trouble voiding, or hematuria. · Musculoskeletal:  No gait disturbance, weakness or joint complaints. · Integumentary: No rash or pruritis. · Neurological: No headache, diplopia, change in muscle strength, numbness or tingling. No change in gait, balance, coordination, mood, affect, memory, mentation, behavior. · Psychiatric: No anxiety, no depression. · Endocrine: No malaise, fatigue or temperature intolerance. No excessive thirst, fluid intake, or urination. No tremor. · Hematologic/Lymphatic: No abnormal bruising or bleeding, blood clots or swollen lymph nodes. · Allergic/Immunologic: No nasal congestion or hives.   ·     Physical Examination:    Vitals:    12/02/20 1030   BP: 126/71   Pulse: 95   Resp: 16   Temp:    SpO2: 96%    Weight: 222 lb (100.7 kg)         General Appearance:  Alert, cooperative, no distress, appears stated age   Head:  Normocephalic, without obvious abnormality, atraumatic   Eyes:  PERRL, conjunctiva/corneas clear       Nose: Nares normal, no drainage or sinus tenderness   Throat: Lips, mucosa, and tongue normal   Neck: Supple, symmetrical, trachea midline, no adenopathy, thyroid: not enlarged, symmetric, no tenderness/mass/nodules, no carotid bruit or JVD       Lungs:   Clear to auscultation bilaterally, respirations unlabored   Chest Wall:  No tenderness or deformity   Heart:  IRRegular rate and rhythm, S1, S2 normal, no murmur, rub or gallop   Abdomen:   Soft, non-tender, bowel sounds active all four quadrants,  no masses, no organomegaly           Extremities: Extremities normal, atraumatic, no cyanosis or edema   Pulses: 2+ and symmetric   Skin: Skin color, texture, turgor normal, no rashes or lesions   Pysch: Normal mood and affect   Neurologic: Normal gross motor and sensory exam. Labs  CBC:   Lab Results   Component Value Date    WBC 6.8 12/02/2020    RBC 3.93 12/02/2020    HGB 11.1 12/02/2020    HCT 34.0 12/02/2020    MCV 86.6 12/02/2020    RDW 16.4 12/02/2020     12/02/2020     CMP:    Lab Results   Component Value Date     11/11/2020    K 4.4 11/11/2020     11/11/2020    CO2 29 11/11/2020    BUN 20 11/11/2020    CREATININE 0.7 11/11/2020    GFRAA >60 11/11/2020    AGRATIO 1.6 09/05/2019    LABGLOM >60 11/11/2020    LABGLOM 92 06/08/2018    GLUCOSE 288 11/11/2020    PROT 6.4 09/05/2019    PROT 6.2 12/08/2012    CALCIUM 8.9 11/11/2020    BILITOT 0.4 09/05/2019    ALKPHOS 84 09/05/2019    AST 22 09/05/2019    ALT 28 09/05/2019     PT/INR:  No results found for: PTINR  No results found for: CKTOTAL, CKMB, CKMBINDEX, TROPONINI    EKG:  I have reviewed EKG with the following interpretation:  Impression:  Atrial fibrillation with rapid ventricular response with premature ventricular or aberrantly conducted complexesNon-specific intra-ventricular conduction delayT wave abnormality, consider lateral ischemiaAbnormal       All testing and labs listed below were personally reviewed. Assessment  Patient Active Problem List   Diagnosis    Essential hypertension    Generalized osteoarthrosis, involving multiple sites    Pure hypercholesterolemia    Obstructive sleep apnea syndrome    Allergic rhinitis    Pseudogout    Melanoma in situ of lower leg ([facility] [address]    Type 2 diabetes mellitus with diabetic polyneuropathy, without long-term current use of insulin (HCC)    Eczema    Dyshidrotic eczema    GERD without esophagitis    Non morbid obesity, unspecified obesity type    Moderate persistent asthma without complication    Mediastinal adenopathy    Multiple lung nodules on CT         Plan:    I had the opportunity to review the clinical symptoms and presentation of Les Sheehan. Assessment/Plan:  Active Problems:    Cardiac arrest: PEA arrest likely from hypoxia. Full neurologic recovery. New onset of Afib. Check echo. Based on these findings I recommend left heart cath for definitive evaluation of coronary arteries. Risks, benefits, expectations, and alternative treatments were discussed. Questions appropriately answered. Peri Hightower agrees to proceed and verbalized understanding. If cath normal consult EP for afib. I will address the patient's cardiac risk factors and adjusted pharmacologic treatment as needed. In addition, I have reinforced the need for patient directed risk factor modification. Tobacco use was discussed with the patient and educated on the negative effects. I have asked the patient to not utilize these agents. Thank you for allowing to us to participate in the care or [facility]. Further evaluation will be based upon the patient's clinical course and testing results. All questions and concerns were addressed to the patient/family. Alternatives to my treatment were discussed. The note was completed using EMR. Every effort was made to ensure accuracy; however, inadvertent computerized transcription errors may be present.     Rebekah Price MD 12/2/2020 11:07 AM

## 2020-12-02 NOTE — PROGRESS NOTES
Reviewed problem list, assessment, H&P, and checklist preoperative. Scope verified using 2 person system. Family in waiting room.

## 2020-12-02 NOTE — PROGRESS NOTES
Active chest pain 5/10 described as \"pressure\" Dr Douglas Medina informed new ordered ,cardiolgy informed.

## 2020-12-02 NOTE — H&P
HOSPITALISTS HISTORY AND PHYSICAL    12/2/2020 11:51 AM    Patient Information:  Jennifer Mena is a 72 y.o. male [id number]  PCP:  Gena Corbett MD (Tel: [phone] )    Chief complaint: Cardiac arrest during bronchoscopy    History of Present Illness:  Juni Pastor is a 72 y.o. male with history of asthma, type II DM, hypertension, hyperlipidemia, SYLVIE, atopic dermatitis, cancer who had a cardiac arrest during bronchoscopy today morning. As per EMR, patient has history of dyspnea, chest tightness, cough with mucoid phlegm as outpatient. Pt had a CT scan showed multiple nodules. Patient came for elective bronchoscopy as outpatient today morning. Prior to procedure, patient was a difficult intubation with intermittent hypoxia. Patient went into cardiac arrest. Bronchoscope had to be removed without biopsy, and only BAL. CPR was initiated and lasted briefly. Patient received a dose of epi. Achieved ROSC. Was noted to be in A. fib with RVR. Cardiology was consulted. Dr. Francisca Hudson took the patient to Cath Lab. Coronary angiography with nonobstructive coronary artery disease. LVEF of 40% and no regional wall motion abnormalities. Patient eventually converted to normal sinus rhythm spontaneously. Being admitted for overnight observation.     History obtained from patient, EMR notes and Dr. Negin Shaffer. REVIEW OF SYSTEMS:   Constitutional: Negative for fever,chills or night sweats  ENT: Negative for rhinorrhea, epistaxis, hoarseness, sore throat.   Respiratory: Positive for shortness of breath, negative for wheezing  Cardiovascular: Positive for chest tightness, negative for palpitations   Gastrointestinal: Negative for nausea, vomiting, diarrhea  Genitourinary: Negative for polyuria, dysuria   Hematologic/Lymphatic: Negative for bleeding tendency, easy bruising  Musculoskeletal: Negative for myalgias and arthralgias  Neurologic: Negative for confusion,dysarthria. Skin: Negative for itching,rash  Psychiatric: Negative for depression,anxiety, agitation. Endocrine: Negative for polydipsia,polyuria,heat /cold intolerance. Past Medical History:   has a past medical history of Abdominal pain, LLQ, Allergic rhinitis, Asthma, Atopic dermatitis, Cancer ([facility] [address] Diabetes mellitus ([facility] [address] Erectile dysfunction, Generalized osteoarthrosis, involving multiple sites, GERD (gastroesophageal reflux disease), Gynecomastia, Hyperlipidemia, Nasal congestion, Nipple pain, Obstructive sleep apnea syndrome, Pseudo-gout, and Unspecified essential hypertension. Past Surgical History:   has a past surgical history that includes Hand surgery (2011); other surgical history (2002); Finger surgery (Left, 3-1-2013); Foot surgery (Right); Carpal tunnel release (Bilateral, 2013); pr njx dx/ther sbst intrlmnr lmbr/sac w/img gdn (Right, 9/24/2018); and lumbar nerve block (Right, 12/12/2018). Medications:  No current facility-administered medications on file prior to encounter. Current Outpatient Medications on File Prior to Encounter   Medication Sig Dispense Refill    ALBUTEROL SULFATE HFA IN Inhale into the lungs as needed      pioglitazone (ACTOS) 30 MG tablet TAKE 1 TABLET BY MOUTH EVERY DAY 90 tablet 1    ramipril (ALTACE) 2.5 MG capsule TAKE 1 CAPSULE BY MOUTH EVERY DAY 30 capsule 5    fluticasone-salmeterol (ADVAIR DISKUS) 100-50 MCG/DOSE diskus inhaler Inhale 1 puff into the lungs 2 times daily 1 Inhaler 5    glimepiride (AMARYL) 1 MG tablet TAKE 1 TABLET BY MOUTH IN THE MORNING before breakfast 90 tablet 0    allopurinol (ZYLOPRIM) 300 MG tablet Take 1 tablet by mouth daily 90 tablet 1    traMADol (ULTRAM) 50 MG tablet Take 50 mg by mouth 2 times daily as needed for Pain.       vitamin D3 (CHOLECALCIFEROL) 25 MCG (1000 UT) TABS tablet Take 1,000 Units by mouth daily      finasteride (PROSCAR) appears comfortable. Eyes: Sclera clear, pupils equal  ENT: Moist mucus membranes, no thrush. Trachea midline. Cardiovascular: Regular rhythm, normal S1, S2. No murmur, gallop, rub. No edema in lower extremities  Respiratory: Clear to auscultation bilaterally, no wheeze, good inspiratory effort  Gastrointestinal: Abdomen soft, non-tender, not distended, normal bowel sounds  Musculoskeletal: No cyanosis in digits, neck supple  Neurology: Cranial nerves grossly intact. Alert and oriented in time, place and person. No speech or motor deficits  Psychiatry: Appropriate affect. Not agitated  Skin: Warm, dry, normal turgor, no rash  Brisk capillary refill, peripheral pulses palpable     Labs:  CBC:   Lab Results   Component Value Date    WBC 6.8 12/02/2020    RBC 3.93 12/02/2020    HGB 11.1 12/02/2020    HCT 34.0 12/02/2020    MCV 86.6 12/02/2020    MCH 28.3 12/02/2020    MCHC 32.6 12/02/2020    RDW 16.4 12/02/2020     12/02/2020    MPV 8.0 12/02/2020     BMP:    Lab Results   Component Value Date     11/11/2020    K 4.4 11/11/2020     11/11/2020    CO2 29 11/11/2020    BUN 20 11/11/2020    CREATININE 0.7 11/11/2020    CALCIUM 8.9 11/11/2020    GFRAA >60 11/11/2020    LABGLOM >60 11/11/2020    LABGLOM 92 06/08/2018    GLUCOSE 288 11/11/2020     No orders to display     Chest Xray: Not indicated  EKG: A. fib with RVR  I visualized CXR images and EKG strips  Discussed case  with Pulmonologist.     Problem List  Active Problems:    Mediastinal adenopathy    Multiple lung nodules on CT    Cardiac arrest [facility])  Resolved Problems:    * No resolved hospital problems. *        Assessment/Plan:     S/p cardiac arrest:  Etiology unclear. Likely due to prolonged hypoxia. ROSC in ED 2 to 3 minutes. No electrolyte abnormalities noted on labs. Initially noted to be in A. fib with RVR. Spontaneously converted to normal sinus rhythm. Cardiology consulted. Dr. Richard Glaser performed cath today, 12/2.   Cardiac cath showed nonobstructive coronary artery disease. Plan to admit patient and observe on telemetry overnight. Shortness of breath, asthma:  Patient had symptoms suggestive of exacerbation occasionally. Currently stable respiratory status. I spoke with Dr. Jose Velasco about the pt. Given difficult airway/intubation and due to concern for airway edema, starting patient on prednisone 40 mg once daily as per pulmonology recommendations. Plan to discharge patient on tapering dose of steroids. Continue with Advair, albuterol and DuoNeb breathing treatments. Type II DM:  Resumed on patient's oral medications. POC blood sugars and sliding scale insulin to cover. HTN:   BP is controlled. On lisinopril 5 mg p.o. daily. Other comorbidities: Hyperlipidemia, SYLVIE, atopic dermatitis    DVT prophylaxis Lovenox subcu daily  Code status full code  Diet cardiac, low carb diet  IV access peripheral IV  Ha Catheter no    Admit as inpatient. Cardiology consulted, underwent cardiac cath. I anticipate hospitalization spanning less than two midnights for investigation and treatment of the above medically necessary diagnoses. Please note that some part of this chart was generated using Dragon dictation software. Although every effort was made to ensure the accuracy of this automated transcription, some errors in transcription may have occurred inadvertently. If you may need any clarification, please do not hesitate to contact me through [facility].        Abdirashid España MD    12/2/2020 11:51 AM

## 2020-12-02 NOTE — CONSULTS
[facility]   Electrophysiology Consultation   Date: 12/2/2020  Reason for Consultation: Atrial fibrillation    Consult Requesting Physician:  Dr. Silvia Glover MD     CC: Shortness of breath   HPI: Les Sheehan is a 72 y.o. male past medical history significant for obesity, obstructive sleep apnea on CPAP, history of asthma, who was seen by pulmonary for dyspnea and chest tightness and mediastinal/hilar adenopathy. CT scan showed lung nodules. Patient plan for bronchoscopy and had significant event during bronchoscopy. Reportedly it was a difficult intubation and difficult to pass ET tube through the vocal cords per anesthesia. 3 attempts were made and patient had hypoxemia and brief PEA arrest requiring CPR and epinephrine with ROSC within 1 minute of CPR. Patient has undergone coronary angiography with no significant epicardial coronary artery disease. He had developed atrial fibrillation and now has been converted back to sinus rhythm. Patient reports no prior history of atrial arrhythmia. He however has had palpitation for the past 3-month. He feels fluttering in his chest which lasts about 5 minutes. He is has had these intermittent episodes more frequently recently. No prior history of syncope. Compliant with his CPAP. Patient is obese. No prior history of stroke. Electrophysiology team has been consulted to assess for atrial fibrillation. Assessment and plan:   - Paroxysmal atrial fibrillation:    New diagnosis   No prior history of atrial arrhythmia. Atrial fibrillation is likely secondary to hypoxemia and PEA arrest.  Patient is now converted back to sinus rhythm. Coronary angiography with no significant epicardial coronary artery disease per interventional cardiology     However echocardiography shows abnormal regional wall motion abnormality, LVH.   This could be related to standing secondary to PAF hypoxemia     I do not recommend anticoagulation at this time   Metoprolol   Will discharge patient home with 30-day outpatient Holter monitoring to assess for atrial fibrillation. Patient does have evidence of conduction system disorder with IVCD on his ECG.    - PEA arrest secondary to hypoxemia:    S/p brief CPR   Now stable. - Obesity: Body mass index is 32.78 kg/m². - SYLVIE on CPAP. - Possible pulmonary HTN:    Per CT scan. Likely related to obesity and SYLVIE    Risk factor modifications. Discussed with referring physician. Diagnostic studies:   ECG: Atrial fibrillation, IVCD    Echo: Ejection fraction is approximately 40%. Regional wall motion abnormality in inferior and apical walls. Mild aortic regurgitation and sclerotic aortic valve. -CT chest with no pulmonary embolism. Multiple noncalcified pulmonary nodules with hilar and mediastinal lymphadenopathy. Cardiomegaly with probable pulmonary venous hypertension per CT scan. Left adrenal adenoma. I independently reviewed the cardiac diagnostic studies, ECG and relevant imaging studies. Physical Examination:  Vitals:    20 1145   BP: 126/64   Pulse: 94   Resp: 16   Temp:    SpO2: 95%      In: 900 [I.V.:900]  Out: -    Wt Readings from Last 3 Encounters:   20 222 lb (100.7 kg)   10/21/20 230 lb 6 oz (104.5 kg)   20 226 lb (102.5 kg)     Temp  Av.3 °F (36.3 °C)  Min: 97 °F (36.1 °C)  Max: 97.5 °F (36.4 °C)  Pulse  Av.8  Min: 56  Max: 170  BP  Min: 86/52  Max: 231/143  SpO2  Av.3 %  Min: 22 %  Max: 100 %  FiO2   Av.3 %  Min: 27 %  Max: 99 %    Intake/Output Summary (Last 24 hours) at 2020 1541  Last data filed at 2020 1599  Gross per 24 hour   Intake 900 ml   Output --   Net 900 ml         I independently reviewed all cardiac tracing from cardiac telemetry. · Constitutional: Oriented. No distress. · Head: Normocephalic and atraumatic.    · Mouth/Throat: Oropharynx is clear and moist.   · Eyes: Conjunctivae normal. EOM are normal. · Neck: Neck supple. + JVD present. · Cardiovascular: Normal rate, regular rhythm, S2&S4.  + systolic murmur  · Pulmonary/Chest: Bilateral respiratory sounds. No rhonchi.  + obese   · Abdominal: Soft. No tenderness. · Musculoskeletal: No tenderness. No edema    · Lymphadenopathy: Has no cervical adenopathy. · Neurological: Alert and oriented. Follows command, No Gross deficit   · Skin: Skin is warm, No rash noted. · Psychiatric: Has a normal behavior       Scheduled Meds:   racepinephrine HCl        metoprolol        fentaNYL        allopurinol  300 mg Oral Daily    finasteride  5 mg Oral Daily    budesonide-formoterol  2 puff Inhalation BID    [START ON 12/3/2020] glimepiride  1 mg Oral Daily with breakfast    pantoprazole  40 mg Oral Daily    tamsulosin  0.4 mg Oral Daily    Vitamin D  1,000 Units Oral Daily    sodium chloride flush  10 mL Intravenous 2 times per day    enoxaparin  40 mg Subcutaneous Daily    [START ON 12/3/2020] lisinopril  5 mg Oral Daily    predniSONE  40 mg Oral Daily     Continuous Infusions:   sodium chloride Stopped (12/02/20 1000)    dextrose       PRN Meds:.racepinephrine HCl, albuterol sulfate HFA, ipratropium-albuterol, traMADol, glucose, dextrose, glucagon (rDNA), dextrose, sodium chloride flush, acetaminophen **OR** acetaminophen, polyethylene glycol, promethazine **OR** ondansetron, potassium chloride **OR** potassium alternative oral replacement **OR** potassium chloride, magnesium sulfate     Review of System:  [x] Full ROS obtained and negative except as mentioned in HPI    Prior to Admission medications    Medication Sig Start Date End Date Taking?  Authorizing Provider   ALBUTEROL SULFATE HFA IN Inhale into the lungs as needed   Yes Historical Provider, MD   pioglitazone (ACTOS) 30 MG tablet TAKE 1 TABLET BY MOUTH EVERY DAY 11/25/20  Yes Gena Corbett MD   ramipril (ALTACE) 2.5 MG capsule TAKE 1 CAPSULE BY MOUTH EVERY DAY 11/23/20  Yes Lorrie Crawford MD Lola   fluticasone-salmeterol (ADVAIR DISKUS) 100-50 MCG/DOSE diskus inhaler Inhale 1 puff into the lungs 2 times daily 11/4/20  Yes Dinesh Minor MD   glimepiride (AMARYL) 1 MG tablet TAKE 1 TABLET BY MOUTH IN THE MORNING before breakfast 9/29/20  Yes Travis Patel MD   allopurinol (ZYLOPRIM) 300 MG tablet Take 1 tablet by mouth daily 7/13/20  Yes Travis Patel MD   traMADol (ULTRAM) 50 MG tablet Take 50 mg by mouth 2 times daily as needed for Pain. Yes Historical Provider, MD   vitamin D3 (CHOLECALCIFEROL) 25 MCG (1000 UT) TABS tablet Take 1,000 Units by mouth daily   Yes Historical Provider, MD   finasteride (PROSCAR) 5 MG tablet Take 5 mg by mouth daily   Yes Historical Provider, MD   tamsulosin (FLOMAX) 0.4 MG capsule Take 0.4 mg by mouth daily   Yes Historical Provider, MD   diclofenac (VOLTAREN) 50 MG EC tablet Take 1 tablet by mouth 2 times daily 7/8/20  Yes Travis Patel MD   pantoprazole (PROTONIX) 40 MG tablet Take 1 tablet by mouth daily 7/8/20  Yes Travis Patel MD   metFORMIN (GLUCOPHAGE) 1000 MG tablet Take 1 tablet by mouth 2 times daily 7/8/20  Yes Travis Patel MD   simvastatin (ZOCOR) 40 MG tablet TAKE 1 TABLET BY MOUTH AT BEDTIME 7/8/20  Yes Travis Patel MD   diclofenac sodium (VOLTAREN) 1 % GEL APPLY 2 GRAMS TO THE AFFECTED AREA TWICE DAILY 4/20/20  Yes Travis Patel MD   ipratropium-albuterol (DUONEB) 0.5-2.5 (3) MG/3ML SOLN nebulizer solution Inhale 3 mLs into the lungs every 4 hours as needed for Shortness of Breath Dx: Asthma  ICD-10: J45.909 11/25/20   Dinesh Minor MD   traMADol (ULTRAM) 50 MG tablet Take 1 tablet by mouth 2 times daily for 90 days.  10/21/20 1/19/21  Travis Patel MD   FREESTYLE LANCETS MISC 1 each by Does not apply route daily 2/15/17   Travis Patel MD   Blood Glucose Monitoring Suppl (FREESTYLE LITE) CLAUDINE 1 Device by Does not apply route 2 times daily 2/15/17   Travis Patel MD Past Medical History:   Diagnosis Date    Abdominal pain, LLQ     Allergic rhinitis     Asthma     Atopic dermatitis     Cancer ([facility] [address]     melanoma    Diabetes mellitus ([facility] [address]     Erectile dysfunction     Generalized osteoarthrosis, involving multiple sites     GERD (gastroesophageal reflux disease)     Gynecomastia     Hyperlipidemia     Nasal congestion     Nipple pain     Obstructive sleep apnea syndrome 10/5/2012    Pseudo-gout     Unspecified essential hypertension         Past Surgical History:   Procedure Laterality Date    BRONCHOSCOPY N/A 12/2/2020    BRONCHOSCOPY ALVEOLAR LAVAGE performed by [facility] Bilateral 2013    FINGER SURGERY Left 3-1-2013    Incision & Drainage of left Thumb Infection    FOOT SURGERY Right     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    HAND SURGERY  2011    LUMBAR NERVE BLOCK Right 12/12/2018    RIGHT L4/L5 LUMBAR INTERLAMINAR EPIDURAL STEROID INJECTION WITH FLUOROSCOPY performed by Yuli Bazan MD at [address] [place] [address] Way right side post    ND NJX DX/THER SBST INTRLMNR LMBR/SAC W/IMG GDN Right 9/24/2018    RIGHT L4/L5 INTERLAMINAR EPIDURAL STEROID INJECTION WITH FLUOROSCOPY performed by Yuli Bazan MD at [facility]       No Known Allergies    Social History:  Reviewed. reports that he quit smoking about 27 years ago. His smoking use included cigarettes. He has a 10.00 pack-year smoking history. He has never used smokeless tobacco. He reports that he does not drink alcohol or use drugs. Family History:  Reviewed. Reviewed. No family history of SCD. Relevant and available labs, and cardiovascular diagnostics reviewed. Reviewed. No results for input(s): NA, K, CL, CO2, PHOS, BUN, CREATININE in the last 72 hours.     Invalid input(s): CA  Recent Labs     12/02/20  0635   WBC 6.8   HGB 11.1*   HCT 34.0*   MCV 86.6        CrCl cannot be calculated (Patient's most recent lab result is older than the maximum 10 days allowed. ). No results found for: BNP      I independently reviewed all cardiac tracing from cardiac telemetry. I independently reviewed relevant and available cardiac diagnostic tests ECG, CXR, Echo, Stress test, Device interrogation, Holter, CT scan. Complex medical condition with multiple medical problems affecting prognosis and outcome of EP interventions    All questions and concerns were addressed to the patient/family. Alternatives to my treatment were discussed. I have discussed the above stated plan and the patient verbalized understanding and agreed with the plan. NOTE: This report was transcribed using voice recognition software. Every effort was made to ensure accuracy, however, inadvertent computerized transcription errors may be present.      Riaz Cash MD, MPH  [facility]   Office: [phone]

## 2020-12-02 NOTE — SIGNIFICANT EVENT
Pt was a difficult intubation- difficult to pass ET tube through the vocal cords per anesthesia, requiring attempts x 3 with Lebron by anesthesia. Brief PEA arrest requiring CPR/epinephrine 1mg. ROSC with hemodynamic stability within 1 minute of CPR. Respiratory arrest from hypoxia/difficult intubation. Only bronchoscopy with lavage was performed. It was decided not to pursue EBUS due to above complications and need for ET tube exchange for a larger size ET tube - inspection of the vocal cords revealed minimal trauma and swelling. Pt's wife was informed about the above events. Pt is now in new onset A fibrillation with /min. Cardizem 5mg x1. Might require overnight observation.

## 2020-12-02 NOTE — CONSULTS
[facility] Pulmonary and Critical Care   Consult Note      Reason for Consult: Respiratory arrest prior to bronchoscopy requiring admission  Requesting Physician: Hernandez Benton    Subjective:   CHIEF COMPLAINT: Respiratory arrest     HPI: Patient is well-known to me from office for history of asthma-was lately noted to have 2 to 3-month history of worsening shortness of breath and chest tightness. CT chest revealed multiple bilateral small pulmonary nodules along with mediastinal/hilar adenopathy ? Granulomatous lung disease. Bronchoscopy/EBUS was planned today-was complicated by respiratory arrest due to difficulty with intubation. CPR was performed for approximately 1 minute, 1 mg of epinephrine was administered with ROSC. Ultimately patient was successfully intubated-bronchoscopy revealed airway erythema/edema with no significant secretions. EBUS part of the procedure was abandoned due to respiratory arrest.  Subsequently patient was noted to have new onset atrial fibrillation in recovery-hence decision was made to admit the patient. History of mild persistent asthma-has recently completed a course of antibiotics and prednisone. Currently on Advair, albuterol and DuoNeb breathing treatments at home. Former smoker. 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        The patient is a 72 y.o. male with significant past medical history of:      Diagnosis Date    Abdominal pain, LLQ     Allergic rhinitis     Asthma     Atopic dermatitis     [facility]     melanoma    Diabetes mellitus ([facility]     Erectile dysfunction     Generalized osteoarthrosis, involving multiple sites     GERD (gastroesophageal reflux disease)     Gynecomastia     Hyperlipidemia     Nasal congestion     Nipple pain     Obstructive sleep apnea syndrome 10/5/2012    Pseudo-gout     Unspecified essential hypertension         Past Surgical History:        Procedure Laterality Date    BRONCHOSCOPY N/A 12/2/2020    BRONCHOSCOPY ALVEOLAR LAVAGE performed by Elias Craig MD at [facility] Bilateral 2013    FINGER SURGERY Left 3-1-2013    Incision & Drainage of left Thumb Infection    FOOT SURGERY Right     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    HAND SURGERY  2011    LUMBAR NERVE BLOCK Right 12/12/2018    RIGHT L4/L5 LUMBAR INTERLAMINAR EPIDURAL STEROID INJECTION WITH FLUOROSCOPY performed by Nathan Cerna MD at [address] [place] [address] Way right side post    AR NJX DX/THER SBST INTRLMNR LMBR/SAC W/IMG GDN Right 9/24/2018    RIGHT L4/L5 INTERLAMINAR EPIDURAL STEROID INJECTION WITH FLUOROSCOPY performed by Nathan Cerna MD at [facility]     Current Medications:    Current Facility-Administered Medications: 0.9 % sodium chloride infusion, , Intravenous, Continuous  racepinephrine HCl (VAPONEFPRIN) 2.25 % nebulizer solution NEBU, , ,   racepinephrine HCl (VAPONEFPRIN) 2.25 % nebulizer solution NEBU 11.25 mg, 11.25 mg, Nebulization, Q4H PRN  metoprolol (LOPRESSOR) 5 MG/5ML injection, , ,   fentaNYL (SUBLIMAZE) 100 MCG/2ML injection, , ,   albuterol sulfate  (90 Base) MCG/ACT inhaler 1 puff, 1 puff, Inhalation, Q4H PRN  allopurinol (ZYLOPRIM) tablet 300 mg, 300 mg, Oral, Daily  finasteride (PROSCAR) tablet 5 mg, 5 mg, Oral, Daily  budesonide-formoterol (SYMBICORT) 80-4.5 MCG/ACT inhaler 2 puff, 2 puff, Inhalation, BID  [START ON 12/3/2020] glimepiride (AMARYL) tablet 1 mg, 1 mg, Oral, Daily with breakfast  ipratropium-albuterol (DUONEB) nebulizer solution 3 mL, 1 vial, Inhalation, Q4H PRN  pantoprazole (PROTONIX) tablet 40 mg, 40 mg, Oral, Daily  tamsulosin (FLOMAX) capsule 0.4 mg, 0.4 mg, Oral, Daily  traMADol (ULTRAM) tablet 50 mg, 50 mg, Oral, BID PRN  Vitamin D (CHOLECALCIFEROL) tablet 1,000 Units, 1,000 Units, Oral, Daily  dextrose 5 % solution, 100 mL/hr, Intravenous, PRN  sodium chloride flush 0.9 % injection 10 mL, 10 mL, Intravenous, 2 times per day  sodium chloride flush 0.9 % injection 10 mL, 10 mL, Intravenous, PRN  acetaminophen (TYLENOL) tablet 650 mg, 650 mg, Oral, Q6H PRN **OR** acetaminophen (TYLENOL) suppository 650 mg, 650 mg, Rectal, Q6H PRN  polyethylene glycol (GLYCOLAX) packet 17 g, 17 g, Oral, Daily PRN  promethazine (PHENERGAN) tablet 12.5 mg, 12.5 mg, Oral, Q6H PRN **OR** ondansetron (ZOFRAN) injection 4 mg, 4 mg, Intravenous, Q6H PRN  enoxaparin (LOVENOX) injection 40 mg, 40 mg, Subcutaneous, Daily  potassium chloride (KLOR-CON M) extended release tablet 40 mEq, 40 mEq, Oral, PRN **OR** potassium bicarb-citric acid (EFFER-K) effervescent tablet 40 mEq, 40 mEq, Oral, PRN **OR** potassium chloride 10 mEq/100 mL IVPB (Peripheral Line), 10 mEq, Intravenous, PRN  magnesium sulfate 2 g in 50 mL IVPB premix, 2 g, Intravenous, PRN  [START ON 12/3/2020] lisinopril (PRINIVIL;ZESTRIL) tablet 5 mg, 5 mg, Oral, Daily  predniSONE (DELTASONE) tablet 40 mg, 40 mg, Oral, Daily  metFORMIN (GLUCOPHAGE) tablet 1,000 mg, 1,000 mg, Oral, BID  pioglitazone (ACTOS) tablet 30 mg, 1 tablet, Oral, Daily  glucose (GLUTOSE) 40 % oral gel 15 g, 15 g, Oral, PRN  dextrose 50 % IV solution, 12.5 g, Intravenous, PRN  glucagon (rDNA) injection 1 mg, 1 mg, Intramuscular, PRN  dextrose 5 % solution, 100 mL/hr, Intravenous, PRN  insulin lispro (HUMALOG) injection vial 0-12 Units, 0-12 Units, Subcutaneous, TID WC  insulin lispro (HUMALOG) injection vial 0-6 Units, 0-6 Units, Subcutaneous, Nightly    No Known Allergies    Social History:    TOBACCO:   reports that he quit smoking about 27 years ago. His smoking use included cigarettes. He has a 10.00 pack-year smoking history. He has never used smokeless tobacco.  ETOH:   reports no history of alcohol use.   Patient currently lives independently    Family History:       Problem Relation Age of Onset    Arthritis Mother     High Blood Pressure Mother     Emphysema Mother     Osteoporosis Mother     Diabetes Father     Heart Disease Father     High Cholesterol Father     Parkinsonism Father     Diabetes Sister        REVIEW OF SYSTEMS:    Constitutional: negative for fatigue, fevers, malaise and weight loss  Ears, nose, mouth, throat: negative for ear drainage, epistaxis, hoarseness, nasal congestion, sore throat and voice change  Respiratory: negative except for shortness of breath and chest tightness  Cardiovascular: negative for chest pain, chest pressure/discomfort, irregular heart beat, lower extremity edema and palpitations  Gastrointestinal: negative for abdominal pain, constipation, diarrhea, jaundice, melena, odynophagia, reflux symptoms and vomiting  Hematologic/lymphatic: negative for bleeding, easy bruising, lymphadenopathy and petechiae  Musculoskeletal:negative for arthralgias, bone pain, muscle weakness, neck pain and stiff joints  Neurological: negative for dizziness, gait problems, headaches, seizures, speech problems, tremors and weakness  Behavioral/Psych: negative for anxiety, behavior problems, depression, fatigue and sleep disturbance  Endocrine: negative for diabetic symptoms including none, neuropathy, polyphagia, polyuria, polydipsia, vomiting and diarrhea and temperature intolerance  Allergic/Immunologic: negative for anaphylaxis, angioedema, hay fever and urticaria      Objective:     Patient Vitals for the past 8 hrs:   BP Pulse Resp SpO2   12/02/20 1145 126/64 94 16 95 %   12/02/20 1130 129/75 87 16 95 %   12/02/20 1030 126/71 95 16 96 %   12/02/20 1015 130/81 101 16 95 %     I/O last 3 completed shifts: In: 900 [I.V.:900]  Out: -   No intake/output data recorded.     Physical Exam:  General Appearance: alert and oriented to person, place and time, well developed and well- nourished, in no acute distress  Skin: warm and dry, no rash or erythema  Head: normocephalic and atraumatic  Eyes: pupils equal, round, and reactive to light, extraocular eye movements intact, conjunctivae normal  ENT: external ear and ear canal normal bilaterally, nose without deformity, nasal mucosa and turbinates normal  Neck: supple and non-tender without mass, no cervical lymphadenopathy  Pulmonary/Chest: clear to auscultation bilaterally- no wheezes, rales or rhonchi, normal air movement, no respiratory distress  Cardiovascular: normal rate, regular rhythm,  no murmurs, rubs, distal pulses intact, no carotid bruits  Abdomen: soft, non-tender, non-distended, normal bowel sounds, no masses or organomegaly  Lymph Nodes: Cervical, supraclavicular normal  Extremities: no cyanosis, clubbing or edema  Musculoskeletal: normal range of motion, no joint swelling, deformity or tenderness  Neurologic: alert, no focal neurologic deficits    Data Review:  CBC:   Lab Results   Component Value Date    WBC 6.8 12/02/2020    RBC 3.93 12/02/2020     BMP:   Lab Results   Component Value Date    GLUCOSE 288 11/11/2020    CO2 29 11/11/2020    BUN 20 11/11/2020    CREATININE 0.7 11/11/2020    CALCIUM 8.9 11/11/2020     ABG: No results found for: FCN3IHT, BEART, X5VTSDOY, PHART, THGBART, MIK9MEJ, PO2ART, MGV4GUK    Radiology: All pertinent images / reports were reviewed as a part of this visit. Narrative    EXAMINATION:    CTA OF THE CHEST 11/11/2020 8:52 am         TECHNIQUE:    CTA of the chest was performed after the administration of intravenous    contrast.  Multiplanar reformatted images are provided for review.  MIP    images are provided for review.  Dose modulation, iterative reconstruction,    and/or weight based adjustment of the mA/kV was utilized to reduce the    radiation dose to as low as reasonably achievable.         COMPARISON:    None.         HISTORY:    ORDERING SYSTEM PROVIDED HISTORY: SOB (shortness of breath)    TECHNOLOGIST PROVIDED HISTORY:    Reason for exam:->r/o pulmonary embolism    Reason for Exam: SOB for 6 months, pt states no recent surgeries or procedures    Acuity: Unknown    Type of Exam: Unknown         FINDINGS:    Pulmonary Arteries: Pulmonary arteries are adequately opacified.  No emboli    are demonstrated         Mediastinum: Mildly enlarged mediastinal and bilateral hilar lymph nodes. Thoracic aorta normal in caliber.  Cardiomegaly.  No pericardial effusion         Lungs/pleura: Prominent non dependent pulmonary vessels.  No suspicious    infiltrate or edema.  Numerous noncalcified pulmonary nodules throughout both    lungs, measuring up to 1 cm in the right lower lobe.  No pleural effusion         Upper Abdomen: 1.8 cm low-attenuation left adrenal nodule         Soft Tissues/Bones: No acute or suspicious bony abnormality              Impression    1. No evidence of pulmonary embolism    2. Multiple noncalcified pulmonary nodules with mildly enlarged bilateral    hilar and mediastinal lymph nodes.  Primary differential is metastatic    disease and granulomatous infection    3. Cardiomegaly with probable pulmonary venous hypertension    4. Left adrenal adenoma        Problem List:   Respiratory arrest/difficult intubation  Paroxysmal atrial fibrillation  Mild asthma with acute exacerbation  Assessment/Plan:     Respiratory arrest due to hypoxia from difficult intubation requiring multiple attempts. Patient is now awake and alert, no evidence of neurologic compromise. No stridor or wheeze on exam.    Patient underwent cardiac cath today-normal coronaries as per report. EF was somewhat low at 40% with global hypokinesis on 2D echo ? Related to atrial fibrillation. Patient has now converted into normal sinus rhythm, would require repeat echocardiogram in 4 to 6 weeks. Also patient is planned for 30-day event monitoring, since he has a history of palpitations. Patient had bronchoscopy with lavage performed-findings more compatible with acute bronchitis. EBUS was planned but not performed due to respiratory events. Start prednisone 40 mg daily. Potential discharge tomorrow.     Darlene Frey MD

## 2020-12-02 NOTE — PROGRESS NOTES
Dr. Latricia Neves at bedside talking to patient and spouse plan \"stay overnight ,cardiolgy to see,steroids to treat Bronchitis\" may resume diabetic diet per Dr. Latricia Neves.

## 2020-12-03 ENCOUNTER — APPOINTMENT (OUTPATIENT)
Dept: GENERAL RADIOLOGY | Age: 65
DRG: 919 | End: 2020-12-03
Attending: INTERNAL MEDICINE
Payer: COMMERCIAL

## 2020-12-03 VITALS
HEART RATE: 71 BPM | TEMPERATURE: 97.9 F | BODY MASS INDEX: 33.67 KG/M2 | SYSTOLIC BLOOD PRESSURE: 117 MMHG | HEIGHT: 69 IN | WEIGHT: 227.29 LBS | DIASTOLIC BLOOD PRESSURE: 69 MMHG | OXYGEN SATURATION: 92 % | RESPIRATION RATE: 16 BRPM

## 2020-12-03 PROBLEM — I46.9 CARDIAC ARREST (HCC): Status: RESOLVED | Noted: 2020-12-02 | Resolved: 2020-12-03

## 2020-12-03 LAB
ALBUMIN SERPL-MCNC: 3.2 G/DL (ref 3.4–5)
ALP BLD-CCNC: 108 U/L (ref 40–129)
ALT SERPL-CCNC: 34 U/L (ref 10–40)
ANION GAP SERPL CALCULATED.3IONS-SCNC: 10 MMOL/L (ref 3–16)
AST SERPL-CCNC: 24 U/L (ref 15–37)
BASOPHILS ABSOLUTE: 0 K/UL (ref 0–0.2)
BASOPHILS RELATIVE PERCENT: 0 %
BILIRUB SERPL-MCNC: 0.4 MG/DL (ref 0–1)
BILIRUBIN DIRECT: <0.2 MG/DL (ref 0–0.3)
BILIRUBIN, INDIRECT: ABNORMAL MG/DL (ref 0–1)
BUN BLDV-MCNC: 25 MG/DL (ref 7–20)
CALCIUM SERPL-MCNC: 9 MG/DL (ref 8.3–10.6)
CHLORIDE BLD-SCNC: 104 MMOL/L (ref 99–110)
CO2: 23 MMOL/L (ref 21–32)
CREAT SERPL-MCNC: 0.7 MG/DL (ref 0.8–1.3)
EOSINOPHILS ABSOLUTE: 0 K/UL (ref 0–0.6)
EOSINOPHILS RELATIVE PERCENT: 0 %
GFR AFRICAN AMERICAN: >60
GFR NON-AFRICAN AMERICAN: >60
GLUCOSE BLD-MCNC: 306 MG/DL (ref 70–99)
GLUCOSE BLD-MCNC: 353 MG/DL (ref 70–99)
HCT VFR BLD CALC: 32 % (ref 40.5–52.5)
HEMOGLOBIN: 10.6 G/DL (ref 13.5–17.5)
LACTIC ACID: 1.1 MMOL/L (ref 0.4–2)
LYMPHOCYTES ABSOLUTE: 0.7 K/UL (ref 1–5.1)
LYMPHOCYTES RELATIVE PERCENT: 8.9 %
MCH RBC QN AUTO: 28.7 PG (ref 26–34)
MCHC RBC AUTO-ENTMCNC: 33.1 G/DL (ref 31–36)
MCV RBC AUTO: 86.6 FL (ref 80–100)
MONOCYTES ABSOLUTE: 0.4 K/UL (ref 0–1.3)
MONOCYTES RELATIVE PERCENT: 5.1 %
NEUTROPHILS ABSOLUTE: 7.2 K/UL (ref 1.7–7.7)
NEUTROPHILS RELATIVE PERCENT: 86 %
PDW BLD-RTO: 16.4 % (ref 12.4–15.4)
PERFORMED ON: ABNORMAL
PLATELET # BLD: 212 K/UL (ref 135–450)
PMV BLD AUTO: 7.8 FL (ref 5–10.5)
POTASSIUM REFLEX MAGNESIUM: 4.3 MMOL/L (ref 3.5–5.1)
RBC # BLD: 3.69 M/UL (ref 4.2–5.9)
SODIUM BLD-SCNC: 137 MMOL/L (ref 136–145)
TOTAL PROTEIN: 6.1 G/DL (ref 6.4–8.2)
WBC # BLD: 8.4 K/UL (ref 4–11)

## 2020-12-03 PROCEDURE — 94640 AIRWAY INHALATION TREATMENT: CPT

## 2020-12-03 PROCEDURE — 36415 COLL VENOUS BLD VENIPUNCTURE: CPT

## 2020-12-03 PROCEDURE — 6370000000 HC RX 637 (ALT 250 FOR IP): Performed by: NURSE PRACTITIONER

## 2020-12-03 PROCEDURE — 6370000000 HC RX 637 (ALT 250 FOR IP): Performed by: INTERNAL MEDICINE

## 2020-12-03 PROCEDURE — 99233 SBSQ HOSP IP/OBS HIGH 50: CPT | Performed by: NURSE PRACTITIONER

## 2020-12-03 PROCEDURE — 71045 X-RAY EXAM CHEST 1 VIEW: CPT

## 2020-12-03 PROCEDURE — 85025 COMPLETE CBC W/AUTO DIFF WBC: CPT

## 2020-12-03 PROCEDURE — 99233 SBSQ HOSP IP/OBS HIGH 50: CPT | Performed by: INTERNAL MEDICINE

## 2020-12-03 PROCEDURE — 80076 HEPATIC FUNCTION PANEL: CPT

## 2020-12-03 PROCEDURE — 83605 ASSAY OF LACTIC ACID: CPT

## 2020-12-03 PROCEDURE — 94761 N-INVAS EAR/PLS OXIMETRY MLT: CPT

## 2020-12-03 PROCEDURE — 84443 ASSAY THYROID STIM HORMONE: CPT

## 2020-12-03 PROCEDURE — 99232 SBSQ HOSP IP/OBS MODERATE 35: CPT | Performed by: INTERNAL MEDICINE

## 2020-12-03 PROCEDURE — 2580000003 HC RX 258: Performed by: INTERNAL MEDICINE

## 2020-12-03 PROCEDURE — 80048 BASIC METABOLIC PNL TOTAL CA: CPT

## 2020-12-03 PROCEDURE — 6360000002 HC RX W HCPCS: Performed by: INTERNAL MEDICINE

## 2020-12-03 RX ORDER — PREDNISONE 20 MG/1
TABLET ORAL
Qty: 7 TABLET | Refills: 0 | Status: SHIPPED | OUTPATIENT
Start: 2020-12-03 | End: 2020-12-08

## 2020-12-03 RX ORDER — LISINOPRIL 5 MG/1
5 TABLET ORAL DAILY
Qty: 30 TABLET | Refills: 1 | Status: ON HOLD | OUTPATIENT
Start: 2020-12-04 | End: 2021-02-13 | Stop reason: HOSPADM

## 2020-12-03 RX ADMIN — INSULIN LISPRO 6 UNITS: 100 INJECTION, SOLUTION INTRAVENOUS; SUBCUTANEOUS at 08:25

## 2020-12-03 RX ADMIN — Medication 2 PUFF: at 07:55

## 2020-12-03 RX ADMIN — FINASTERIDE 5 MG: 5 TABLET, FILM COATED ORAL at 08:16

## 2020-12-03 RX ADMIN — TRAMADOL HYDROCHLORIDE 50 MG: 50 TABLET, FILM COATED ORAL at 08:25

## 2020-12-03 RX ADMIN — ALLOPURINOL 300 MG: 300 TABLET ORAL at 08:16

## 2020-12-03 RX ADMIN — GLIMEPIRIDE 1 MG: 2 TABLET ORAL at 08:16

## 2020-12-03 RX ADMIN — TAMSULOSIN HYDROCHLORIDE 0.4 MG: 0.4 CAPSULE ORAL at 08:13

## 2020-12-03 RX ADMIN — Medication 10 ML: at 08:16

## 2020-12-03 RX ADMIN — ENOXAPARIN SODIUM 40 MG: 40 INJECTION SUBCUTANEOUS at 08:13

## 2020-12-03 RX ADMIN — PANTOPRAZOLE SODIUM 40 MG: 40 TABLET, DELAYED RELEASE ORAL at 08:16

## 2020-12-03 RX ADMIN — METOPROLOL TARTRATE 12.5 MG: 25 TABLET, FILM COATED ORAL at 13:21

## 2020-12-03 RX ADMIN — LISINOPRIL 5 MG: 5 TABLET ORAL at 08:16

## 2020-12-03 RX ADMIN — Medication 1000 UNITS: at 08:16

## 2020-12-03 RX ADMIN — PIOGLITAZONE 30 MG: 15 TABLET ORAL at 08:16

## 2020-12-03 RX ADMIN — INSULIN LISPRO 10 UNITS: 100 INJECTION, SOLUTION INTRAVENOUS; SUBCUTANEOUS at 13:20

## 2020-12-03 ASSESSMENT — PAIN DESCRIPTION - PAIN TYPE: TYPE: ACUTE PAIN

## 2020-12-03 ASSESSMENT — PAIN SCALES - GENERAL
PAINLEVEL_OUTOF10: 0
PAINLEVEL_OUTOF10: 0
PAINLEVEL_OUTOF10: 6
PAINLEVEL_OUTOF10: 0
PAINLEVEL_OUTOF10: 0
PAINLEVEL_OUTOF10: 5
PAINLEVEL_OUTOF10: 0
PAINLEVEL_OUTOF10: 0
PAINLEVEL_OUTOF10: 5

## 2020-12-03 ASSESSMENT — PAIN DESCRIPTION - LOCATION: LOCATION: BACK

## 2020-12-03 ASSESSMENT — PAIN DESCRIPTION - FREQUENCY: FREQUENCY: INTERMITTENT

## 2020-12-03 NOTE — PROGRESS NOTES
[facility] Daily Progress Note      Admit Date:  12/2/2020    No chief complaint on file. Subjective:  Mr. Trenton José denies exertional chest pain, SOB/RAUSCH, PND, palpitations, light-headedness, or edema.      Objective:   /69   Pulse 71   Temp 97.9 °F (36.6 °C) (Oral)   Resp 16   Ht 5' 9\" (1.753 m)   Wt 227 lb 4.7 oz (103.1 kg)   SpO2 92%   BMI 33.57 kg/m²       Intake/Output Summary (Last 24 hours) at 12/3/2020 1423  Last data filed at 12/2/2020 2129  Gross per 24 hour   Intake 10 ml   Output --   Net 10 ml       TELEMETRY: Sinus     Physical Exam:  General:  Awake, alert, oriented x 3, NAD  Skin:  Warm and dry  Neck:  JVD flat  Chest:  normal air entry  Cardiovascular:  RRR S1S2, no S3, no mrmr  Abdomen:  Soft, ND, NT, No HSM  Extremities:  No edema    Medications:    metoprolol tartrate  12.5 mg Oral BID    allopurinol  300 mg Oral Daily    finasteride  5 mg Oral Daily    budesonide-formoterol  2 puff Inhalation BID    glimepiride  1 mg Oral Daily with breakfast    pantoprazole  40 mg Oral Daily    tamsulosin  0.4 mg Oral Daily    Vitamin D  1,000 Units Oral Daily    sodium chloride flush  10 mL Intravenous 2 times per day    enoxaparin  40 mg Subcutaneous Daily    lisinopril  5 mg Oral Daily    predniSONE  40 mg Oral Daily    metFORMIN  1,000 mg Oral BID    pioglitazone  30 mg Oral Daily    insulin lispro  0-12 Units Subcutaneous TID WC    insulin lispro  0-6 Units Subcutaneous Nightly      sodium chloride Stopped (12/02/20 1000)    dextrose      dextrose       racepinephrine HCl, albuterol sulfate HFA, ipratropium-albuterol, traMADol, dextrose, sodium chloride flush, acetaminophen **OR** acetaminophen, polyethylene glycol, promethazine **OR** ondansetron, potassium chloride **OR** potassium alternative oral replacement **OR** potassium chloride, magnesium sulfate, glucose, dextrose, glucagon (rDNA), dextrose    Lab Data:  CBC:   Recent Labs     12/02/20  0673 12/03/20  0613   WBC 6.8 8.4   HGB 11.1* 10.6*   HCT 34.0* 32.0*   MCV 86.6 86.6    212     BMP:   Recent Labs     12/02/20  1909 12/03/20  0613    137   K 4.5 4.3    104   CO2 22 23   BUN 19 25*   CREATININE 0.6* 0.7*     LIVER PROFILE:   Recent Labs     12/03/20  0613   AST 24   ALT 34   BILIDIR <0.2   BILITOT 0.4   ALKPHOS 108     PT/INR:   Recent Labs     12/02/20  0635   PROTIME 13.1   INR 1.13     APTT:   Recent Labs     12/02/20 0635   APTT 31.4     BNP:  No results for input(s): BNP in the last 72 hours. IMAGING:  -Overall left ventricular systolic function is moderately depressed .   -Ejection fraction is visually estimated to be 40%. E/e'= 7.4 cm   -There is global hypokinesis that appears worse in the inferior and apical   walls.   -Indeterminate diastolic function.   -The aortic valve appears sclerotic but opens well. -Mild aortic regurgitation.   -Mild tricuspid regurgitation. Assessment/Plan:  Active Problems:    OK to discharge home      Low LVEF: likely stunning post CPR/arrest. Start lisinopril. No bb due to bradycardia. Check echo in 90 days. Afib: post Epi/CPR afib. Resolved. NSR with PACs/PVCs. Check 30 day monitor. If reoccurs recommend [facility].  FU with AMY Elliott MD 12/3/2020 2:23 PM

## 2020-12-03 NOTE — PROGRESS NOTES
[facility] Pulmonary/CCM Progress note      Admit Date: 12/2/2020    Chief Complaint: Respiratory arrest    Subjective: Interval History: Complains of chest pain with cough, small streaks of hemoptysis. Still has the chest tightness, denies any wheezing.     Scheduled Meds:   allopurinol  300 mg Oral Daily    finasteride  5 mg Oral Daily    budesonide-formoterol  2 puff Inhalation BID    glimepiride  1 mg Oral Daily with breakfast    pantoprazole  40 mg Oral Daily    tamsulosin  0.4 mg Oral Daily    Vitamin D  1,000 Units Oral Daily    sodium chloride flush  10 mL Intravenous 2 times per day    enoxaparin  40 mg Subcutaneous Daily    lisinopril  5 mg Oral Daily    predniSONE  40 mg Oral Daily    metFORMIN  1,000 mg Oral BID    pioglitazone  30 mg Oral Daily    insulin lispro  0-12 Units Subcutaneous TID WC    insulin lispro  0-6 Units Subcutaneous Nightly     Continuous Infusions:   sodium chloride Stopped (12/02/20 1000)    dextrose      dextrose       PRN Meds:racepinephrine HCl, albuterol sulfate HFA, ipratropium-albuterol, traMADol, dextrose, sodium chloride flush, acetaminophen **OR** acetaminophen, polyethylene glycol, promethazine **OR** ondansetron, potassium chloride **OR** potassium alternative oral replacement **OR** potassium chloride, magnesium sulfate, glucose, dextrose, glucagon (rDNA), dextrose    Review of Systems  Constitutional: negative for fatigue, fevers, malaise and weight loss  Ears, nose, mouth, throat: negative for ear drainage, epistaxis, hoarseness, nasal congestion, sore throat and voice change  Respiratory: negative except for cough and pleurisy/chest pain  Cardiovascular: negative for chest pain, chest pressure/discomfort, irregular heart beat, lower extremity edema and palpitations  Gastrointestinal: negative for abdominal pain, constipation, diarrhea, jaundice, melena, odynophagia, reflux symptoms and vomiting  Hematologic/lymphatic: negative for bleeding, easy bruising, lymphadenopathy and petechiae  Musculoskeletal:negative for arthralgias, bone pain, muscle weakness, neck pain and stiff joints  Neurological: negative for dizziness, gait problems, headaches, seizures, speech problems, tremors and weakness  Behavioral/Psych: negative for anxiety, behavior problems, depression, fatigue and sleep disturbance  Endocrine: negative for diabetic symptoms including none, neuropathy, polyphagia, polyuria, polydipsia, vomiting and diarrhea and temperature intolerance  Allergic/Immunologic: negative for anaphylaxis, angioedema, hay fever and urticaria    Objective:     Patient Vitals for the past 8 hrs:   BP Temp Temp src Pulse Resp SpO2 Weight   12/03/20 1048 107/64 97.9 °F (36.6 °C) Oral 66 16 92 % --   12/03/20 0800 (!) 152/73 98.3 °F (36.8 °C) Oral 64 20 92 % --   12/03/20 0756 -- -- -- -- 20 96 % --   12/03/20 0613 -- -- -- 72 -- -- --   12/03/20 0541 -- -- -- 50 -- -- --   12/03/20 0445 106/61 97.7 °F (36.5 °C) Axillary 50 19 95 % 227 lb 4.7 oz (103.1 kg)   12/03/20 0320 -- -- -- 55 -- -- --     I/O last 3 completed shifts: In: 910 [I.V.:910]  Out: -   No intake/output data recorded.     General Appearance: alert and oriented to person, place and time, well developed and well- nourished, in no acute distress  Skin: warm and dry, no rash or erythema  Head: normocephalic and atraumatic  Eyes: pupils equal, round, and reactive to light, extraocular eye movements intact, conjunctivae normal  ENT: external ear and ear canal normal bilaterally, nose without deformity, nasal mucosa and turbinates normal  Neck: supple and non-tender without mass, no cervical lymphadenopathy  Pulmonary/Chest: clear to auscultation bilaterally- no wheezes, rales or rhonchi, normal air movement, no respiratory distress  Cardiovascular: normal rate, regular rhythm,  no murmurs, rubs, distal pulses intact, no carotid bruits  Abdomen: soft, non-tender, non-distended, normal bowel sounds, no masses or pleural effusion         Upper Abdomen: 1.8 cm low-attenuation left adrenal nodule         Soft Tissues/Bones: No acute or suspicious bony abnormality              Impression    1. No evidence of pulmonary embolism    2. Multiple noncalcified pulmonary nodules with mildly enlarged bilateral    hilar and mediastinal lymph nodes.  Primary differential is metastatic    disease and granulomatous infection    3. Cardiomegaly with probable pulmonary venous hypertension    4. Left adrenal adenoma          Problem List:     Respiratory arrest related to hypoxia from difficult intubation  Pulmonary nodules with mediastinal adenopathy  Paroxysmal atrial fibrillation    Assessment/Plan:     Patient had a respiratory arrest related to prolonged hypoxia from difficult intubation-ROSC within 1 minute, paroxysmal atrial fibrillation-now in normal sinus rhythm. Bronchoscopy with lavage performed yesterday, await cultures. Low EF with global hypokinesis on echo, cardiac cath performed yesterday showed clean coronaries. Low EF possibly from arrest/atrial fibrillation. Will require repeat 2D echo in 1 month. Plans for outpatient event monitoring due to history of palpitations. Treat with a short course of prednisone taper x1 week. Okay for discharge from pulmonary standpoint. Patient will see me in 1 week.     Waldo Cash MD

## 2020-12-03 NOTE — DISCHARGE SUMMARY
[facility] DISCHARGE SUMMARY    Patient Demographics    PatientBarbie Thomas  Date of Birth. 1955  MRN. [id number]     Primary care provider. Rayne Keller MD  (Tel: [phone])    Admit date: 12/2/2020    Discharge date (blank if same as Note Date): Note Date: 12/3/2020     Reason for Hospitalization. Cardiac arrest during bronchoscopy      Significant Findings. Active Problems:    Mediastinal adenopathy    Multiple lung nodules on CT  Resolved Problems:    Cardiac arrest ([address]    Respiratory arrest ([address]       Problems and results from this hospitalization that need follow up. 1. 30-day Holter monitor as per Cardiology recommendations. Significant test results and incidental findings. XR CHEST PORTABLE   Final Result   Patchy ground-glass opacification in the peripheral aspect of the right mid   lung, possibly infiltrate and pneumonia, including atypical viral pneumonia. Stable cardiomegaly without overt failure. Invasive procedures and treatments. 1. Bronchoscopy  2. Cardiac cath with non-obstructive CAD on 12/2/2020    [facility]. Zelalem Thomas is a 72 y.o. male with history of asthma, type II DM, hypertension, hyperlipidemia, SYLVIE, atopic dermatitis, cancer who had a cardiac arrest during bronchoscopy on the morning of admission. As per EMR, patient has history of dyspnea, chest tightness, cough with mucoid phlegm as outpatient. Pt had a CT scan showed multiple nodules. Patient came for elective bronchoscopy as outpatient. For the procedure, patient was a difficult intubation with intermittent hypoxia. Patient went into cardiac arrest. Bronchoscope had to be removed without biopsy, and only BAL done. CPR was initiated and lasted briefly. Patient received a dose of epi. Achieved ROSC. Was noted to be in A. fib with RVR. Cardiology was consulted. Dr. Hui Reilly took the patient to Cath Lab.   Coronary angiography only showed nonobstructive coronary artery disease. LVEF of 40% and no regional wall motion abnormalities. Patient converted to normal sinus rhythm spontaneously. Admitted for overnight observation. No arrhythmias noted on telemetry. As per pulm recommendations, started on Prednisone 40 mg po for possible airway edema from trauma during intubation. Tapering Prednisone over a week as per Pulm recommendations. Started on Lisinopril 5 mg po qd as per Cardiology. 30-day Holter monitor arranged prior to d/c home as per cardiology recommendations. Consults. IP CONSULT TO CARDIOLOGY    Physical examination on discharge day. /69   Pulse 71   Temp 97.9 °F (36.6 °C) (Oral)   Resp 16   Ht 5' 9\" (1.753 m)   Wt 227 lb 4.7 oz (103.1 kg)   SpO2 92%   BMI 33.57 kg/m²   General appearance. Alert. Looks comfortable. HEENT. Sclera clear. Moist mucus membranes. Cardiovascular. Regular rate and rhythm, normal S1, S2. No murmur. Respiratory. Not using accessory muscles. Clear to auscultation bilaterally, no wheeze. Gastrointestinal. Abdomen soft, non-tender, not distended, normal bowel sounds  Neurology. Facial symmetry. No speech deficits. Moving all extremities equally. Extremities. No edema in lower extremities. Skin. Warm, dry, normal turgor    Condition at time of discharge stable    Medication instructions provided to patient at discharge. Medication List      START taking these medications    lisinopril 5 MG tablet  Commonly known as:  PRINIVIL;ZESTRIL  Take 1 tablet by mouth daily  Start taking on:  December 4, 2020     metoprolol tartrate 25 MG tablet  Commonly known as:  LOPRESSOR  Take 0.5 tablets by mouth 2 times daily     predniSONE 20 MG tablet  Commonly known as:  DELTASONE  Take 2 tablets by mouth daily for 1 day, THEN 1 tablet daily for 3 days, THEN 0.5 tablets daily for 3 days.   Start taking on:  December 3, 2020        CHANGE how you take these medications    traMADol 50 MG tablet  Commonly known as:  ULTRAM  What changed:  Another medication with the same name was removed. Continue taking this medication, and follow the directions you see here. CONTINUE taking these medications    ALBUTEROL SULFATE HFA IN  Notes to patient:  Albuterol/pratropium (DuoNeb) or Albuterol (Proventil)  Use: to open airways, reduce secretions  Side effects: nervousness, jitteriness, shakiness, headache, fast heartbeat     allopurinol 300 MG tablet  Commonly known as:  ZYLOPRIM  Take 1 tablet by mouth daily  Notes to patient:  Reduces the production of uric acid in your body. Uric acid buildup can lead to gout or kidney stones  Side effects: joint stiffness or swelling     diclofenac sodium 1 % Gel  Commonly known as:  VOLTAREN  APPLY 2 GRAMS TO THE AFFECTED AREA TWICE DAILY  Notes to patient:   For inflammation  Side effects: irritation at application site     finasteride 5 MG tablet  Commonly known as:  PROSCAR  Notes to patient:  Treats symptoms of benign prostatic hyperplasia (BPH) in men with an enlarged prostate  Side effects: chills, dizziness      fluticasone-salmeterol 100-50 MCG/DOSE diskus inhaler  Commonly known as:  Advair Diskus  Inhale 1 puff into the lungs 2 times daily  Notes to patient:  Use: to open airways, reduce secretions  Side effects: nervousness, jitteriness, shakiness, headache, fast heartbeat     FreeStyle Lancets Misc  1 each by Does not apply route daily     FreeStyle Lite Berkley  1 Device by Does not apply route 2 times daily     glimepiride 1 MG tablet  Commonly known as:  AMARYL  TAKE 1 TABLET BY MOUTH IN THE MORNING before breakfast  Notes to patient:  Keeps blood sugar from going too high  Side effects: abdominal pain, diarrhea     ipratropium-albuterol 0.5-2.5 (3) MG/3ML Soln nebulizer solution  Commonly known as:  DUONEB  Inhale 3 mLs into the lungs every 4 hours as needed for Shortness of Breath Dx: Asthma  ICD-10: J45.909  Notes to patient:  Albuterol/pratropium (DuoNeb)   Use: to open airways, thin secretions  Side effects: nervousness, jitteriness, shakiness, headache, fast heartbeat     metFORMIN 1000 MG tablet  Commonly known as:  GLUCOPHAGE  Take 1 tablet by mouth 2 times daily  Notes to patient:  Keeps blood sugar from going too high  Side effects: abdominal pain, diarrhea     pantoprazole 40 MG tablet  Commonly known as:  PROTONIX  Take 1 tablet by mouth daily  Notes to patient:  Pantoprozole/ Protonix  Use: reduces stomach acid, protects the digestive tract  Side effects: headache or diarrhea     pioglitazone 30 MG tablet  Commonly known as:  ACTOS  TAKE 1 TABLET BY MOUTH EVERY DAY  Notes to patient:  Actos may rarely cause liver damage; liver function tests are required prior to initiation and periodically thereafter; patient is asked to immediately report any unusual upper abdominal pain, nausea or vomiting or jaundice. Fluid retention may also occur; observe for weight gain or edema.      simvastatin 40 MG tablet  Commonly known as:  ZOCOR  TAKE 1 TABLET BY MOUTH AT BEDTIME  Notes to patient:  Use: lower bad cholesterol  Side effects: headache, muscle pain, constipation, diarrhea     tamsulosin 0.4 MG capsule  Commonly known as:  FLOMAX  Notes to patient:  Tamsulosin /Flomax  Use: treat an enlarged prostate or urinary retention  Side effects: Feeling dizzy, headache or low blood pressure     vitamin D3 25 MCG (1000 UT) Tabs tablet  Commonly known as:  CHOLECALCIFEROL  Notes to patient:  Dietary/vitamin supplement  Side effects: discolored urine         STOP taking these medications    diclofenac 50 MG EC tablet  Commonly known as:  VOLTAREN     ramipril 2.5 MG capsule  Commonly known as:  ALTACE           Where to Get Your Medications      These medications were sent to [address]  [facility]    Phone:  [phone]   · lisinopril 5 MG tablet  · metoprolol

## 2020-12-03 NOTE — PROGRESS NOTES
Data- discharge order received, pt verbalized agreement to discharge, disposition to previous residence, no needs for HHC/DME. Action- discharge instructions prepared and given to pt, pt verbalized understanding. Medication information packet given r/t NEW and/or CHANGED prescriptions emphasizing name/purpose/side effects, pt verbalized understanding. Discharge instruction summary: Diet- cardiac, Activity- as tolerated, Primary Care Physician as follows: Ronna Florentino -[phone] f/u appointment to be made by pt, immunizations reviewed, prescription medications filled in pt preferred pharmacy . Inpatient surgical procedure precautions reviewed: yes. Response- Pt belongings gathered, IV removed. Disposition is home (no HHC/DME needs), transported with belongings, taken to lobby via w/c w/ family, no complications.

## 2020-12-03 NOTE — PROGRESS NOTES
[facility]   Electrophysiology Progress Note     Date: 12/3/2020  Admit Date: 12/2/2020     Reason for consultation: Atrial fibrillation    Chief Complaint: \" I want to go home\"    History of Present Illness: History obtained from patient and medical record. Adonis Howard is a 72 y.o. male with a past medical history of asthma, SYLVIE, HLD, DM, HTN, and obesity. He was recently seen by pulmonary for dyspnea and chest tightness. A CT showed lung nodules. He had a significant event during bronchoscopy. His intubation was extremely difficult with hypoxia. He had brief episode of PEA requiring CPR and epinephrine. He underwent cath with no significant CAD. He developed atrial fibrillation, but converted to sinus rhythm on his own. Interval Hx: Today, he is being seen for follow up. He feels better. He remains in sinus rhythm with PVCs on telemetry. His BP is stable. We discussed the need for anti-coagulation if recurrent atrial fibrillation is noted on his outpatient monitoring. We discussed the treatment options for PVCs. Patient seen and examined. Clinical notes reviewed. Telemetry reviewed. No new complaints today. No major events overnight. Denies having chest pain, palpitations, shortness of breath, orthopnea/PND, cough, or dizziness at the time of this visit. Allergies:  No Known Allergies    Home Meds:  Prior to Visit Medications    Medication Sig Taking?  Authorizing Provider   ALBUTEROL SULFATE HFA IN Inhale into the lungs as needed Yes Historical Provider, MD   pioglitazone (ACTOS) 30 MG tablet TAKE 1 TABLET BY MOUTH EVERY DAY Yes Forrest Estrella MD   ramipril (ALTACE) 2.5 MG capsule TAKE 1 CAPSULE BY MOUTH EVERY DAY Yes Forrest Estrella MD   fluticasone-salmeterol (ADVAIR DISKUS) 100-50 MCG/DOSE diskus inhaler Inhale 1 puff into the lungs 2 times daily Yes Pratima Johnson MD   glimepiride (AMARYL) 1 MG tablet TAKE 1 TABLET BY MOUTH IN THE MORNING before breakfast Yes Shraddha Campos MD   allopurinol (ZYLOPRIM) 300 MG tablet Take 1 tablet by mouth daily Yes Shraddha aCmpos MD   traMADol (ULTRAM) 50 MG tablet Take 50 mg by mouth 2 times daily as needed for Pain. Yes Historical Provider, MD   vitamin D3 (CHOLECALCIFEROL) 25 MCG (1000 UT) TABS tablet Take 1,000 Units by mouth daily Yes Historical Provider, MD   finasteride (PROSCAR) 5 MG tablet Take 5 mg by mouth daily Yes Historical Provider, MD   tamsulosin (FLOMAX) 0.4 MG capsule Take 0.4 mg by mouth daily Yes Historical Provider, MD   diclofenac (VOLTAREN) 50 MG EC tablet Take 1 tablet by mouth 2 times daily Yes Shraddha Campos MD   pantoprazole (PROTONIX) 40 MG tablet Take 1 tablet by mouth daily Yes Shraddha Campos MD   metFORMIN (GLUCOPHAGE) 1000 MG tablet Take 1 tablet by mouth 2 times daily Yes Shraddha Campos MD   simvastatin (ZOCOR) 40 MG tablet TAKE 1 TABLET BY MOUTH AT BEDTIME Yes Shraddha Campos MD   diclofenac sodium (VOLTAREN) 1 % GEL APPLY 2 GRAMS TO THE AFFECTED AREA TWICE DAILY Yes Shraddha Campos MD   ipratropium-albuterol (DUONEB) 0.5-2.5 (3) MG/3ML SOLN nebulizer solution Inhale 3 mLs into the lungs every 4 hours as needed for Shortness of Breath Dx: Asthma  ICD-10: 185 [address] Nichole Velázquez MD   traMADol (ULTRAM) 50 MG tablet Take 1 tablet by mouth 2 times daily for 90 days.   Shraddha Campos MD   FREESTYLE LANCETS MISC 1 each by Does not apply route daily  Shraddha Campos MD   Blood Glucose Monitoring Suppl (FREESTYLE LITE) CLAUDINE 1 Device by Does not apply route 2 times daily  Shraddha Campos MD      Scheduled Meds:   metoprolol tartrate  12.5 mg Oral BID    allopurinol  300 mg Oral Daily    finasteride  5 mg Oral Daily    budesonide-formoterol  2 puff Inhalation BID    glimepiride  1 mg Oral Daily with breakfast    pantoprazole  40 mg Oral Daily    tamsulosin  0.4 mg Oral Daily    Vitamin D  1,000 Units Oral Daily    sodium chloride flush  10 mL Intravenous 2 times per day    enoxaparin  40 mg Subcutaneous Daily    lisinopril  5 mg Oral Daily    predniSONE  40 mg Oral Daily    metFORMIN  1,000 mg Oral BID    pioglitazone  30 mg Oral Daily    insulin lispro  0-12 Units Subcutaneous TID WC    insulin lispro  0-6 Units Subcutaneous Nightly     Continuous Infusions:   sodium chloride Stopped (12/02/20 1000)    dextrose      dextrose       PRN Meds:racepinephrine HCl, albuterol sulfate HFA, ipratropium-albuterol, traMADol, dextrose, sodium chloride flush, acetaminophen **OR** acetaminophen, polyethylene glycol, promethazine **OR** ondansetron, potassium chloride **OR** potassium alternative oral replacement **OR** potassium chloride, magnesium sulfate, glucose, dextrose, glucagon (rDNA), dextrose     Past Medical History:  Past Medical History:   Diagnosis Date    Abdominal pain, LLQ     Allergic rhinitis     Asthma     Atopic dermatitis     Cancer ([facility]     melanoma    Diabetes mellitus ([facility]     Erectile dysfunction     Generalized osteoarthrosis, involving multiple sites     GERD (gastroesophageal reflux disease)     Gynecomastia     Hyperlipidemia     Nasal congestion     Nipple pain     Obstructive sleep apnea syndrome 10/5/2012    Pseudo-gout     Unspecified essential hypertension       Past Surgical History:    has a past surgical history that includes Hand surgery (2011); other surgical history (2002); Finger surgery (Left, 3-1-2013); Foot surgery (Right); Carpal tunnel release (Bilateral, 2013); pr njx dx/ther sbst intrlmnr lmbr/sac w/img gdn (Right, 9/24/2018); lumbar nerve block (Right, 12/12/2018); bronchoscopy (N/A, 12/2/2020); and back surgery. Social History:  Reviewed. reports that he quit smoking about 27 years ago. His smoking use included cigarettes. He has a 10.00 pack-year smoking history. He has never used smokeless tobacco. He reports that he does not drink alcohol or use drugs. Family History:  Reviewed.  family history includes Arthritis in his mother; Diabetes in his father and sister; Emphysema in his mother; Heart Disease in his father; High Blood Pressure in his mother; High Cholesterol in his father; Osteoporosis in his mother; Parkinsonism in his father. Review of Systems:  · Constitutional: Negative for fever, night sweats, chills, weight changes, or weakness  · Skin: Negative for rash, dry skin, pruritus, bruising, bleeding, blood clots, or changes in skin pigment  · HEENT: Negative for vision changes, ringing in the ears, sore throat, dysphagia, or swollen lymph nodes  · Respiratory: Reviewed in HPI  · Cardiovascular: Reviewed in HPI  · Gastrointestinal: Negative for abdominal pain, N/V/D, constipation, or black/tarry stools  · Genito-Urinary: Negative for dysuria, incontinence, urgency, or hematuria  · Musculoskeletal: Negative for joint swelling, muscle pain, or injuries  · Neurological/Psych: Negative for confusion, seizures, headaches, balance issues or TIA-like symptoms. No anxiety, depression, or insomnia    Physical Examination:  Vitals:    12/03/20 1048   BP: 107/64   Pulse: 66   Resp: 16   Temp: 97.9 °F (36.6 °C)   SpO2: 92%      In: 10 [I.V.:10]  Out: -    Wt Readings from Last 3 Encounters:   12/03/20 227 lb 4.7 oz (103.1 kg)   10/21/20 230 lb 6 oz (104.5 kg)   07/08/20 226 lb (102.5 kg)       Intake/Output Summary (Last 24 hours) at 12/3/2020 1110  Last data filed at 12/2/2020 2129  Gross per 24 hour   Intake 10 ml   Output --   Net 10 ml       Telemetry: Personally Reviewed  - Sinus rhythm with PVC  · Constitutional: Cooperative and in no apparent distress, and appears well nourished  · Skin: Warm and pink; no pallor, cyanosis, bruising, or clubbing  · HEENT: Symmetric and normocephalic. PERRL, EOM intact. Conjunctiva pink with clear sclera. Mucus membranes pink and moist. Teeth intact. Thyroid smooth without nodules or goiter. · Cardiovascular: Regular rate and rhythm.  S1/S2 present without murmurs, rubs, or gallops. Peripheral pulses 2+, capillary refill < 3 seconds. No elevation of JVP. No peripheral edema  · Respiratory: Respirations symmetric and unlabored. Lungs clear to auscultation bilaterally, no wheezing, crackles, or rhonchi  · Gastrointestinal: Abdomen soft and round. Bowel sounds normoactive in all quadrants without tenderness or masses. · Musculoskeletal: Bilateral upper and lower extremity strength 5/5 with full ROM  · Neurologic/Psych: Awake and orientated to person, place and time. Calm affect, appropriate mood    Pertinent labs, diagnostic, device, and imaging results reviewed as a part of this visit    Labs:    BMP:   Recent Labs     20  1909 20  0613    137   K 4.5 4.3    104   CO2 22 23   BUN 19 25*   CREATININE 0.6* 0.7*     Estimated Creatinine Clearance: 125 mL/min (A) (based on SCr of 0.7 mg/dL (L)). CBC:   Recent Labs     20  0635 20  0613   WBC 6.8 8.4   HGB 11.1* 10.6*   HCT 34.0* 32.0*   MCV 86.6 86.6    212     Thyroid:   Lab Results   Component Value Date    TSH 3.21 2016     Lipids:   Lab Results   Component Value Date    CHOL 172 2019    CHOL 130 2019    HDL 42 2019    TRIG 172 2019     LFTS:   Lab Results   Component Value Date    ALT 34 2020    AST 24 2020    ALKPHOS 108 2020    PROT 6.1 2020    PROT 6.2 2012    AGRATIO 1.6 2019    BILITOT 0.4 2020     Cardiac Enzymes: No results found for: CKTOTAL, CKMB, CKMBINDEX, TROPONINI  Coags:   Lab Results   Component Value Date    PROTIME 13.1 2020    INR 1.13 2020       EC20  Atrial fibrillation with RVR    ECHO: 20   -Overall left ventricular systolic function is moderately depressed .   -Ejection fraction is visually estimated to be 40%.  E/e'= 7.4 cm   -There is global hypokinesis that appears worse in the inferior and apical walls.   -Indeterminate diastolic function.   -The aortic valve appears sclerotic but opens well. -Mild aortic regurgitation.   -Mild tricuspid regurgitation. Cath: 12/3/20  Anatomy:   LM-nml   LAD-nml  Cx-distal 20%  OM- nml  RCA-dominant, prox 20%  RPDA- nml  LVEF- 40%  LVG- global hypo  LVEDP- 9     Contrast: 108  Flouro Time: 19.5  Access: R radial a.     Impression  ~Coronary Angiography w/ nonobstructive CAD  ~LVG with LVEF of 40 and no regional wall motion abnormalities    Problem List:   Patient Active Problem List    Diagnosis Date Noted    Cardiac arrest [facility] 12/02/2020    Mediastinal adenopathy     Multiple lung nodules on CT     Respiratory arrest ([facility]     Moderate persistent asthma without complication 58/94/3866    GERD without esophagitis 09/04/2019    Non morbid obesity, unspecified obesity type 09/04/2019    Dyshidrotic eczema 06/06/2019    Eczema 04/04/2018    Type 2 diabetes mellitus with diabetic polyneuropathy, without long-term current use of insulin ([facility] 08/11/2017    Melanoma in situ of lower leg ([facility] 02/01/2017    Pseudogout 02/08/2016    Allergic rhinitis 04/03/2013    Obstructive sleep apnea syndrome 10/05/2012    Essential hypertension 10/01/2012    Generalized osteoarthrosis, involving multiple sites 10/01/2012    Pure hypercholesterolemia 10/01/2012        Assessment and Plan:     1. Paroxysmal Atrial Fibrillation  - New diagnosis occurred in setting of hypoxia/PEA arrest    - Currently in NSR  - Start metoprolol 12.5 mg BID given low resting HR    - [id number] score: 3 (Age, HTN, DM) ; [id number] Vasc score and anticoagulation discussed.   ~ No need for anti-coagulation at this time. Will start [address] if recurrence noted    - Afib risk factors including age, HTN, obesity, inactivity and SYLVIE were discussed with patient.  Risk factor modification recommended   ~ TSH: Will recheck       - Treatment options including cardioversion, rate control strategy, antiarrhythmics, anticoagulation and possible ablation were discussed with patient. Risks, benefits and alternative of each treatment options were explained. All questions answered     - Will discharge with 30 day event monitor to assess for recurrence    2. Cardiac Arrest   - Secondary to hypoxia   - [facility] with mild CAD    3. CAD  - Mild per cath (12/2/20)  - Stable  - No complaints of angina  - Continue ASA, ACEI, BB, and statin    4. PVC's  - Noted on telemetry; appears slightly symptomatic    - Discussed PVCs, risks, therapies and alternatives with patient. All questions were answered  - Treatment options including medical therapy with antiarrhythmic drugs or ablation discussed   ~ Start low dose BB    5. NICM   - EF 40%, likely secondary to arrest   - On ACE   - Add low dose BB     - Will repeat echo in a few months to reassess EF    6. SYLVIE  - Stable: Uses CPAP  - Encourage to use CPAP to prevent long term effects of untreated SYLVIE    7. Obesity  - Body mass index is 33.57 kg/m². - Complicating assessment and treatment. Placing patient at risk for multiple co-morbidities as well as early death and contributing to the patient's presentation  - Discussed weight loss and patient was encouraged to reduce calorie intake and increase exercise    [id number] Jeanine Baker for discharge from EP standpoint. Will follow up in office in 2 months. EP will sign off. Please call if there are any questions. Thank you for consult. All pertinent information and plan of care discussed with the EP physician. All questions and concerns were addressed to the patient. Alternatives to my treatment were discussed. I have discussed the above stated plan with patient and the nurse. The patient verbalized understanding and agreed with the plan. The patient was seen for >35 minutes. I reviewed interval history, physical exam, review of data including labs, imaging, development and implementation of treatment plan and coordination of complex care.     More than 50% of the time was devoted to giving the patient detailed instructions instructions on addressing diet, regular exercise, weight control, smoking abstention, medication compliance, and stress minimization. Patient was provided written and verbal instructions regarding risk factor modification. Thank you for allowing to us to participate in the care of Vernard Fleischer.     MACIEL Mckenzie-CNP  [address]   Office: [phone]

## 2020-12-03 NOTE — PROGRESS NOTES
Pt admitted to 3 A from cath lab. A&o x4. resp even/unlabored on ra, sob with exertion observed, lungs clear denise. No cp/pressure, tele reading sinus arrhythmia. Pt ambulated to br, steady gait. Pt had 1 episode of coughing up thick sputum with some dark blood into tissue. Pt denies pain, pt needs met. Bed low, call bell in reach.  Instructed to call for assist.

## 2020-12-04 ENCOUNTER — TELEPHONE (OUTPATIENT)
Dept: FAMILY MEDICINE CLINIC | Age: 65
End: 2020-12-04

## 2020-12-04 ENCOUNTER — CARE COORDINATION (OUTPATIENT)
Dept: CASE MANAGEMENT | Age: 65
End: 2020-12-04

## 2020-12-04 LAB
CULTURE, RESPIRATORY: NORMAL
CULTURE, RESPIRATORY: NORMAL
GRAM STAIN RESULT: NORMAL
GRAM STAIN RESULT: NORMAL
TSH REFLEX: 0.27 UIU/ML (ref 0.27–4.2)

## 2020-12-04 NOTE — CARE COORDINATION
Yo 45 Transitions Initial Follow Up Call    Call within 2 business days of discharge: Yes    Patient: Juni Pastor Patient : 1955   MRN: [id number]  Reason for Admission:   Discharge Date: 12/3/20 RARS: Readmission Risk Score: 19      Last Discharge [facility]       Complaint Diagnosis Description Type Department Provider    20  SOB (shortness of breath) . .. Admission (Discharged) Bakari Jarrett MD           2nd attempt to reach pt today for an initial 24 hour call, left contact info on .     Follow Up  Future Appointments   Date Time Provider Garett Mcrae   2020 11:15 AM Gena Corbett MD [facility]   12/10/2020  1:40 PM Louise Ge MD PATIENCE SLEEP [facility]   2020  1:45 PM Gerri Frias MD PULM & CC [facility]   2021  1:45 PM Gerri Frias MD PULM & CC [facility]   1/15/2021  8:15 AM Alisha Soriano MD FF Cardio [facility]   2021  2:30 PM Gena Corbett MD [facility]       Trenton Boston RN

## 2020-12-04 NOTE — TELEPHONE ENCOUNTER
----- Message from [facility] sent at 12/3/2020  4:28 PM EST -----  Subject: Appointment Request    Reason for Call: Routine Hospital Follow Up    QUESTIONS  Type of Appointment? Established Patient  Reason for appointment request? No appointments available during search  Additional Information for Provider? pt was discharged from the hospital   on 12/3/2020 and is needing to schedule a 1 week follow up  ---------------------------------------------------------------------------  --------------  [address]  What is the best way for the office to contact you? OK to leave message on   voicemail  Preferred Call Back Phone Number? [phone]  ---------------------------------------------------------------------------  --------------  SCRIPT ANSWERS  Relationship to Patient? Other  Representative Name? Crissy Lay  Additional information verified (besides Name and Date of Birth)? Address  Appointment reason? Well Care/Follow Ups  Select a Well Care/Follow Ups appointment reason? Adult Hospital Follow Up   [facility]  (Patient requests to see provider urgently. )? No  (Has the patient been discharged from the hospital within 2 business days   AND does not have a Telephone Encounter  Follow Up From [address] [place] [address]   documented in [address]?)? No  Do you have any questions for your primary care provider that need to be   answered prior to your appointment? (Use RN Triage if question pertains to   anything on the red flag list)? No  (Patient needs follow up visit after hospital discharge) Book first   available appointment within 7 days OF DISCHARGE   if no appt   proceed to book the next available time slot within 14 days OF DISCHARGE   AND Send Message to Provider. [address] Follow Up appointment cannot be   booked beyond 14 Days and should result in a Message to Provider. ? Yes   Have you been diagnosed with   tested for   or told that you are suspected of having COVID-19 (Coronavirus)?  No  Have

## 2020-12-04 NOTE — CARE COORDINATION
Yo 45 Transitions Initial Follow Up Call    Call within 2 business days of discharge: Yes    Patient: Maryanne Valera Patient : 1955   MRN: [id number]  Reason for Admission:   Discharge Date: 12/3/20 RARS: Readmission Risk Score: 19      Last Discharge [facility]       Complaint Diagnosis Description Type Department Provider    20  SOB (shortness of breath) . ..  Admission (Discharged) FZ 3A Nahomi Monk MD           Initial 24 hr call attempted, contact info left on       Follow Up  Future Appointments   Date Time Provider Garett Mcrae   12/10/2020  1:40 PM MD DAVE CamposON SLEEP [facility]   2020  1:45 PM Dilshad Perdomo MD PULM & CC [facility]   2021  1:45 PM Dilshad Perdomo MD PULTITUS & CC [facility]   1/15/2021  8:15 AM Rosario Mantilla MD FF Cardio [facility]   2021  2:30 PM Ronna Florentino MD [facility]       Donovan Bunch RN

## 2020-12-05 ENCOUNTER — CARE COORDINATION (OUTPATIENT)
Dept: CASE MANAGEMENT | Age: 65
End: 2020-12-05

## 2020-12-05 NOTE — CARE COORDINATION
Yo 45 Transitions Initial Follow Up Call    Call within 2 business days of discharge: Yes    Patient: Miquel Steven Patient : 1955   MRN: [id number]  Reason for Admission:   Discharge Date: 12/3/20 RARS: Readmission Risk Score: 19      Last Discharge [facility]       Complaint Diagnosis Description Type Department Provider    20  SOB (shortness of breath) . ..  Admission (Discharged) Vinh Bunch MD           2nd attempt at an initial 24 hour call, contact info left on       Follow Up  Future Appointments   Date Time Provider Garett Mcrae   2020 11:15 AM Hedy Palumbo MD [facility]   12/10/2020  1:40 PM Joy Almeida MD [address]   2020  1:45 PM Maddie Cruz MD PULM & CC [facility]   2021  1:45 PM Maddie Cruz MD PULM & CC [facility]   1/15/2021  8:15 AM Nicole Jordan MD FF Cardio [facility]   2021  2:30 PM Hedy Palumbo MD [facility]       Malka Varghese RN

## 2020-12-06 LAB
BLOOD CULTURE, ROUTINE: NORMAL
CULTURE, BLOOD 2: NORMAL

## 2020-12-07 ENCOUNTER — CARE COORDINATION (OUTPATIENT)
Dept: CASE MANAGEMENT | Age: 65
End: 2020-12-07

## 2020-12-08 ENCOUNTER — OFFICE VISIT (OUTPATIENT)
Dept: FAMILY MEDICINE CLINIC | Age: 65
End: 2020-12-08
Payer: COMMERCIAL

## 2020-12-08 VITALS
RESPIRATION RATE: 16 BRPM | TEMPERATURE: 98 F | OXYGEN SATURATION: 97 % | SYSTOLIC BLOOD PRESSURE: 130 MMHG | WEIGHT: 226.25 LBS | DIASTOLIC BLOOD PRESSURE: 70 MMHG | HEART RATE: 53 BPM | BODY MASS INDEX: 33.41 KG/M2

## 2020-12-08 PROCEDURE — 99214 OFFICE O/P EST MOD 30 MIN: CPT | Performed by: FAMILY MEDICINE

## 2020-12-08 RX ORDER — RAMIPRIL 2.5 MG/1
2.5 CAPSULE ORAL DAILY
COMMUNITY
End: 2020-12-24

## 2020-12-08 NOTE — LETTER
[facility] [address]  Phone: [phone]  Fax: [phone]    Travis Patel MD        December 8, 2020     Patient: Malik Hunt   YOB: 1955   Date of Visit: 12/8/2020       To Whom it May Concern:    Antonia [place] was seen in my clinic on 12/8/2020. He may return to work on 1-8-2021 pending furterh evaluation. If you have any questions or concerns, please don't hesitate to call.     Sincerely,           Travis Patel MD

## 2020-12-08 NOTE — PROGRESS NOTES
Subjective:      Patient ID: Dorothy Yarbrough is a 72 y.o. male. CC: Patient presents for hospital follow-up. HPI Pt is here for a hospital follow up. Pt was seen at [facility] to have bronchoscopy and went into cardiac arrest.  Patient was hospitalized at [facility] December 2 through December 3. They have difficulty intubating him for the bronchoscopy and he became hypoxic and he had a cardiac arrest.  Patient was also noted to be in atrial fib with rapid ventricular response. Patient then went to the cardiac Cath Lab which did not demonstrate any obstructive coronary artery disease but did demonstrate a left ventricular ejection fraction of only 40%. Patient then spontaneously converted to normal sinus rhythm. Patient was placed on prednisone and he was given steroids in the hospital.  Patient was also started on lisinopril and discontinued Altace medication. Metoprolol was also added. Patient was taken off diclofenac during hospitalization as well. He still feeling rather sore and uncomfortable and his workplace does not want to come back to work until he is medically cleared. Patient is wearing a cardiac monitoring at this point of time.     Review of Systems  Patient Active Problem List   Diagnosis    Essential hypertension    Generalized osteoarthrosis, involving multiple sites    Pure hypercholesterolemia    Obstructive sleep apnea syndrome    Allergic rhinitis    Pseudogout    Melanoma in situ of lower leg ([facility] [address]    Type 2 diabetes mellitus with diabetic polyneuropathy, without long-term current use of insulin ([facility])    Eczema    Dyshidrotic eczema    GERD without esophagitis    Non morbid obesity, unspecified obesity type    Moderate persistent asthma without complication    Mediastinal adenopathy    Multiple lung nodules on CT       Outpatient Medications Marked as Taking for the 12/8/20 encounter (Office Visit) with Bharati Seymour MD   Medication Sig Dispense Refill   [facility]  lisinopril (PRINIVIL;ZESTRIL) 5 MG tablet Take 1 tablet by mouth daily 30 tablet 1    metoprolol tartrate (LOPRESSOR) 25 MG tablet Take 0.5 tablets by mouth 2 times daily 60 tablet 1    pioglitazone (ACTOS) 30 MG tablet TAKE 1 TABLET BY MOUTH EVERY DAY 90 tablet 1    ipratropium-albuterol (DUONEB) 0.5-2.5 (3) MG/3ML SOLN nebulizer solution Inhale 3 mLs into the lungs every 4 hours as needed for Shortness of Breath Dx: Asthma  ICD-10: J45.909 120 vial 5    fluticasone-salmeterol (ADVAIR DISKUS) 100-50 MCG/DOSE diskus inhaler Inhale 1 puff into the lungs 2 times daily 1 Inhaler 5    glimepiride (AMARYL) 1 MG tablet TAKE 1 TABLET BY MOUTH IN THE MORNING before breakfast 90 tablet 0    allopurinol (ZYLOPRIM) 300 MG tablet Take 1 tablet by mouth daily 90 tablet 1    finasteride (PROSCAR) 5 MG tablet Take 5 mg by mouth daily      tamsulosin (FLOMAX) 0.4 MG capsule Take 0.4 mg by mouth daily      pantoprazole (PROTONIX) 40 MG tablet Take 1 tablet by mouth daily 90 tablet 1    metFORMIN (GLUCOPHAGE) 1000 MG tablet Take 1 tablet by mouth 2 times daily 180 tablet 1    simvastatin (ZOCOR) 40 MG tablet TAKE 1 TABLET BY MOUTH AT BEDTIME 90 tablet 1    FREESTYLE LANCETS MISC 1 each by Does not apply route daily 100 each 3    Blood Glucose Monitoring Suppl (FREESTYLE LITE) CLAUDINE 1 Device by Does not apply route 2 times daily 1 Device 0       No Known Allergies    Social History     Tobacco Use    Smoking status: Former Smoker     Packs/day: 1.00     Years: 10.00     Pack years: 10.00     Types: Cigarettes     Last attempt to quit: 4/3/1993     Years since quittin.7    Smokeless tobacco: Never Used    Tobacco comment: quit 25 years ago   Substance Use Topics    Alcohol use: No       /70 (Site: Right Upper Arm, Position: Sitting, Cuff Size: Medium Adult)   Pulse 53   Temp 98 °F (36.7 °C) (Tympanic)   Resp 16   Wt 226 lb 4 oz (102.6 kg)   SpO2 97%   BMI 33.41 kg/m²     Objective:   Physical at this point of time from the steroids    Agree with his current medical management and recommended low-salt diet    Work excuse was provided through January 8 and based on his laboratory testing he may need to come to see me sooner. Medical decision making of moderate complexity. Please note that this chart was generated using Dragon dictation software. Although every effort was made to ensure the accuracy of this automated transcription, some errors in transcription may have occurred.

## 2020-12-09 ENCOUNTER — TELEPHONE (OUTPATIENT)
Dept: FAMILY MEDICINE CLINIC | Age: 65
End: 2020-12-09

## 2020-12-09 NOTE — TELEPHONE ENCOUNTER
Patient was instructed at hospital discharge to discontinue diclofenac medication as this is a problem with cardiac patients. He still can use the albuterol inhaler as needed. Tramadol is fine.   Prednisone medication was a short-term burst as he left the hospital.

## 2020-12-09 NOTE — TELEPHONE ENCOUNTER
Patients wife called and had some questions about him being told to stop taking these medications    albuterol sulfate  (90 Base) MCG/ACT inhaler     diclofenac (VOLTAREN) 50 MG EC tablet     predniSONE (DELTASONE) tablet 40 mg     traMADol (ULTRAM) tablet 50 mg (      Patient did his blood work this morning

## 2020-12-10 ENCOUNTER — TELEPHONE (OUTPATIENT)
Dept: FAMILY MEDICINE CLINIC | Age: 65
End: 2020-12-10

## 2020-12-10 ENCOUNTER — VIRTUAL VISIT (OUTPATIENT)
Dept: PULMONOLOGY | Age: 65
End: 2020-12-10
Payer: COMMERCIAL

## 2020-12-10 LAB
A/G RATIO: 1.4 (CALC) (ref 1–2.5)
ALBUMIN SERPL-MCNC: 3.5 G/DL (ref 3.6–5.1)
ALP BLD-CCNC: 107 U/L (ref 35–144)
ALT SERPL-CCNC: 18 U/L (ref 9–46)
AST SERPL-CCNC: 13 U/L (ref 10–35)
BASOPHILS ABSOLUTE: 32 CELLS/UL (ref 0–200)
BASOPHILS RELATIVE PERCENT: 0.4 %
BILIRUB SERPL-MCNC: 0.3 MG/DL (ref 0.2–1.2)
BUN / CREAT RATIO: ABNORMAL (CALC) (ref 6–22)
BUN BLDV-MCNC: 19 MG/DL (ref 7–25)
CALCIUM SERPL-MCNC: 8.6 MG/DL (ref 8.6–10.3)
CHLORIDE BLD-SCNC: 100 MMOL/L (ref 98–110)
CHOLESTEROL, TOTAL: 158 MG/DL
CHOLESTEROL/HDL RATIO: 4.6 (CALC)
CO2: 31 MMOL/L (ref 20–32)
CREAT SERPL-MCNC: 0.75 MG/DL (ref 0.7–1.25)
EOSINOPHILS ABSOLUTE: 267 CELLS/UL (ref 15–500)
EOSINOPHILS RELATIVE PERCENT: 3.3 %
GFR AFRICAN AMERICAN: 112 ML/MIN/1.73M2
GFR, ESTIMATED: 96 ML/MIN/1.73M2
GLOBULIN: 2.5 G/DL (CALC) (ref 1.9–3.7)
GLUCOSE BLD-MCNC: 272 MG/DL (ref 65–99)
HBA1C MFR BLD: 9.4 % OF TOTAL HGB
HCT VFR BLD CALC: 38.5 % (ref 38.5–50)
HDLC SERPL-MCNC: 34 MG/DL
HEMOGLOBIN: 12.4 G/DL (ref 13.2–17.1)
IRON SATURATION: 16 % (CALC) (ref 20–48)
IRON: 50 MCG/DL (ref 50–180)
LDL CHOLESTEROL CALCULATED: 100 MG/DL (CALC)
LYMPHOCYTES ABSOLUTE: 1612 CELLS/UL (ref 850–3900)
LYMPHOCYTES RELATIVE PERCENT: 19.9 %
MCH RBC QN AUTO: 27.9 PG (ref 27–33)
MCHC RBC AUTO-ENTMCNC: 32.2 G/DL (ref 32–36)
MCV RBC AUTO: 86.7 FL (ref 80–100)
MONOCYTES ABSOLUTE: 608 CELLS/UL (ref 200–950)
MONOCYTES RELATIVE PERCENT: 7.5 %
NEUTROPHILS ABSOLUTE: 5581 CELLS/UL (ref 1500–7800)
NONHDLC SERPL-MCNC: 124 MG/DL (CALC)
PDW BLD-RTO: 14.5 % (ref 11–15)
PLATELET # BLD: 258 THOUSAND/UL (ref 140–400)
PMV BLD AUTO: 10.5 FL (ref 7.5–12.5)
POTASSIUM SERPL-SCNC: 3.8 MMOL/L (ref 3.5–5.3)
RBC # BLD: 4.44 MILLION/UL (ref 4.2–5.8)
SEGMENTED NEUTROPHILS RELATIVE PERCENT: 68.9 %
SODIUM BLD-SCNC: 141 MMOL/L (ref 135–146)
TOTAL IRON BINDING CAPACITY: 318 MCG/DL (CALC) (ref 250–425)
TOTAL PROTEIN: 6 G/DL (ref 6.1–8.1)
TRIGL SERPL-MCNC: 141 MG/DL
WBC # BLD: 8.1 THOUSAND/UL (ref 3.8–10.8)

## 2020-12-10 PROCEDURE — 99214 OFFICE O/P EST MOD 30 MIN: CPT | Performed by: INTERNAL MEDICINE

## 2020-12-10 NOTE — TELEPHONE ENCOUNTER
Looks as though he has a follow up scheduled for today for Pulmonology. Patient should keep that appointment and have them advise on treatment.

## 2020-12-10 NOTE — LETTER
[facility]  [place] [address] [address] [address]  Phone: [phone]  Fax: [phone]    December 10, 2020       Patient: Vernard Fleischer   MR Number: [id number]   YOB: 1955   Date of Visit: 12/10/2020       Franco Bettencourt was seen for a follow up visit today. Here is my assessment and plan as well as an attached copy of his visit today:    Obstructive sleep apnea syndrome  Chronic- Stable. Reviewed compliance download with pt. Supplies and parts as needed for his machine. These are medically necessary. Continue medications per his PCP and other physicians. Limit caffeine use after 3pm.    Essential hypertension  Chronic- Stable. Cont meds per PCP and other physicians. Type 2 diabetes mellitus with diabetic polyneuropathy, without long-term current use of insulin (HCC)  Chronic- Stable. Cont meds per PCP and other physicians. GERD without esophagitis  Chronic- Stable. Cont meds per PCP and other physicians. Non morbid obesity, unspecified obesity type  Chronic-Stable. Encouraged him to work on weight loss through diet and exercise. If you have questions or concerns, please do not hesitate to call me. I look forward to following Prince saldana along with you.     Sincerely,    Rahat Wise MD    CC providers:  Geoffrey Salcido MD  [address] [place] [address]

## 2020-12-10 NOTE — TELEPHONE ENCOUNTER
When speaking with wife about another telephone encounter I mentioned that there was a note about patient's medications. Patient states that it was already discussed.

## 2020-12-10 NOTE — TELEPHONE ENCOUNTER
Re-reviewed Pulmonology is with sleep medicine today. Dependant on severity of symptoms I would continue with nebulizer/inhaler treatment every 4-6 hours if getting relief. May consider discussing at appointment today with Dr. Asya Garcia.

## 2020-12-10 NOTE — TELEPHONE ENCOUNTER
Wife states patient has been complaining since he seen Dr Geneva Mendoza last of chest tightness, trouble breathing and some coughing. He took breathing tx last night which helped a bit. They want to know if there is anything else they can do for his symptoms.        Provider out of office    Please advise

## 2020-12-10 NOTE — PROGRESS NOTES
Brianna Reyna MD, Nereyda Giordano, [facility]  Tiffanie Kehrt CNP Wade Jerry CNP [address]  3rd Floor, [address] [facility], [facility] [address] [phone]   [facility] [address] [facility]. Sophy Agu[phone]     Video Visit- Follow up    Pursuant to the emergency declaration under the [address] [place] [address], [facility] waiver authority and the [employer] Act, this Virtual  Visit was conducted, with patient's consent, to reduce the patient's risk of exposure to COVID-19 and provide continuity of care for an established patient. Services were provided through a video synchronous discussion virtually to substitute for in-person clinic visit. Patient was located in their home. Assessment/Plan:     1. Obstructive sleep apnea syndrome  Assessment & Plan:  Chronic- Stable. Reviewed compliance download with pt. Supplies and parts as needed for his machine. These are medically necessary. Continue medications per his PCP and other physicians. Limit caffeine use after 3pm.  2. Essential hypertension  Assessment & Plan:  Chronic- Stable. Cont meds per PCP and other physicians. 3. Type 2 diabetes mellitus with diabetic polyneuropathy, without long-term current use of insulin ([facility])  Assessment & Plan:  Chronic- Stable. Cont meds per PCP and other physicians. 4. GERD without esophagitis  Assessment & Plan:  Chronic- Stable. Cont meds per PCP and other physicians. 5. Non morbid obesity, unspecified obesity type  Assessment & Plan:  Chronic-Stable. Encouraged him to work on weight loss through diet and exercise. Diagnoses of Obstructive sleep apnea syndrome, Essential hypertension, Type 2 diabetes mellitus with diabetic polyneuropathy, without long-term current use of insulin ([facility] [address] GERD without esophagitis, and Non morbid obesity, unspecified obesity type were pertinent to this visit.  The chronic medical conditions listed are directly related to the primary diagnosis listed above. The management of the primary diagnosis affects the secondary diagnosis and vice versa. Subjective:     Patient ID: Estiven Hammonds is a 72 y.o. male. Chief Complaint   Patient presents with    Sleep Apnea     Subjective   HPI:    Machine Modem/Download Info:  Compliance (hours/night): 8.75 hrs/night  Download AHI (/hour): 9 /HR  Average IPAP Pressure: 13.4 cmH2O  Average EPAP Pressure: 10.4 cmH2O AUTO BIPAP - Settings (Marisol)  IPAP Max: 25 cmH2O  EPAP Min: 7 cmH2O  Pressure Support Min: 3  Pressure Support Max: 7   Comfort Settings  Humidity Level (0-8): 3  Flex/EPR (0-3): 3       SYLVIE:  New machine is much better. He continues to do well with his machine at the current settings. No issues with EDS, snoring, or apneas. He is waking refreshed, for the most part, in the am.  No HA, dryness, or congestion. No issues using his machine.     [facility]    Current Outpatient Medications   Medication Sig Dispense Refill    ramipril (ALTACE) 2.5 MG capsule Take 2.5 mg by mouth daily      diclofenac (VOLTAREN) 50 MG EC tablet Take 50 mg by mouth 2 times daily      lisinopril (PRINIVIL;ZESTRIL) 5 MG tablet Take 1 tablet by mouth daily 30 tablet 1    metoprolol tartrate (LOPRESSOR) 25 MG tablet Take 0.5 tablets by mouth 2 times daily 60 tablet 1    pioglitazone (ACTOS) 30 MG tablet TAKE 1 TABLET BY MOUTH EVERY DAY 90 tablet 1    ipratropium-albuterol (DUONEB) 0.5-2.5 (3) MG/3ML SOLN nebulizer solution Inhale 3 mLs into the lungs every 4 hours as needed for Shortness of Breath Dx: Asthma  ICD-10: J45.909 120 vial 5    fluticasone-salmeterol (ADVAIR DISKUS) 100-50 MCG/DOSE diskus inhaler Inhale 1 puff into the lungs 2 times daily 1 Inhaler 5    glimepiride (AMARYL) 1 MG tablet TAKE 1 TABLET BY MOUTH IN THE MORNING before breakfast 90 tablet 0    allopurinol (ZYLOPRIM) 300 MG tablet Take 1 tablet by mouth daily 90 tablet 1    finasteride (PROSCAR) 5 MG tablet Take 5 mg by mouth daily      tamsulosin (FLOMAX) 0.4 MG capsule Take 0.4 mg by mouth daily      pantoprazole (PROTONIX) 40 MG tablet Take 1 tablet by mouth daily 90 tablet 1    metFORMIN (GLUCOPHAGE) 1000 MG tablet Take 1 tablet by mouth 2 times daily 180 tablet 1    simvastatin (ZOCOR) 40 MG tablet TAKE 1 TABLET BY MOUTH AT BEDTIME 90 tablet 1    FREESTYLE LANCETS MISC 1 each by Does not apply route daily 100 each 3    Blood Glucose Monitoring Suppl (FREESTYLE LITE) CLAUDINE 1 Device by Does not apply route 2 times daily 1 Device 0     No current facility-administered medications for this visit.         Allergies as of 12/10/2020    (No Known Allergies)         Electronically signed by David Boswell MD on 12/10/2020 at 1:58 PM

## 2020-12-14 ENCOUNTER — TELEPHONE (OUTPATIENT)
Dept: PULMONOLOGY | Age: 65
End: 2020-12-14

## 2020-12-14 ENCOUNTER — TELEPHONE (OUTPATIENT)
Dept: FAMILY MEDICINE CLINIC | Age: 65
End: 2020-12-14

## 2020-12-14 RX ORDER — LEVOFLOXACIN 750 MG/1
750 TABLET ORAL DAILY
Qty: 10 TABLET | Refills: 0 | Status: SHIPPED | OUTPATIENT
Start: 2020-12-14 | End: 2020-12-24

## 2020-12-14 RX ORDER — PREDNISONE 10 MG/1
TABLET ORAL
Qty: 30 TABLET | Refills: 0 | Status: SHIPPED | OUTPATIENT
Start: 2020-12-14 | End: 2020-12-24

## 2020-12-14 NOTE — TELEPHONE ENCOUNTER
Pt informed of change in medication - pharmacy informed that it is ok to take the Levaquin and glimepiride per Dr Kelly Hallmark

## 2020-12-14 NOTE — TELEPHONE ENCOUNTER
Terry Neal is calling from [facility] [phone] and states that the patient doesn't want to see his exsisting pulmonary doctor. Patient would like to get a referral for this doctor.     Vesta Avina  [phone]   [address] [facility] jasmin  [address]

## 2020-12-14 NOTE — TELEPHONE ENCOUNTER
Prescribed Prednisone taper and levaquin. Check CXR to assess for infiltrates. Asked patient to contact us next week with a report of his symptoms. May need to increase the dose of advair during next visit.

## 2020-12-14 NOTE — TELEPHONE ENCOUNTER
Pt is wheezing / non productive coughing / increased sob / chest congestion / pt's spouse stated that he is worse than when he left the hospital     Pt wants to know what to do?      Dr Hood Mode please advise

## 2020-12-14 NOTE — TELEPHONE ENCOUNTER
Pharmacy calling because Levaquin has interaction with Glimopiride and just wanted to make sure Dr Rolando Dotson was aware and it is ok to fill Please call her and let her know.

## 2020-12-16 ENCOUNTER — TELEPHONE (OUTPATIENT)
Dept: CARDIOLOGY CLINIC | Age: 65
End: 2020-12-16

## 2020-12-16 NOTE — TELEPHONE ENCOUNTER
Patient returning call to NPSR, patient requesting to discuss this with NPSR. Patient states he will be home for the rest of the afternoon/evening and can be reached at [phone].

## 2020-12-16 NOTE — TELEPHONE ENCOUNTER
Called. No message, LMOM to call back to office. He had recurrent atrial fibrillation on his event monitor and needs to be started on anti-coagulation.     Marlyn Prince, APRN-CNP

## 2020-12-16 NOTE — TELEPHONE ENCOUNTER
Called pt. I discussed anticoagulation to decrease the risk of thromboembolic events including stroke. Benefits and alternatives were discussed with patient. Risk of bleeding was discussed. Patient verbalized understanding. Different forms of anticoagulants including coumadin, Pradaxa, Eliquis, and Xarelto were discussed. Patient opted to start with Eliquis 5 mg BID. Pt VU and will start medication tonight.  If price is too high, he will call office and we will provide samples of DOAC and start financial assistance process for him    MACIEL Tao-CNP

## 2020-12-17 NOTE — PROGRESS NOTES
Xarelto is preferred on his formulary over Eliquis. New rx sent for Xarelto 20 mg daily. Called pt and he LATIA. He will call back if any issues.      MACIEL Newman-CNP

## 2020-12-18 ENCOUNTER — TELEPHONE (OUTPATIENT)
Dept: CARDIOLOGY CLINIC | Age: 65
End: 2020-12-18

## 2020-12-18 RX ORDER — GLIMEPIRIDE 1 MG/1
TABLET ORAL
Qty: 90 TABLET | Refills: 1 | Status: SHIPPED | OUTPATIENT
Start: 2020-12-18 | End: 2020-12-24

## 2020-12-18 NOTE — TELEPHONE ENCOUNTER
The xarelto 20mg that was sent to pharmacy yesterday needs PA. Pharmacy sent rx to \"cover my meds\" . If office needs to call about PA the number is . Wife wanted to know if office had any samples of xarelto 20mg , per MA we are out of the 20mg but have quite a bit of the 15mg. Would the 15mg xarelto work for him until PA goes through? Patient isnt working and cant afford to pay for rx.  Please call wife

## 2020-12-18 NOTE — TELEPHONE ENCOUNTER
I spoke with Patients wife Wyatt Lundberg. She will  Come by the office on Monday to  the card for the [employer] at the  for her husbands prescription.

## 2020-12-21 ENCOUNTER — TELEPHONE (OUTPATIENT)
Dept: FAMILY MEDICINE CLINIC | Age: 65
End: 2020-12-21

## 2020-12-21 ENCOUNTER — HOSPITAL ENCOUNTER (OUTPATIENT)
Dept: GENERAL RADIOLOGY | Age: 65
Discharge: HOME | End: 2020-12-21
Payer: COMMERCIAL

## 2020-12-21 ENCOUNTER — TELEPHONE (OUTPATIENT)
Dept: PULMONOLOGY | Age: 65
End: 2020-12-21

## 2020-12-21 ENCOUNTER — TELEPHONE (OUTPATIENT)
Dept: CARDIOLOGY CLINIC | Age: 65
End: 2020-12-21

## 2020-12-21 ENCOUNTER — HOSPITAL ENCOUNTER (OUTPATIENT)
Age: 65
Discharge: HOME | End: 2020-12-21
Payer: COMMERCIAL

## 2020-12-21 PROCEDURE — 71046 X-RAY EXAM CHEST 2 VIEWS: CPT

## 2020-12-21 RX ORDER — WARFARIN SODIUM 5 MG/1
5 TABLET ORAL DAILY
Qty: 30 TABLET | Refills: 5 | Status: ON HOLD | OUTPATIENT
Start: 2020-12-21 | End: 2021-01-02 | Stop reason: SDUPTHER

## 2020-12-21 NOTE — TELEPHONE ENCOUNTER
Called and spoke to pt's wife (EC) regarding the Xarelto. She is very upset about everything and states \"You doctors and pharmacists are not doing anything to help my . \"    I reiterated to her that the co-pay card she was given by our office should be good to be used at his pharmacy, but she states that the pharmacy is making her activate the card herself. She is angry and states that she does not have the time/energy to do it. She is adamant about starting him on coumadin instead. (She takes coumadin and has a machine to check her INR at home)    I discussed anticoagulation to decrease the risk of thromboembolic events including stroke. Benefits and alternatives were discussed with patient. Risk of bleeding was discussed. We discussed the need to have INR monitoring by her PCP or the coumadin clinic at [facility]. She will check with the PCP's office, Dr. India Redd, to see if he will manage his coumadin. If not, will send a referral to [facility] anti-coagulation. RX for coumadin 5 mg daily sent to his pharmacy. Will need INR check in 2-3 days.     Sherice Buchanan, APRN-CNP

## 2020-12-21 NOTE — TELEPHONE ENCOUNTER
Still has some residual shadowing on the chest x-ray but improved according to radiologist.  Certainly would recommend he continue with pulmonology care in this regards

## 2020-12-21 NOTE — TELEPHONE ENCOUNTER
[address] [facility]
Certainly a referral for pulmonology is approved but he just recently had a bronchoscopy with pulmonology so obviously they probably have a referral.  If he is not improving by next week then I need to see him
Pt's wife advise
Referral faxed again.  Please advise on pt's sx
Wife calling about the referral for pulmonology. They still have not received the referral, they need it faxed to [phone]. Also patient is not getting any better, chest is still feeling tight. He is suppose to return to work on 1/8 but due to him not improving, they want to know if it should be extended and will need a note.       Please advise
Statement Selected

## 2020-12-21 NOTE — TELEPHONE ENCOUNTER
Pt wife calling she is not happy with all the trouble to get Xarelto. She is requesting to put him on Warfarin instead.   pls call to advise thank you

## 2020-12-22 ENCOUNTER — TELEPHONE (OUTPATIENT)
Dept: PULMONOLOGY | Age: 65
End: 2020-12-22

## 2020-12-22 ENCOUNTER — TELEPHONE (OUTPATIENT)
Dept: FAMILY MEDICINE CLINIC | Age: 65
End: 2020-12-22

## 2020-12-22 NOTE — TELEPHONE ENCOUNTER
Pt wants to have his PFT - Do you want to have the PFT with or without bronchodilators?     Dr Clements Serum please advise

## 2020-12-23 RX ORDER — TRAMADOL HYDROCHLORIDE 50 MG/1
TABLET ORAL
Qty: 60 TABLET | Refills: 0 | Status: SHIPPED | OUTPATIENT
Start: 2020-12-23 | End: 2021-01-22

## 2020-12-23 NOTE — TELEPHONE ENCOUNTER
Medication:   Requested Prescriptions     Pending Prescriptions Disp Refills    traMADol (ULTRAM) 50 MG tablet [Pharmacy Med Name: traMADol HCl Oral Tablet 50 MG] 60 tablet 0     Sig: TAKE 1 TABLET BY MOUTH TWO TIMES A DAY **reduce doses taken as pain becomes manageable**      Last Filled:  10/21 w 2 rf    Patient Phone Number: [phone] (home)     Last appt: 12/8/2020   Next appt: 12/24/2020    Last OARRS:   RX Monitoring 3/4/2019   Attestation The Prescription Monitoring Report for this patient was reviewed today.    Periodic Controlled Substance Monitoring -     PDMP Monitoring:    Last PDMP Nery Maritn as Reviewed [facility]):  Review User Review Instant Review Result   Ijeoma Cuellar 10/21/2020  1:16 PM Reviewed PDMP [1]     Preferred Pharmacy:   [address] [phone] [facility] [phone]  [address]  [place] [address]  Phone: [phone] Fax: [phone]    [facility] [address]  [place] [address]  Phone: [phone] Fax: [phone]

## 2020-12-24 ENCOUNTER — OFFICE VISIT (OUTPATIENT)
Dept: FAMILY MEDICINE CLINIC | Age: 65
End: 2020-12-24
Payer: COMMERCIAL

## 2020-12-24 VITALS
HEART RATE: 112 BPM | WEIGHT: 218 LBS | BODY MASS INDEX: 32.19 KG/M2 | DIASTOLIC BLOOD PRESSURE: 72 MMHG | OXYGEN SATURATION: 96 % | RESPIRATION RATE: 16 BRPM | SYSTOLIC BLOOD PRESSURE: 110 MMHG | TEMPERATURE: 97.9 F

## 2020-12-24 PROBLEM — I25.5 ISCHEMIC CARDIOMYOPATHY: Status: ACTIVE | Noted: 2020-12-24

## 2020-12-24 PROBLEM — I48.19 PERSISTENT ATRIAL FIBRILLATION (HCC): Status: ACTIVE | Noted: 2020-12-24

## 2020-12-24 PROCEDURE — 99214 OFFICE O/P EST MOD 30 MIN: CPT | Performed by: FAMILY MEDICINE

## 2020-12-24 RX ORDER — FUROSEMIDE 40 MG/1
40 TABLET ORAL DAILY
Qty: 90 TABLET | Refills: 1 | Status: SHIPPED | OUTPATIENT
Start: 2020-12-24

## 2020-12-24 RX ORDER — GLIMEPIRIDE 4 MG/1
4 TABLET ORAL DAILY
Qty: 90 TABLET | Refills: 1 | Status: ON HOLD | OUTPATIENT
Start: 2020-12-24 | End: 2021-02-08 | Stop reason: HOSPADM

## 2020-12-24 NOTE — PROGRESS NOTES
Subjective:      Patient ID: Monet Cox is a 72 y.o. male. CC: Patient presents for re-evaluation of chronic health problems including fatigue, chest pain, status post cardiac arrest, atrial fibrillation, diabetes mellitus with hyperglycemia and arthritis. [facility] Pt is here for a follow up in accompaniment of wife. Pt states he is not doing well since his cardiac procedure. He still has cough problems. He states the cough for the most part is nonproductive in nature. He was placed on antibiotic therapy but he was unable take the antibiotic because it caused GI upset. He was also given prednisone which he has completed at this point of time. He did not notice any real change in the cough with the prednisone medication. His wife also states he is losing weight and his blood sugars been over 200. He has been on several rounds of prednisone since the hospitalization. Hemoglobin A1c prior to hospitalization was very well controlled in regards to diabetic management. Recently glimepiride medication was increased to 2 mg daily. He was on pioglitazone prior to hospitalization but then and this was stopped because of the cardiomyopathy. He does have an appoint with cardiology after the first year.     Review of Systems  Patient Active Problem List   Diagnosis    Essential hypertension    Generalized osteoarthrosis, involving multiple sites    Pure hypercholesterolemia    Obstructive sleep apnea syndrome    Allergic rhinitis    Pseudogout    Melanoma in situ of lower leg ([facility] [address]    Type 2 diabetes mellitus with diabetic polyneuropathy, without long-term current use of insulin (HCC)    Eczema    Dyshidrotic eczema    GERD without esophagitis    Non morbid obesity, unspecified obesity type    Moderate persistent asthma without complication    Mediastinal adenopathy    Multiple lung nodules on CT    Cardiac arrest ([address] Outpatient Medications Marked as Taking for the 20 encounter (Office Visit) with Rhiannon Campos MD   Medication Sig Dispense Refill    traMADol (ULTRAM) 50 MG tablet TAKE 1 TABLET BY MOUTH TWO TIMES A DAY **reduce doses taken as pain becomes manageable** 60 tablet 0    warfarin (COUMADIN) 5 MG tablet Take 1 tablet by mouth daily 30 tablet 5    glimepiride (AMARYL) 1 MG tablet TAKE 1 TABLET BY MOUTH IN THE MORNING BEFORE BREAKFAST 90 tablet 1    fluticasone-salmeterol (ADVAIR DISKUS) 250-50 MCG/DOSE AEPB Inhale 1 puff into the lungs every 12 hours 1 Inhaler 6    lisinopril (PRINIVIL;ZESTRIL) 5 MG tablet Take 1 tablet by mouth daily 30 tablet 1    metoprolol tartrate (LOPRESSOR) 25 MG tablet Take 0.5 tablets by mouth 2 times daily 60 tablet 1    pioglitazone (ACTOS) 30 MG tablet TAKE 1 TABLET BY MOUTH EVERY DAY 90 tablet 1    ipratropium-albuterol (DUONEB) 0.5-2.5 (3) MG/3ML SOLN nebulizer solution Inhale 3 mLs into the lungs every 4 hours as needed for Shortness of Breath Dx: Asthma  ICD-10: J45.909 120 vial 5    allopurinol (ZYLOPRIM) 300 MG tablet Take 1 tablet by mouth daily 90 tablet 1    finasteride (PROSCAR) 5 MG tablet Take 5 mg by mouth daily      tamsulosin (FLOMAX) 0.4 MG capsule Take 0.4 mg by mouth daily      pantoprazole (PROTONIX) 40 MG tablet Take 1 tablet by mouth daily 90 tablet 1    metFORMIN (GLUCOPHAGE) 1000 MG tablet Take 1 tablet by mouth 2 times daily 180 tablet 1    simvastatin (ZOCOR) 40 MG tablet TAKE 1 TABLET BY MOUTH AT BEDTIME 90 tablet 1    FREESTYLE LANCETS MISC 1 each by Does not apply route daily 100 each 3    Blood Glucose Monitoring Suppl (FREESTYLE LITE) CLAUDINE 1 Device by Does not apply route 2 times daily 1 Device 0       No Known Allergies    Social History     Tobacco Use    Smoking status: Former Smoker     Packs/day: 1.00     Years: 10.00     Pack years: 10.00     Types: Cigarettes     Quit date: 4/3/1993     Years since quittin.7 Chest x-ray report reviewed with patient and wife I believe is more of a congestive heart failure rather than respiratory infection. Patient was started on furosemide daily and discussed limiting the salt in his diet    For the atrial fibrillation obviously he needs to be on anticoagulation which was just recently started and continue with metoprolol for heart rate control. Keep appoint with cardiology for further management    Fatigue is secondary to cardiac arrest and atrial fibrillation with congestive heart failure. Patient is also been taking aspirin for his osteoarthritis advised that he can only take Tylenol for arthritis as he is on anticoagulation medication. He also can use local heat. He recently developed some sciatica symptoms on the left side as well. Work excuse was provided through January 21 as he has an appoint with me on January 20    Medical decision making of moderate complexity. Please note that this chart was generated using Dragon dictation software. Although every effort was made to ensure the accuracy of this automated transcription, some errors in transcription may have occurred.

## 2020-12-30 ENCOUNTER — CLINICAL DOCUMENTATION (OUTPATIENT)
Dept: CARDIOLOGY CLINIC | Age: 65
End: 2020-12-30

## 2020-12-30 ENCOUNTER — TELEPHONE (OUTPATIENT)
Dept: PULMONOLOGY | Age: 65
End: 2020-12-30

## 2020-12-30 ENCOUNTER — APPOINTMENT (OUTPATIENT)
Dept: GENERAL RADIOLOGY | Age: 65
DRG: 225 | End: 2020-12-30
Payer: COMMERCIAL

## 2020-12-30 ENCOUNTER — OFFICE VISIT (OUTPATIENT)
Dept: CARDIOLOGY CLINIC | Age: 65
End: 2020-12-30
Payer: COMMERCIAL

## 2020-12-30 ENCOUNTER — HOSPITAL ENCOUNTER (INPATIENT)
Age: 65
LOS: 3 days | Discharge: HOME | DRG: 225 | End: 2021-01-02
Attending: EMERGENCY MEDICINE | Admitting: FAMILY MEDICINE
Payer: COMMERCIAL

## 2020-12-30 ENCOUNTER — TELEPHONE (OUTPATIENT)
Dept: CARDIOLOGY CLINIC | Age: 65
End: 2020-12-30

## 2020-12-30 ENCOUNTER — OFFICE VISIT (OUTPATIENT)
Dept: PRIMARY CARE CLINIC | Age: 65
End: 2020-12-30
Payer: COMMERCIAL

## 2020-12-30 VITALS
HEART RATE: 88 BPM | WEIGHT: 213.8 LBS | DIASTOLIC BLOOD PRESSURE: 87 MMHG | SYSTOLIC BLOOD PRESSURE: 134 MMHG | OXYGEN SATURATION: 98 % | BODY MASS INDEX: 31.67 KG/M2 | HEIGHT: 69 IN

## 2020-12-30 DIAGNOSIS — E87.1 HYPONATREMIA: ICD-10-CM

## 2020-12-30 DIAGNOSIS — E87.6 HYPOKALEMIA: ICD-10-CM

## 2020-12-30 DIAGNOSIS — I25.9 CHEST PAIN DUE TO MYOCARDIAL ISCHEMIA, UNSPECIFIED ISCHEMIC CHEST PAIN TYPE: Primary | ICD-10-CM

## 2020-12-30 DIAGNOSIS — I47.20 VT (VENTRICULAR TACHYCARDIA): ICD-10-CM

## 2020-12-30 PROBLEM — I48.0 PAROXYSMAL ATRIAL FIBRILLATION (HCC): Status: ACTIVE | Noted: 2020-12-30

## 2020-12-30 PROBLEM — I48.91 ATRIAL FIBRILLATION (HCC): Status: ACTIVE | Noted: 2020-12-30

## 2020-12-30 LAB
ABO/RH: NORMAL
ANION GAP SERPL CALCULATED.3IONS-SCNC: 14 MMOL/L (ref 3–16)
ANTIBODY SCREEN: NORMAL
BASOPHILS ABSOLUTE: 0 K/UL (ref 0–0.2)
BASOPHILS RELATIVE PERCENT: 0.5 %
BUN BLDV-MCNC: 19 MG/DL (ref 7–20)
CALCIUM SERPL-MCNC: 9.7 MG/DL (ref 8.3–10.6)
CHLORIDE BLD-SCNC: 95 MMOL/L (ref 99–110)
CO2: 20 MMOL/L (ref 21–32)
CREAT SERPL-MCNC: 0.8 MG/DL (ref 0.8–1.3)
EOSINOPHILS ABSOLUTE: 0.1 K/UL (ref 0–0.6)
EOSINOPHILS RELATIVE PERCENT: 0.8 %
GFR AFRICAN AMERICAN: >60
GFR NON-AFRICAN AMERICAN: >60
GLUCOSE BLD-MCNC: 329 MG/DL (ref 70–99)
GLUCOSE BLD-MCNC: 414 MG/DL (ref 70–99)
HCT VFR BLD CALC: 37.8 % (ref 40.5–52.5)
HEMOGLOBIN: 12.2 G/DL (ref 13.5–17.5)
INR BLD: 3.25 (ref 0.86–1.14)
LYMPHOCYTES ABSOLUTE: 1.1 K/UL (ref 1–5.1)
LYMPHOCYTES RELATIVE PERCENT: 12.9 %
MCH RBC QN AUTO: 27.8 PG (ref 26–34)
MCHC RBC AUTO-ENTMCNC: 32.3 G/DL (ref 31–36)
MCV RBC AUTO: 86 FL (ref 80–100)
MONOCYTES ABSOLUTE: 0.8 K/UL (ref 0–1.3)
MONOCYTES RELATIVE PERCENT: 8.6 %
NEUTROPHILS ABSOLUTE: 6.8 K/UL (ref 1.7–7.7)
NEUTROPHILS RELATIVE PERCENT: 77.2 %
PDW BLD-RTO: 16.2 % (ref 12.4–15.4)
PERFORMED ON: ABNORMAL
PLATELET # BLD: 269 K/UL (ref 135–450)
PMV BLD AUTO: 7.5 FL (ref 5–10.5)
POTASSIUM REFLEX MAGNESIUM: 4.2 MMOL/L (ref 3.5–5.1)
POTASSIUM SERPL-SCNC: 4.2 MMOL/L (ref 3.5–5.1)
PROTHROMBIN TIME: 38.2 SEC (ref 10–13.2)
RBC # BLD: 4.39 M/UL (ref 4.2–5.9)
SARS-COV-2, NAAT: NOT DETECTED
SODIUM BLD-SCNC: 129 MMOL/L (ref 136–145)
TROPONIN: 0.02 NG/ML
WBC # BLD: 8.8 K/UL (ref 4–11)

## 2020-12-30 PROCEDURE — 83036 HEMOGLOBIN GLYCOSYLATED A1C: CPT

## 2020-12-30 PROCEDURE — 99283 EMERGENCY DEPT VISIT LOW MDM: CPT

## 2020-12-30 PROCEDURE — 71046 X-RAY EXAM CHEST 2 VIEWS: CPT

## 2020-12-30 PROCEDURE — 86850 RBC ANTIBODY SCREEN: CPT

## 2020-12-30 PROCEDURE — 86901 BLOOD TYPING SEROLOGIC RH(D): CPT

## 2020-12-30 PROCEDURE — 99211 OFF/OP EST MAY X REQ PHY/QHP: CPT | Performed by: NURSE PRACTITIONER

## 2020-12-30 PROCEDURE — 84484 ASSAY OF TROPONIN QUANT: CPT

## 2020-12-30 PROCEDURE — 1200000000 HC SEMI PRIVATE

## 2020-12-30 PROCEDURE — 85610 PROTHROMBIN TIME: CPT

## 2020-12-30 PROCEDURE — 86900 BLOOD TYPING SEROLOGIC ABO: CPT

## 2020-12-30 PROCEDURE — 93000 ELECTROCARDIOGRAM COMPLETE: CPT | Performed by: INTERNAL MEDICINE

## 2020-12-30 PROCEDURE — 85025 COMPLETE CBC W/AUTO DIFF WBC: CPT

## 2020-12-30 PROCEDURE — 99215 OFFICE O/P EST HI 40 MIN: CPT | Performed by: INTERNAL MEDICINE

## 2020-12-30 PROCEDURE — 80048 BASIC METABOLIC PNL TOTAL CA: CPT

## 2020-12-30 PROCEDURE — 6370000000 HC RX 637 (ALT 250 FOR IP): Performed by: FAMILY MEDICINE

## 2020-12-30 PROCEDURE — 93005 ELECTROCARDIOGRAM TRACING: CPT | Performed by: EMERGENCY MEDICINE

## 2020-12-30 PROCEDURE — U0002 COVID-19 LAB TEST NON-CDC: HCPCS

## 2020-12-30 RX ORDER — FUROSEMIDE 40 MG/1
40 TABLET ORAL DAILY
Status: DISCONTINUED | OUTPATIENT
Start: 2020-12-31 | End: 2021-01-01

## 2020-12-30 RX ORDER — INSULIN LISPRO 100 [IU]/ML
0-12 INJECTION, SOLUTION INTRAVENOUS; SUBCUTANEOUS
Status: DISCONTINUED | OUTPATIENT
Start: 2020-12-30 | End: 2021-01-01

## 2020-12-30 RX ORDER — TRAMADOL HYDROCHLORIDE 50 MG/1
50 TABLET ORAL EVERY 4 HOURS PRN
Status: DISCONTINUED | OUTPATIENT
Start: 2020-12-30 | End: 2021-01-02 | Stop reason: HOSPADM

## 2020-12-30 RX ORDER — LISINOPRIL 10 MG/1
5 TABLET ORAL DAILY
Status: DISCONTINUED | OUTPATIENT
Start: 2020-12-31 | End: 2021-01-02 | Stop reason: HOSPADM

## 2020-12-30 RX ORDER — SIMVASTATIN 10 MG
40 TABLET ORAL NIGHTLY
Status: DISCONTINUED | OUTPATIENT
Start: 2020-12-30 | End: 2020-12-30 | Stop reason: CLARIF

## 2020-12-30 RX ORDER — SODIUM CHLORIDE 0.9 % (FLUSH) 0.9 %
10 SYRINGE (ML) INJECTION EVERY 12 HOURS SCHEDULED
Status: DISCONTINUED | OUTPATIENT
Start: 2020-12-30 | End: 2021-01-02 | Stop reason: HOSPADM

## 2020-12-30 RX ORDER — DEXTROSE MONOHYDRATE 50 MG/ML
100 INJECTION, SOLUTION INTRAVENOUS PRN
Status: DISCONTINUED | OUTPATIENT
Start: 2020-12-30 | End: 2021-01-02 | Stop reason: HOSPADM

## 2020-12-30 RX ORDER — ATORVASTATIN CALCIUM 10 MG/1
20 TABLET, FILM COATED ORAL NIGHTLY
Status: DISCONTINUED | OUTPATIENT
Start: 2020-12-30 | End: 2021-01-02 | Stop reason: HOSPADM

## 2020-12-30 RX ORDER — NICOTINE POLACRILEX 4 MG
15 LOZENGE BUCCAL PRN
Status: DISCONTINUED | OUTPATIENT
Start: 2020-12-30 | End: 2021-01-02 | Stop reason: HOSPADM

## 2020-12-30 RX ORDER — PANTOPRAZOLE SODIUM 40 MG/1
40 TABLET, DELAYED RELEASE ORAL DAILY
Status: DISCONTINUED | OUTPATIENT
Start: 2020-12-31 | End: 2021-01-02 | Stop reason: HOSPADM

## 2020-12-30 RX ORDER — ONDANSETRON 2 MG/ML
4 INJECTION INTRAMUSCULAR; INTRAVENOUS EVERY 6 HOURS PRN
Status: DISCONTINUED | OUTPATIENT
Start: 2020-12-30 | End: 2021-01-02 | Stop reason: HOSPADM

## 2020-12-30 RX ORDER — IPRATROPIUM BROMIDE AND ALBUTEROL SULFATE 2.5; .5 MG/3ML; MG/3ML
1 SOLUTION RESPIRATORY (INHALATION) EVERY 4 HOURS PRN
Status: DISCONTINUED | OUTPATIENT
Start: 2020-12-30 | End: 2021-01-02 | Stop reason: HOSPADM

## 2020-12-30 RX ORDER — PROMETHAZINE HYDROCHLORIDE 25 MG/1
12.5 TABLET ORAL EVERY 6 HOURS PRN
Status: DISCONTINUED | OUTPATIENT
Start: 2020-12-30 | End: 2021-01-02 | Stop reason: HOSPADM

## 2020-12-30 RX ORDER — ACETAMINOPHEN 650 MG/1
650 SUPPOSITORY RECTAL EVERY 6 HOURS PRN
Status: DISCONTINUED | OUTPATIENT
Start: 2020-12-30 | End: 2021-01-02 | Stop reason: HOSPADM

## 2020-12-30 RX ORDER — POLYETHYLENE GLYCOL 3350 17 G/17G
17 POWDER, FOR SOLUTION ORAL DAILY PRN
Status: DISCONTINUED | OUTPATIENT
Start: 2020-12-30 | End: 2021-01-02 | Stop reason: HOSPADM

## 2020-12-30 RX ORDER — TAMSULOSIN HYDROCHLORIDE 0.4 MG/1
0.4 CAPSULE ORAL DAILY
Status: DISCONTINUED | OUTPATIENT
Start: 2020-12-31 | End: 2021-01-02 | Stop reason: HOSPADM

## 2020-12-30 RX ORDER — FINASTERIDE 5 MG/1
5 TABLET, FILM COATED ORAL DAILY
Status: DISCONTINUED | OUTPATIENT
Start: 2020-12-31 | End: 2021-01-02 | Stop reason: HOSPADM

## 2020-12-30 RX ORDER — INSULIN LISPRO 100 [IU]/ML
0-6 INJECTION, SOLUTION INTRAVENOUS; SUBCUTANEOUS NIGHTLY
Status: DISCONTINUED | OUTPATIENT
Start: 2020-12-30 | End: 2021-01-01

## 2020-12-30 RX ORDER — ACETAMINOPHEN 325 MG/1
650 TABLET ORAL EVERY 6 HOURS PRN
Status: DISCONTINUED | OUTPATIENT
Start: 2020-12-30 | End: 2021-01-02 | Stop reason: HOSPADM

## 2020-12-30 RX ORDER — DEXTROSE MONOHYDRATE 25 G/50ML
12.5 INJECTION, SOLUTION INTRAVENOUS PRN
Status: DISCONTINUED | OUTPATIENT
Start: 2020-12-30 | End: 2021-01-02 | Stop reason: HOSPADM

## 2020-12-30 RX ORDER — ALBUTEROL SULFATE 90 UG/1
2 AEROSOL, METERED RESPIRATORY (INHALATION) EVERY 6 HOURS PRN
Qty: 1 INHALER | Refills: 0 | Status: SHIPPED | OUTPATIENT
Start: 2020-12-30 | End: 2021-12-30

## 2020-12-30 RX ORDER — SODIUM CHLORIDE 0.9 % (FLUSH) 0.9 %
10 SYRINGE (ML) INJECTION PRN
Status: DISCONTINUED | OUTPATIENT
Start: 2020-12-30 | End: 2021-01-02 | Stop reason: HOSPADM

## 2020-12-30 RX ADMIN — INSULIN GLARGINE 15 UNITS: 100 INJECTION, SOLUTION SUBCUTANEOUS at 22:00

## 2020-12-30 RX ADMIN — INSULIN LISPRO 4 UNITS: 100 INJECTION, SOLUTION INTRAVENOUS; SUBCUTANEOUS at 22:01

## 2020-12-30 ASSESSMENT — PAIN SCALES - GENERAL
PAINLEVEL_OUTOF10: 6
PAINLEVEL_OUTOF10: 0

## 2020-12-30 ASSESSMENT — PAIN DESCRIPTION - LOCATION: LOCATION: CHEST

## 2020-12-30 NOTE — ED PROVIDER NOTES
Emergency Department Encounter    Patient: Carol Erwin  MRN: [id number]  : 1955  Date of Evaluation: 2020  ED Provider:  Gloria Andrade    Triage Chief Complaint:   Chest Pain (Pt reports Dr. Gloria Lara calling him today stating that his heart monitor showed sustained Vtach and that he should come in to ER. Pt scheduled for pacemaker placement tomorrow. Pt reports CP times 3 months, denies change to CP. Denies SOB)    [place]:  Carol Erwin is a 72 y.o. male that presents to the ER for evaluation of abnormal dysrhythmia monitoring which showed sustained V. tach and a period of asystole. He has had chest pain earlier this month he does not have active chest pain currently. No syncope. He is a very pleasant male he is asymptomatic this point time. Is chronic hyperglycemia. He has no active chest pain. He has afebrile. No calf tenderness. No abdominal pain. No vomiting. No true syncope.   History is obtained from Dr. Joshua Seen as well as the patient    ROS - see HPI, below listed is current ROS at time of my eval:  General:  No fevers, no weakness  Eyes:  no discharge  ENT:  No sore throat, no nasal congestion  Cardiovascular:  + chest pain, + palpitations  Respiratory:  No shortness of breath, no cough, no wheezing  Gastrointestinal:  No pain, no nausea, no vomiting, no diarrhea  Musculoskeletal:  No muscle pain, no joint pain  Skin:  No rash, no pruritis  Neurologic:  No speech problems, no headache, no extremity numbness, no extremity tingling, no extremity weakness  Psychiatric:  No anxiety  Genitourinary:  No dysuria, no hematuria  Endocrine:  No unexpected weight gain, no unexpected weight loss  Extremities:  no edema, no pain    Past Medical History:   Diagnosis Date    Abdominal pain, LLQ     Allergic rhinitis     Asthma     Atopic dermatitis     Cancer ([facility] [address]     melanoma    Diabetes mellitus ([facility] [address]     Erectile dysfunction     Generalized osteoarthrosis, involving multiple sites     GERD (gastroesophageal reflux disease)     Gynecomastia     Hyperlipidemia     Nasal congestion     Nipple pain     Obstructive sleep apnea syndrome 10/5/2012    Pseudo-gout     Unspecified essential hypertension      Past Surgical History:   Procedure Laterality Date    BACK SURGERY      BRONCHOSCOPY N/A 12/2/2020    BRONCHOSCOPY ALVEOLAR LAVAGE performed by Heather Cisneros MD at [facility] Bilateral 2013    FINGER SURGERY Left 3-1-2013    Incision & Drainage of left Thumb Infection    FOOT SURGERY Right     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    HAND SURGERY  2011    LUMBAR NERVE BLOCK Right 12/12/2018    RIGHT L4/L5 LUMBAR INTERLAMINAR EPIDURAL STEROID INJECTION WITH FLUOROSCOPY performed by Sherryn Claude, MD at [address] [place] [address]  2002    melenoma right side post    [place] [address] DX/THER SBST INTRLMNR LMBR/SAC W/IMG GDN Right 9/24/2018    RIGHT L4/L5 INTERLAMINAR EPIDURAL STEROID INJECTION WITH FLUOROSCOPY performed by Sherryn Claude, MD at [address] [place] [address] History   Problem Relation Age of Onset    Arthritis Mother     High Blood Pressure Mother     Emphysema Mother     Osteoporosis Mother     Diabetes Father     Heart Disease Father     High Cholesterol Father     Parkinsonism Father     Diabetes Sister      Social History     Socioeconomic History    Marital status:      Spouse name: Not on file    Number of children: Not on file    Years of education: Not on file    Highest education level: Not on file   Occupational History    Occupation:    Social Needs    Financial resource strain: Not on file    Food insecurity     Worry: Not on file     Inability: Not on file    Transportation needs     Medical: Not on file     Non-medical: Not on file   Tobacco Use    Smoking status: Former Smoker     Packs/day: 1.00     Years: 10.00     Pack years: 10.00     Types: Cigarettes     Quit date: 4/3/1993     Years since quitting: 27.7    Smokeless tobacco: Never Used    Tobacco comment: quit 25 years ago   Substance and Sexual Activity    Alcohol use: No    Drug use: No    Sexual activity: Yes     Partners: Female   Lifestyle    Physical activity     Days per week: Not on file     Minutes per session: Not on file    Stress: Not on file   Relationships    Social connections     Talks on phone: Not on file     Gets together: Not on file     Attends [employer] service: Not on file     Active member of club or organization: Not on file     Attends meetings of clubs or organizations: Not on file     Relationship status: Not on file    Intimate partner violence     Fear of current or ex partner: Not on file     Emotionally abused: Not on file     Physically abused: Not on file     Forced sexual activity: Not on file   Other Topics Concern    Not on file   Social History Narrative    Not on file     Current Facility-Administered Medications   Medication Dose Route Frequency Provider Last Rate Last Admin    insulin glargine (LANTUS;BASAGLAR) injection pen 25 Units  25 Units Subcutaneous Nightly Naun Lott MD        sodium chloride flush 0.9 % injection 10 mL  10 mL Intravenous 2 times per day Anastacia Lima MD        sodium chloride flush 0.9 % injection 10 mL  10 mL Intravenous PRN Anastacia Lima MD        ceFAZolin (ANCEF) 2 g in dextrose 5 % 100 mL IVPB  2 g Intravenous Q8H Anastacia Lima MD        oxyCODONE (ROXICODONE) immediate release tablet 5 mg  5 mg Oral Q4H PRN Anastacia Lima MD        Or    oxyCODONE (ROXICODONE) immediate release tablet 10 mg  10 mg Oral Q4H PRN Anastacia Lima MD        ondansetron (ZOFRAN) injection 4 mg  4 mg Intravenous Q6H PRN Anastacia Lima MD        finasteride (PROSCAR) tablet 5 mg  5 mg Oral Daily Mary Chapman MD   5 mg at 12/31/20 0844    furosemide (LASIX) tablet 40 mg  40 mg Oral Daily Naun Lott MD   40 mg at 12/31/20 0844    ipratropium-albuterol (DUONEB) nebulizer solution 3 mL 1 vial Inhalation Q4H PRN Shaw Goodwin MD        lisinopril (PRINIVIL;ZESTRIL) tablet 5 mg  5 mg Oral Daily Shaw Goodwin MD   5 mg at 12/31/20 0844    pantoprazole (PROTONIX) tablet 40 mg  40 mg Oral Daily Shaw Goodwin MD   40 mg at 12/31/20 0844    metoprolol tartrate (LOPRESSOR) tablet 12.5 mg  12.5 mg Oral BID Shaw Goodwin MD   12.5 mg at 12/31/20 0844    tamsulosin (FLOMAX) capsule 0.4 mg  0.4 mg Oral Daily hSaw Goodwin MD   0.4 mg at 12/31/20 0844    traMADol (ULTRAM) tablet 50 mg  50 mg Oral Q4H PRN Shaw Goodwin MD        glucose (GLUTOSE) 40 % oral gel 15 g  15 g Oral PRN Shaw Goodwin MD        dextrose 50 % IV solution  12.5 g Intravenous PRN Shaw Goodwin MD        glucagon (rDNA) injection 1 mg  1 mg Intramuscular PRN Shaw Goodwin MD        dextrose 5 % solution  100 mL/hr Intravenous PRN Shaw Goodwin MD        sodium chloride flush 0.9 % injection 10 mL  10 mL Intravenous 2 times per day Shaw Goodwin MD   10 mL at 12/31/20 0845    sodium chloride flush 0.9 % injection 10 mL  10 mL Intravenous PRN Shaw Goodwin MD        enoxaparin (LOVENOX) injection 100 mg  1 mg/kg Subcutaneous BID Shaw Goodwin MD        promethazine (PHENERGAN) tablet 12.5 mg  12.5 mg Oral Q6H PRN Shaw Goodwin MD        Or    ondansetron (ZOFRAN) injection 4 mg  4 mg Intravenous Q6H PRN Shaw Goodwin MD        polyethylene glycol (GLYCOLAX) packet 17 g  17 g Oral Daily PRN Shaw Goodwin MD        acetaminophen (TYLENOL) tablet 650 mg  650 mg Oral Q6H PRN Shaw Goodwin MD   650 mg at 12/31/20 0855    Or    acetaminophen (TYLENOL) suppository 650 mg  650 mg Rectal Q6H PRN Shaw Goodwin MD        insulin lispro (1 Unit Dial) 0-12 Units  0-12 Units Subcutaneous TID WC Shaw Goodwin MD   4 Units at 12/31/20 1216    insulin lispro (1 Unit Dial) 0-6 Units  0-6 Units Subcutaneous Nightly Shaw Goodwin MD   4 Units at 12/30/20 2201    atorvastatin (LIPITOR) tablet 20 mg  20 mg Oral Nightly Lori Medrano MD Allergies   Allergen Reactions    Levaquin [Levofloxacin] Diarrhea       Nursing Notes Reviewed    Physical Exam:  Triage VS:    ED Triage Vitals [12/30/20 1344]   Enc Vitals Group      /82      Pulse 74      Resp 16      Temp 97.8 °F (36.6 °C)      Temp Source Temporal      SpO2       Weight 213 lb (96.6 kg)      Height       Head Circumference       Peak Flow       Pain Score       Pain Loc       Pain Edu? Excl. in [address]? My pulse ox interpretation is  normal    General appearance:  No acute distress. Skin:  Warm. Dry. Eye:  Extraocular movements intact. Ears, nose, mouth and throat:  Oral mucosa moist   Neck:  Trachea midline. Extremity:  No swelling. Normal ROM     Heart:  Regular rate and rhythm, normal S1 & S2, no extra heart sounds. Perfusion:  intact  Respiratory:  Lungs clear to auscultation bilaterally. Respirations nonlabored. Abdominal:  Normal bowel sounds. Soft. Nontender. Non distended. Neurological:  Alert and oriented times 3.              Psychiatric:  Appropriate    I have reviewed and interpreted all of the currently available lab results from this visit (if applicable):  Results for orders placed or performed during the hospital encounter of 87/83/64   Basic Metabolic Panel   Result Value Ref Range    Sodium 129 (L) 136 - 145 mmol/L    Potassium 4.2 3.5 - 5.1 mmol/L    Chloride 95 (L) 99 - 110 mmol/L    CO2 20 (L) 21 - 32 mmol/L    Anion Gap 14 3 - 16    Glucose 414 (H) 70 - 99 mg/dL    BUN 19 7 - 20 mg/dL    CREATININE 0.8 0.8 - 1.3 mg/dL    GFR Non-African American >60 >60    GFR African American >60 >60    Calcium 9.7 8.3 - 10.6 mg/dL   Troponin   Result Value Ref Range    Troponin 0.02 (H) <0.01 ng/mL   CBC Auto Differential   Result Value Ref Range    WBC 8.8 4.0 - 11.0 K/uL    RBC 4.39 4.20 - 5.90 M/uL    Hemoglobin 12.2 (L) 13.5 - 17.5 g/dL    Hematocrit 37.8 (L) 40.5 - 52.5 %    MCV 86.0 80.0 - 100.0 fL    MCH 27.8 26.0 - 34.0 pg    MCHC 32.3 31.0 - 36.0 g/dL    RDW 16.2 (H) 12.4 - 15.4 %    Platelets 862 040 - 645 K/uL    MPV 7.5 5.0 - 10.5 fL    Neutrophils % 77.2 %    Lymphocytes % 12.9 %    Monocytes % 8.6 %    Eosinophils % 0.8 %    Basophils % 0.5 %    Neutrophils Absolute 6.8 1.7 - 7.7 K/uL    Lymphocytes Absolute 1.1 1.0 - 5.1 K/uL    Monocytes Absolute 0.8 0.0 - 1.3 K/uL    Eosinophils Absolute 0.1 0.0 - 0.6 K/uL    Basophils Absolute 0.0 0.0 - 0.2 K/uL   Protime-INR   Result Value Ref Range    Protime 38.2 (H) 10.0 - 13.2 sec    INR 3.25 (H) 0.86 - 1.14   COVID-19   Result Value Ref Range    SARS-CoV-2, NAAT Not Detected Not Detected   Basic Metabolic Panel w/ Reflex to MG   Result Value Ref Range    Potassium reflex Magnesium 4.2 3.5 - 5.1 mmol/L   Hemoglobin A1c   Result Value Ref Range    Hemoglobin A1C 10.6 See comment %    eAG 257.5 mg/dL   POCT Glucose   Result Value Ref Range    POC Glucose 329 (H) 70 - 99 mg/dl    Performed on ACCU-CHEK    POCT Glucose   Result Value Ref Range    POC Glucose 329 (H) 70 - 99 mg/dl    Performed on ACCU-CHEK    POCT Glucose   Result Value Ref Range    POC Glucose 238 (H) 70 - 99 mg/dl    Performed on ACCU-CHEK    EKG 12 Lead   Result Value Ref Range    Ventricular Rate 87 BPM    Atrial Rate 96 BPM    QRS Duration 126 ms    Q-T Interval 366 ms    QTc Calculation (Bazett) 440 ms    R Axis -35 degrees    T Axis 153 degrees    Diagnosis       Atrial fibrillationLeft axis deviationNon-specific intra-ventricular conduction blockNonspecific T wave abnormality , probably digitalis effectAbnormal ECG   TYPE AND SCREEN   Result Value Ref Range    ABO/Rh O POS     Antibody Screen NEG       Radiographs (if obtained):  Radiologist's Report Reviewed:  Xr Chest (2 Vw)    Result Date: 12/30/2020  EXAMINATION: TWO XRAY VIEWS OF THE CHEST 12/30/2020 2:34 pm COMPARISON: Recent CT report dated November 11, 2020 HISTORY: ORDERING SYSTEM PROVIDED HISTORY: CP TECHNOLOGIST PROVIDED HISTORY: Reason for exam:->CP Acuity: Unknown FINDINGS: Lungs demonstrate prominence of the right pulmonary hilum. The cardiac silhouette is unremarkable. There are no pleural effusions. There is no pneumothorax. Prominence of the right pulmonary hilum noted. No consolidation. Recommend comparison with clinical exam and recent chest CT report which demonstrated multiple pulmonary nodules with enlarged hilar and mediastinal lymph nodes. EKG (if obtained): (All EKG's are interpreted by myself in the absence of a cardiologist)      MDM:  Patient presented with chest pain. Given history and presentation cardiac workup initiated. Patient had labs, EKG, x-ray and troponin ordered. Patient was placed on monitor. Patient's pulse ox is normal.  Patient presents to the ER for evaluation of confirmed ventricular tachycardia and asystole on cardiac monitoring from home is electrophysiologist will place the patient on a AICD pacemaker in the a.m. He has no troponin leak he has hyperglycemia without DKA, he will be admitted to the hospital he is given insulin he tolerated p.o. he will be n.p.o. after midnight he is on anticoagulation. No change in his medications currently. He is asymptomatic at this point time. Clinical Impression:  1. Chest pain due to myocardial ischemia, unspecified ischemic chest pain type    2. VT (ventricular tachycardia) ([address]      Disposition referral (if applicable):  No follow-up provider specified. Disposition medications (if applicable):  Current Discharge Medication List          Comment: Please note this report has been produced using speech recognition software and may contain errors related to that system including errors in grammar, punctuation, and spelling, as well as words and phrases that may be inappropriate. Efforts were made to edit the dictations.       Manuel Palma MD  42/81/92 [address], [place]  59/21/92 [id number]

## 2020-12-30 NOTE — PROGRESS NOTES
Pt seen in  ED, admission completed. Pt is alert and oriented x 4. Pt lives at home with his wife and is being admitted for V-tach and chest pain. Plan of care updated, all questions answered.

## 2020-12-30 NOTE — PROGRESS NOTES
Wanda Hightower received a viral test for COVID-19. They were educated on isolation and quarantine as appropriate. For any symptoms, they were directed to seek care from their PCP, given contact information to establish with a doctor, directed to an urgent care or the emergency room.

## 2020-12-30 NOTE — PROGRESS NOTES
He had an episode of sustained VT , his EF is 40% and he has Hx of cardiac arrest.    Will bring in for [employer] to allow for further evaluation before decision for ICD.

## 2020-12-30 NOTE — ED NOTES
Bed: 18  Expected date:   Expected time:   Means of arrival: Walk In  Comments:     [place]  12/30/20 1538

## 2020-12-30 NOTE — ED NOTES
Bed: 27  Expected date:   Expected time:   Means of arrival:   Comments:  [facility]  12/30/20 [id number]

## 2020-12-30 NOTE — TELEPHONE ENCOUNTER
I spoke with the patients wife Mayuri Suero. States the patient did feel the VT episode last night. He was shaking and short of breath. Advised patient to come in to office for evaluation and treatment.

## 2020-12-30 NOTE — PROGRESS NOTES
[address]   Electrophysiology   Date: 12/30/2020  I had the privilege of visiting Mariella Al in the office. CC: Sustained Ventricular Tachycardia  HPI: Mariella Al is a 72 y.o. male  past medical history significant for obesity, obstructive sleep apnea on CPAP, history of asthma, who was seen by pulmonary for dyspnea and chest tightness and mediastinal/hilar adenopathy. CT scan showed lung nodules. Patient plan for bronchoscopy and had significant event during bronchoscopy. PT was a difficult intubation. 3 attempts were made and patient was hypoxic and had brief PEA arrest with CPR and rosc within 1 minute of CPR. Coronary angiography showed no significant epicardial disease. He had developed atrial fibrillation and was cardioverted. EKG 12/02/2020 showed atrial fibrillation. Pt was cardioverted 12/02/2020. At that time patient was seen by Dr. Shona Osgood and an event monitor was put on him. 12/30/2020 [facility] urgent report showed sustained VT. Interval History: Fayetta Holstein presented today to follow up regarding long run of Ventricular tachycardia last night on Holter monitor. Pt is diaphoretic and  complains of feeling weak. Has a dry cough and chest pain. He has been feeling weaker today than before. He does not remember the episode last night.     Past Medical History:   Diagnosis Date    Abdominal pain, LLQ     Allergic rhinitis     Asthma     Atopic dermatitis     Cancer ([address]     melanoma    Diabetes mellitus ([address]     Erectile dysfunction     Generalized osteoarthrosis, involving multiple sites     GERD (gastroesophageal reflux disease)     Gynecomastia     Hyperlipidemia     Nasal congestion     Nipple pain     Obstructive sleep apnea syndrome 10/5/2012    Pseudo-gout     Unspecified essential hypertension         Past Surgical History:   Procedure Laterality Date    BACK SURGERY      BRONCHOSCOPY N/A 12/2/2020    BRONCHOSCOPY ALVEOLAR LAVAGE performed by Nathan Cross Helena Galloway MD at [facility] Bilateral 2013    FINGER SURGERY Left 3-1-2013    Incision & Drainage of left Thumb Infection    FOOT SURGERY Right     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    HAND SURGERY  2011    LUMBAR NERVE BLOCK Right 12/12/2018    RIGHT L4/L5 LUMBAR INTERLAMINAR EPIDURAL STEROID INJECTION WITH FLUOROSCOPY performed by Shasta Gale MD at [address] [place] [address]  2002    melenoma right side post    TN NJX DX/THER SBST INTRLMNR LMBR/SAC W/IMG GDN Right 9/24/2018    RIGHT L4/L5 INTERLAMINAR EPIDURAL STEROID INJECTION WITH FLUOROSCOPY performed by Shasta Gale MD at [address] [place] [address] St: Allergies   Allergen Reactions    Levaquin [Levofloxacin] Diarrhea       Social History:  Reviewed. reports that he quit smoking about 27 years ago. His smoking use included cigarettes. He has a 10.00 pack-year smoking history. He has never used smokeless tobacco. He reports that he does not drink alcohol or use drugs. Family History:  Reviewed. family history includes Arthritis in his mother; Diabetes in his father and sister; Emphysema in his mother; Heart Disease in his father; High Blood Pressure in his mother; High Cholesterol in his father; Osteoporosis in his mother; Parkinsonism in his father. Review of System:  All other systems reviewed and are negative except for that noted above.  Pertinent negatives are:     · General: negative for fever, chills   · Ophthalmic ROS: negative for - eye pain or loss of vision  · ENT ROS: negative for - headaches, sore throat   · Respiratory: negative for - cough, sputum  · Cardiovascular: Reviewed in HPI  · Gastrointestinal: negative for - abdominal pain, diarrhea, N/V  · Hematology: negative for - bleeding, blood clots, bruising or jaundice  · Genito-Urinary:  negative for - Dysuria or incontinence  · Musculoskeletal: negative for - Joint swelling, muscle pain  · Neurological: negative for - confusion, dizziness, headaches   · Psychiatric: No anxiety, no depression. · Dermatological: negative for - rash    Physical Examination:  Vitals:    20 1253   BP: 134/87   Pulse: 88   SpO2: 98%        · Constitutional: Oriented. No distress. · Head: Normocephalic and atraumatic. · Mouth/Throat: Oropharynx is clear and moist.   · Eyes: Conjunctivae normal. EOM are normal.   · Neck: Neck supple. No rigidity. No JVD present. · Cardiovascular: Normal rate, irregular rhythm, S1&S2. · Pulmonary/Chest: Bilateral respiratory sounds. No wheezes, No rhonchi. · Abdominal: Soft. Bowel sounds present. No distension, No tenderness. · Musculoskeletal: No tenderness. No edema    · Lymphadenopathy: Has no cervical adenopathy. · Neurological: Alert and oriented. Cranial nerve appears intact, No Gross deficit   · Skin: Skin is warm and dry. No rash noted. · Psychiatric: Has a normal behavior     Labs, diagnostic and imaging results reviewed. EC20 Atrial fibrillation with PVCs    Echo: 2020  Summary   -Overall left ventricular systolic function is moderately depressed .   -Ejection fraction is visually estimated to be 40%. E/e'= 7.4 cm   -There is global hypokinesis that appears worse in the inferior and apical   walls.   -Indeterminate diastolic function.   -The aortic valve appears sclerotic but opens well. -Mild aortic regurgitation.   -Mild tricuspid regurgitation    Cath: 2020     Cardiac Cath LVG:  Anatomy:   LM-nml   LAD-nml  Cx-distal 20%  OM- nml  RCA-dominant, prox 20%  RPDA- nml  LVEF- 40%  LVG- global hypo  LVEDP- 9    [facility] Pulmonary 2020    1. No evidence of pulmonary embolism   2. Multiple noncalcified pulmonary nodules with mildly enlarged bilateral   hilar and mediastinal lymph nodes.  Primary differential is metastatic   disease and granulomatous infection   3. Cardiomegaly with probable pulmonary venous hypertension   4.  Left adrenal adenoma       Medication:  Current Outpatient options , life vest if he is not willing to proceed with it today, defibrillator Risk and benefits discussed. This is a clinic call case of sustained VT without any clear underlying cause. He has nonobstructive CAD and mildly reduced ejection fraction. The cardiomyopathy could be an underlying mechanism but he is already on medical therapy. - physiology of Ventricular tachycardia discussed. - Cath 12/02/2020 - non obstructive CAD  - LVEF 12/02/2020 40%  - patient would like to have ICD done. Will admit today and plan for ICD tomorrow 12/31/2020. Paroxysmal Atrial fibrillation   -EKG 12/30/2020  afib with PVC  -Newly diagnosed after recent PEA arrest.  No prior history of atrial arrythmia. Arrest thought to  be secondary to hypoxia. His monitor shows that he was in A. fib and he is also in A. fib now. - cath 12/02/2020 - non obstructive CAD  - Echo 12/02/2020- LVEF 40%  - metoprolol 12.5 mg BID  - warfarin  - holter monitor in place since discharge 12/03/2020    PEA arrest secondary to hypoxemia    This episode likely was not related to the current situation. Obesity  Body mass index is 31.57 kg/m². - Excessive weight is complicating assessment and treatment. It is placing patient at risk for multiple co-morbidities as well as early death and contributing to the patient's presentation. - discussed weight management with diet and exercise     SYLVIE  -encouraged compliance with CPAP    Possible Pulmonary Hypertension   - risk factor modification       Patient chose to proceed with ICD. We will proceed with a dual-chamber device. He will be followed by Dionisio Gibson. I independently reviewed  OT      Thank you for allowing me to participate in the care of Mariella Al. Further evaluation will be based upon the patient's clinical course and testing results. All questions and concerns were addressed to the patient/family. Alternatives to my treatment were discussed.  I have discussed the above stated plan and the patient verbalized understanding and agreed with the plan. NOTE: This report was transcribed using voice recognition software. Every effort was made to ensure accuracy, however, inadvertent computerized transcription errors may be present. Marga Lamb MD, [address]   Office: [phone]    Scribe attestation:  This note was scribed in the presence of Marga Lamb MD by Nolia Buerger, RN    I, Dr. Marga Lamb personally performed the services described in this documentation as scribed by RN in my presence, and it is both accurate and complete.

## 2020-12-30 NOTE — TELEPHONE ENCOUNTER
Called and left message for pt that Dr Igor Patton refilled his Albuterol but asked for him to call our office and make a follow up appointment to go over his PFT's that he is having done 1-7-21

## 2020-12-30 NOTE — PATIENT INSTRUCTIONS
You have received a viral test for COVID-19. Below is education on quarantine per the CDC guidelines. For any symptoms, seek care from your PCP, call [phone] to establish care with a doctor, or go directly to an urgent care or the emergency room. Test results will take 2-7 days and will be sent to you in your [employer] account. If you test positive, you will be contacted via phone. If you test negative, the ONLY communication will be through [address] GO TO [employer] AND SIGN UP FOR [employer]  (LOWER LEFT OF THE HOME PAGE)  No test is 100%. If you have symptoms, you should follow the guidance of quarantine as previously stated. You can still be contagious if you have symptoms. Your [facility] will reach out to you if you have a positive result. They will provide you with a return to work date and note. If you were tested for a pre-op, then you should remain in quarantine until your procedure. How do I know if I need to be in quarantine? If you live in a community where COVID-19 is or might be spreading (currently, that is virtually everywhere in the [place])  Be alert for symptoms. Watch for fever, cough, shortness of breath, or other symptoms of COVID-19.  ? Take your temperature if symptoms develop. ? Practice social distancing. Maintain 6 feet of distance from others and stay out of crowded places. ? Follow CDC guidance if symptoms develop. If you feel healthy but:  ? Recently had close contact with a person with COVID-19 you need to Quarantine:  ? Stay home until 14 days after your last exposure. ? Check your temperature twice a day and watch for symptoms of COVID-19.  ? If possible, stay away from people who are at higher-risk for getting very sick from COVID-19. Stay Home and Monitor Your Health if you:  ? Have been diagnosed with COVID-19, or  ? Are waiting for test results, or  ?  Have cough, fever, or shortness of breath, or symptoms of COVID-19 When You Can be Around Others After You Had or Likely Had COVID-19     If you have or think you might have COVID-19, it is important to stay home and away from other people. Staying away from others helps stop the spread of COVID-19. If you have an emergency warning sign (including trouble breathing), get emergency medical care immediately. When you can be around others (end home isolation) depends on different factors for different situations. Find CDC's recommendations for your situation below. I think or know I had COVID-19, and I had symptoms  You can be with others after  ? 3 days with no fever and  ? Respiratory symptoms have improved (e.g. cough, shortness of breath) and  ? 10 days since symptoms first appeared  Depending on your healthcare provider's advice and availability of testing, you might get tested to see if you still have COVID-19. If you will be tested, you can be around others when you have no fever, respiratory symptoms have improved, and you receive two negative test results in a row, at least 24 hours apart. I tested positive for COVID-19 but had no symptoms  If you continue to have no symptoms, you can be with others after:  ? 10 days have passed since test or 14 days since your exposure test   Depending on your healthcare provider's advice and availability of testing, you might get tested to see if you still have COVID-19. If you will be tested, you can be around others after you receive two negative test results in a row, at least 24 hours apart. If you develop symptoms after testing positive, follow the guidance above for I think or know I had COVID, and I had symptoms.   For Anyone Who Has Been Around a Person with COVID-19  It is important to remember that anyone who has close contact with someone with COVID-19 should stay home for 14 days after exposure based on the time it takes to develop illness. Testing is not necessary.     www.cdc.gov/coronavirus/2019-ncov/index.html

## 2020-12-31 ENCOUNTER — NURSE ONLY (OUTPATIENT)
Dept: CARDIOLOGY CLINIC | Age: 65
End: 2020-12-31

## 2020-12-31 ENCOUNTER — APPOINTMENT (OUTPATIENT)
Dept: GENERAL RADIOLOGY | Age: 65
DRG: 225 | End: 2020-12-31
Payer: COMMERCIAL

## 2020-12-31 PROBLEM — Z95.810 ICD (IMPLANTABLE CARDIOVERTER-DEFIBRILLATOR), DUAL, IN SITU: Status: ACTIVE | Noted: 2020-12-31

## 2020-12-31 LAB
ESTIMATED AVERAGE GLUCOSE: 257.5 MG/DL
GLUCOSE BLD-MCNC: 238 MG/DL (ref 70–99)
GLUCOSE BLD-MCNC: 329 MG/DL (ref 70–99)
GLUCOSE BLD-MCNC: 342 MG/DL (ref 70–99)
GLUCOSE BLD-MCNC: 374 MG/DL (ref 70–99)
HBA1C MFR BLD: 10.6 %
PERFORMED ON: ABNORMAL
SARS-COV-2, NAA: NOT DETECTED

## 2020-12-31 PROCEDURE — 33249 INSJ/RPLCMT DEFIB W/LEAD(S): CPT

## 2020-12-31 PROCEDURE — 6360000002 HC RX W HCPCS

## 2020-12-31 PROCEDURE — 99153 MOD SED SAME PHYS/QHP EA: CPT

## 2020-12-31 PROCEDURE — C1898 LEAD, PMKR, OTHER THAN TRANS: HCPCS

## 2020-12-31 PROCEDURE — 6360000002 HC RX W HCPCS: Performed by: FAMILY MEDICINE

## 2020-12-31 PROCEDURE — 1200000000 HC SEMI PRIVATE

## 2020-12-31 PROCEDURE — C1894 INTRO/SHEATH, NON-LASER: HCPCS

## 2020-12-31 PROCEDURE — 6370000000 HC RX 637 (ALT 250 FOR IP): Performed by: FAMILY MEDICINE

## 2020-12-31 PROCEDURE — 2709999900 HC NON-CHARGEABLE SUPPLY

## 2020-12-31 PROCEDURE — C1721 AICD, DUAL CHAMBER: HCPCS

## 2020-12-31 PROCEDURE — 6370000000 HC RX 637 (ALT 250 FOR IP): Performed by: INTERNAL MEDICINE

## 2020-12-31 PROCEDURE — 2780000010 HC IMPLANT OTHER

## 2020-12-31 PROCEDURE — 33249 INSJ/RPLCMT DEFIB W/LEAD(S): CPT | Performed by: INTERNAL MEDICINE

## 2020-12-31 PROCEDURE — 2580000003 HC RX 258: Performed by: INTERNAL MEDICINE

## 2020-12-31 PROCEDURE — 2580000003 HC RX 258: Performed by: FAMILY MEDICINE

## 2020-12-31 PROCEDURE — 99152 MOD SED SAME PHYS/QHP 5/>YRS: CPT

## 2020-12-31 PROCEDURE — 02H63KZ INSERTION OF DEFIBRILLATOR LEAD INTO RIGHT ATRIUM, PERCUTANEOUS APPROACH: ICD-10-PCS | Performed by: INTERNAL MEDICINE

## 2020-12-31 PROCEDURE — C1777 LEAD, AICD, ENDO SINGLE COIL: HCPCS

## 2020-12-31 PROCEDURE — 0JH608Z INSERTION OF DEFIBRILLATOR GENERATOR INTO CHEST SUBCUTANEOUS TISSUE AND FASCIA, OPEN APPROACH: ICD-10-PCS | Performed by: INTERNAL MEDICINE

## 2020-12-31 PROCEDURE — 71045 X-RAY EXAM CHEST 1 VIEW: CPT

## 2020-12-31 PROCEDURE — 2500000003 HC RX 250 WO HCPCS

## 2020-12-31 PROCEDURE — B2151ZZ FLUOROSCOPY OF LEFT HEART USING LOW OSMOLAR CONTRAST: ICD-10-PCS | Performed by: INTERNAL MEDICINE

## 2020-12-31 PROCEDURE — 6360000004 HC RX CONTRAST MEDICATION: Performed by: INTERNAL MEDICINE

## 2020-12-31 PROCEDURE — 02HK3KZ INSERTION OF DEFIBRILLATOR LEAD INTO RIGHT VENTRICLE, PERCUTANEOUS APPROACH: ICD-10-PCS | Performed by: INTERNAL MEDICINE

## 2020-12-31 PROCEDURE — 6360000002 HC RX W HCPCS: Performed by: INTERNAL MEDICINE

## 2020-12-31 PROCEDURE — 99233 SBSQ HOSP IP/OBS HIGH 50: CPT | Performed by: INTERNAL MEDICINE

## 2020-12-31 PROCEDURE — 2580000003 HC RX 258

## 2020-12-31 RX ORDER — OXYCODONE HYDROCHLORIDE 5 MG/1
5 TABLET ORAL EVERY 4 HOURS PRN
Status: DISCONTINUED | OUTPATIENT
Start: 2020-12-31 | End: 2021-01-02 | Stop reason: HOSPADM

## 2020-12-31 RX ORDER — OXYCODONE HYDROCHLORIDE 5 MG/1
10 TABLET ORAL EVERY 4 HOURS PRN
Status: DISCONTINUED | OUTPATIENT
Start: 2020-12-31 | End: 2021-01-02 | Stop reason: HOSPADM

## 2020-12-31 RX ORDER — SODIUM CHLORIDE 0.9 % (FLUSH) 0.9 %
10 SYRINGE (ML) INJECTION EVERY 12 HOURS SCHEDULED
Status: DISCONTINUED | OUTPATIENT
Start: 2020-12-31 | End: 2021-01-02 | Stop reason: HOSPADM

## 2020-12-31 RX ORDER — SODIUM CHLORIDE 0.9 % (FLUSH) 0.9 %
10 SYRINGE (ML) INJECTION PRN
Status: DISCONTINUED | OUTPATIENT
Start: 2020-12-31 | End: 2021-01-02 | Stop reason: HOSPADM

## 2020-12-31 RX ORDER — ONDANSETRON 2 MG/ML
4 INJECTION INTRAMUSCULAR; INTRAVENOUS EVERY 6 HOURS PRN
Status: DISCONTINUED | OUTPATIENT
Start: 2020-12-31 | End: 2021-01-02 | Stop reason: HOSPADM

## 2020-12-31 RX ADMIN — METOPROLOL TARTRATE 12.5 MG: 25 TABLET, FILM COATED ORAL at 19:52

## 2020-12-31 RX ADMIN — PANTOPRAZOLE SODIUM 40 MG: 40 TABLET, DELAYED RELEASE ORAL at 08:44

## 2020-12-31 RX ADMIN — FUROSEMIDE 40 MG: 40 TABLET ORAL at 08:44

## 2020-12-31 RX ADMIN — Medication 10 ML: at 19:52

## 2020-12-31 RX ADMIN — TAMSULOSIN HYDROCHLORIDE 0.4 MG: 0.4 CAPSULE ORAL at 08:44

## 2020-12-31 RX ADMIN — ENOXAPARIN SODIUM 100 MG: 100 INJECTION SUBCUTANEOUS at 19:51

## 2020-12-31 RX ADMIN — ATORVASTATIN CALCIUM 20 MG: 10 TABLET, FILM COATED ORAL at 19:52

## 2020-12-31 RX ADMIN — CEFAZOLIN SODIUM 2 G: 10 INJECTION, POWDER, FOR SOLUTION INTRAVENOUS at 19:09

## 2020-12-31 RX ADMIN — ACETAMINOPHEN 650 MG: 325 TABLET ORAL at 08:55

## 2020-12-31 RX ADMIN — METOPROLOL TARTRATE 12.5 MG: 25 TABLET, FILM COATED ORAL at 08:44

## 2020-12-31 RX ADMIN — OXYCODONE 10 MG: 5 TABLET ORAL at 22:48

## 2020-12-31 RX ADMIN — INSULIN LISPRO 5 UNITS: 100 INJECTION, SOLUTION INTRAVENOUS; SUBCUTANEOUS at 19:55

## 2020-12-31 RX ADMIN — IOPAMIDOL 10 ML: 755 INJECTION, SOLUTION INTRAVENOUS at 11:40

## 2020-12-31 RX ADMIN — LISINOPRIL 5 MG: 10 TABLET ORAL at 08:44

## 2020-12-31 RX ADMIN — OXYCODONE 10 MG: 5 TABLET ORAL at 19:09

## 2020-12-31 RX ADMIN — INSULIN LISPRO 4 UNITS: 100 INJECTION, SOLUTION INTRAVENOUS; SUBCUTANEOUS at 12:16

## 2020-12-31 RX ADMIN — INSULIN LISPRO 8 UNITS: 100 INJECTION, SOLUTION INTRAVENOUS; SUBCUTANEOUS at 16:35

## 2020-12-31 RX ADMIN — INSULIN LISPRO 8 UNITS: 100 INJECTION, SOLUTION INTRAVENOUS; SUBCUTANEOUS at 08:48

## 2020-12-31 RX ADMIN — Medication 10 ML: at 08:45

## 2020-12-31 RX ADMIN — FINASTERIDE 5 MG: 5 TABLET, FILM COATED ORAL at 08:44

## 2020-12-31 ASSESSMENT — PAIN SCALES - GENERAL
PAINLEVEL_OUTOF10: 0
PAINLEVEL_OUTOF10: 8
PAINLEVEL_OUTOF10: 7
PAINLEVEL_OUTOF10: 0
PAINLEVEL_OUTOF10: 2

## 2020-12-31 ASSESSMENT — PAIN DESCRIPTION - LOCATION: LOCATION: SHOULDER

## 2020-12-31 ASSESSMENT — PAIN DESCRIPTION - PAIN TYPE
TYPE: ACUTE PAIN
TYPE: SURGICAL PAIN

## 2020-12-31 ASSESSMENT — PAIN DESCRIPTION - ORIENTATION: ORIENTATION: LEFT

## 2020-12-31 NOTE — PROCEDURES
[facility]     Electrophysiology Procedure Note       Date of Procedure: 12/31/2020  Patient's Name: Jay Mc  YOB: 1955   Medical Record Number: [id number]  Referring Physician: Neha Marshall MD  Procedure Performed by: Germain Wlison MD    Procedures performed:     · Insertion of MRI compatible right ventricular  lead under fluoroscopy. · Insertion of MRI compatible right atrial lead under fluoroscopy  · Insertion of a MRI compatible  dual  chamber ICD  · IV sedation. · Programming and analysis of the device      Indication of the procedure: Jay Mc is a 72 y.o. male with   presented to the hospital with   Ventricular tachycardia   with no reversible cause      A dual chamber device was implanted for Atrial fibrillation management as planned to keep him in sinusand  to  provide physiological pacing, the need for atrial pacing and avoiding ventricular pacing in the setting of heart failure, and avoid pacemaker syndrome. Details of procedure: The patient was brought to the electrophysiology laboratory in stable condition. The patient was in a fasting and non-sedated state. The risks, benefits and alternatives of the procedure were discussed with the patient  . The risks including, but not limited to, the risks of vascular injury, bleeding, infection, device malfunction, lead dislodgement, radiation exposure, injury to cardiac and surrounding structures (including pneumothorax), stroke, myocardial infarction and death were discussed in detail. Patient opted to proceed with the device implantation. Written informed consent was signed and placed in the chart. Prophylactic antibiotic was given. An independent trained observer pushed medications at my direction. We monitored the patient's level of consciousness and vital signs/physiologic status throughout the procedure duration (see start and stop times below).   Sedation:  4 mg Versed, 200 mcg Fentanyl Sedation start: 120.1  Sedation stop: 1233     Selective venography of the left upper extremity was done to rule out compression of the vein, and because of difficult access, which showed patent vein. The patient was prepped and draped in a sterile fashion. A timeout protocol was completed to identify the patient and the procedure being performed. Pre-sedation evaluation and airway assessment (Mallampatti classification, class lI) was completed. IV sedation was provided with IV Versed, Fentanyl. An incision was made in the left pectoral area after administration of lidocaine. Using electrocautery and blunt dissection, a pocket was created. Central venous access into the left axillary vein was obtained using the modified Seldinger technique. After central venous access was obtained, a sheath was placed in the axillary vein. A right ventricular pacing lead was advanced into position on the apical septum under fluoroscopic guidance and using a series of curved stylets. The lead was actively fixated. After confirming appropriate function, the sheath was split and removed. The lead was secured to the underlying tissue using suture material.     A new sheath was advanced over a second previously placed wire into the vein. The atrial lead was advanced to the right atrial appendage under fluoroscopic guidance. The lead was actively fixated. After confirming appropriate function, the sheath was split and removed. The lead was secured to the underlying tissue using suture. The pocket was irrigated with saline solution. The leads were then connected to the new pulse generator,  dual/ chamber ICD, which was then placed into the cleaned pocket. The pulse generator was sutured inside the pocket. The pocket was then closed in three separate subcutaneous layers using  3-0 Vicryl and subcuticular layer using 4-0 Vicryl . The skin was covered with pressure dressing. Steristrips was used on the skin. We also used D-stat solution to prevent pocket hematoma. Antibiotic envelope was used. All sponge and needle counts were reported as correct at the end of the procedure. The patient tolerated the procedure well and there were no complications. Post-sedation evaluation was completed. Patient was transported to the holding area in stable condition. Blood THCB<47 cc  No complication  Lead and device information:         Plan:     The patient will be admitted and have usual post-implant care, including chest x-ray, and antibiotic therapy and interrogation of the device.    He can be discharged tomorrow

## 2020-12-31 NOTE — H&P
HOSPITALISTS HISTORY AND PHYSICAL    12/30/2020 8:42 PM    Patient Information:  Maile Harper is a 72 y.o. male [id number]  PCP:  Leanna Vang MD (Tel: [phone] )    Chief complaint:    Chief Complaint   Patient presents with    Chest Pain     Pt reports Dr. Gerry Magallon calling him today stating that his heart monitor showed sustained Vtach and that he should come in to ER. Pt scheduled for pacemaker placement tomorrow. Pt reports CP times 3 months, denies change to CP. Denies SOB        History of Present Illness:  Brittany Rodney is a 72 y.o. male was sent by his cardiology d/t sustained V-Tach seen on heart monitor. Pt remains asymptomatic. Denies chest pain , syncope or dyspnea. eKG showed afib     REVIEW OF SYSTEMS:   Constitutional: Negative for fever,chills or night sweats  ENT: Negative for rhinorrhea, epistaxis, hoarseness, sore throat. Respiratory: Negative for shortness of breath,wheezing  Cardiovascular: Negative for chest pain, palpitations   Gastrointestinal: Negative for nausea, vomiting, diarrhea  Genitourinary: Negative for polyuria, dysuria   Hematologic/Lymphatic: Negative for bleeding tendency, easy bruising  Musculoskeletal: Negative for myalgias and arthralgias  Neurologic: Negative for confusion,dysarthria. Skin: Negative for itching,rash  Psychiatric: Negative for depression,anxiety, agitation. Endocrine: Negative for polydipsia,polyuria,heat /cold intolerance. Past Medical History:   has a past medical history of Abdominal pain, LLQ, Allergic rhinitis, Asthma, Atopic dermatitis, Cancer ([facility] [address] Diabetes mellitus ([facility] [address] Erectile dysfunction, Generalized osteoarthrosis, involving multiple sites, GERD (gastroesophageal reflux disease), Gynecomastia, Hyperlipidemia, Nasal congestion, Nipple pain, Obstructive sleep apnea syndrome, Pseudo-gout, and Unspecified essential hypertension.      Past Surgical History:   has a past surgical history that includes Hand surgery (2011); other surgical history (2002); Finger surgery (Left, 3-1-2013); Foot surgery (Right); Carpal tunnel release (Bilateral, 2013); pr njx dx/ther sbst intrlmnr lmbr/sac w/img gdn (Right, 9/24/2018); lumbar nerve block (Right, 12/12/2018); bronchoscopy (N/A, 12/2/2020); and back surgery. Medications:  No current facility-administered medications on file prior to encounter.       Current Outpatient Medications on File Prior to Encounter   Medication Sig Dispense Refill    albuterol sulfate  (90 Base) MCG/ACT inhaler Inhale 2 puffs into the lungs every 6 hours as needed for Wheezing 1 Inhaler 0    glimepiride (AMARYL) 4 MG tablet Take 1 tablet by mouth daily 90 tablet 1    furosemide (LASIX) 40 MG tablet Take 1 tablet by mouth daily 90 tablet 1    traMADol (ULTRAM) 50 MG tablet TAKE 1 TABLET BY MOUTH TWO TIMES A DAY **reduce doses taken as pain becomes manageable** 60 tablet 0    warfarin (COUMADIN) 5 MG tablet Take 1 tablet by mouth daily 30 tablet 5    fluticasone-salmeterol (ADVAIR DISKUS) 250-50 MCG/DOSE AEPB Inhale 1 puff into the lungs every 12 hours 1 Inhaler 6    lisinopril (PRINIVIL;ZESTRIL) 5 MG tablet Take 1 tablet by mouth daily 30 tablet 1    metoprolol tartrate (LOPRESSOR) 25 MG tablet Take 0.5 tablets by mouth 2 times daily 60 tablet 1    ipratropium-albuterol (DUONEB) 0.5-2.5 (3) MG/3ML SOLN nebulizer solution Inhale 3 mLs into the lungs every 4 hours as needed for Shortness of Breath Dx: Asthma  ICD-10: J45.909 120 vial 5    allopurinol (ZYLOPRIM) 300 MG tablet Take 1 tablet by mouth daily 90 tablet 1    finasteride (PROSCAR) 5 MG tablet Take 5 mg by mouth daily      tamsulosin (FLOMAX) 0.4 MG capsule Take 0.4 mg by mouth daily      pantoprazole (PROTONIX) 40 MG tablet Take 1 tablet by mouth daily 90 tablet 1    metFORMIN (GLUCOPHAGE) 1000 MG tablet Take 1 tablet by mouth 2 times daily 180 tablet 1    simvastatin (ZOCOR) 40 MG (GLYCOLAX) packet 17 g  17 g Oral Daily PRN Ryanne Manley MD        acetaminophen (TYLENOL) tablet 650 mg  650 mg Oral Q6H PRN Ryanne Manley MD        Or   [facility] acetaminophen (TYLENOL) suppository 650 mg  650 mg Rectal Q6H PRN Ryanne Manley MD        insulin lispro (1 Unit Dial) 0-12 Units  0-12 Units Subcutaneous TID WC Ryanne Manley MD        insulin lispro (1 Unit Dial) 0-6 Units  0-6 Units Subcutaneous Nightly Ryanne Manley MD        insulin glargine (LANTUS;BASAGLAR) injection pen 15 Units  15 Units Subcutaneous Nightly Ryanne Manley MD        atorvastatin (LIPITOR) tablet 20 mg  20 mg Oral Nightly Jesus Chang MD         Current Outpatient Medications   Medication Sig Dispense Refill    albuterol sulfate  (90 Base) MCG/ACT inhaler Inhale 2 puffs into the lungs every 6 hours as needed for Wheezing 1 Inhaler 0    glimepiride (AMARYL) 4 MG tablet Take 1 tablet by mouth daily 90 tablet 1    furosemide (LASIX) 40 MG tablet Take 1 tablet by mouth daily 90 tablet 1    traMADol (ULTRAM) 50 MG tablet TAKE 1 TABLET BY MOUTH TWO TIMES A DAY **reduce doses taken as pain becomes manageable** 60 tablet 0    warfarin (COUMADIN) 5 MG tablet Take 1 tablet by mouth daily 30 tablet 5    fluticasone-salmeterol (ADVAIR DISKUS) 250-50 MCG/DOSE AEPB Inhale 1 puff into the lungs every 12 hours 1 Inhaler 6    lisinopril (PRINIVIL;ZESTRIL) 5 MG tablet Take 1 tablet by mouth daily 30 tablet 1    metoprolol tartrate (LOPRESSOR) 25 MG tablet Take 0.5 tablets by mouth 2 times daily 60 tablet 1    ipratropium-albuterol (DUONEB) 0.5-2.5 (3) MG/3ML SOLN nebulizer solution Inhale 3 mLs into the lungs every 4 hours as needed for Shortness of Breath Dx: Asthma  ICD-10: J45.909 120 vial 5    allopurinol (ZYLOPRIM) 300 MG tablet Take 1 tablet by mouth daily 90 tablet 1    finasteride (PROSCAR) 5 MG tablet Take 5 mg by mouth daily      tamsulosin (FLOMAX) 0.4 MG capsule Take 0.4 mg by mouth daily      pantoprazole (PROTONIX) 40 MG tablet Take 1 tablet by mouth daily 90 tablet 1    metFORMIN (GLUCOPHAGE) 1000 MG tablet Take 1 tablet by mouth 2 times daily 180 tablet 1    simvastatin (ZOCOR) 40 MG tablet TAKE 1 TABLET BY MOUTH AT BEDTIME 90 tablet 1    FREESTYLE LANCETS MISC 1 each by Does not apply route daily 100 each 3    Blood Glucose Monitoring Suppl (FREESTYLE LITE) CLAUDINE 1 Device by Does not apply route 2 times daily 1 Device 0     Allergies: Allergies   Allergen Reactions    Levaquin [Levofloxacin] Diarrhea        Social History:   reports that he quit smoking about 27 years ago. His smoking use included cigarettes. He has a 10.00 pack-year smoking history. He has never used smokeless tobacco. He reports that he does not drink alcohol or use drugs. Family History:  family history includes Arthritis in his mother; Diabetes in his father and sister; Emphysema in his mother; Heart Disease in his father; High Blood Pressure in his mother; High Cholesterol in his father; Osteoporosis in his mother; Parkinsonism in his father. , *    Physical Exam:  BP (!) 121/57   Pulse 98   Temp 98 °F (36.7 °C) (Oral)   Resp 29   Wt 213 lb (96.6 kg)   BMI 31.45 kg/m²     General appearance:  Appears comfortable. Well nourished  Eyes: Sclera clear, pupils equal  ENT: Moist mucus membranes, no thrush. Trachea midline. Cardiovascular: Regular rhythm, normal S1, S2. No murmur, gallop, rub. No edema in lower extremities  Respiratory: Clear to auscultation bilaterally, no wheeze, good inspiratory effort  Gastrointestinal: Abdomen soft, non-tender, not distended, normal bowel sounds  Musculoskeletal: No cyanosis in digits, neck supple  Neurology: Cranial nerves grossly intact. Alert and oriented in time, place and person. No speech or motor deficits  Psychiatry: Appropriate affect.  Not agitated  Skin: Warm, dry, normal turgor, no rash    Labs:  CBC:   Lab Results   Component Value Date    WBC 8.8 12/30/2020    RBC 4.39 12/30/2020    HGB 12.2 12/30/2020    HCT 37.8 12/30/2020    MCV 86.0 12/30/2020    MCH 27.8 12/30/2020    MCHC 32.3 12/30/2020    RDW 16.2 12/30/2020     12/30/2020    MPV 7.5 12/30/2020     BMP:    Lab Results   Component Value Date     12/30/2020    K 4.2 12/30/2020    K 4.2 12/30/2020    CL 95 12/30/2020    CO2 20 12/30/2020    BUN 19 12/30/2020    CREATININE 0.8 12/30/2020    CALCIUM 9.7 12/30/2020    GFRAA >60 12/30/2020    LABGLOM >60 12/30/2020    LABGLOM 92 06/08/2018    GLUCOSE 414 12/30/2020       Chest Xray:   EKG:         Problem List  Active Problems:    Atrial fibrillation ([address]  Resolved Problems:    * No resolved hospital problems. *        Assessment/Plan:       V tach reported on cardiac monitor  Pt remains asymptomatic and hemodyanamically stable  Monitor Electrolytes  Admit to CCU with tele  Cardiology plans for ICD tomorrow    afib new diagnosis  Hr is < 100  Cont metoprolol  Cont coumadin  INr is therapeutic    Type 2 DM   on lantus and sliding scale insulin    Caleb cont CPaP    Admit as inpatient. I anticipate hospitalization spanning more than two midnights for investigation and treatment of the above medically necessary diagnoses.       Caron Alfaro MD    12/30/2020 8:42 PM

## 2020-12-31 NOTE — FLOWSHEET NOTE
12/31/20 0830   Vital Signs   Temp 98.6 °F (37 °C)   Temp Source Oral   Pulse 95   Heart Rate Source Brachial   Resp 18   /80   BP Location Right upper arm   MAP (mmHg) 96   Patient Position Semi fowlers   Level of Consciousness Alert (0)   MEWS Score 1   Patient Currently in Pain Denies   Pain Assessment   Pain Assessment 0-10   Pain Level 0   Non-Pharmaceutical Pain Intervention(s) Declines   Response to Pain Intervention Patient Satisfied   Oxygen Therapy   SpO2 93 %   Pulse Oximeter Device Mode Intermittent   Pulse Oximeter Device Location Finger   O2 Device None (Room air)     Shift assessment compete. VSS. Pt. Is alert and oriented resting comfortably in bed. Pt. Has no complaints of chest pain or chest tightness or racing at this time. Pt denies SOB. POC discussed with patient and patient states understanding and is in agreement with plan. Call light and bedside table within reach. No further need expressed at this time.  Mariama Arevalo

## 2020-12-31 NOTE — CARE COORDINATION
Discharge Planning Assessment    SW discharge planner met with patient to discuss reason for admission, current living situation, and potential needs at the time of discharge. Pt in ED d/t chest pain    Demographics/Insurance verified: Yes    Current type of dwelling:  House - no difficulty w/stairs    Living arrangements:  w/spouse    Level of function/Support:  Pt reported he is independent with ADLs. Spouse is supportive. PCP:   Dr. Sesar Iverson    Last Visit to PCP:  Today     DME:  None. No needs reported. Active with any community resources/agencies/skilled home care:  None. No non-skilled needs reported. Medication compliance issues:  No    Financial issues that could impact healthcare:  No    Tentative discharge plan:  Home most likely. No needs identified. Discussed with patient and/or family that on the day of discharge home tentative time of discharge will be between 10 AM and noon.     Transportation at the time of discharge:  Spouse     Electronically signed by LDUWIG Lopez LSW on 12/30/2020 at 7:40 PM

## 2020-12-31 NOTE — PROGRESS NOTES
[address]   Electrophysiology Progress Note     Admit Date: 2020     Reason for follow up: Sustained VT    HPI and Interval History:   Patient seen and examined. Clinical notes reviewed. Telemetry reviewed. No new complaint today. No major events overnight. Denies having chest pain, shortness of breath, dyspnea on exertion, Orthopnea, PND at the time of this visit. Review of System:  All other systems reviewed and are negative except for that noted above. Pertinent negatives are:     · General: negative for fever, chills   · Ophthalmic ROS: negative for - eye pain or loss of vision  · ENT ROS: negative for - headaches, sore throat   · Respiratory: negative for - cough, sputum  · Cardiovascular: Reviewed in HPI  · Gastrointestinal: negative for - abdominal pain, diarrhea, N/V  · Hematology: negative for - bleeding, blood clots, bruising or jaundice  · Genito-Urinary:  negative for - Dysuria or incontinence  · Musculoskeletal: negative for - Joint swelling, muscle pain  · Neurological: negative for - confusion, dizziness, headaches   · Psychiatric: No anxiety, no depression. · Dermatological: negative for - rash      Physical Examination:  Vitals:    20 0830   BP: 127/80   Pulse: 95   Resp: 18   Temp: 98.6 °F (37 °C)   SpO2: 93%      No intake/output data recorded. Wt Readings from Last 3 Encounters:   20 213 lb (96.6 kg)   20 213 lb 12.8 oz (97 kg)   20 218 lb (98.9 kg)     Temp  Av.2 °F (36.8 °C)  Min: 97.8 °F (36.6 °C)  Max: 98.7 °F (37.1 °C)  Pulse  Av.8  Min: 74  Max: 104  BP  Min: 112/69  Max: 141/87  SpO2  Av.3 %  Min: 93 %  Max: 98 %  No intake or output data in the 24 hours ending 20 1052    · Telemetry: Atrial fibrillation   · Constitutional: Oriented. No distress. · Head: Normocephalic and atraumatic.    · Mouth/Throat: Oropharynx is clear and moist.   · Eyes: Conjunctivae normal. EOM are normal. · Neck: Neck supple. No rigidity. No JVD present. · Cardiovascular: Normal rate, irregular rhythm, S1&S2. · Pulmonary/Chest: Bilateral respiratory sounds. No wheezes, No rhonchi. · Abdominal: Soft. Bowel sounds present. No distension, No tenderness. · Musculoskeletal: No tenderness. No edema    · Lymphadenopathy: Has no cervical adenopathy. · Neurological: Alert and oriented. Cranial nerve appears intact, No Gross deficit   · Skin: Skin is warm and dry. No rash noted. · Psychiatric: Has a normal behavior     Labs, diagnostic and imaging results reviewed. Reviewed. Recent Labs     12/30/20  1400   *   K 4.2  4.2   CL 95*   CO2 20*   BUN 19   CREATININE 0.8     Recent Labs     12/30/20  1400   WBC 8.8   HGB 12.2*   HCT 37.8*   MCV 86.0        Lab Results   Component Value Date    TROPONINI 0.02 12/30/2020     CrCl cannot be calculated (Unknown ideal weight.).    No results found for: BNP  Lab Results   Component Value Date    PROTIME 38.2 12/30/2020    PROTIME 13.1 12/02/2020    PROTIME 12.3 09/24/2018    INR 3.25 12/30/2020    INR 1.13 12/02/2020    INR 1.08 09/24/2018     Lab Results   Component Value Date    CHOL 158 12/09/2020    HDL 34 12/09/2020    TRIG 141 12/09/2020       Scheduled Meds:   insulin glargine  25 Units Subcutaneous Nightly    finasteride  5 mg Oral Daily    furosemide  40 mg Oral Daily    lisinopril  5 mg Oral Daily    pantoprazole  40 mg Oral Daily    metoprolol tartrate  12.5 mg Oral BID    tamsulosin  0.4 mg Oral Daily    sodium chloride flush  10 mL Intravenous 2 times per day    enoxaparin  1 mg/kg Subcutaneous BID    insulin lispro  0-12 Units Subcutaneous TID WC    insulin lispro  0-6 Units Subcutaneous Nightly    atorvastatin  20 mg Oral Nightly     Continuous Infusions:   dextrose PRN Meds:ipratropium-albuterol, traMADol, glucose, dextrose, glucagon (rDNA), dextrose, sodium chloride flush, promethazine **OR** ondansetron, polyethylene glycol, acetaminophen **OR** acetaminophen     Patient Active Problem List    Diagnosis Date Noted    Paroxysmal atrial fibrillation ([facility] 12/30/2020    [facility] 12/30/2020    Atrial fibrillation ([facility] 12/30/2020    Persistent atrial fibrillation ([facility] 12/24/2020    Ischemic cardiomyopathy 12/24/2020    Cardiac arrest ([facility] 12/02/2020    Mediastinal adenopathy     Multiple lung nodules on CT     Moderate persistent asthma without complication 01/73/8691    GERD without esophagitis 09/04/2019    Non morbid obesity, unspecified obesity type 09/04/2019    Dyshidrotic eczema 06/06/2019    Eczema 04/04/2018    Type 2 diabetes mellitus with diabetic polyneuropathy, without long-term current use of insulin ([facility] 08/11/2017    Melanoma in situ of lower leg ([facility] 02/01/2017    Pseudogout 02/08/2016    Allergic rhinitis 04/03/2013    Obstructive sleep apnea syndrome 10/05/2012    Essential hypertension 10/01/2012    Generalized osteoarthrosis, involving multiple sites 10/01/2012    Pure hypercholesterolemia 10/01/2012      Active Hospital Problems    Diagnosis Date Noted    Atrial fibrillation [facility]) [I48.91] 12/30/2020       Assessment:       Plan:      Ventricular Tachycardia  - Sustained VT on holter monitor 12/30/2020 0400. The rate was 290 bpm.  It is started with a few PVCs and baseline rhythm of atrial fibrillation. Pt was asleep at the time, short of breath     and \"shaking like a leaf\" per wife. - treatment options , life vest if he is not willing to proceed with it today, defibrillator Risk and benefits discussed.     The etiology of his VT is unclear. In the absence of any reversible cause and presence of sustained VT with symptoms he is a candidate for secondary prevention of sudden cardiac death. - Excessive weight is complicating assessment and treatment. It is placing patient at risk for multiple co-morbidities as well as early death and contributing to the patient's presentation. - discussed weight management with diet and exercise      SYLVIE  -encouraged compliance with CPAP     Possible Pulmonary Hypertension   - risk factor modification         Patient chose to proceed with ICD. We will proceed with a dual-chamber device. He will be followed by Reginaldo Aldrich. I independently reviewed  MCOT   NOTE: This report was transcribed using voice recognition software. Every effort was made to ensure accuracy, however, inadvertent computerized transcription errors may be present.

## 2020-12-31 NOTE — FLOWSHEET NOTE
12/31/20 1245   Vital Signs   Temp 98 °F (36.7 °C)   Temp Source Oral   Pulse 76   Heart Rate Source Brachial   Resp 16   BP (!) 93/42   BP Location Right upper arm   MAP (mmHg) (!) 59   Patient Position Semi fowlers   Level of Consciousness Alert (0)   MEWS Score 2   Patient Currently in Pain Denies   Pain Assessment   Pain Assessment 0-10   Pain Level 0   Non-Pharmaceutical Pain Intervention(s) Declines   Response to Pain Intervention Patient Satisfied   Oxygen Therapy   SpO2 92 %   Pulse Oximeter Device Mode Intermittent   Pulse Oximeter Device Location Finger   O2 Device None (Room air)     Pt back from cath lab. Post-pacemaker placement. Pacemaker site CDI. Pressure dressing in place at this time. Vitals stable. Pt aware of restrictions and states no needs at this time.  Mariama Arevalo

## 2021-01-01 LAB
ANION GAP SERPL CALCULATED.3IONS-SCNC: 10 MMOL/L (ref 3–16)
BILIRUBIN URINE: NEGATIVE
BLOOD, URINE: NEGATIVE
BUN BLDV-MCNC: 23 MG/DL (ref 7–20)
CALCIUM SERPL-MCNC: 9.3 MG/DL (ref 8.3–10.6)
CHLORIDE BLD-SCNC: 93 MMOL/L (ref 99–110)
CLARITY: CLEAR
CO2: 26 MMOL/L (ref 21–32)
COLOR: YELLOW
CREAT SERPL-MCNC: 0.9 MG/DL (ref 0.8–1.3)
EKG ATRIAL RATE: 96 BPM
EKG DIAGNOSIS: NORMAL
EKG Q-T INTERVAL: 366 MS
EKG QRS DURATION: 126 MS
EKG QTC CALCULATION (BAZETT): 440 MS
EKG R AXIS: -35 DEGREES
EKG T AXIS: 153 DEGREES
EKG VENTRICULAR RATE: 87 BPM
GFR AFRICAN AMERICAN: >60
GFR NON-AFRICAN AMERICAN: >60
GLUCOSE BLD-MCNC: 241 MG/DL (ref 70–99)
GLUCOSE BLD-MCNC: 338 MG/DL (ref 70–99)
GLUCOSE BLD-MCNC: 338 MG/DL (ref 70–99)
GLUCOSE BLD-MCNC: 346 MG/DL (ref 70–99)
GLUCOSE BLD-MCNC: 414 MG/DL (ref 70–99)
GLUCOSE URINE: >=1000 MG/DL
HCT VFR BLD CALC: 35.6 % (ref 40.5–52.5)
HEMOGLOBIN: 11.5 G/DL (ref 13.5–17.5)
INR BLD: 3.3 (ref 0.86–1.14)
KETONES, URINE: NEGATIVE MG/DL
LEUKOCYTE ESTERASE, URINE: NEGATIVE
MCH RBC QN AUTO: 27.3 PG (ref 26–34)
MCHC RBC AUTO-ENTMCNC: 32.2 G/DL (ref 31–36)
MCV RBC AUTO: 84.7 FL (ref 80–100)
MICROSCOPIC EXAMINATION: ABNORMAL
NITRITE, URINE: NEGATIVE
OSMOLALITY URINE: 434 MOSM/KG (ref 390–1070)
PDW BLD-RTO: 16.7 % (ref 12.4–15.4)
PERFORMED ON: ABNORMAL
PH UA: 5.5 (ref 5–8)
PLATELET # BLD: 272 K/UL (ref 135–450)
PMV BLD AUTO: 7.3 FL (ref 5–10.5)
POTASSIUM SERPL-SCNC: 4 MMOL/L (ref 3.5–5.1)
PRO-BNP: 768 PG/ML (ref 0–124)
PROTEIN UA: NEGATIVE MG/DL
PROTHROMBIN TIME: 38.7 SEC (ref 10–13.2)
RBC # BLD: 4.2 M/UL (ref 4.2–5.9)
SODIUM BLD-SCNC: 129 MMOL/L (ref 136–145)
SODIUM URINE: 63 MMOL/L
SPECIFIC GRAVITY UA: 1.01 (ref 1–1.03)
URINE TYPE: ABNORMAL
UROBILINOGEN, URINE: 0.2 E.U./DL
WBC # BLD: 8.4 K/UL (ref 4–11)

## 2021-01-01 PROCEDURE — 83935 ASSAY OF URINE OSMOLALITY: CPT

## 2021-01-01 PROCEDURE — 6360000002 HC RX W HCPCS: Performed by: INTERNAL MEDICINE

## 2021-01-01 PROCEDURE — 6370000000 HC RX 637 (ALT 250 FOR IP): Performed by: FAMILY MEDICINE

## 2021-01-01 PROCEDURE — 6370000000 HC RX 637 (ALT 250 FOR IP): Performed by: INTERNAL MEDICINE

## 2021-01-01 PROCEDURE — 6360000002 HC RX W HCPCS: Performed by: FAMILY MEDICINE

## 2021-01-01 PROCEDURE — 81003 URINALYSIS AUTO W/O SCOPE: CPT

## 2021-01-01 PROCEDURE — 99024 POSTOP FOLLOW-UP VISIT: CPT | Performed by: INTERNAL MEDICINE

## 2021-01-01 PROCEDURE — 84300 ASSAY OF URINE SODIUM: CPT

## 2021-01-01 PROCEDURE — 1200000000 HC SEMI PRIVATE

## 2021-01-01 PROCEDURE — 6370000000 HC RX 637 (ALT 250 FOR IP): Performed by: NURSE PRACTITIONER

## 2021-01-01 PROCEDURE — 85610 PROTHROMBIN TIME: CPT

## 2021-01-01 PROCEDURE — 80048 BASIC METABOLIC PNL TOTAL CA: CPT

## 2021-01-01 PROCEDURE — 2580000003 HC RX 258: Performed by: FAMILY MEDICINE

## 2021-01-01 PROCEDURE — 93010 ELECTROCARDIOGRAM REPORT: CPT | Performed by: INTERNAL MEDICINE

## 2021-01-01 PROCEDURE — 36415 COLL VENOUS BLD VENIPUNCTURE: CPT

## 2021-01-01 PROCEDURE — 83880 ASSAY OF NATRIURETIC PEPTIDE: CPT

## 2021-01-01 PROCEDURE — 94761 N-INVAS EAR/PLS OXIMETRY MLT: CPT

## 2021-01-01 PROCEDURE — 85027 COMPLETE CBC AUTOMATED: CPT

## 2021-01-01 PROCEDURE — 2580000003 HC RX 258: Performed by: INTERNAL MEDICINE

## 2021-01-01 PROCEDURE — 94640 AIRWAY INHALATION TREATMENT: CPT

## 2021-01-01 RX ORDER — FUROSEMIDE 40 MG/1
40 TABLET ORAL DAILY
Status: DISCONTINUED | OUTPATIENT
Start: 2021-01-01 | End: 2021-01-02 | Stop reason: HOSPADM

## 2021-01-01 RX ORDER — FUROSEMIDE 10 MG/ML
20 INJECTION INTRAMUSCULAR; INTRAVENOUS ONCE
Status: COMPLETED | OUTPATIENT
Start: 2021-01-01 | End: 2021-01-01

## 2021-01-01 RX ORDER — IPRATROPIUM BROMIDE AND ALBUTEROL SULFATE 2.5; .5 MG/3ML; MG/3ML
1 SOLUTION RESPIRATORY (INHALATION)
Status: DISCONTINUED | OUTPATIENT
Start: 2021-01-01 | End: 2021-01-02 | Stop reason: HOSPADM

## 2021-01-01 RX ORDER — INSULIN LISPRO 100 [IU]/ML
0-18 INJECTION, SOLUTION INTRAVENOUS; SUBCUTANEOUS
Status: DISCONTINUED | OUTPATIENT
Start: 2021-01-01 | End: 2021-01-02 | Stop reason: HOSPADM

## 2021-01-01 RX ORDER — INSULIN LISPRO 100 [IU]/ML
0-9 INJECTION, SOLUTION INTRAVENOUS; SUBCUTANEOUS NIGHTLY
Status: DISCONTINUED | OUTPATIENT
Start: 2021-01-01 | End: 2021-01-02 | Stop reason: HOSPADM

## 2021-01-01 RX ADMIN — ACETAMINOPHEN 650 MG: 325 TABLET ORAL at 16:39

## 2021-01-01 RX ADMIN — LISINOPRIL 5 MG: 10 TABLET ORAL at 08:48

## 2021-01-01 RX ADMIN — OXYCODONE 10 MG: 5 TABLET ORAL at 12:37

## 2021-01-01 RX ADMIN — IPRATROPIUM BROMIDE AND ALBUTEROL SULFATE 1 AMPULE: .5; 3 SOLUTION RESPIRATORY (INHALATION) at 20:24

## 2021-01-01 RX ADMIN — Medication 10 ML: at 20:22

## 2021-01-01 RX ADMIN — Medication 10 ML: at 03:34

## 2021-01-01 RX ADMIN — CEFAZOLIN SODIUM 2 G: 10 INJECTION, POWDER, FOR SOLUTION INTRAVENOUS at 03:33

## 2021-01-01 RX ADMIN — TAMSULOSIN HYDROCHLORIDE 0.4 MG: 0.4 CAPSULE ORAL at 08:47

## 2021-01-01 RX ADMIN — FUROSEMIDE 20 MG: 10 INJECTION, SOLUTION INTRAMUSCULAR; INTRAVENOUS at 15:32

## 2021-01-01 RX ADMIN — INSULIN LISPRO 12 UNITS: 100 INJECTION, SOLUTION INTRAVENOUS; SUBCUTANEOUS at 12:37

## 2021-01-01 RX ADMIN — Medication 10 ML: at 15:32

## 2021-01-01 RX ADMIN — FUROSEMIDE 40 MG: 40 TABLET ORAL at 08:47

## 2021-01-01 RX ADMIN — IPRATROPIUM BROMIDE AND ALBUTEROL SULFATE 1 AMPULE: .5; 3 SOLUTION RESPIRATORY (INHALATION) at 15:41

## 2021-01-01 RX ADMIN — ATORVASTATIN CALCIUM 20 MG: 10 TABLET, FILM COATED ORAL at 20:21

## 2021-01-01 RX ADMIN — INSULIN LISPRO 6 UNITS: 100 INJECTION, SOLUTION INTRAVENOUS; SUBCUTANEOUS at 20:25

## 2021-01-01 RX ADMIN — INSULIN LISPRO 4 UNITS: 100 INJECTION, SOLUTION INTRAVENOUS; SUBCUTANEOUS at 08:51

## 2021-01-01 RX ADMIN — FINASTERIDE 5 MG: 5 TABLET, FILM COATED ORAL at 08:47

## 2021-01-01 RX ADMIN — ENOXAPARIN SODIUM 100 MG: 100 INJECTION SUBCUTANEOUS at 08:48

## 2021-01-01 RX ADMIN — OXYCODONE 10 MG: 5 TABLET ORAL at 16:38

## 2021-01-01 RX ADMIN — METOPROLOL TARTRATE 12.5 MG: 25 TABLET, FILM COATED ORAL at 08:47

## 2021-01-01 RX ADMIN — FUROSEMIDE 40 MG: 40 TABLET ORAL at 09:46

## 2021-01-01 RX ADMIN — PANTOPRAZOLE SODIUM 40 MG: 40 TABLET, DELAYED RELEASE ORAL at 06:28

## 2021-01-01 RX ADMIN — INSULIN LISPRO 12 UNITS: 100 INJECTION, SOLUTION INTRAVENOUS; SUBCUTANEOUS at 16:39

## 2021-01-01 RX ADMIN — METOPROLOL TARTRATE 25 MG: 25 TABLET, FILM COATED ORAL at 20:22

## 2021-01-01 RX ADMIN — IPRATROPIUM BROMIDE AND ALBUTEROL SULFATE 1 AMPULE: .5; 3 SOLUTION RESPIRATORY (INHALATION) at 11:19

## 2021-01-01 ASSESSMENT — PAIN SCALES - GENERAL: PAINLEVEL_OUTOF10: 0

## 2021-01-01 NOTE — PROGRESS NOTES
[facility]    1/1/2021 9:30 AM        Name: Eliecer Song . Admitted: 12/30/2020  Primary Care Provider: [place] Noe MD (Tel: [phone])      Chief Complaint   Patient presents with    Chest Pain     Pt reports Dr. Roge Forbes calling him today stating that his heart monitor showed sustained Vtach and that he should come in to ER. Pt scheduled for pacemaker placement tomorrow. Pt reports CP times 3 months, denies change to CP. Denies SOB       Brief History: Patient is a 73 yo male with hx DM2, GERD, HLD, HTN, SYLVIE. He has recent PES arrest during elective bronch and was noted to be in atrial fib at that time. [facility] 12/2/2020 with non-obstructive CDA and LVEF 40%. He was discharged with event monitor. Patient presented to hospital after event heart monitor showed VT. He is s/p ICD placement yesterday 12/31. Admission labs showed hyponatremia 129 and blood glucose 414. Subjective: Presently up in bedside chair. There were no acute overnight events. Offers no new complaints, he does mention morning cough. Denies shortness of breath, chest pain, chills, palpitations. He remains in atrial fibrillation, rate to 110s. CXR post procedure with mild pulmonary edema, EP added extra dose of Lasix.          Reviewed interval ancillary notes    Current Medications      metoprolol tartrate (LOPRESSOR) tablet 25 mg, BID      furosemide (LASIX) tablet 40 mg, Daily      furosemide (LASIX) injection 20 mg, Once      insulin glargine (LANTUS;BASAGLAR) injection pen 25 Units, Nightly      sodium chloride flush 0.9 % injection 10 mL, 2 times per day      sodium chloride flush 0.9 % injection 10 mL, PRN      oxyCODONE (ROXICODONE) immediate release tablet 5 mg, Q4H PRN    Or      oxyCODONE (ROXICODONE) immediate release tablet 10 mg, Q4H PRN      ondansetron (ZOFRAN) injection 4 mg, Q6H PRN   finasteride (PROSCAR) tablet 5 mg, Daily      ipratropium-albuterol (DUONEB) nebulizer solution 3 mL, Q4H PRN      lisinopril (PRINIVIL;ZESTRIL) tablet 5 mg, Daily      pantoprazole (PROTONIX) tablet 40 mg, Daily      tamsulosin (FLOMAX) capsule 0.4 mg, Daily      traMADol (ULTRAM) tablet 50 mg, Q4H PRN      glucose (GLUTOSE) 40 % oral gel 15 g, PRN      dextrose 50 % IV solution, PRN      glucagon (rDNA) injection 1 mg, PRN      dextrose 5 % solution, PRN      sodium chloride flush 0.9 % injection 10 mL, 2 times per day      sodium chloride flush 0.9 % injection 10 mL, PRN      enoxaparin (LOVENOX) injection 100 mg, BID      promethazine (PHENERGAN) tablet 12.5 mg, Q6H PRN    Or      ondansetron (ZOFRAN) injection 4 mg, Q6H PRN      polyethylene glycol (GLYCOLAX) packet 17 g, Daily PRN      acetaminophen (TYLENOL) tablet 650 mg, Q6H PRN    Or      acetaminophen (TYLENOL) suppository 650 mg, Q6H PRN      insulin lispro (1 Unit Dial) 0-12 Units, TID WC      insulin lispro (1 Unit Dial) 0-6 Units, Nightly      atorvastatin (LIPITOR) tablet 20 mg, Nightly        Objective:  /84   Pulse 112   Temp 98 °F (36.7 °C) (Oral)   Resp 18   Wt 213 lb (96.6 kg)   SpO2 96%   BMI 31.45 kg/m²     Intake/Output Summary (Last 24 hours) at 1/1/2021 0930  Last data filed at 1/1/2021 0333  Gross per 24 hour   Intake 580 ml   Output    Net 580 ml      Wt Readings from Last 3 Encounters:   12/30/20 213 lb (96.6 kg)   12/30/20 213 lb 12.8 oz (97 kg)   12/24/20 218 lb (98.9 kg)       General appearance:  Appears comfortable  Eyes: Sclera clear. Pupils equal.  ENT: Moist oral mucosa. Trachea midline, no adenopathy. Cardiovascular: Irregular rhythm, normal S1, S2. No murmur. No edema in lower extremities, + hematoma ICD pocket  Respiratory: Not using accessory muscles. Good inspiratory effort. Clear to auscultation bilaterally, no wheeze or crackles. 2. Paroxysmal atrial fibrillation. Rate suboptimally controlled. Metoprolol has been increased. Warfarin on hold secondary ICD pocket hematoma. Per EP recs, resume in 5 days. INR 3.30  3. NICM. EF 40% on echo (12/20). On ACE, consider switch to evidence based beta blocker, will defer to cardiology and EP. 4. Hyponatremia. Sodium 129, likely dilutional from hyperglycemia (414). Corrected sodium 134 Arun Merritts) / 137 Arlyss Massy). Check UA, urine sodium and osmolality. Consult nephrology. 5. DM2. A1c 10.6. On glimepiride and metformin at home, being covered with insulin here. Increase lantus to 30 units nightly, transition to high dose correction. Add carb control diet. Dietician to see. 6. HTN. Controlled. Continue lisinopril. 7. SYLVIE. Has cpap.        Diet: DIET CARDIAC;  Code:Full Code  DVT PPX: icds (INR 3.30)      MACIEL Mckenna CNP   1/1/2021 9:30 AM

## 2021-01-01 NOTE — PROGRESS NOTES
[facility]   Electrophysiology Progress Note     Admit Date: 2020     Reason for follow up: Sustained VT    HPI and Interval History:   Patient seen and examined. Clinical notes reviewed. Telemetry reviewed. No new complaint today. No major events overnight. Denies having chest pain, shortness of breath, dyspnea on exertion, Orthopnea, PND at the time of this visit. Post ICD. Feels fine  Review of System:  All other systems reviewed and are negative except for that noted above. Pertinent negatives are:     · General: negative for fever, chills   · Ophthalmic ROS: negative for - eye pain or loss of vision  · ENT ROS: negative for - headaches, sore throat   · Respiratory: negative for - cough, sputum  · Cardiovascular: Reviewed in HPI  · Gastrointestinal: negative for - abdominal pain, diarrhea, N/V  · Hematology: negative for - bleeding, blood clots, bruising or jaundice  · Genito-Urinary:  negative for - Dysuria or incontinence  · Musculoskeletal: negative for - Joint swelling, muscle pain  · Neurological: negative for - confusion, dizziness, headaches   · Psychiatric: No anxiety, no depression. · Dermatological: negative for - rash      Physical Examination:  Vitals:    21 0812   BP: 133/84   Pulse: 112   Resp: 18   Temp: 98 °F (36.7 °C)   SpO2: 96%      In: 340 [P.O.:240]  Out: -    Wt Readings from Last 3 Encounters:   20 213 lb (96.6 kg)   20 213 lb 12.8 oz (97 kg)   20 218 lb (98.9 kg)     Temp  Av.4 °F (36.9 °C)  Min: 97.9 °F (36.6 °C)  Max: 99.4 °F (37.4 °C)  Pulse  Av.1  Min: 76  Max: 112  BP  Min: 93/42  Max: 137/93  SpO2  Av.8 %  Min: 92 %  Max: 96 %    Intake/Output Summary (Last 24 hours) at 2021 0906  Last data filed at 2021 0333  Gross per 24 hour   Intake 580 ml   Output    Net 580 ml       · Telemetry: Atrial fibrillation   · Constitutional: Oriented. No distress. · Head: Normocephalic and atraumatic.    · Mouth/Throat: Oropharynx is clear and moist.   · Eyes: Conjunctivae normal. EOM are normal.   · Neck: Neck supple. No rigidity. No JVD present. · Cardiovascular: Normal rate, irregular rhythm, S1&S2. · Pulmonary/Chest: Bilateral respiratory sounds. No wheezes, No rhonchi. ICD pocket shows some hematoma  · Abdominal: Soft. Bowel sounds present. No distension, No tenderness. · Musculoskeletal: No tenderness. No edema    · Lymphadenopathy: Has no cervical adenopathy. · Neurological: Alert and oriented. Cranial nerve appears intact, No Gross deficit   · Skin: Skin is warm and dry. No rash noted. · Psychiatric: Has a normal behavior     Labs, diagnostic and imaging results reviewed. Reviewed. Recent Labs     12/30/20  1400   *   K 4.2  4.2   CL 95*   CO2 20*   BUN 19   CREATININE 0.8     Recent Labs     12/30/20  1400   WBC 8.8   HGB 12.2*   HCT 37.8*   MCV 86.0        Lab Results   Component Value Date    TROPONINI 0.02 12/30/2020     CrCl cannot be calculated (Unknown ideal weight.).    No results found for: BNP  Lab Results   Component Value Date    PROTIME 38.2 12/30/2020    PROTIME 13.1 12/02/2020    PROTIME 12.3 09/24/2018    INR 3.25 12/30/2020    INR 1.13 12/02/2020    INR 1.08 09/24/2018     Lab Results   Component Value Date    CHOL 158 12/09/2020    HDL 34 12/09/2020    TRIG 141 12/09/2020       Scheduled Meds:   metoprolol tartrate  25 mg Oral BID    insulin glargine  25 Units Subcutaneous Nightly    sodium chloride flush  10 mL Intravenous 2 times per day    finasteride  5 mg Oral Daily    furosemide  40 mg Oral Daily    lisinopril  5 mg Oral Daily    pantoprazole  40 mg Oral Daily    tamsulosin  0.4 mg Oral Daily    sodium chloride flush  10 mL Intravenous 2 times per day    enoxaparin  1 mg/kg Subcutaneous BID    insulin lispro  0-12 Units Subcutaneous TID WC    insulin lispro  0-6 Units Subcutaneous Nightly    atorvastatin  20 mg Oral Nightly     Continuous Infusions:   dextrose       PRN Meds:sodium chloride flush, oxyCODONE **OR** oxyCODONE, ondansetron, ipratropium-albuterol, traMADol, glucose, dextrose, glucagon (rDNA), dextrose, sodium chloride flush, promethazine **OR** ondansetron, polyethylene glycol, acetaminophen **OR** acetaminophen     Patient Active Problem List    Diagnosis Date Noted    ICD (implantable cardioverter-defibrillator), dual, in situ 12/31/2020    D[facility]     Paroxysmal atrial fibrillation ([facility] 12/30/2020    VT (ventricular tachycardia) ([facility] 12/30/2020    Atrial fibrillation ([facility] 12/30/2020    Persistent atrial fibrillation ([facility] 12/24/2020    Ischemic cardiomyopathy 12/24/2020    Cardiac arrest ([facility] 12/02/2020    Mediastinal adenopathy     Multiple lung nodules on CT     Moderate persistent asthma without complication 59/07/9359    GERD without esophagitis 09/04/2019    Non morbid obesity, unspecified obesity type 09/04/2019    Dyshidrotic eczema 06/06/2019    Eczema 04/04/2018    Type 2 diabetes mellitus with diabetic polyneuropathy, without long-term current use of insulin ([facility] 08/11/2017    [facility] 02/01/2017    Pseudogout 02/08/2016    Allergic rhinitis 04/03/2013    SYLVIE (obstructive sleep apnea) 10/05/2012    Essential hypertension 10/01/2012    Generalized osteoarthrosis, involving multiple sites 10/01/2012    Pure hypercholesterolemia 10/01/2012      Active Hospital Problems    Diagnosis Date Noted    [facility] [I42.0]     Atrial fibrillation ([facility] [I48.91] 12/30/2020    [facility] [I47.2] 12/30/2020    SYLVIE (obstructive sleep apnea) [G47.33] 10/05/2012       Assessment:       Plan:      Ventricular Tachycardia  - Sustained VT on holter monitor 12/30/2020 0400. The rate was 290 bpm.  It is started with a few PVCs and baseline rhythm of atrial fibrillation.   Pt was asleep at the time, short of breath     and \"shaking like a leaf\" per wife.     Francisco  has nonobstructive CAD and mildly reduced ejection fraction. The cardiomyopathy could be an underlying mechanism but he is already on medical therapy. The etiology of his VT is unclear. In the absence of any reversible cause and presence of sustained VT with symptoms he underwent ICD implant.      - ICD pocket hematoma    Stop coumadin for 5 days and resume afterwards. Ice pack over the pocket. Will assess R wave sensing . Paroxysmal Atrial fibrillation   -EKG 12/30/2020  afib with PVC  -Newly diagnosed after recent PEA arrest.  No prior history of atrial arrythmia. Arrest thought to  be secondary to hypoxia. His monitor shows that he was in A. fib and he is also in A. fib now. - cath 12/02/2020 - non obstructive CAD  - Echo 12/02/2020- LVEF 40%  - metoprolol to be increased to 25 mg  BID  - warfarin      He can return the monitor. Will plan for cardioversion in 3-4 weeks with Dr. Javier Ayon.       Dilated cardiomyopathy    He is on medical therapy  Increase metoprolol to 25 mg bid      PEA arrest secondary to hypoxemia     This episode likely was not related to the current situation.        Obesity  Body mass index is 31.57 kg/m². - Excessive weight is complicating assessment and treatment. It is placing patient at risk for multiple co-morbidities as well as early death and contributing to the patient's presentation. - discussed weight management with diet and exercise      SYLVIE  -encouraged compliance with CPAP     Possible Pulmonary Hypertension   - risk factor modification         - Hyponatremia   Likely due to excess water intake secondary to uncontrolled DM. Limit free water. Better BS control. One extra dose of lasix 20 mg today. He will be followed by Edgard Franklin. I independently reviewed  [facility]   NOTE: This report was transcribed using voice recognition software.  Every effort was made to ensure accuracy, however, inadvertent computerized transcription

## 2021-01-01 NOTE — PROGRESS NOTES
[facility] PROGRESS NOTE    [id number]       Name: Tonia Valentin Admitted: 12/30/2020  Primary Care Provider: Shira Barlow MD (Tel: [phone])      Subjective:  . S/p ICd placement today with dr Eugenio Grossman.    Pain is controlled  Afebrile     Reviewed interval ancillary notes    Current Medications      insulin glargine (LANTUS;BASAGLAR) injection pen 25 Units, Nightly      sodium chloride flush 0.9 % injection 10 mL, 2 times per day      sodium chloride flush 0.9 % injection 10 mL, PRN      oxyCODONE (ROXICODONE) immediate release tablet 5 mg, Q4H PRN    Or      oxyCODONE (ROXICODONE) immediate release tablet 10 mg, Q4H PRN      ondansetron (ZOFRAN) injection 4 mg, Q6H PRN      finasteride (PROSCAR) tablet 5 mg, Daily      furosemide (LASIX) tablet 40 mg, Daily      ipratropium-albuterol (DUONEB) nebulizer solution 3 mL, Q4H PRN      lisinopril (PRINIVIL;ZESTRIL) tablet 5 mg, Daily      pantoprazole (PROTONIX) tablet 40 mg, Daily      metoprolol tartrate (LOPRESSOR) tablet 12.5 mg, BID      tamsulosin (FLOMAX) capsule 0.4 mg, Daily      traMADol (ULTRAM) tablet 50 mg, Q4H PRN      glucose (GLUTOSE) 40 % oral gel 15 g, PRN      dextrose 50 % IV solution, PRN      glucagon (rDNA) injection 1 mg, PRN      dextrose 5 % solution, PRN      sodium chloride flush 0.9 % injection 10 mL, 2 times per day      sodium chloride flush 0.9 % injection 10 mL, PRN      enoxaparin (LOVENOX) injection 100 mg, BID      promethazine (PHENERGAN) tablet 12.5 mg, Q6H PRN    Or      ondansetron (ZOFRAN) injection 4 mg, Q6H PRN      polyethylene glycol (GLYCOLAX) packet 17 g, Daily PRN      acetaminophen (TYLENOL) tablet 650 mg, Q6H PRN    Or      acetaminophen (TYLENOL) suppository 650 mg, Q6H PRN      insulin lispro (1 Unit Dial) 0-12 Units, TID WC      insulin lispro (1 Unit Dial) 0-6 Units, Nightly   atorvastatin (LIPITOR) tablet 20 mg, Nightly        Objective:  /74   Pulse 90   Temp 98.6 °F (37 °C) (Axillary)   Resp 16   Wt 213 lb (96.6 kg)   SpO2 96%   BMI 31.45 kg/m²     Intake/Output Summary (Last 24 hours) at 1/1/2021 0514  Last data filed at 1/1/2021 0333  Gross per 24 hour   Intake 580 ml   Output    Net 580 ml      Wt Readings from Last 3 Encounters:   12/30/20 213 lb (96.6 kg)   12/30/20 213 lb 12.8 oz (97 kg)   12/24/20 218 lb (98.9 kg)       General appearance:  Appears comfortable  Eyes: Sclera clear. Pupils equal.  ENT: Moist oral mucosa. Trachea midline, no adenopathy. Cardiovascular: Regular rhythm, normal S1, S2. No murmur. No edema in lower extremities  Respiratory: Not using accessory muscles. Good inspiratory effort. Clear to auscultation bilaterally, no wheeze or crackles. GI: Abdomen soft, no tenderness, not distended, normal bowel sounds  Musculoskeletal: No cyanosis in digits, neck supple  Neurology: CN 2-12 grossly intact. No speech or motor deficits  Psych: Normal affect. Alert and oriented in time, place and person  Skin: Warm, dry, normal turgor    Labs and Tests:  CBC:   Recent Labs     12/30/20  1400   WBC 8.8   HGB 12.2*        BMP:    Recent Labs     12/30/20  1400   *   K 4.2  4.2   CL 95*   CO2 20*   BUN 19   CREATININE 0.8   GLUCOSE 414*     Hepatic: No results for input(s): AST, ALT, ALB, BILITOT, ALKPHOS in the last 72 hours. Problem List  Active Problems:    SYLVIE (obstructive sleep apnea)    VT (ventricular tachycardia) (HCC)    Atrial fibrillation ([address]    Dilated cardiomyopathy ([address]  Resolved Problems:    * No resolved hospital problems.  *       Assessment & Plan:   V tach reported on cardiac monitor  Pt remains asymptomatic and hemodyanamically stable  Monitor Electrolytes  Cardiology following  S/p Dual chamber ICD insertion on 12/31     afib new diagnosis  Hr is < 100  Cont metoprolol  Cont coumadin  INr is therapeutic    Type 2 DM   on lantus and sliding scale insulin     Caleb cont CPaP      Diet: DIET CARDIAC;  Code:Full Code  DVT PPX      Pam Severino MD   [id number]

## 2021-01-01 NOTE — PROGRESS NOTES
Assessment and med pass complete. Meds passed whole with water. Got fresh ice water per request. Ambulates independently, informed to call if needing assist. Reminded not to raise arm above shoulder, swath in place. Denies other needs, call light in reach.

## 2021-01-02 VITALS
WEIGHT: 208.8 LBS | HEIGHT: 69 IN | SYSTOLIC BLOOD PRESSURE: 130 MMHG | OXYGEN SATURATION: 94 % | HEART RATE: 76 BPM | RESPIRATION RATE: 16 BRPM | TEMPERATURE: 98.2 F | DIASTOLIC BLOOD PRESSURE: 70 MMHG | BODY MASS INDEX: 30.93 KG/M2

## 2021-01-02 LAB
ANION GAP SERPL CALCULATED.3IONS-SCNC: 12 MMOL/L (ref 3–16)
BASOPHILS ABSOLUTE: 0 K/UL (ref 0–0.2)
BASOPHILS RELATIVE PERCENT: 0.3 %
BUN BLDV-MCNC: 23 MG/DL (ref 7–20)
CALCIUM SERPL-MCNC: 8.8 MG/DL (ref 8.3–10.6)
CHLORIDE BLD-SCNC: 98 MMOL/L (ref 99–110)
CO2: 26 MMOL/L (ref 21–32)
CREAT SERPL-MCNC: 0.6 MG/DL (ref 0.8–1.3)
EOSINOPHILS ABSOLUTE: 0.1 K/UL (ref 0–0.6)
EOSINOPHILS RELATIVE PERCENT: 0.7 %
GFR AFRICAN AMERICAN: >60
GFR NON-AFRICAN AMERICAN: >60
GLUCOSE BLD-MCNC: 237 MG/DL (ref 70–99)
GLUCOSE BLD-MCNC: 247 MG/DL (ref 70–99)
GLUCOSE BLD-MCNC: 374 MG/DL (ref 70–99)
HCT VFR BLD CALC: 32.2 % (ref 40.5–52.5)
HEMOGLOBIN: 10.6 G/DL (ref 13.5–17.5)
LYMPHOCYTES ABSOLUTE: 1 K/UL (ref 1–5.1)
LYMPHOCYTES RELATIVE PERCENT: 14.1 %
MAGNESIUM: 1.8 MG/DL (ref 1.8–2.4)
MCH RBC QN AUTO: 27.6 PG (ref 26–34)
MCHC RBC AUTO-ENTMCNC: 32.8 G/DL (ref 31–36)
MCV RBC AUTO: 84 FL (ref 80–100)
MONOCYTES ABSOLUTE: 0.6 K/UL (ref 0–1.3)
MONOCYTES RELATIVE PERCENT: 8.7 %
NEUTROPHILS ABSOLUTE: 5.6 K/UL (ref 1.7–7.7)
NEUTROPHILS RELATIVE PERCENT: 76.2 %
PDW BLD-RTO: 16.8 % (ref 12.4–15.4)
PERFORMED ON: ABNORMAL
PERFORMED ON: ABNORMAL
PLATELET # BLD: 256 K/UL (ref 135–450)
PMV BLD AUTO: 7.3 FL (ref 5–10.5)
POTASSIUM SERPL-SCNC: 3.4 MMOL/L (ref 3.5–5.1)
RBC # BLD: 3.83 M/UL (ref 4.2–5.9)
SODIUM BLD-SCNC: 136 MMOL/L (ref 136–145)
WBC # BLD: 7.8 K/UL (ref 4–11)

## 2021-01-02 PROCEDURE — 2580000003 HC RX 258: Performed by: INTERNAL MEDICINE

## 2021-01-02 PROCEDURE — 94761 N-INVAS EAR/PLS OXIMETRY MLT: CPT

## 2021-01-02 PROCEDURE — 6370000000 HC RX 637 (ALT 250 FOR IP): Performed by: INTERNAL MEDICINE

## 2021-01-02 PROCEDURE — 93283 PRGRMG EVAL IMPLANTABLE DFB: CPT | Performed by: INTERNAL MEDICINE

## 2021-01-02 PROCEDURE — 94640 AIRWAY INHALATION TREATMENT: CPT

## 2021-01-02 PROCEDURE — 85025 COMPLETE CBC W/AUTO DIFF WBC: CPT

## 2021-01-02 PROCEDURE — 80048 BASIC METABOLIC PNL TOTAL CA: CPT

## 2021-01-02 PROCEDURE — 36415 COLL VENOUS BLD VENIPUNCTURE: CPT

## 2021-01-02 PROCEDURE — 6370000000 HC RX 637 (ALT 250 FOR IP): Performed by: FAMILY MEDICINE

## 2021-01-02 PROCEDURE — 99024 POSTOP FOLLOW-UP VISIT: CPT | Performed by: INTERNAL MEDICINE

## 2021-01-02 PROCEDURE — 6370000000 HC RX 637 (ALT 250 FOR IP): Performed by: NURSE PRACTITIONER

## 2021-01-02 PROCEDURE — 83735 ASSAY OF MAGNESIUM: CPT

## 2021-01-02 RX ORDER — WARFARIN SODIUM 5 MG/1
5 TABLET ORAL DAILY
Qty: 30 TABLET | Refills: 5 | Status: SHIPPED | OUTPATIENT
Start: 2021-01-02

## 2021-01-02 RX ORDER — POTASSIUM CHLORIDE 20 MEQ/1
20 TABLET, EXTENDED RELEASE ORAL 2 TIMES DAILY
Qty: 180 TABLET | Refills: 1 | Status: CANCELLED | OUTPATIENT
Start: 2021-01-02

## 2021-01-02 RX ORDER — POTASSIUM CHLORIDE 20 MEQ/1
40 TABLET, EXTENDED RELEASE ORAL ONCE
Status: COMPLETED | OUTPATIENT
Start: 2021-01-02 | End: 2021-01-02

## 2021-01-02 RX ADMIN — PANTOPRAZOLE SODIUM 40 MG: 40 TABLET, DELAYED RELEASE ORAL at 06:10

## 2021-01-02 RX ADMIN — IPRATROPIUM BROMIDE AND ALBUTEROL SULFATE 1 AMPULE: .5; 3 SOLUTION RESPIRATORY (INHALATION) at 12:41

## 2021-01-02 RX ADMIN — IPRATROPIUM BROMIDE AND ALBUTEROL SULFATE 1 AMPULE: .5; 3 SOLUTION RESPIRATORY (INHALATION) at 08:13

## 2021-01-02 RX ADMIN — TAMSULOSIN HYDROCHLORIDE 0.4 MG: 0.4 CAPSULE ORAL at 08:34

## 2021-01-02 RX ADMIN — INSULIN LISPRO 6 UNITS: 100 INJECTION, SOLUTION INTRAVENOUS; SUBCUTANEOUS at 08:36

## 2021-01-02 RX ADMIN — INSULIN LISPRO 15 UNITS: 100 INJECTION, SOLUTION INTRAVENOUS; SUBCUTANEOUS at 12:10

## 2021-01-02 RX ADMIN — METOPROLOL TARTRATE 25 MG: 25 TABLET, FILM COATED ORAL at 08:34

## 2021-01-02 RX ADMIN — LISINOPRIL 5 MG: 10 TABLET ORAL at 08:34

## 2021-01-02 RX ADMIN — POTASSIUM CHLORIDE 40 MEQ: 1500 TABLET, EXTENDED RELEASE ORAL at 12:09

## 2021-01-02 RX ADMIN — FINASTERIDE 5 MG: 5 TABLET, FILM COATED ORAL at 08:34

## 2021-01-02 RX ADMIN — Medication 10 ML: at 08:35

## 2021-01-02 RX ADMIN — FUROSEMIDE 40 MG: 40 TABLET ORAL at 08:34

## 2021-01-02 RX ADMIN — ACETAMINOPHEN 650 MG: 325 TABLET ORAL at 08:42

## 2021-01-02 ASSESSMENT — PAIN DESCRIPTION - PAIN TYPE
TYPE: ACUTE PAIN
TYPE: ACUTE PAIN

## 2021-01-02 ASSESSMENT — PAIN SCALES - GENERAL
PAINLEVEL_OUTOF10: 0
PAINLEVEL_OUTOF10: 0

## 2021-01-02 ASSESSMENT — PAIN DESCRIPTION - LOCATION: LOCATION: BACK

## 2021-01-02 ASSESSMENT — PAIN DESCRIPTION - DESCRIPTORS: DESCRIPTORS: ACHING

## 2021-01-02 NOTE — FLOWSHEET NOTE
01/02/21 0815   Vital Signs   Temp 98.3 °F (36.8 °C)   Temp Source Oral   Pulse 101   Heart Rate Source Brachial   Resp 16   /72   BP Location Right upper arm   MAP (mmHg) 84   Patient Position Sitting   Level of Consciousness Alert (0)   MEWS Score 2   Patient Currently in Pain Denies   Height and Weight   Height 5' 9\" (1.753 m)   Weight 208 lb 12.8 oz (94.7 kg)   BSA (Calculated - sq m) 2.15 sq meters   BMI (Calculated) 30.9   Pain Assessment   Pain Assessment 0-10   Pain Level 0   Non-Pharmaceutical Pain Intervention(s) Declines   Response to Pain Intervention Patient Satisfied   Oxygen Therapy   SpO2 93 %   Pulse Oximeter Device Mode Intermittent   Pulse Oximeter Device Location Finger   O2 Device None (Room air)     Shift assessment compete. VSS. Pt. Is alert and oriented resting comfortably in bed. Pt. Has complaints of back pain and state that his back pain is \"from sleeping in an uncomfortable bed\". Pt denies chest pain or SOB. Pacemaker site CDI with no bleeding noted. POC discussed with patient and patient states understanding and is in agreement with plan. Call light and bedside table within reach. Will continue to monitor.  Mariama Arevalo

## 2021-01-02 NOTE — PROGRESS NOTES
Assessment and med pass complete. Meds passed whole with water. Got drink and snack per request. Ambulates independently, informed to call if needing assist. Denies other needs, call light in reach.

## 2021-01-02 NOTE — PROGRESS NOTES
· Constitutional: Oriented. No distress. · Head: Normocephalic and atraumatic. · Mouth/Throat: Oropharynx is clear and moist.   · Eyes: Conjunctivae normal. EOM are normal.   · Neck: Neck supple. No rigidity. No JVD present. · Cardiovascular: Normal rate, irregular rhythm, S1&S2. · Pulmonary/Chest: Bilateral respiratory sounds. No wheezes, No rhonchi. ICD pocket shows some hematoma  · Abdominal: Soft. Bowel sounds present. No distension, No tenderness. · Musculoskeletal: No tenderness. No edema    · Lymphadenopathy: Has no cervical adenopathy. · Neurological: Alert and oriented. Cranial nerve appears intact, No Gross deficit   · Skin: Skin is warm and dry. No rash noted. · Psychiatric: Has a normal behavior     Labs, diagnostic and imaging results reviewed. Reviewed. Recent Labs     12/30/20  1400 01/01/21  0950 01/02/21  0602   * 129* 136   K 4.2  4.2 4.0 3.4*   CL 95* 93* 98*   CO2 20* 26 26   BUN 19 23* 23*   CREATININE 0.8 0.9 0.6*     Recent Labs     12/30/20  1400 01/01/21  0950 01/02/21  0602   WBC 8.8 8.4 7.8   HGB 12.2* 11.5* 10.6*   HCT 37.8* 35.6* 32.2*   MCV 86.0 84.7 84.0    272 256     Lab Results   Component Value Date    TROPONINI 0.02 12/30/2020     Estimated Creatinine Clearance: 139 mL/min (A) (based on SCr of 0.6 mg/dL (L)).    No results found for: BNP  Lab Results   Component Value Date    PROTIME 38.7 01/01/2021    PROTIME 38.2 12/30/2020    PROTIME 13.1 12/02/2020    INR 3.30 01/01/2021    INR 3.25 12/30/2020    INR 1.13 12/02/2020     Lab Results   Component Value Date    CHOL 158 12/09/2020    HDL 34 12/09/2020    TRIG 141 12/09/2020       Scheduled Meds:   potassium chloride  40 mEq Oral Once    metoprolol tartrate  25 mg Oral BID    furosemide  40 mg Oral Daily    insulin lispro  0-18 Units Subcutaneous TID WC    insulin lispro  0-9 Units Subcutaneous Nightly    ipratropium-albuterol  1 ampule Inhalation Q4H WA  insulin glargine  30 Units Subcutaneous Nightly    sodium chloride flush  10 mL Intravenous 2 times per day    finasteride  5 mg Oral Daily    lisinopril  5 mg Oral Daily    pantoprazole  40 mg Oral Daily    tamsulosin  0.4 mg Oral Daily    sodium chloride flush  10 mL Intravenous 2 times per day    atorvastatin  20 mg Oral Nightly     Continuous Infusions:   dextrose       PRN Meds:sodium chloride flush, oxyCODONE **OR** oxyCODONE, ondansetron, ipratropium-albuterol, traMADol, glucose, dextrose, glucagon (rDNA), dextrose, sodium chloride flush, promethazine **OR** ondansetron, polyethylene glycol, acetaminophen **OR** acetaminophen     Patient Active Problem List    Diagnosis Date Noted    Hyponatremia     ICD (implantable cardioverter-defibrillator), dual, in situ 12/31/2020    Dilated cardiomyopathy (HCC)     Paroxysmal atrial fibrillation ([facility] 12/30/2020    [facility] 12/[facility]    [facility] 12/30/2020    Persistent atrial fibrillation ([facility] 12/24/2020    Ischemic cardiomyopathy 12/24/2020    Cardiac arrest ([facility] 12/02/2020    Mediastinal adenopathy     Multiple lung nodules on CT     Moderate persistent asthma without complication 80/64/6560    GERD without esophagitis 09/04/2019    Non morbid obesity, unspecified obesity type 09/04/2019    Dyshidrotic eczema 06/06/2019    Eczema 04/04/2018    Type 2 diabetes mellitus with diabetic polyneuropathy, without long-term current use of insulin ([facility] 08/11/2017    Melanoma in situ of lower leg ([facility] 02/01/2017    Pseudogout 02/08/2016    Allergic rhinitis 04/03/2013    SYLVIE (obstructive sleep apnea) 10/05/2012    Essential hypertension 10/01/2012    Generalized osteoarthrosis, involving multiple sites 10/01/2012    Pure hypercholesterolemia 10/01/2012      Active Hospital Problems    Diagnosis Date Noted    Hyponatremia [E87.1]     Dilated cardiomyopathy ([facility] [I42.0]  [facility] [I48.91] 12/30/2020    VT [facility]ricular [facility] [I47.2] 12/30/2020    SYLVIE (obstructive sleep apnea) [G47.33] 10/05/2012       Assessment:       Plan:      Ventricular Tachycardia  - Sustained VT on holter monitor 12/30/2020 0400. The rate was 290 bpm.  It is started with a few PVCs and baseline rhythm of atrial fibrillation. Pt was asleep at the time, short of breath     and \"shaking like a leaf\" per wife.     Daisy Silva has nonobstructive CAD and mildly reduced ejection fraction. The cardiomyopathy could be an underlying mechanism but he is already on medical therapy. The etiology of his VT is unclear. In the absence of any reversible cause and presence of sustained VT with symptoms he underwent ICD implant.      - ICD pocket hematoma    Stop coumadin for 5 days and resume afterwards. Ice pack over the pocket. Sensing was set from the tip to coil. - Implantable cardioverter defibrillator(ICD)     The CIED was interrogated and programmed and I supervised and reviewed all the data. All findings and changes are in device interrogation sheat and reflect my personal interpretation and changes and is scanned to Epic. Paroxysmal Atrial fibrillation   -EKG 12/30/2020  afib with PVC  -Newly diagnosed after recent PEA arrest.  No prior history of atrial arrythmia. Arrest thought to  be secondary to hypoxia. His monitor shows that he was in A. fib and he is also in A. fib now. - cath 12/02/2020 - non obstructive CAD  - Echo 12/02/2020- LVEF 40%  - metoprolol to be increased to 25 mg  BID  - warfarin      He can return the monitor. Will plan for cardioversion in 3-4 weeks with Dr. Willard Dominguez.       Dilated cardiomyopathy    He is on medical therapy  Increase metoprolol to 25 mg bid      PEA arrest secondary to hypoxemia     This episode likely was not related to the current situation.        Obesity  Body mass index is 31.57 kg/m².

## 2021-01-02 NOTE — CONSULTS
Nephrology Consult Note  [phone]  [phone]   [email or web address].        Reason for Consult:  hyponatremia    HISTORY OF PRESENT ILLNESS:                This is a patient with significant past medical history of DM2, cardiac arrest, A.fib, HTN, who was admitted for sustained VT on holter monitor. He is s/p ICD implant on 12/31/20 by Dr. Jacqueline Lan. He has a h/o cardiac arrest on 12/2/20. Pot op course complicated by ICD pocket hematoma, coumadin held for 5 days. Nephology consulted for hyponatremia in the setting of hyperglycemia. He denies any chest pain or SOB  He denies any fever/chills. He does note BS has been uncontrolled of late. He is on metformin.  Na 129,  range.   -Na and BS improved this am  He is scheduled for discharge today        Past Medical History:        Diagnosis Date    Abdominal pain, LLQ     Allergic rhinitis     Asthma     Atopic dermatitis     Cancer ([facility]     melanoma    Diabetes mellitus ([facility]     Erectile dysfunction     Generalized osteoarthrosis, involving multiple sites     GERD (gastroesophageal reflux disease)     Gynecomastia     Hyperlipidemia     Nasal congestion     Nipple pain     Obstructive sleep apnea syndrome 10/5/2012    Pseudo-gout     Unspecified essential hypertension        Past Surgical History:        Procedure Laterality Date    BACK SURGERY      BRONCHOSCOPY N/A 12/2/2020    BRONCHOSCOPY ALVEOLAR LAVAGE performed by Caleb Padgett MD [facility] Bilateral 2013    FINGER SURGERY Left 3-1-2013    Incision & Drainage of left Thumb Infection    FOOT SURGERY Right     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    HAND SURGERY  2011    LUMBAR NERVE BLOCK Right 12/12/2018    RIGHT L4/L5 LUMBAR INTERLAMINAR EPIDURAL STEROID INJECTION WITH FLUOROSCOPY performed by Luisa Aguilar MD at [address] [place] [address] Way right side post  GA NJX DX/THER SBST INTRLMNR LMBR/SAC W/IMG GDN Right 9/24/2018    RIGHT L4/L5 INTERLAMINAR EPIDURAL STEROID INJECTION WITH FLUOROSCOPY performed by Zeinab Ellsworth MD at [facility]       Current Medications:    No current facility-administered medications on file prior to encounter.       Current Outpatient Medications on File Prior to Encounter   Medication Sig Dispense Refill    albuterol sulfate  (90 Base) MCG/ACT inhaler Inhale 2 puffs into the lungs every 6 hours as needed for Wheezing 1 Inhaler 0    glimepiride (AMARYL) 4 MG tablet Take 1 tablet by mouth daily 90 tablet 1    furosemide (LASIX) 40 MG tablet Take 1 tablet by mouth daily 90 tablet 1    traMADol (ULTRAM) 50 MG tablet TAKE 1 TABLET BY MOUTH TWO TIMES A DAY **reduce doses taken as pain becomes manageable** 60 tablet 0    warfarin (COUMADIN) 5 MG tablet Take 1 tablet by mouth daily 30 tablet 5    fluticasone-salmeterol (ADVAIR DISKUS) 250-50 MCG/DOSE AEPB Inhale 1 puff into the lungs every 12 hours 1 Inhaler 6    lisinopril (PRINIVIL;ZESTRIL) 5 MG tablet Take 1 tablet by mouth daily 30 tablet 1    metoprolol tartrate (LOPRESSOR) 25 MG tablet Take 0.5 tablets by mouth 2 times daily 60 tablet 1    ipratropium-albuterol (DUONEB) 0.5-2.5 (3) MG/3ML SOLN nebulizer solution Inhale 3 mLs into the lungs every 4 hours as needed for Shortness of Breath Dx: Asthma  ICD-10: J45.909 120 vial 5    allopurinol (ZYLOPRIM) 300 MG tablet Take 1 tablet by mouth daily 90 tablet 1    finasteride (PROSCAR) 5 MG tablet Take 5 mg by mouth daily      tamsulosin (FLOMAX) 0.4 MG capsule Take 0.4 mg by mouth daily      pantoprazole (PROTONIX) 40 MG tablet Take 1 tablet by mouth daily 90 tablet 1    metFORMIN (GLUCOPHAGE) 1000 MG tablet Take 1 tablet by mouth 2 times daily 180 tablet 1    simvastatin (ZOCOR) 40 MG tablet TAKE 1 TABLET BY MOUTH AT BEDTIME 90 tablet 1    FREESTYLE LANCETS MISC 1 each by Does not apply route daily 100 each 3  Blood Glucose Monitoring Suppl (FREESTYLE LITE) CLAUDINE 1 Device by Does not apply route 2 times daily 1 Device 0       Allergies:  Levaquin [levofloxacin]    Social History:    Social History     Socioeconomic History    Marital status:      Spouse name: Not on file    Number of children: Not on file    Years of education: Not on file    Highest education level: Not on file   Occupational History    Occupation:    Social Needs    Financial resource strain: Not on file    Food insecurity     Worry: Not on file     Inability: Not on file    Transportation needs     Medical: Not on file     Non-medical: Not on file   Tobacco Use    Smoking status: Former Smoker     Packs/day: 1.00     Years: 10.00     Pack years: 10.00     Types: Cigarettes     Quit date: 4/3/1993     Years since quittin.7    Smokeless tobacco: Never Used    Tobacco comment: quit 25 years ago   Substance and Sexual Activity    Alcohol use: No    Drug use: No    Sexual activity: Yes     Partners: Female   Lifestyle    Physical activity     Days per week: Not on file     Minutes per session: Not on file    Stress: Not on file   Relationships    Social connections     Talks on phone: Not on file     Gets together: Not on file     Attends Hinduism service: Not on file     Active member of club or organization: Not on file     Attends meetings of clubs or organizations: Not on file     Relationship status: Not on file    Intimate partner violence     Fear of current or ex partner: Not on file     Emotionally abused: Not on file     Physically abused: Not on file     Forced sexual activity: Not on file   Other Topics Concern    Not on file   Social History Narrative    Not on file       Family History:       Problem Relation Age of Onset    Arthritis Mother     High Blood Pressure Mother     Emphysema Mother     Osteoporosis Mother     Diabetes Father     Heart Disease Father     High Cholesterol Father  Parkinsonism Father     Diabetes Sister          Review of Systems:  a comprehensive Review of systems was negative except for the following: fatigue    PHYSICAL EXAM:    Vitals:    /72   Pulse 101   Temp 98.3 °F (36.8 °C) (Oral)   Resp 16   Ht 5' 9\" (1.753 m)   Wt 208 lb 12.8 oz (94.7 kg)   SpO2 93%   BMI 30.83 kg/m²   I/O last 3 completed shifts: In: 744 [P.O.:875]  Out: 1890 [Urine:1890]  I/O this shift:  In: 240 [P.O.:240]  Out: -     Physical Exam:  Gen: Resting in bed, NAD. HEENT: MMM, OP clear. CV: RRR no m/r/g. No S3.  Lungs: Good respiratory effort, clear air entry   Left ICD dressing intact  Abd: S/NT +BS  Ext: No edema, no cyanosis  Skin: Warm. No rashes appreciated. : No TTP over bladder, nondistended. Neuro: Alert and oriented x 3, nonfocal.  Joints: No erythema noted over joints. DATA:    CBC:   Lab Results   Component Value Date    WBC 7.8 01/02/2021    RBC 3.83 01/02/2021    HGB 10.6 01/02/2021    HCT 32.2 01/02/2021    MCV 84.0 01/02/2021    MCH 27.6 01/02/2021    MCHC 32.8 01/02/2021    RDW 16.8 01/02/2021     01/02/2021    MPV 7.3 01/02/2021     BMP:    Lab Results   Component Value Date     01/02/2021    K 3.4 01/02/2021    K 4.2 12/30/2020    CL 98 01/02/2021    CO2 26 01/02/2021    BUN 23 01/02/2021    LABALBU 3.5 12/09/2020    CREATININE 0.6 01/02/2021    CALCIUM 8.8 01/02/2021    GFRAA >60 01/02/2021    LABGLOM >60 01/02/2021    LABGLOM 92 06/08/2018    GLUCOSE 237 01/02/2021       IMPRESSION/RECOMMENDATIONS:      Hyponatremia: corrected for BS Na is 134- improved with hyper glycemia treated.  Josse Cargo not reliable after lasix  -OK to continue lasix  -replace KCL    H/o VT: s/p ICD  -replace K, MG is pending    Hypokalemia: add KCL 40meq daily with Mg oxide 400mg daily given h/o VT    A.fib: on metoprolol, coumadin on hold for 5 days due to hematoma    DM2: on metformin as outpatient    HTN:S table on metoprolol and lisinopril Thank you for allowing me to participate in the care of this patient. I will continue to follow along. Please call with questions.     Harvey Coronado MD

## 2021-01-02 NOTE — PROGRESS NOTES
Data- discharge order received, pt verbalized agreement to discharge, disposition to previous residence, no needs for HHC/DME. Action- discharge instructions prepared and given to patient, pt verbalized understanding. Medication information packet given r/t NEW and/or CHANGED prescriptions emphasizing name/purpose/side effects, pt verbalized understanding. Discharge instruction summary: Diet- Cardiac, Fluid restiction, Activity- up as tolerated with post-op pacemaker placement, Primary Care Physician as follows: Stephani Angulo -[phone] f/u appointment made for January 15th with cardiology, immunizations reviewed, no new medications or prescriptions at discharge. Response- Pt belongings gathered, IV removed. Disposition is home (no HHC/DME needs), transported with wife, taken to lobby via w/c w/ belongings, no complications. D/C instructions given to patient and reviewed with patient and wife. Post-op pacemaker restrictions reviewed and both state understanding. Discussed hematoma and risk for increased swelling and to evaluate daily for changes in swelling and both state understanding. D/C'd in stable condition.  Mariama Arevalo

## 2021-01-02 NOTE — PLAN OF CARE
Problem: HEMODYNAMIC STATUS  Goal: Patient has stable vital signs and fluid balance  Outcome: Ongoing     Problem: FLUID AND ELECTROLYTE IMBALANCE  Goal: Fluid and electrolyte balance are achieved/maintained  Outcome: Ongoing  Note: Strict intake and output. 1500mL fluid restriction. Receiving oral lasix. Cardiology consulted. Problem: Serum Glucose Level - Abnormal:  Goal: Ability to maintain appropriate glucose levels will improve  Description: Ability to maintain appropriate glucose levels will improve  Outcome: Ongoing  Note: Patient with uncontrolled glucose levels. Receiving sliding scale insulin and lantus starting tonight. Diabetic educator consulted.

## 2021-01-02 NOTE — DISCHARGE SUMMARY
[facility] DISCHARGE SUMMARY    Patient Demographics    Patient. Pardeep Jaeger  Date of Birth. 1955  MRN. [id number]     Primary care provider. Adeel Cisneros MD  (Tel: [phone])    Admit date: 12/30/2020    Discharge date (blank if same as Note Date): Note Date: 1/2/2021     Reason for Hospitalization. Chief Complaint   Patient presents with    Chest Pain     Pt reports Dr. Deshawn Edmonds calling him today stating that his heart monitor showed sustained Vtach and that he should come in to ER. Pt scheduled for pacemaker placement tomorrow. Pt reports CP times 3 months, denies change to CP. Denies SOB         Significant Findings. Active Problems:    SYLVIE (obstructive sleep apnea)    VT (ventricular tachycardia) (HCC)    Atrial fibrillation (HCC)    Dilated cardiomyopathy (HCC)    AICD (automatic cardioverter/defibrillator) present    Hyponatremia    Hypokalemia  Resolved Problems:    * No resolved hospital problems. *       Problems and results from this hospitalization that need follow up. 1. Atrial fibrillation. Metoprolol increased for rate control. EP planning cardioversion in 3-4 weeks. 2. ICD pocket hematoma. Resume warfarin 1/7.   3. DM2, uncontrolled. Blood glucose 414 on admission, A1c 10.6. Dietician met with patient to discuss carb control diet. Recommend close follow up with PCP. 4. Hyponatremia. Suspect dilutional from hyperglycemia. Recommend BMP within 1 week. Order in [address] [place] [address] Rd. Significant test results and incidental findings. CXR 12/31/2020: Interval placement of a left-sided dual lead pacemaker/ICD seen in place.    Mild pulmonary edema pattern with an enlarged cardiac silhouette.  No   consolidation.  No pneumothorax.      CXR 12/30/2020:  Prominence of the right pulmonary hilum noted.  No consolidation.  Recommend   comparison with clinical exam and recent chest CT report which demonstrated   multiple pulmonary nodules with enlarged hilar and mediastinal lymph nodes.          Invasive procedures and treatments. 1. Status post ICD (MRI compatible) placement 12/31/2020. [facility] Course. Patient is a 73 yo male with hx DM2, GERD, HLD, HTN, SYLVIE. He has recent PES arrest during elective bronch and was noted to be in atrial fib at that time. [facility] 12/2/2020 with non-obstructive CDA and LVEF 40%. He was discharged with event monitor. Patient presented to hospital after event heart monitor showed VT. He is s/p ICD placement yesterday 12/31. Admission labs showed hyponatremia 129 and blood glucose 414.      1. Sustained v tach. Status post ICD placement 12/31/2020 with Dr Eulalia Zamora. Complicated by pocket hematoma. Warfarin on hold 5 days. On beta blocker. 2. Paroxysmal atrial fibrillation. Rate suboptimally controlled, metoprolol dose increased. Warfarin on hold secondary ICD pocket hematoma, resume 1/7.     3. NICM. EF 40% on echo (12/20). On ACE and lopressor for rate control and management NSVT per EP. Consider switch to evidence based beta blocker when appropriate. 4. Hyponatremia. Sodium 129 on admission. Suspected dilutional from hyperglycemia (414). Corrected sodium 134 Raymundo Oppenheim) / 137 Hanane Annamarie). Urine sodium 63, osmolality 434. Sodium 136 on DC. 5. DM2, uncontrolled. A1c 10.6. On glimepiride and metformin at home, covered with insulin in hospital. Patient reluctant to consider insulin on DC. Seen by dietician and provided education on carbohydrate control. Advised to follow closely with PCP for glucose management. 6. HTN. Controlled. Continued lisinopril.       Consults. IP CONSULT TO CARDIOLOGY  IP CONSULT TO DIETITIAN  IP CONSULT TO HEART FAILURE NURSE/COORDINATOR  IP CONSULT TO NEPHROLOGY  IP CONSULT TO DIABETES EDUCATOR    Physical examination on discharge day.    /72   Pulse 101   Temp 98.3 °F (36.8 °C) (Oral)   Resp 16   Ht 5' 9\" (1.753 m)   Wt 208 lb 12.8 oz (94.7 kg)   SpO2 93%   BMI 30.83 kg/m²   General appearance. Alert. Looks comfortable. HEENT. Sclera clear. Moist mucus membranes. Cardiovascular. Regular rate and rhythm, normal S1, S2. No murmur, + hematoma ICD pocket. Respiratory. Not using accessory muscles. Clear to auscultation bilaterally, no wheeze. Gastrointestinal. Abdomen soft, non-tender, not distended, normal bowel sounds  Neurology. Facial symmetry. No speech deficits. Moving all extremities equally. Extremities. No edema in lower extremities. Skin. Warm, dry, normal turgor    Condition at time of discharge stable    Medication instructions provided to patient at discharge. Medication List      CHANGE how you take these medications    metoprolol tartrate 25 MG tablet  Commonly known as: LOPRESSOR  Take 1 tablet by mouth 2 times daily  What changed: how much to take  Notes to patient: Use: to treat weak heart or high blood pressure  Side effects: feeling dizzy, tired, or weak     warfarin 5 MG tablet  Commonly known as: Coumadin  Take 1 tablet by mouth daily Do not resume until 1/7  What changed: additional instructions  Notes to patient: Use: to thin your blood to reduce risk of blood clots, heart attack, and stroke. Side effects: bleeding        CONTINUE taking these medications    albuterol sulfate  (90 Base) MCG/ACT inhaler  Inhale 2 puffs into the lungs every 6 hours as needed for Wheezing  Notes to patient: Albuterol/pratropium (DuoNeb) or Albuterol (Proventil)  Use: to open airways, reduce secretions  Side effects: nervousness, jitteriness, shakiness, headache, fast heartbeat     allopurinol 300 MG tablet  Commonly known as: ZYLOPRIM  Take 1 tablet by mouth daily  Notes to patient: Reduces the production of uric acid in your body.  Uric acid buildup can lead to gout or kidney stones  Side effects: joint stiffness or swelling     finasteride 5 MG tablet  Commonly known as: PROSCAR  Notes to patient: Treats symptoms of benign prostatic hyperplasia (BPH) in men with an enlarged prostate  Side effects: chills, dizziness      fluticasone-salmeterol 250-50 MCG/DOSE Aepb  Commonly known as: Advair Diskus  Inhale 1 puff into the lungs every 12 hours  Notes to patient: Use: wheezing, SOB due to asthma or lung disease. Side effects: headache or stomach upset      FreeStyle Lancets Misc  1 each by Does not apply route daily     FreeStyle Lite Berkley  1 Device by Does not apply route 2 times daily     furosemide 40 MG tablet  Commonly known as: LASIX  Take 1 tablet by mouth daily  Notes to patient: Use: treat heart failure, fluid retention, lower blood pressure. Side effects: frequent urination, weakness, muscle cramps, increased sensitivity to light, nausea and dizziness. glimepiride 4 MG tablet  Commonly known as: AMARYL  Take 1 tablet by mouth daily  Notes to patient: Use: to lower blood sugar in patients with high blood sugar (diabetes)  Side effects: upset stomach, dizziness, weight gain, headache     ipratropium-albuterol 0.5-2.5 (3) MG/3ML Soln nebulizer solution  Commonly known as: DUONEB  Inhale 3 mLs into the lungs every 4 hours as needed for Shortness of Breath Dx: Asthma  ICD-10: J45.909  Notes to patient: Albuterol/pratropium (DuoNeb)   Use: to open airways, thin secretions  Side effects: nervousness, jitteriness, shakiness, headache, fast heartbeat     lisinopril 5 MG tablet  Commonly known as: PRINIVIL;ZESTRIL  Take 1 tablet by mouth daily  Notes to patient: Use: lower blood pressure, preventing heart failure  Side effects: cough, change in taste, and dizziness . Call MD right away if lips or throat begin swell or you have difficulty breathing.      metFORMIN 1000 MG tablet  Commonly known as: GLUCOPHAGE  Take 1 tablet by mouth 2 times daily  Notes to patient: Keeps blood sugar from going too high  Side effects: abdominal pain, diarrhea     pantoprazole 40 MG tablet  Commonly known as: PROTONIX  Take 1 tablet by mouth daily  Notes to patient: Pantoprozole/ Protonix  Use: reduces stomach acid, protects the digestive tract  Side effects: headache or diarrhea     simvastatin 40 MG tablet  Commonly known as: ZOCOR  TAKE 1 TABLET BY MOUTH AT BEDTIME  Notes to patient: Use: lower bad cholesterol  Side effects: headache, muscle pain, constipation, diarrhea     tamsulosin 0.4 MG capsule  Commonly known as: FLOMAX  Notes to patient: Tamsulosin /Flomax  Use: treat an enlarged prostate or urinary retention  Side effects: Feeling dizzy, headache or low blood pressure     traMADol 50 MG tablet  Commonly known as: ULTRAM  TAKE 1 TABLET BY MOUTH TWO TIMES A DAY **reduce doses taken as pain becomes manageable**  Notes to patient: For pain  Has narcotic and nonnarcotic properties. Works like a narcotic without the same dependency issues and side effects. Side effects: may constipate, may make you sleepy           Where to Get Your Medications      You can get these medications from any pharmacy    Bring a paper prescription for each of these medications  · warfarin 5 MG tablet     Information about where to get these medications is not yet available    Ask your nurse or doctor about these medications  · metoprolol tartrate 25 MG tablet         Discharge recommendations given to patient. Follow Up. PCP in 1 week   Disposition. Home  Activity. As tolerated  Diet: DIET CARDIAC; Carb Control: 4 carb choices (60 gms)/meal; Daily Fluid Restriction: 1500 ml      Spent 40 minutes in discharge process.     Signed:  MACIEL Norton CNP     1/2/2021 11:37 AM

## 2021-01-04 ENCOUNTER — TELEPHONE (OUTPATIENT)
Dept: FAMILY MEDICINE CLINIC | Age: 66
End: 2021-01-04

## 2021-01-04 ENCOUNTER — CARE COORDINATION (OUTPATIENT)
Dept: CASE MANAGEMENT | Age: 66
End: 2021-01-04

## 2021-01-04 DIAGNOSIS — I25.5 ISCHEMIC CARDIOMYOPATHY: ICD-10-CM

## 2021-01-04 DIAGNOSIS — I42.0 DILATED CARDIOMYOPATHY (HCC): ICD-10-CM

## 2021-01-04 DIAGNOSIS — Z95.810 AICD (AUTOMATIC CARDIOVERTER/DEFIBRILLATOR) PRESENT: Primary | ICD-10-CM

## 2021-01-04 DIAGNOSIS — I47.20 VT (VENTRICULAR TACHYCARDIA): ICD-10-CM

## 2021-01-04 DIAGNOSIS — I46.9 CARDIAC ARREST (HCC): ICD-10-CM

## 2021-01-04 DIAGNOSIS — I48.0 PAROXYSMAL ATRIAL FIBRILLATION (HCC): ICD-10-CM

## 2021-01-04 LAB
FUNGUS (MYCOLOGY) CULTURE: ABNORMAL
FUNGUS (MYCOLOGY) CULTURE: ABNORMAL
FUNGUS STAIN: ABNORMAL
FUNGUS STAIN: ABNORMAL
ORGANISM: ABNORMAL
ORGANISM: ABNORMAL

## 2021-01-04 NOTE — TELEPHONE ENCOUNTER
Patients blood sugars 360 fasting and he is only on oral meds    Patient would like Home Care    Patient has persistent cough and she would like to know if it could be related to [address] says he has a chronic cough for about 3 months    Could this be a virtual visit?

## 2021-01-05 ENCOUNTER — OFFICE VISIT (OUTPATIENT)
Dept: FAMILY MEDICINE CLINIC | Age: 66
End: 2021-01-05
Payer: COMMERCIAL

## 2021-01-05 ENCOUNTER — NURSE ONLY (OUTPATIENT)
Dept: CARDIOLOGY CLINIC | Age: 66
End: 2021-01-05
Payer: COMMERCIAL

## 2021-01-05 ENCOUNTER — HOSPITAL ENCOUNTER (OUTPATIENT)
Age: 66
Discharge: HOME | End: 2021-01-05
Payer: COMMERCIAL

## 2021-01-05 ENCOUNTER — TELEPHONE (OUTPATIENT)
Dept: CARDIOLOGY CLINIC | Age: 66
End: 2021-01-05

## 2021-01-05 ENCOUNTER — CARE COORDINATION (OUTPATIENT)
Dept: CASE MANAGEMENT | Age: 66
End: 2021-01-05

## 2021-01-05 ENCOUNTER — OFFICE VISIT (OUTPATIENT)
Dept: CARDIOLOGY CLINIC | Age: 66
End: 2021-01-05
Payer: COMMERCIAL

## 2021-01-05 VITALS
HEART RATE: 76 BPM | BODY MASS INDEX: 31.67 KG/M2 | OXYGEN SATURATION: 95 % | DIASTOLIC BLOOD PRESSURE: 77 MMHG | WEIGHT: 213.8 LBS | SYSTOLIC BLOOD PRESSURE: 114 MMHG | HEIGHT: 69 IN

## 2021-01-05 VITALS
OXYGEN SATURATION: 98 % | WEIGHT: 210.6 LBS | HEART RATE: 76 BPM | BODY MASS INDEX: 31.1 KG/M2 | DIASTOLIC BLOOD PRESSURE: 70 MMHG | TEMPERATURE: 98.6 F | SYSTOLIC BLOOD PRESSURE: 102 MMHG

## 2021-01-05 DIAGNOSIS — Z95.810 AICD (AUTOMATIC CARDIOVERTER/DEFIBRILLATOR) PRESENT: ICD-10-CM

## 2021-01-05 DIAGNOSIS — E87.1 HYPONATREMIA: ICD-10-CM

## 2021-01-05 DIAGNOSIS — I25.5 ISCHEMIC CARDIOMYOPATHY: ICD-10-CM

## 2021-01-05 DIAGNOSIS — I48.0 PAROXYSMAL ATRIAL FIBRILLATION (HCC): ICD-10-CM

## 2021-01-05 DIAGNOSIS — J45.40 MODERATE PERSISTENT ASTHMA WITHOUT COMPLICATION: Chronic | ICD-10-CM

## 2021-01-05 DIAGNOSIS — I42.0 DILATED CARDIOMYOPATHY (HCC): ICD-10-CM

## 2021-01-05 DIAGNOSIS — I48.91 ATRIAL FIBRILLATION, UNSPECIFIED TYPE (HCC): ICD-10-CM

## 2021-01-05 DIAGNOSIS — E11.42 TYPE 2 DIABETES MELLITUS WITH DIABETIC POLYNEUROPATHY, WITHOUT LONG-TERM CURRENT USE OF INSULIN (HCC): Primary | Chronic | ICD-10-CM

## 2021-01-05 DIAGNOSIS — I47.20 VT (VENTRICULAR TACHYCARDIA): ICD-10-CM

## 2021-01-05 DIAGNOSIS — I48.91 ATRIAL FIBRILLATION, UNSPECIFIED TYPE (HCC): Primary | ICD-10-CM

## 2021-01-05 DIAGNOSIS — I46.9 CARDIAC ARREST (HCC): ICD-10-CM

## 2021-01-05 DIAGNOSIS — E87.6 HYPOKALEMIA: ICD-10-CM

## 2021-01-05 LAB
ALBUMIN SERPL-MCNC: 3.1 G/DL (ref 3.4–5)
ALP BLD-CCNC: 130 U/L (ref 40–129)
ALT SERPL-CCNC: 27 U/L (ref 10–40)
AST SERPL-CCNC: 31 U/L (ref 15–37)
BILIRUB SERPL-MCNC: <0.2 MG/DL (ref 0–1)
BILIRUBIN DIRECT: <0.2 MG/DL (ref 0–0.3)
BILIRUBIN, INDIRECT: ABNORMAL MG/DL (ref 0–1)
TOTAL PROTEIN: 6.7 G/DL (ref 6.4–8.2)

## 2021-01-05 PROCEDURE — 93283 PRGRMG EVAL IMPLANTABLE DFB: CPT | Performed by: INTERNAL MEDICINE

## 2021-01-05 PROCEDURE — 93000 ELECTROCARDIOGRAM COMPLETE: CPT | Performed by: INTERNAL MEDICINE

## 2021-01-05 PROCEDURE — 99214 OFFICE O/P EST MOD 30 MIN: CPT | Performed by: INTERNAL MEDICINE

## 2021-01-05 PROCEDURE — 80076 HEPATIC FUNCTION PANEL: CPT

## 2021-01-05 PROCEDURE — 93272 ECG/REVIEW INTERPRET ONLY: CPT | Performed by: INTERNAL MEDICINE

## 2021-01-05 PROCEDURE — 84443 ASSAY THYROID STIM HORMONE: CPT

## 2021-01-05 PROCEDURE — 99496 TRANSJ CARE MGMT HIGH F2F 7D: CPT | Performed by: FAMILY MEDICINE

## 2021-01-05 PROCEDURE — 36415 COLL VENOUS BLD VENIPUNCTURE: CPT

## 2021-01-05 RX ORDER — METOPROLOL TARTRATE 50 MG/1
50 TABLET, FILM COATED ORAL 2 TIMES DAILY
Qty: 90 TABLET | Refills: 3 | Status: SHIPPED | OUTPATIENT
Start: 2021-01-05 | End: 2021-01-11

## 2021-01-05 RX ORDER — INSULIN GLARGINE 100 [IU]/ML
20 INJECTION, SOLUTION SUBCUTANEOUS NIGHTLY
Qty: 1 PEN | Refills: 0 | COMMUNITY
Start: 2021-01-05 | End: 2021-01-13

## 2021-01-05 RX ORDER — CEPHALEXIN 500 MG/1
500 CAPSULE ORAL 3 TIMES DAILY
Qty: 21 CAPSULE | Refills: 0 | Status: SHIPPED | OUTPATIENT
Start: 2021-01-05 | End: 2021-01-12

## 2021-01-05 RX ORDER — AMIODARONE HYDROCHLORIDE 200 MG/1
200 TABLET ORAL 2 TIMES DAILY
Qty: 45 TABLET | Refills: 3 | Status: ON HOLD | OUTPATIENT
Start: 2021-01-05 | End: 2021-02-08 | Stop reason: HOSPADM

## 2021-01-05 RX ORDER — ALBUTEROL SULFATE 90 UG/1
2 AEROSOL, METERED RESPIRATORY (INHALATION) EVERY 6 HOURS PRN
Qty: 1 INHALER | Refills: 3 | Status: SHIPPED | OUTPATIENT
Start: 2021-01-05 | End: 2021-01-14 | Stop reason: CLARIF

## 2021-01-05 RX ORDER — BENZONATATE 200 MG/1
200 CAPSULE ORAL 3 TIMES DAILY PRN
Qty: 30 CAPSULE | Refills: 0 | Status: SHIPPED | OUTPATIENT
Start: 2021-01-05 | End: 2021-01-12

## 2021-01-05 ASSESSMENT — PATIENT HEALTH QUESTIONNAIRE - PHQ9
SUM OF ALL RESPONSES TO PHQ QUESTIONS 1-9: 0
1. LITTLE INTEREST OR PLEASURE IN DOING THINGS: 0
SUM OF ALL RESPONSES TO PHQ QUESTIONS 1-9: 0
SUM OF ALL RESPONSES TO PHQ QUESTIONS 1-9: 0
SUM OF ALL RESPONSES TO PHQ9 QUESTIONS 1 & 2: 0

## 2021-01-05 NOTE — CARE COORDINATION
Yo [facility] Transitions Initial Follow Up Call    Call within 2 business days of discharge: Yes    Patient: Nasim Sifuentes Patient : 1955   MRN: [id number]  Reason for Admission:   Discharge Date: 21 RARS: Readmission Risk Score: 25      Last Discharge [facility]       Complaint Diagnosis Description Type Department Provider    20 Chest Pain Chest pain due to myocardial ischemia, unspecified ischemic chest pain type . .. ED to Hosp-Admission (Discharged) (ADMITTED) LM 3A Cornelio Johnson MD; Eligio Scott. ..            2nd attempt at an initial 24 hour call, contact info left on vm      Follow Up  Future Appointments   Date Time Provider Garett Mcrae   2021  4:15 PM Leopold Hope, MD [facility]   2021 10:00 AM [facility]ED[facility]   1/15/2021  8:15 AM Angelito Brothers MD FF Cardio [facility]   2021  2:30 PM Leopold Hope, MD [facility]   2021  9:00 AM SCHEDULE, [facility] FF Cardio [facility]   2021  1:20 PM MD Bree Craig [facility]       Abilio Martell, MARY

## 2021-01-05 NOTE — PROGRESS NOTES
Subjective:      Patient ID: Vandana Grimes is a 72 y.o. male. CC: Patient presents for hospital follow-up. [facility] Patient presents for a follow-up from [facility] admission on December 30. Patient was admitted to the hospital December 30 as his heart monitor demonstrated ventricular tachycardia. He had an ICD placed December 31 and was discharged from the hospital January 2. Medications for atrial fibrillation were adjusted and he was also found to have hyponatremia probably secondary to hyperglycemia. Patient diabetes was well controlled while he was taking pioglitazone medication but obviously is not taking that now with the cardiomyopathy. Patient recently had an adjustment of glimepiride and he is already on max dose of Metformin. He restarted the Metformin after leaving the hospital.  Patient was treated with insulin therapy during the hospitalization and would be willing to use that medication. Patient still does not feel well and that he has a persistent cough and he is weak in general.   He saw his cardiologist this morning. He had a shock about 2 days ago on Sunday. Patient was started on amiodarone January 5. He also had a hematoma around the ICD pocket. The plan is to do an ablation in the future. Patient did use his wife's Ventolin inhaler and thought this worked better than the albuterol inhaler. He would like a prescription for the Ventolin inhaler. patient also continues to have poor appetite. He is lost about 20 pounds since his first hospitalization. Care Coordinator phone contact documented in Epic note for January 6, 2021.      Review of Systems     Patient Active Problem List   Diagnosis    Essential hypertension    Generalized osteoarthrosis, involving multiple sites    Pure hypercholesterolemia    SYLVIE (obstructive sleep apnea)    Allergic rhinitis    Pseudogout    Melanoma in situ of lower leg ([facility] [address]  Type 2 diabetes mellitus with diabetic polyneuropathy, without long-term current use of insulin (HCC)    Eczema    Dyshidrotic eczema    GERD without esophagitis    Non morbid obesity, unspecified obesity type    Moderate persistent asthma without complication    Mediastinal adenopathy    Multiple lung nodules on CT    Cardiac arrest (HCC)    Persistent atrial fibrillation (HCC)    Ischemic cardiomyopathy    Paroxysmal atrial fibrillation (HCC)    VT (ventricular tachycardia) (HCC)    Atrial fibrillation (HCC)    Dilated cardiomyopathy (HCC)    AICD (automatic cardioverter/defibrillator) present    Hyponatremia    Hypokalemia       Outpatient Medications Marked as Taking for the 1/5/21 encounter (Office Visit) with Stephani Angulo MD   Medication Sig Dispense Refill    amiodarone (CORDARONE) 200 MG tablet Take 1 tablet by mouth 2 times daily 200 mg twice daily for 2 weeks    Days,   Then 200 mg daily 45 tablet 3    metoprolol tartrate (LOPRESSOR) 50 MG tablet Take 1 tablet by mouth 2 times daily 90 tablet 3    warfarin (COUMADIN) 5 MG tablet Take 1 tablet by mouth daily Do not resume until 1/7 30 tablet 5    albuterol sulfate  (90 Base) MCG/ACT inhaler Inhale 2 puffs into the lungs every 6 hours as needed for Wheezing 1 Inhaler 0    glimepiride (AMARYL) 4 MG tablet Take 1 tablet by mouth daily 90 tablet 1    furosemide (LASIX) 40 MG tablet Take 1 tablet by mouth daily 90 tablet 1    traMADol (ULTRAM) 50 MG tablet TAKE 1 TABLET BY MOUTH TWO TIMES A DAY **reduce doses taken as pain becomes manageable** 60 tablet 0    fluticasone-salmeterol (ADVAIR DISKUS) 250-50 MCG/DOSE AEPB Inhale 1 puff into the lungs every 12 hours 1 Inhaler 6    lisinopril (PRINIVIL;ZESTRIL) 5 MG tablet Take 1 tablet by mouth daily 30 tablet 1  ipratropium-albuterol (DUONEB) 0.5-2.5 (3) MG/3ML SOLN nebulizer solution Inhale 3 mLs into the lungs every 4 hours as needed for Shortness of Breath Dx: Asthma  ICD-10: J45.909 120 vial 5    allopurinol (ZYLOPRIM) 300 MG tablet Take 1 tablet by mouth daily 90 tablet 1    finasteride (PROSCAR) 5 MG tablet Take 5 mg by mouth daily      tamsulosin (FLOMAX) 0.4 MG capsule Take 0.4 mg by mouth daily      pantoprazole (PROTONIX) 40 MG tablet Take 1 tablet by mouth daily 90 tablet 1    metFORMIN (GLUCOPHAGE) 1000 MG tablet Take 1 tablet by mouth 2 times daily 180 tablet 1    simvastatin (ZOCOR) 40 MG tablet TAKE 1 TABLET BY MOUTH AT BEDTIME 90 tablet 1    FREESTYLE LANCETS MISC 1 each by Does not apply route daily 100 each 3    Blood Glucose Monitoring Suppl (FREESTYLE LITE) CLAUDINE 1 Device by Does not apply route 2 times daily 1 Device 0       Allergies   Allergen Reactions    Levaquin [Levofloxacin] Diarrhea       Social History     Tobacco Use    Smoking status: Former Smoker     Packs/day: 1.00     Years: 10.00     Pack years: 10.00     Types: Cigarettes     Quit date: 4/3/1993     Years since quittin.7    Smokeless tobacco: Never Used    Tobacco comment: quit 25 years ago   Substance Use Topics    Alcohol use: No       /70 (Site: Left Upper Arm, Position: Sitting, Cuff Size: Medium Adult)   Pulse 76   Temp 98.6 °F (37 °C) (Tympanic)   Wt 210 lb 9.6 oz (95.5 kg)   SpO2 98%   BMI 31.10 kg/m²         Objective:   Physical Exam  Constitutional:       General: He is not in acute distress. Appearance: He is well-developed. Neck:      Thyroid: No thyroid mass or thyromegaly. Vascular: No carotid bruit. Cardiovascular:      Rate and Rhythm: Normal rate. Rhythm irregularly irregular. Pulses:           Dorsalis pedis pulses are 2+ on the right side and 2+ on the left side. Posterior tibial pulses are 2+ on the right side and 2+ on the left side. Heart sounds: Normal heart sounds. No murmur. No friction rub. No gallop. Pulmonary:      Effort: Pulmonary effort is normal.      Breath sounds: Normal breath sounds. Chest:      Chest wall: Tenderness present. Musculoskeletal: Normal range of motion. General: No tenderness. Right lower leg: No edema. Left lower leg: No edema. Neurological:      Mental Status: He is alert and oriented to person, place, and time. Sensory: Sensation is intact. Motor: Motor function is intact. Psychiatric:         Behavior: Behavior is cooperative. Assessment:      Anjali Gomez was seen today for follow-up from hospital.    Diagnoses and all orders for this visit:    Type 2 diabetes mellitus with diabetic polyneuropathy, without long-term current use of insulin ([address]    Dilated cardiomyopathy ([address]    AICD (automatic cardioverter/defibrillator) present    Paroxysmal atrial fibrillation (HCC)    Hypokalemia    Hyponatremia    Moderate persistent asthma without complication    Other orders  -     cephALEXin (KEFLEX) 500 MG capsule; Take 1 capsule by mouth 3 times daily for 7 days  -     benzonatate (TESSALON) 200 MG capsule; Take 1 capsule by mouth 3 times daily as needed for Cough  -     albuterol sulfate HFA (VENTOLIN HFA) 108 (90 Base) MCG/ACT inhaler; Inhale 2 puffs into the lungs every 6 hours as needed for Wheezing  -     insulin glargine (BASAGLAR KWIKPEN) 100 UNIT/ML injection pen; Inject 20 Units into the skin nightly            Plan:      Hospital information and cardiology notes reviewed with patient and wife    Encourage patient to follow close with cardiology care    In regards to his diabetic management patient was started on 20 units of insulin nightly and he is can report back his blood sugars in the next 2 weeks. He would be a candidate for Invokana but he states at this point time financially cannot afford this medication.

## 2021-01-05 NOTE — TELEPHONE ENCOUNTER
Spoke with the patient's wife and advised per RN pt may come to clinic today at 1:30 for post op check and to see RMM . Wife voiced understanding. none

## 2021-01-06 ENCOUNTER — TELEPHONE (OUTPATIENT)
Dept: FAMILY MEDICINE CLINIC | Age: 66
End: 2021-01-06

## 2021-01-06 ENCOUNTER — CARE COORDINATION (OUTPATIENT)
Dept: CASE MANAGEMENT | Age: 66
End: 2021-01-06

## 2021-01-06 ENCOUNTER — TELEPHONE (OUTPATIENT)
Dept: CARDIOLOGY CLINIC | Age: 66
End: 2021-01-06

## 2021-01-06 LAB — TSH REFLEX: 2.19 UIU/ML (ref 0.27–4.2)

## 2021-01-06 NOTE — CARE COORDINATION
[facility] Transitions Initial Follow Up Call    Call within 2 business days of discharge: Yes    Patient: Ruthie Tompkins Patient : 1955   MRN: [id number]  Reason for Admission:   Discharge Date: 21 RARS: Readmission Risk Score: 25      Last Discharge [facility]       Complaint Diagnosis Description Type Department Provider    20 Chest Pain Chest pain due to myocardial ischemia, unspecified ischemic chest pain type . .. ED to Hosp-Admission (Discharged) (ADMITTED) LM 3A Nir Trejo MD; Kuldeep Casey. ..            3rd and final attempt at an initial 24 hour call, contact info left on vm          Follow Up  Future Appointments   Date Time Provider Garett Mcrae   2021 10:00 AM SCHEDULE, LM PFT LM [facility]   2/3/2021  3:00 PM Renetta Lynch MD [facility]   2021  2:00 PM SCHEDULE, [place] DEVICE CHECK [facility]   2021  2:00 PM Farida Kline MD FF [facility]   2021  9:00 AM SCHEDULE, [place] REMOTE TRANSMISSION [facility]   2021  1:20 PM MD Brissa Baxter [facility]       Jorge Luis Ibarra RN

## 2021-01-06 NOTE — PROGRESS NOTES
Patient comes in for programming evaluation for his defibrillator. All sensing and pacing parameters are within normal range. Battery life 10.9 years  AP 0.5%.  0.1%. AF with a 61% burden  6 VT monitored episodes. 24 VF treated episodes. 23 pace-terminated. 1 shock terminated episode. Patient remains on warfarin and metoprolol (will increase today). Will start amiodarone today. Patients incision is healing nicely. Pocket Hematoma noted. Patient to call the office immediately with any signs on infection. No changes need to be made at this time. Please see interrogation for more detail. Optivol is initializing. Patient will see Dr. Daniel Mckoy today and follow up as scheduled. Home Monitor being mailed.

## 2021-01-06 NOTE — TELEPHONE ENCOUNTER
Advised patient and wife the Ventolin is not covered by his insurance and that they will have to get the Albuterol that is covered by the insurance. Patient's wife wanted to update you on patient. His blood sugar is in the 200's today so they are doing better. Patient will send an update on Friday for us.

## 2021-01-07 ENCOUNTER — HOSPITAL ENCOUNTER (OUTPATIENT)
Dept: PULMONOLOGY | Age: 66
Discharge: HOME | End: 2021-01-07
Payer: COMMERCIAL

## 2021-01-07 VITALS — RESPIRATION RATE: 16 BRPM | HEART RATE: 80 BPM | OXYGEN SATURATION: 97 %

## 2021-01-07 DIAGNOSIS — R06.09 DOE (DYSPNEA ON EXERTION): ICD-10-CM

## 2021-01-07 LAB
DLCO %PRED: 65 %
DLCO PRED: NORMAL
DLCO/VA %PRED: NORMAL
DLCO/VA PRED: NORMAL
DLCO/VA: NORMAL
DLCO: NORMAL
EXPIRATORY TIME-POST: NORMAL
EXPIRATORY TIME: NORMAL
FEF 25-75% %CHNG: NORMAL
FEF 25-75% %PRED-POST: NORMAL
FEF 25-75% %PRED-PRE: NORMAL
FEF 25-75% PRED: NORMAL
FEF 25-75%-POST: NORMAL
FEF 25-75%-PRE: NORMAL
FEV1 %PRED-POST: 57 %
FEV1 %PRED-PRE: 62 %
FEV1 PRED: NORMAL
FEV1-POST: NORMAL
FEV1-PRE: NORMAL
FEV1/FVC %PRED-POST: NORMAL
FEV1/FVC %PRED-PRE: NORMAL
FEV1/FVC PRED: NORMAL
FEV1/FVC-POST: 104 %
FEV1/FVC-PRE: 103 %
FVC %PRED-POST: NORMAL
FVC %PRED-PRE: NORMAL
FVC PRED: NORMAL
FVC-POST: NORMAL
FVC-PRE: NORMAL
GAW %PRED: NORMAL
GAW PRED: NORMAL
GAW: NORMAL
IC %PRED: NORMAL
IC PRED: NORMAL
IC: NORMAL
MEP: NORMAL
MIP: NORMAL
MVV %PRED-PRE: NORMAL
MVV PRED: NORMAL
MVV-PRE: NORMAL
PEF %PRED-POST: NORMAL
PEF %PRED-PRE: NORMAL
PEF PRED: NORMAL
PEF%CHNG: NORMAL
PEF-POST: NORMAL
PEF-PRE: NORMAL
RAW %PRED: NORMAL
RAW PRED: NORMAL
RAW: NORMAL
RV %PRED: NORMAL
RV PRED: NORMAL
RV: NORMAL
SVC %PRED: NORMAL
SVC PRED: NORMAL
SVC: NORMAL
TLC %PRED: 76 %
TLC PRED: NORMAL
TLC: NORMAL
VA %PRED: NORMAL
VA PRED: NORMAL
VA: NORMAL
VTG %PRED: NORMAL
VTG PRED: NORMAL
VTG: NORMAL

## 2021-01-07 PROCEDURE — 94729 DIFFUSING CAPACITY: CPT

## 2021-01-07 PROCEDURE — 94060 EVALUATION OF WHEEZING: CPT

## 2021-01-07 PROCEDURE — 94760 N-INVAS EAR/PLS OXIMETRY 1: CPT

## 2021-01-07 PROCEDURE — 6370000000 HC RX 637 (ALT 250 FOR IP): Performed by: INTERNAL MEDICINE

## 2021-01-07 PROCEDURE — 94726 PLETHYSMOGRAPHY LUNG VOLUMES: CPT

## 2021-01-07 PROCEDURE — 94200 LUNG FUNCTION TEST (MBC/MVV): CPT

## 2021-01-07 RX ORDER — ALBUTEROL SULFATE 90 UG/1
4 AEROSOL, METERED RESPIRATORY (INHALATION) ONCE
Status: COMPLETED | OUTPATIENT
Start: 2021-01-07 | End: 2021-01-07

## 2021-01-07 RX ADMIN — Medication 4 PUFF: at 10:28

## 2021-01-07 ASSESSMENT — PULMONARY FUNCTION TESTS
FEV1/FVC_PRE: 103
FEV1_PERCENT_PREDICTED_PRE: 62
FEV1_PERCENT_PREDICTED_POST: 57
FEV1/FVC_POST: 104

## 2021-01-08 ENCOUNTER — TELEPHONE (OUTPATIENT)
Dept: CARDIOLOGY CLINIC | Age: 66
End: 2021-01-08

## 2021-01-08 ENCOUNTER — TELEPHONE (OUTPATIENT)
Dept: PULMONOLOGY | Age: 66
End: 2021-01-08

## 2021-01-08 ENCOUNTER — TELEPHONE (OUTPATIENT)
Dept: FAMILY MEDICINE CLINIC | Age: 66
End: 2021-01-08

## 2021-01-08 NOTE — TELEPHONE ENCOUNTER
Advised that home health referral was sent to [facility]. Also to call the cardiologist regarding the medications.

## 2021-01-08 NOTE — TELEPHONE ENCOUNTER
Called patient. He is holding his amiodarone and states that he has had upset stomach before. Discussed arrhythmia and why antiarrhythmic therapy has been recommended. Recommend taking the pill with food, once daily if he can tolerate it.

## 2021-01-08 NOTE — PROCEDURES
Pulmonary Function Testing      Patient name:  Nica Pearson     [facility] Unit #:   [id number]   Date of test:  1/7/2021  Date of interpretation:   1/8/2021    Mr. Nica Pearson is a 72y.o. year-old non smoker. The spirometry data were acceptable and reproducible. Spirometry:  Flow volume loops were normal. The FEV-1/FVC ratio was normal. The FEV-1 was 2.06 liters (62% of predicted), which was moderately decreased. The FVC was 2.67 liters (60% of predicted), which was decreased. Response to inhaled bronchodilators (albuterol) was not significant. Lung volumes:  Lung volumes were tested by plethysmography. The total lung capacity was 4.84 liters (76% of predicted), which was decreased. The residual volume was 2.17 liters (91% of predicted), which was normal. The ratio of residual volume to total lung capacity (RV/TLC) was 45, which was normal.     Diffusion capacity was found to be 65% which is Moderately decreased. Interpretation:  Mild restriction noted, no clear evidence of obstructive airway disease.     Comments:

## 2021-01-08 NOTE — TELEPHONE ENCOUNTER
Pt wife calling, pt was in to see RMM on 01/05/2021 and he started pt on new medications and they are making him sick to his stomach and he doesn't want to eat anything. They think it might be the amiodarone. Need to know what to do?  Pls call to advise Thank you

## 2021-01-08 NOTE — TELEPHONE ENCOUNTER
I tried to call patient, however wife is very upset. He has been nauseated and unable to eat and also very tired since starting amiodarone the evening of 1/5. He received his morning dose, but wife does not plan to give it this evening.      Merlene Navas, APRN-CNP

## 2021-01-08 NOTE — TELEPHONE ENCOUNTER
Please let pt know that his PFT does not show significant evidence of asthma. He should however continue with current inhalers. I strongly suspect that his shortness of breath is related to heart rhythm issues.

## 2021-01-08 NOTE — TELEPHONE ENCOUNTER
Michelle Carmona from [facility] is calling to see if you would consider writing and  orders for Home Care because spouse is stressed

## 2021-01-08 NOTE — TELEPHONE ENCOUNTER
Patient has been put on new heart meds and patient is not feeling well on them, he is dry heaving, coughing a lot, nausea and fatigue since starting these medications that the cardiologist put him on.  Please advise

## 2021-01-10 ENCOUNTER — TELEPHONE (OUTPATIENT)
Dept: CARDIOLOGY CLINIC | Age: 66
End: 2021-01-10

## 2021-01-10 NOTE — TELEPHONE ENCOUNTER
Patient was shocked on 1-10-21 for what appears to be AF with RVR. EP to review. Will notify office staff.

## 2021-01-11 ENCOUNTER — TELEPHONE (OUTPATIENT)
Dept: FAMILY MEDICINE CLINIC | Age: 66
End: 2021-01-11

## 2021-01-11 ENCOUNTER — TELEPHONE (OUTPATIENT)
Dept: CARDIOLOGY CLINIC | Age: 66
End: 2021-01-11

## 2021-01-11 RX ORDER — ALLOPURINOL 300 MG/1
TABLET ORAL
Qty: 90 TABLET | Refills: 0 | Status: SHIPPED | OUTPATIENT
Start: 2021-01-11

## 2021-01-11 RX ORDER — PANTOPRAZOLE SODIUM 40 MG/1
TABLET, DELAYED RELEASE ORAL
Qty: 90 TABLET | Refills: 0 | Status: SHIPPED | OUTPATIENT
Start: 2021-01-11

## 2021-01-11 RX ORDER — METOPROLOL TARTRATE 50 MG/1
75 TABLET, FILM COATED ORAL 2 TIMES DAILY
Qty: 90 TABLET | Refills: 3 | Status: ON HOLD | OUTPATIENT
Start: 2021-01-11 | End: 2021-02-08 | Stop reason: HOSPADM

## 2021-01-11 NOTE — TELEPHONE ENCOUNTER
Increase metroprolol to 75 mg day. Re-inforce importance of amiodarone. If shocked again will need to consider ablation. Instructions given to wife.  She verbalized understanding

## 2021-01-11 NOTE — TELEPHONE ENCOUNTER
I spoke with patient wife. He is not taking amiodarone. Please see prior note. Reviewed case with Dr. Zoila Portillo. He would like to increase his Metoprolol if his BP will tolerate it. Last documented /70. His wife is rechecking and will call back.

## 2021-01-11 NOTE — TELEPHONE ENCOUNTER
Wife requesting call back from Dena Anaya. She just needs the [facility] phone number. A nurse aide was suppose to visit today and they have not shown up.       Please advise

## 2021-01-11 NOTE — TELEPHONE ENCOUNTER
Wife is calling Mary Grace Chacon back regarding pt. She has some information she needs to give Mary Grace Chacon.

## 2021-01-11 NOTE — TELEPHONE ENCOUNTER
The home aid just came. I personally saw the patient with the PA, and completed the key components of the history and physical exam. I then discussed the management plan with the PA.   gen in nad resp clear cardiac no murmur abd soft skin/msk neurovasc intact skin wound as described by pa   wound care precautions given

## 2021-01-11 NOTE — TELEPHONE ENCOUNTER
Cortney Barrett, will reach out to the Patient and see how he is feeling and if he is taking his taking/tolerating his amiodarone.  Thanks

## 2021-01-11 NOTE — TELEPHONE ENCOUNTER
Patient was shocked early Sunday morning. I spoke with his wife. He is not taking amiodarone at all due to it upsetting his stomach. Case reviewed with Dr Robin Resendez. Instructed to increase metroprolol to 100 mg BID if patient tolerates. Pt has home care nurse coming in today and they will check his BP and call back.

## 2021-01-13 ENCOUNTER — TELEPHONE (OUTPATIENT)
Dept: FAMILY MEDICINE CLINIC | Age: 66
End: 2021-01-13

## 2021-01-13 RX ORDER — PEN NEEDLE, DIABETIC 31 G X1/4"
1 NEEDLE, DISPOSABLE MISCELLANEOUS DAILY
Qty: 100 EACH | Refills: 3 | Status: SHIPPED | OUTPATIENT
Start: 2021-01-13

## 2021-01-13 RX ORDER — INSULIN GLARGINE 100 [IU]/ML
25 INJECTION, SOLUTION SUBCUTANEOUS NIGHTLY
Qty: 5 PEN | Refills: 2 | Status: SHIPPED | OUTPATIENT
Start: 2021-01-13

## 2021-01-13 NOTE — TELEPHONE ENCOUNTER
Patients wife called and wanted to know about you increasing his insulin to 25mls.  And she does not have the insulin or the needles      Please advise and give her a callback

## 2021-01-14 ENCOUNTER — TELEPHONE (OUTPATIENT)
Dept: FAMILY MEDICINE CLINIC | Age: 66
End: 2021-01-14

## 2021-01-14 ENCOUNTER — TELEPHONE (OUTPATIENT)
Dept: CARDIOLOGY CLINIC | Age: 66
End: 2021-01-14

## 2021-01-14 ENCOUNTER — OFFICE VISIT (OUTPATIENT)
Dept: FAMILY MEDICINE CLINIC | Age: 66
End: 2021-01-14
Payer: COMMERCIAL

## 2021-01-14 VITALS
TEMPERATURE: 97.1 F | WEIGHT: 214 LBS | SYSTOLIC BLOOD PRESSURE: 112 MMHG | HEIGHT: 69 IN | DIASTOLIC BLOOD PRESSURE: 62 MMHG | OXYGEN SATURATION: 94 % | HEART RATE: 87 BPM | BODY MASS INDEX: 31.7 KG/M2

## 2021-01-14 DIAGNOSIS — G47.00 INSOMNIA, UNSPECIFIED TYPE: ICD-10-CM

## 2021-01-14 DIAGNOSIS — I50.9 ACUTE CONGESTIVE HEART FAILURE, UNSPECIFIED HEART FAILURE TYPE (HCC): ICD-10-CM

## 2021-01-14 DIAGNOSIS — I27.20 PULMONARY HYPERTENSION (HCC): Primary | ICD-10-CM

## 2021-01-14 DIAGNOSIS — R05.3 CHRONIC COUGH: ICD-10-CM

## 2021-01-14 DIAGNOSIS — I48.19 PERSISTENT ATRIAL FIBRILLATION (HCC): ICD-10-CM

## 2021-01-14 PROCEDURE — 85610 PROTHROMBIN TIME: CPT | Performed by: NURSE PRACTITIONER

## 2021-01-14 PROCEDURE — 99214 OFFICE O/P EST MOD 30 MIN: CPT | Performed by: NURSE PRACTITIONER

## 2021-01-14 RX ORDER — ONDANSETRON 4 MG/1
4 TABLET, FILM COATED ORAL 3 TIMES DAILY PRN
Qty: 30 TABLET | Refills: 0 | Status: ON HOLD | OUTPATIENT
Start: 2021-01-14 | End: 2021-02-08 | Stop reason: HOSPADM

## 2021-01-14 RX ORDER — HYDROXYZINE PAMOATE 50 MG/1
50 CAPSULE ORAL 3 TIMES DAILY PRN
Qty: 90 CAPSULE | Refills: 0 | Status: SHIPPED | OUTPATIENT
Start: 2021-01-14 | End: 2021-02-13

## 2021-01-14 RX ORDER — BENZONATATE 100 MG/1
100 CAPSULE ORAL 3 TIMES DAILY PRN
Qty: 30 CAPSULE | Refills: 0 | Status: SHIPPED | OUTPATIENT
Start: 2021-01-14 | End: 2021-01-21

## 2021-01-14 ASSESSMENT — ENCOUNTER SYMPTOMS
NAUSEA: 1
CONSTIPATION: 0
WHEEZING: 1
SHORTNESS OF BREATH: 1
DIARRHEA: 0
ABDOMINAL PAIN: 0
COUGH: 1
VOMITING: 0
ABDOMINAL DISTENTION: 0
CHEST TIGHTNESS: 1

## 2021-01-14 NOTE — TELEPHONE ENCOUNTER
Received call from [facility]- Dr. Corinne Rocha nurse in regards to patient's PT/INR. She is wondering if we are following him or if Cardiology should follow him. Reviewed notes in chart- only thing we seen was 12/30/2020 were document for home PT/INR was scanned. Iris Ag that Dr. Sesar Iverson is out of the office today but will return tomorrow and I can forward it on to him since I am not able to answer. Per [facility] she states that she will talk to the physician about ordering one since he has not had one since 1/1/2021 per labs in chart and it was elevated. [facility] stated that they are able to follow him for Pt/INR if needed. She just doesn't want the patient's INR to fall through the cracks. Please advise and return call to Roark Boxer since [facility] will be out of the office tomorrow.

## 2021-01-14 NOTE — PROGRESS NOTES
Kathy Hightower  : 1955  Encounter date: 2021    This javi 72 y.o. male who presents with  Chief Complaint   Patient presents with    Anxiety     Pt  c/o anxiety and upset stomach x 1 wk       History of present illness:    HPI Pt is 72year old male with recent MI 20. Pt with afib and rapid ventricular response. Pt sees pulmonology for SYLVIE syndrome, recent CT showed pulmonary nodules. Pt with continued concerns following ICD placement on 20., patient has naturally cardioverted, shocked,  and has returned to sinus with amiodarone. Ongoing concerns with dyspnea, mildly productive cough and GI upset.  heart monitor showed sustained vtach with ongoing chest pain. Pt with uncontrolled blood sugars, last A1c- 10.6. Pt with 20 lb weight loss. Pt concerned about persistent productive cough, clear to yellow. Pt has been treated with keflex, 10 day course, reports he thinks it is working, advised to continue. Pt reports low appetite related to nausea. Denies anxiety but has not been sleeping well due to coughing. Reports wheezing at night. PFT's normal, using ventolin HFA, nebulizer, Advair with little relief.      Current Outpatient Medications on File Prior to Visit   Medication Sig Dispense Refill    insulin glargine (BASAGLAR KWIKPEN) 100 UNIT/ML injection pen Inject 25 Units into the skin nightly 5 pen 2    Insulin Pen Needle (MEIJER PEN NEEDLES) 31G X 6 MM MISC 1 each by Does not apply route daily 100 each 3    pantoprazole (PROTONIX) 40 MG tablet TAKE 1 TABLET BY MOUTH ONE TIME A DAY  90 tablet 0    allopurinol (ZYLOPRIM) 300 MG tablet TAKE 1 TABLET BY MOUTH ONE TIME A DAY  90 tablet 0    metoprolol tartrate (LOPRESSOR) 50 MG tablet Take 1.5 tablets by mouth 2 times daily 90 tablet 3    amiodarone (CORDARONE) 200 MG tablet Take 1 tablet by mouth 2 times daily 200 mg twice daily for 2 weeks    Days,   Then 200 mg daily 45 tablet 3  warfarin (COUMADIN) 5 MG tablet Take 1 tablet by mouth daily Do not resume until 1/7 30 tablet 5    albuterol sulfate  (90 Base) MCG/ACT inhaler Inhale 2 puffs into the lungs every 6 hours as needed for Wheezing 1 Inhaler 0    glimepiride (AMARYL) 4 MG tablet Take 1 tablet by mouth daily 90 tablet 1    furosemide (LASIX) 40 MG tablet Take 1 tablet by mouth daily 90 tablet 1    traMADol (ULTRAM) 50 MG tablet TAKE 1 TABLET BY MOUTH TWO TIMES A DAY **reduce doses taken as pain becomes manageable** 60 tablet 0    fluticasone-salmeterol (ADVAIR DISKUS) 250-50 MCG/DOSE AEPB Inhale 1 puff into the lungs every 12 hours 1 Inhaler 6    lisinopril (PRINIVIL;ZESTRIL) 5 MG tablet Take 1 tablet by mouth daily 30 tablet 1    ipratropium-albuterol (DUONEB) 0.5-2.5 (3) MG/3ML SOLN nebulizer solution Inhale 3 mLs into the lungs every 4 hours as needed for Shortness of Breath Dx: Asthma  ICD-10: J45.909 120 vial 5    finasteride (PROSCAR) 5 MG tablet Take 5 mg by mouth daily      tamsulosin (FLOMAX) 0.4 MG capsule Take 0.4 mg by mouth daily      metFORMIN (GLUCOPHAGE) 1000 MG tablet Take 1 tablet by mouth 2 times daily 180 tablet 1    simvastatin (ZOCOR) 40 MG tablet TAKE 1 TABLET BY MOUTH AT BEDTIME 90 tablet 1    FREESTYLE LANCETS MISC 1 each by Does not apply route daily 100 each 3    Blood Glucose Monitoring Suppl (FREESTYLE LITE) CLAUDINE 1 Device by Does not apply route 2 times daily 1 Device 0     No current facility-administered medications on file prior to visit.        Allergies   Allergen Reactions    Levaquin [Levofloxacin] Diarrhea     Past Medical History:   Diagnosis Date    Abdominal pain, LLQ     Allergic rhinitis     Asthma     Atopic dermatitis     Cancer ([facility]     melanoma    Diabetes mellitus ([facility]     Erectile dysfunction     Generalized osteoarthrosis, involving multiple sites     GERD (gastroesophageal reflux disease)     Gynecomastia     Hyperlipidemia     Nasal congestion  Nipple pain     Obstructive sleep apnea syndrome 10/5/2012    Pseudo-gout     Unspecified essential hypertension       Past Surgical History:   Procedure Laterality Date    BACK SURGERY      BRONCHOSCOPY N/A 2020    BRONCHOSCOPY ALVEOLAR LAVAGE [facility]ell [facility] Bilateral 2013    FINGER SURGERY Left 3-1-2013    Incision & Drainage of left Thumb Infection    FOOT SURGERY Right     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    HAND SURGERY  2011    LUMBAR NERVE BLOCK Right 2018    RIGHT L4/L5 LUMBAR INTERLAMINAR EPIDURAL STEROID INJECTION WITH FLUOROSCOPY performed by Shira Raines MD at [address] [place] [address]      melenoma right side post    RI [facility] [employer] [address] DX/THER SBST INTRLMNR LMBR/SAC W/IMG GDN Right 2018    RIGHT L4/L5 INTERLAMINAR EPIDURAL STEROID INJECTION WITH FLUOROSCOPY performed by Shira Raines MD at [place] [address] History   Problem Relation Age of Onset    Arthritis Mother     High Blood Pressure Mother     Emphysema Mother     Osteoporosis Mother     Diabetes Father     Heart Disease Father     High Cholesterol Father     Parkinsonism Father     Diabetes Sister       Social History     Tobacco Use    Smoking status: Former Smoker     Packs/day: 1.00     Years: 10.00     Pack years: 10.00     Types: Cigarettes     Quit date: 4/3/1993     Years since quittin.8    Smokeless tobacco: Never Used    Tobacco comment: quit 25 years ago   Substance Use Topics    Alcohol use: No        Review of Systems   Constitutional: Positive for activity change, appetite change and unexpected weight change. Negative for chills, fatigue and fever. Respiratory: Positive for cough, chest tightness, shortness of breath and wheezing. Cardiovascular: Positive for palpitations. Negative for chest pain and leg swelling. Gastrointestinal: Positive for nausea. Negative for abdominal distention, abdominal pain, constipation, diarrhea and vomiting. Allergic/Immunologic: Positive for immunocompromised state. Neurological: Negative for dizziness, weakness, light-headedness and headaches. Hematological: Bruises/bleeds easily (coumadin). Psychiatric/Behavioral: Positive for sleep disturbance. Negative for dysphoric mood. The patient is not nervous/anxious. Objective:    /62 (Site: Left Upper Arm, Position: Sitting, Cuff Size: Large Adult)   Pulse 87   Temp 97.1 °F (36.2 °C) (Temporal)   Ht 5' 9\" (1.753 m)   Wt 214 lb (97.1 kg)   SpO2 94%   BMI 31.60 kg/m²   Weight: 214 lb (97.1 kg)     BP Readings from Last 3 Encounters:   01/14/21 112/62   01/05/21 102/70   01/05/21 114/77     Wt Readings from Last 3 Encounters:   01/14/21 214 lb (97.1 kg)   01/05/21 210 lb 9.6 oz (95.5 kg)   01/05/21 213 lb 12.8 oz (97 kg)     BMI Readings from Last 3 Encounters:   01/14/21 31.60 kg/m²   01/05/21 31.10 kg/m²   01/05/21 31.57 kg/m²       Physical Exam  Vitals signs reviewed. Constitutional:       Appearance: He is well-developed. He is obese. Cardiovascular:      Rate and Rhythm: Normal rate and regular rhythm. Heart sounds: Normal heart sounds. No murmur. Pulmonary:      Effort: Pulmonary effort is normal.      Breath sounds: Normal breath sounds. Abdominal:      General: Bowel sounds are normal. There is distension. Palpations: Abdomen is soft. Tenderness: There is no abdominal tenderness. Musculoskeletal:      Right lower leg: No edema. Left lower leg: No edema. Skin:     General: Skin is warm and dry. Neurological:      Mental Status: He is alert and oriented to person, place, and time. Psychiatric:         Mood and Affect: Mood normal.         Assessment/Plan    1.  Pulmonary hypertension [facility])  Referral placed  - Caron Harvey MD, [facility], [facility] 2. Persistent atrial fibrillation (HCC)  - POCT INR- 3.3    3. Acute congestive heart failure, unspecified heart failure type [facility])  - Alayna Harvey MD, [facility], [facility]    4. Chronic cough  Follow up with [facility] pulmonary, second opinion  Discussed differentials regarding imaging, symptomology including COPD, CHF, asthma, GERD, Ca  - ondansetron (ZOFRAN) 4 MG tablet; Take 1 tablet by mouth 3 times daily as needed for Nausea or Vomiting  Dispense: 30 tablet; Refill: 0  - benzonatate (TESSALON) 100 MG capsule; Take 1 capsule by mouth 3 times daily as needed for Cough  Dispense: 30 capsule; Refill: 0    5. Insomnia, unspecified type  - hydrOXYzine (VISTARIL) 50 MG capsule; Take 1 capsule by mouth 3 times daily as needed for Itching or Anxiety  Dispense: 90 capsule; Refill: 0      Return if symptoms worsen or fail to improve, for unresolved symptoms. This dictation was generated by voice recognition computer software. Although all attempts are made to edit the dictation for accuracy, there may be errors in the transcription that are not intended.

## 2021-01-14 NOTE — TELEPHONE ENCOUNTER
I Spoke with Uzair Garcia and advised her PCP office had requested self testing kit and she should call his office to manage coumadin.

## 2021-01-14 NOTE — TELEPHONE ENCOUNTER
Patients wife called. She wants to monitor his coumadin at home. Is requesting a machine to measure at home.

## 2021-01-14 NOTE — TELEPHONE ENCOUNTER
A self testing kit has been ordered By Dr. Kayla Ferro office on 12/30/2020. He is not enrolled in the anticoagulation clinic.

## 2021-01-14 NOTE — TELEPHONE ENCOUNTER
Sarah Conteh called back.  Her  has been having \"panic attacks\" I advised that she consult his PCP for his mental health needs

## 2021-01-14 NOTE — TELEPHONE ENCOUNTER
Wife called stating patient has been having panic attacks, SOB and upset stomach, they wanted patient to be seen by either Ksenia Easley or Dr Flavio Moon. Appt made today with Payal.

## 2021-01-15 ENCOUNTER — TELEPHONE (OUTPATIENT)
Dept: PHARMACY | Age: 66
End: 2021-01-15

## 2021-01-15 NOTE — TELEPHONE ENCOUNTER
Pts wife called to find out information on getting his monitor. Explained that she may have been routed to the wrong place because we do not manage warfarin on pts who have home monitors. She then asked for information on having [facility] manage warfarin. Explained that we would need to have a signed order from his MD , once that is received we would contact pt to schedule initial appt. She may want him to have warfarin managed by [facility] until he receives home monitor.

## 2021-01-18 ENCOUNTER — TELEPHONE (OUTPATIENT)
Dept: PULMONOLOGY | Age: 66
End: 2021-01-18

## 2021-01-18 ENCOUNTER — TELEPHONE (OUTPATIENT)
Dept: CARDIOLOGY CLINIC | Age: 66
End: 2021-01-18

## 2021-01-18 ENCOUNTER — TELEPHONE (OUTPATIENT)
Dept: FAMILY MEDICINE CLINIC | Age: 66
End: 2021-01-18

## 2021-01-18 DIAGNOSIS — I50.9 ACUTE CONGESTIVE HEART FAILURE, UNSPECIFIED HEART FAILURE TYPE (HCC): ICD-10-CM

## 2021-01-18 DIAGNOSIS — R53.1 WEAKNESS: ICD-10-CM

## 2021-01-18 DIAGNOSIS — Z95.810 AICD (AUTOMATIC CARDIOVERTER/DEFIBRILLATOR) PRESENT: Primary | ICD-10-CM

## 2021-01-18 NOTE — TELEPHONE ENCOUNTER
Pt's wife Shashank Suero called in requesting the results of the PFT from 1/7/2021.     Pt # [phone]

## 2021-01-18 NOTE — TELEPHONE ENCOUNTER
Can you create an addendum and add this :    \"Pt mobility limitation interferes with everyday living activities. A cane or a walker will not help pt needs, a wheelchair will assist pt with daily activities\"    ?    Please advise.     referral does need to come from PCP TY

## 2021-01-18 NOTE — TELEPHONE ENCOUNTER
Spoke with the wife and advised her of the message below . She voiced understanding .  Call complete

## 2021-01-18 NOTE — TELEPHONE ENCOUNTER
Patients wife called and would like to discuss patients health issues and why patient isn't using machine all the time.  [phone]

## 2021-01-19 LAB
AFB CULTURE (MYCOBACTERIA): NORMAL
AFB CULTURE (MYCOBACTERIA): NORMAL
AFB SMEAR: NORMAL
AFB SMEAR: NORMAL

## 2021-01-21 ENCOUNTER — HOSPITAL ENCOUNTER (OUTPATIENT)
Dept: PHYSICAL THERAPY | Age: 66
Setting detail: THERAPIES SERIES
Discharge: HOME | DRG: 871 | End: 2021-01-21
Payer: COMMERCIAL

## 2021-01-21 ENCOUNTER — TELEPHONE (OUTPATIENT)
Dept: PULMONOLOGY | Age: 66
End: 2021-01-21

## 2021-01-21 PROCEDURE — 97162 PT EVAL MOD COMPLEX 30 MIN: CPT

## 2021-01-21 PROCEDURE — 97530 THERAPEUTIC ACTIVITIES: CPT

## 2021-01-21 NOTE — FLOWSHEET NOTE
[facility] Outpatient Physical Therapy  Phone: [phone]   Fax: [phone]    Physical Therapy Daily Treatment Note  Date:  2021    Patient Name:  Lilliana Keen    :  1955  MRN: [id number]  Medical/Treatment Diagnosis Information:  · Diagnosis: R53.1 (ICD-10-CM) - Weakness  · Treatment Diagnosis: poor breathing and deconditioning  Insurance/Certification information:  PT Insurance Information: anthtem  Physician Information:  Referring Practitioner: Daniel Hannah MD  Plan of care signed (Y/N): []  Yes [x]  No     Date of Patient follow up with Physician:      Progress Report: [x]  Yes  []  No     Date Range for reporting period:  Beginnin21  Ending:     Progress report due (10 Rx/or 30 days whichever is less): visit #10 or 30 (date)     Recertification due (POC duration/ or 90 days whichever is less): visit #12 or 21 (date)     Visit # Insurance Allowable Auth required?  Date Range     []  Yes  []  No      Latex Allergy:  [x]NO      []YES  Preferred Language for Healthcare:   [x]English       []other:    Functional Scale:        Date assessed:  TUG:    Pain level:  9/10     SUBJECTIVE:  See garyal    OBJECTIVE: See geraldine daily pulse ox freqently      RESTRICTIONS/PRECAUTIONS: Pacemaker and defibrillator implanted, pulse Ox     Exercises/Interventions:     Therapeutic Exercises (28918) Resistance / level Sets/sec Reps Notes   Nu step       Step stretching                                                        Therapeutic Activities (49387)       Steps        Gait: amb around dept with cane   X 125 ft O2 dropped to 90                         Neuromuscular Re-ed (69724)       Balance activities                                          Manual Intervention [id number])                                                     Pt. Education:  -patient educated on diagnosis, prognosis and expectations for rehab  -all patient questions were answered Home Exercise Program:  1/21: Pt taught pursed lip breathing, deep breathing techniques, energy saving techniques      Therapeutic Exercise and NMR EXR  [] (47904) Provided verbal/tactile cueing for activities related to strengthening, flexibility, endurance, ROM for improvements in  [] LE / Lumbar: LE, proximal hip, and core control with self care, mobility, lifting, ambulation. [] UE / Cervical: cervical, postural, scapular, scapulothoracic and UE control with self care, reaching, carrying, lifting, house/yardwork, driving, computer work.  [] (50276) Provided verbal/tactile cueing for activities related to improving balance, coordination, kinesthetic sense, posture, motor skill, proprioception to assist with   [] LE / lumbar: LE, proximal hip, and core control in self care, mobility, lifting, ambulation and eccentric single leg control. [] UE / cervical: cervical, scapular, scapulothoracic and UE control with self care, reaching, carrying, lifting, house/yardwork, driving, computer work.   [] (55343) Therapist is in constant attendance of 2 or more patients providing skilled therapy interventions, but not providing any significant amount of measurable one-on-one time to either patient, for improvements in  [] LE / lumbar: LE, proximal hip, and core control in self care, mobility, lifting, ambulation and eccentric single leg control. [] UE / cervical: cervical, scapular, scapulothoracic and UE control with self care, reaching, carrying, lifting, house/yardwork, driving, computer work.      NMR and Therapeutic Activities:    [] (26513 or 17284) Provided verbal/tactile cueing for activities related to improving balance, coordination, kinesthetic sense, posture, motor skill, proprioception and motor activation to allow for proper function of   [] LE: / Lumbar core, proximal hip and LE with self care and ADLs [] UE / Cervical: cervical, postural, scapular, scapulothoracic and UE control with self care, carrying, lifting, driving, computer work.   [] (80209) Gait Re-education- Provided training and instruction to the patient for proper LE, core and proximal hip recruitment and positioning and eccentric body weight control with ambulation re-education including up and down stairs     Home Management Training / Self Care:  [x] (51238) Provided self-care/home management training related to activities of daily living and compensatory training, and/or use of adaptive equipment for improvement with: ADLs and compensatory training, meal preparation, safety procedures and instruction in use of adaptive equipment, including bathing, grooming, dressing, personal hygiene, basic household cleaning and chores.      Home Exercise Program:    [x] (88130) Reviewed/Progressed HEP activities related to strengthening, flexibility, endurance, ROM of   [] LE / Lumbar: core, proximal hip and LE for functional self-care, mobility, lifting and ambulation/stair navigation   [] UE / Cervical: cervical, postural, scapular, scapulothoracic and UE control with self care, reaching, carrying, lifting, house/yardwork, driving, computer work  [] (52551)Reviewed/Progressed HEP activities related to improving balance, coordination, kinesthetic sense, posture, motor skill, proprioception of   [] LE: core, proximal hip and LE for self care, mobility, lifting, and ambulation/stair navigation    [] UE / Cervical: cervical, postural,  scapular, scapulothoracic and UE control with self care, reaching, carrying, lifting, house/yardwork, driving, computer work    Manual Treatments:  PROM / STM / Oscillations-Mobs:  G-I, II, III, IV (PA's, Inf., Post.) [] (74661) Provided manual therapy to mobilize LE, proximal hip and/or LS spine soft tissue/joints for the purpose of modulating pain, promoting relaxation,  increasing ROM, reducing/eliminating soft tissue swelling/inflammation/restriction, improving soft tissue extensibility and allowing for proper ROM for normal function with   [] LE / lumbar: self care, mobility, lifting and ambulation. [] UE / Cervical: self care, reaching, carrying, lifting, house/yardwork, driving, computer work. Modalities:  [] (33882) Vasopneumatic compression: Utilized vasopneumatic compression to decrease edema / swelling for the purpose of improving mobility and quad tone / recruitment which will allow for increased overall function including but not limited to self-care, transfers, ambulation, and ascending / descending stairs. Modalities:      Charges:  Timed Code Treatment Minutes: 10   Total Treatment Minutes: 50     [x] EVAL - LOW (75559)   [] EVAL - MOD (53648)  [] EVAL - HIGH (55164)  [] RE-EVAL (46254)  [] ZX(48845) x       [] Ionto  [] NMR (42533) x       [] Vaso  [] Manual (46579) x       [] Ultrasound  [x] TA x        [] Mech Traction (37807)  [] Aquatic Therapy x     [] ES (un) (26431):   [] Home Management Training x  [] ES(attended) (15327)   [] Dry Needling 1-2 muscles (22077):  [] Dry Needling 3+ muscles (337932)  [] Group:      [] Other:     GOALS:   Patient stated goal: get stronger     Therapist goals for Patient:   Short Term Goals: To be achieved in: 2 weeks  1. Independent in HEP and progression per patient tolerance, in order to prevent re-injury. 2. Patient will have a decrease in pain to facilitate improvement in movement, function, and ADLs as indicated by Functional Deficits.     Long Term Goals: To be achieved in: 6 weeks  1. Disability index score of 25% or less for the TUG to assist with reaching prior level of function. Note: If patient does not return for scheduled/ recommended follow up visits, this note will serve as a discharge from care along with most recent update on progress.

## 2021-01-21 NOTE — PROGRESS NOTES
[facility]  [place] Louis [address], [address]  Phone: [phone]   Fax:     [phone]                                                       Physical Therapy Certification    Dear Referring Practitioner: Nixon Griffin MD,    We had the pleasure of evaluating the following patient for physical therapy services at [address] [place] [address]. A summary of our findings can be found in the initial assessment below. This includes our plan of care. If you have any questions or concerns regarding these findings, please do not hesitate to contact me at the office phone number checked above. Thank you for the referral.       Physician Signature:_______________________________Date:__________________  By signing above (or electronic signature), therapists plan is approved by physician      Patient: Melony Canada   : 1955   MRN: [id number]  Referring Physician: Referring Practitioner: Nixon Griffin MD      Evaluation Date: 2021      Medical Diagnosis Information:  Diagnosis: R53.1 (ICD-10-CM) - Weakness   Treatment Diagnosis: poor breathing and deconditioning                                         Insurance information: PT Insurance Information: anthtem    Precautions/ Contra-indications: Z95.810 (ICD-10-CM) - AICD (automatic cardioverter/defibrillator) present  Latex Allergy:  [x]NO      []YES  Preferred Language for Healthcare:   [x]English       []Other:    C-SSRS Triggered by Intake questionnaire (Past 2 wk assessment ):   [x] No, Questionnaire did not trigger screening.   [] Yes, Patient intake triggered C-SSRS Screening     [] Completed, no further action required.    [] Completed, PCP notified via Epic SUBJECTIVE: Patient stated complaint: Pt's wife is speaking for him. Can't lift over 10 #. Loses his breathe a lot. Not doing anything at home. Having trouble breathing so can't do anything. Pt c/o of back pain that is very high. Coughing causes increased neck and shoulder pain. C/O feeling hot all the time. pain is on the L side of LB into ankle    Relevant Medical History: had an incident on the table. using the inhaler 50-6 x a day and nebulizer  Functional Outcome: TUG    Pain Scale: 9/10  Easing factors: nebulizer treatment  Provocative factors: caoughing     Type: [x]Constant   []Intermittent  []Radiating []Localized []other:     Numbness/Tingling: N/A    Occupation/School: [employer]    Living Status/Prior Level of Function: Prior to this injury / incident, pt was independent with ADLs and IADLs, working full time and taking care of his home     OBJECTIVE:     Palpation:  L lumbar pain with any touch      Functional Mobility/Transfers: Pt reports this is hard and he gets very dizzy with this and in the shower. Inspection: Pt very pale and diaphoretic, has bruising present at chest and slight swelling     Posture: shoulders elevated with breathing    Bandages/Dressings/Incisions: N/A    Gait: (include devices/WB status):  WNL with cane, noted using the cane due to dizziness    Dermatomes Normal Abnormal Comments   Top of head (C1) x     Posterior occipital region (C2) x     Side of neck (C3) x     Top of shoulder (C4) x     Lateral deltoid (C5) x     Tip of thumb (C6) x     Distal middle finger (C7) x     Distal fifth finger (C8) x     Medial forearm (T1) x     inguinal area (L1)       anterior mid-thigh (L2)      distal ant thigh/med knee (L3)      medial lower leg and foot (L4)      lateral lower leg and foot (L5)      posterior calf (S1)      medial calcaneus (S2)          Myotomes Normal Abnormal Comments   Neck flexion (C1-C2) x     Neck sidebending (C3) x     Shoulder elevation (C4) x Shoulder abduction (C5) x     Elbow flexion/wrist extension (C6) x     Elbow extension/wrist flexion (C7) x     Thumb abduction (C8)      Finger abduction (T1)      Hip flexion (L1-L2)      Knee extension (L2-L4)      Dorsiflexion (L4-L5)      Great Toe Ext (L5)      Ankle Eversion (S1-S2)      Ankle PF(S1-S2)           PROM AROM    L R L R   Hip flex   WNL's WNL's                                                                      Joint mobility:    [x]Normal    []Hypo   []Hyper    Strength (0-5) Left Right   Hip flex  4/5 4+/5   Hip abd 4/5 4+/5   Hip add 4/5 4+/5   Knee flex 4/5 4+/5   Knee ext 4/5 4+/5   Heel raise 4+/5 4+/5   Toe raise 4+/5 4+/5                                      Flexibility                              Girth (cm)                                                  [x] Patient history, allergies, meds reviewed. Medical chart reviewed. See intake form. Review Of Systems (ROS):  [x]Performed Review of systems (Integumentary, CardioPulmonary, Neurological) by intake and observation. Intake form has been scanned into medical record. Patient has been instructed to contact their primary care physician regarding ROS issues if not already being addressed at this time.       Co-morbidities/Complexities (which will affect course of rehabilitation):   []None           Arthritic conditions   []Rheumatoid arthritis (M05.9)  [x]Osteoarthritis (M19.91)   Cardiovascular conditions   [x]Hypertension (I10)  [x]Hyperlipidemia (E78.5)  []Angina pectoris (I20)  []Atherosclerosis (I70)   Musculoskeletal conditions   [x]Disc pathology   []Congenital spine pathologies   [x]Prior surgical intervention  []Osteoporosis (M81.8)  []Osteopenia (M85.8)   Endocrine conditions   []Hypothyroid (E03.9)  []Hyperthyroid Gastrointestinal conditions   []Constipation (C78.74)   Metabolic conditions   []Morbid obesity (E66.01)  [x]Diabetes type 1(E10.65) or 2 (E11.65)   []Neuropathy (G60.9)     Pulmonary conditions Participation Restrictions   [x]Reduced participation in self care activities   [x]Reduced participation in home management activities   [x]Reduced participation in work activities   []Reduced participation in social activities. []Reduced participation in sport/recreational activities. Classification/Subgrouping:   [x]signs/symptoms consistent with decreased strength and transfers    Prognosis/Rehab Potential:      []Excellent   [x]Good    []Fair   []Poor    Tolerance of evaluation/treatment:    []Excellent   [x]Good    []Fair   []Poor    Physical Therapy Evaluation Complexity Justification  [x] A history of present problem with:  [] no personal factors and/or comorbidities that impact the plan of care;  []1-2 personal factors and/or comorbidities that impact the plan of care  [x]3 personal factors and/or comorbidities that impact the plan of care  [x] An examination of body systems using standardized tests and measures addressing any of the following: body structures and functions (impairments), activity limitations, and/or participation restrictions;:  [] a total of 1-2 or more elements   [x] a total of 3 or more elements   [] a total of 4 or more elements   [x] A clinical presentation with:  [] stable and/or uncomplicated characteristics   [x] evolving clinical presentation with changing characteristics  [] unstable and unpredictable characteristics;   [x] Clinical decision making of [] low, [x] moderate, [] high complexity using standardized patient assessment instrument and/or measurable assessment of functional outcome. [] EVAL (LOW) 28661 (typically 20 minutes face-to-face)  [x] EVAL (MOD) 27363 (typically 30 minutes face-to-face)  [] EVAL (HIGH) 15884 (typically 45 minutes face-to-face)  [] RE-EVAL     PLAN:   Frequency/Duration:  2 days per week for 6 Weeks:  Interventions:  [x]  Therapeutic exercise including: strength training, ROM, including postural re-education. [x]  NMR activation and proprioception, including postural re-education. [x]  Manual therapy as indicated to include: Dry Needling/IASTM, STM, PROM, Gr I-IV mobilizations, manipulation. [x] Modalities as needed that may include: thermal agents, E-stim, Biofeedback, US, iontophoresis as indicated  [x] Patient education on joint protection, postural re-education, activity modification, progression of HEP. HEP instruction: Written HEP instructions provided and reviewed    GOALS:  Patient stated goal: get stronger    Therapist goals for Patient:   Short Term Goals: To be achieved in: 2 weeks  1. Independent in HEP and progression per patient tolerance, in order to prevent re-injury. 2. Patient will have a decrease in pain to facilitate improvement in movement, function, and ADLs as indicated by Functional Deficits. Long Term Goals: To be achieved in: 6 weeks  1. Disability index score of 25% or less for the TUG to assist with reaching prior level of function. 2. Patient will demonstrate increased AROM to [facility] to allow for proper joint functioning as indicated by patients Functional Deficits. 3. Patient will demonstrate an increase in postural awareness and control to allow for proper functional mobility as indicated by patients Functional Deficits. 4. Patient will demonstrate an increase in Strength to at least 4+/5 to allow for proper functional mobility as indicated by patients Functional Deficits. 5. Patient will return to functional activities including vaccuuming without increased symptoms or restriction.         Electronically signed by:  Lacey Garcia, PT

## 2021-01-21 NOTE — TELEPHONE ENCOUNTER
Spoke with spouse to let her know the pressure change was made. Spouse states she will call next week if issues do not resolve.

## 2021-01-22 ENCOUNTER — TELEPHONE (OUTPATIENT)
Dept: CARDIOLOGY CLINIC | Age: 66
End: 2021-01-22

## 2021-01-22 NOTE — LETTER
[facility]  [address] [place], [address]  Phone Number: [phone]  Dr. Jose Pantoja, APRN-CNP  Lexine Fails, APRN-CNP    Lexine Fails, APRN-CNP    Date: 2021   Patient:Tam Hightower  : 1955     To Whom It May Concern:    Discontinuation of any anticoagulant carries significant risks of morbidity, sometimes with a fatal outcome (e.g. stroke), from thromboembolic complications. Guidelines do not recommend discontinuation of anticoagulation for low risk surgical procedure, such as cataract surgery, dental procedures, and dermatologic surgeries. The risks of hemorrhage or thromboembolism versus the benefit from the operation need to be addressed by attending surgeon. Coumadin:Temporary cessation of warfarin is recommended approximately 5 days prior to surgical procedure. Kathy Hightower may hold Coumadin for 2 days prior to his scheduled procedure as requested. If more days are desired, it is up to the requesting physician to appropriately bridge the patient with an alternative medication. Please limit time off anticoagulation and resume medication as soon as possible. Please contact my office with any further questions or concerns.      Sincerely,       Hamine Fails, APRN-CNP

## 2021-01-25 ENCOUNTER — APPOINTMENT (OUTPATIENT)
Dept: GENERAL RADIOLOGY | Age: 66
DRG: 871 | End: 2021-01-25
Payer: COMMERCIAL

## 2021-01-25 ENCOUNTER — HOSPITAL ENCOUNTER (INPATIENT)
Age: 66
LOS: 13 days | Discharge: NURSING, REHAB OR LONG-TERM CARE | DRG: 871 | End: 2021-02-08
Attending: INTERNAL MEDICINE | Admitting: INTERNAL MEDICINE
Payer: COMMERCIAL

## 2021-01-25 ENCOUNTER — HOSPITAL ENCOUNTER (OUTPATIENT)
Dept: PHYSICAL THERAPY | Age: 66
Setting detail: THERAPIES SERIES
Discharge: HOME | DRG: 871 | End: 2021-01-25
Payer: COMMERCIAL

## 2021-01-25 DIAGNOSIS — J96.01 ACUTE RESPIRATORY FAILURE WITH HYPOXIA (HCC): ICD-10-CM

## 2021-01-25 DIAGNOSIS — D64.9 ANEMIA, UNSPECIFIED TYPE: ICD-10-CM

## 2021-01-25 DIAGNOSIS — R04.0 EPISTAXIS: ICD-10-CM

## 2021-01-25 DIAGNOSIS — J18.9 PNEUMONIA OF RIGHT LOWER LOBE DUE TO INFECTIOUS ORGANISM: Primary | ICD-10-CM

## 2021-01-25 DIAGNOSIS — R53.83 FATIGUE, UNSPECIFIED TYPE: ICD-10-CM

## 2021-01-25 DIAGNOSIS — Z98.890 STATUS POST THORACENTESIS: ICD-10-CM

## 2021-01-25 DIAGNOSIS — C43.9 METASTATIC MELANOMA (HCC): ICD-10-CM

## 2021-01-25 LAB
A/G RATIO: 0.6 (ref 1.1–2.2)
ALBUMIN SERPL-MCNC: 2.6 G/DL (ref 3.4–5)
ALP BLD-CCNC: 135 U/L (ref 40–129)
ALT SERPL-CCNC: 27 U/L (ref 10–40)
ANION GAP SERPL CALCULATED.3IONS-SCNC: 16 MMOL/L (ref 3–16)
APTT: 108.9 SEC (ref 24.2–36.2)
AST SERPL-CCNC: 45 U/L (ref 15–37)
BASOPHILS ABSOLUTE: 0 K/UL (ref 0–0.2)
BASOPHILS RELATIVE PERCENT: 0 %
BILIRUB SERPL-MCNC: 0.4 MG/DL (ref 0–1)
BUN BLDV-MCNC: 23 MG/DL (ref 7–20)
CALCIUM SERPL-MCNC: 8.4 MG/DL (ref 8.3–10.6)
CHLORIDE BLD-SCNC: 96 MMOL/L (ref 99–110)
CO2: 20 MMOL/L (ref 21–32)
CREAT SERPL-MCNC: 1.2 MG/DL (ref 0.8–1.3)
EOSINOPHILS ABSOLUTE: 0 K/UL (ref 0–0.6)
EOSINOPHILS RELATIVE PERCENT: 0 %
GFR AFRICAN AMERICAN: >60
GFR NON-AFRICAN AMERICAN: >60
GLOBULIN: 4.3 G/DL
GLUCOSE BLD-MCNC: 215 MG/DL (ref 70–99)
HCT VFR BLD CALC: 31.6 % (ref 40.5–52.5)
HEMOGLOBIN: 9.8 G/DL (ref 13.5–17.5)
LYMPHOCYTES ABSOLUTE: 1 K/UL (ref 1–5.1)
LYMPHOCYTES RELATIVE PERCENT: 7 %
MCH RBC QN AUTO: 25.9 PG (ref 26–34)
MCHC RBC AUTO-ENTMCNC: 31.1 G/DL (ref 31–36)
MCV RBC AUTO: 83.2 FL (ref 80–100)
MONOCYTES ABSOLUTE: 0.9 K/UL (ref 0–1.3)
MONOCYTES RELATIVE PERCENT: 6 %
NEUTROPHILS ABSOLUTE: 12.6 K/UL (ref 1.7–7.7)
NEUTROPHILS RELATIVE PERCENT: 87 %
OVALOCYTES: ABNORMAL
PDW BLD-RTO: 18.4 % (ref 12.4–15.4)
PLATELET # BLD: 386 K/UL (ref 135–450)
PLATELET SLIDE REVIEW: ADEQUATE
PMV BLD AUTO: 7.5 FL (ref 5–10.5)
POLYCHROMASIA: ABNORMAL
POTASSIUM REFLEX MAGNESIUM: 3.6 MMOL/L (ref 3.5–5.1)
PROCALCITONIN: 0.2 NG/ML (ref 0–0.15)
RBC # BLD: 3.8 M/UL (ref 4.2–5.9)
REASON FOR REJECTION: NORMAL
REJECTED TEST: NORMAL
SLIDE REVIEW: ABNORMAL
SODIUM BLD-SCNC: 132 MMOL/L (ref 136–145)
TOTAL PROTEIN: 6.9 G/DL (ref 6.4–8.2)
TROPONIN: 0.03 NG/ML
WBC # BLD: 14.5 K/UL (ref 4–11)

## 2021-01-25 PROCEDURE — U0003 INFECTIOUS AGENT DETECTION BY NUCLEIC ACID (DNA OR RNA); SEVERE ACUTE RESPIRATORY SYNDROME CORONAVIRUS 2 (SARS-COV-2) (CORONAVIRUS DISEASE [COVID-19]), AMPLIFIED PROBE TECHNIQUE, MAKING USE OF HIGH THROUGHPUT TECHNOLOGIES AS DESCRIBED BY CMS-2020-01-R: HCPCS

## 2021-01-25 PROCEDURE — 99283 EMERGENCY DEPT VISIT LOW MDM: CPT

## 2021-01-25 PROCEDURE — 84145 PROCALCITONIN (PCT): CPT

## 2021-01-25 PROCEDURE — 80053 COMPREHEN METABOLIC PANEL: CPT

## 2021-01-25 PROCEDURE — 2580000003 HC RX 258: Performed by: PHYSICIAN ASSISTANT

## 2021-01-25 PROCEDURE — 93005 ELECTROCARDIOGRAM TRACING: CPT | Performed by: PHYSICIAN ASSISTANT

## 2021-01-25 PROCEDURE — 85025 COMPLETE CBC W/AUTO DIFF WBC: CPT

## 2021-01-25 PROCEDURE — 2000000000 HC ICU R&B

## 2021-01-25 PROCEDURE — 85730 THROMBOPLASTIN TIME PARTIAL: CPT

## 2021-01-25 PROCEDURE — 84484 ASSAY OF TROPONIN QUANT: CPT

## 2021-01-25 PROCEDURE — 6370000000 HC RX 637 (ALT 250 FOR IP): Performed by: PHYSICIAN ASSISTANT

## 2021-01-25 PROCEDURE — 6360000002 HC RX W HCPCS: Performed by: PHYSICIAN ASSISTANT

## 2021-01-25 PROCEDURE — 36415 COLL VENOUS BLD VENIPUNCTURE: CPT

## 2021-01-25 PROCEDURE — 71045 X-RAY EXAM CHEST 1 VIEW: CPT

## 2021-01-25 PROCEDURE — 85610 PROTHROMBIN TIME: CPT

## 2021-01-25 RX ORDER — SODIUM CHLORIDE 0.9 % (FLUSH) 0.9 %
10 SYRINGE (ML) INJECTION PRN
Status: DISCONTINUED | OUTPATIENT
Start: 2021-01-25 | End: 2021-02-08 | Stop reason: HOSPADM

## 2021-01-25 RX ORDER — ONDANSETRON 2 MG/ML
4 INJECTION INTRAMUSCULAR; INTRAVENOUS EVERY 6 HOURS PRN
Status: DISCONTINUED | OUTPATIENT
Start: 2021-01-25 | End: 2021-02-08 | Stop reason: HOSPADM

## 2021-01-25 RX ORDER — LACTOBACILLUS RHAMNOSUS GG 10B CELL
1 CAPSULE ORAL 2 TIMES DAILY WITH MEALS
Status: DISCONTINUED | OUTPATIENT
Start: 2021-01-26 | End: 2021-02-08 | Stop reason: HOSPADM

## 2021-01-25 RX ORDER — LISINOPRIL 10 MG/1
5 TABLET ORAL DAILY
Status: CANCELLED | OUTPATIENT
Start: 2021-01-26

## 2021-01-25 RX ORDER — ALBUTEROL SULFATE 90 UG/1
2 AEROSOL, METERED RESPIRATORY (INHALATION) EVERY 6 HOURS PRN
Status: DISCONTINUED | OUTPATIENT
Start: 2021-01-25 | End: 2021-02-08 | Stop reason: HOSPADM

## 2021-01-25 RX ORDER — SODIUM CHLORIDE 0.9 % (FLUSH) 0.9 %
10 SYRINGE (ML) INJECTION EVERY 12 HOURS SCHEDULED
Status: DISCONTINUED | OUTPATIENT
Start: 2021-01-25 | End: 2021-02-08 | Stop reason: HOSPADM

## 2021-01-25 RX ORDER — HYDROXYZINE PAMOATE 25 MG/1
50 CAPSULE ORAL 3 TIMES DAILY PRN
Status: DISCONTINUED | OUTPATIENT
Start: 2021-01-25 | End: 2021-02-08 | Stop reason: HOSPADM

## 2021-01-25 RX ORDER — ATORVASTATIN CALCIUM 20 MG/1
20 TABLET, FILM COATED ORAL NIGHTLY
Status: DISCONTINUED | OUTPATIENT
Start: 2021-01-25 | End: 2021-02-08 | Stop reason: HOSPADM

## 2021-01-25 RX ORDER — VANCOMYCIN HYDROCHLORIDE 1 G/200ML
1000 INJECTION, SOLUTION INTRAVENOUS ONCE
Status: DISCONTINUED | OUTPATIENT
Start: 2021-01-25 | End: 2021-01-25

## 2021-01-25 RX ORDER — ACETAMINOPHEN 325 MG/1
650 TABLET ORAL EVERY 6 HOURS PRN
Status: DISCONTINUED | OUTPATIENT
Start: 2021-01-25 | End: 2021-02-08 | Stop reason: HOSPADM

## 2021-01-25 RX ORDER — PANTOPRAZOLE SODIUM 40 MG/1
40 TABLET, DELAYED RELEASE ORAL
Status: DISCONTINUED | OUTPATIENT
Start: 2021-01-26 | End: 2021-02-08 | Stop reason: HOSPADM

## 2021-01-25 RX ORDER — AMIODARONE HYDROCHLORIDE 200 MG/1
200 TABLET ORAL DAILY
Status: DISCONTINUED | OUTPATIENT
Start: 2021-01-26 | End: 2021-02-01

## 2021-01-25 RX ORDER — ALLOPURINOL 300 MG/1
300 TABLET ORAL EVERY EVENING
Status: DISCONTINUED | OUTPATIENT
Start: 2021-01-26 | End: 2021-02-08 | Stop reason: HOSPADM

## 2021-01-25 RX ORDER — 0.9 % SODIUM CHLORIDE 0.9 %
500 INTRAVENOUS SOLUTION INTRAVENOUS ONCE
Status: COMPLETED | OUTPATIENT
Start: 2021-01-25 | End: 2021-01-26

## 2021-01-25 RX ORDER — ALBUTEROL SULFATE 90 UG/1
2 AEROSOL, METERED RESPIRATORY (INHALATION) 4 TIMES DAILY
Status: DISCONTINUED | OUTPATIENT
Start: 2021-01-25 | End: 2021-01-28

## 2021-01-25 RX ORDER — FINASTERIDE 5 MG/1
5 TABLET, FILM COATED ORAL DAILY
Status: DISCONTINUED | OUTPATIENT
Start: 2021-01-26 | End: 2021-02-08 | Stop reason: HOSPADM

## 2021-01-25 RX ORDER — BUDESONIDE AND FORMOTEROL FUMARATE DIHYDRATE 160; 4.5 UG/1; UG/1
2 AEROSOL RESPIRATORY (INHALATION) 2 TIMES DAILY
Status: DISCONTINUED | OUTPATIENT
Start: 2021-01-25 | End: 2021-01-31

## 2021-01-25 RX ORDER — FUROSEMIDE 40 MG/1
40 TABLET ORAL DAILY
Status: CANCELLED | OUTPATIENT
Start: 2021-01-26

## 2021-01-25 RX ORDER — TAMSULOSIN HYDROCHLORIDE 0.4 MG/1
0.4 CAPSULE ORAL NIGHTLY
Status: DISCONTINUED | OUTPATIENT
Start: 2021-01-26 | End: 2021-02-08 | Stop reason: HOSPADM

## 2021-01-25 RX ADMIN — CEFEPIME HYDROCHLORIDE 1000 MG: 1 INJECTION, POWDER, FOR SOLUTION INTRAMUSCULAR; INTRAVENOUS at 21:45

## 2021-01-25 RX ADMIN — VANCOMYCIN HYDROCHLORIDE 1000 MG: 1 INJECTION, POWDER, LYOPHILIZED, FOR SOLUTION INTRAVENOUS at 22:21

## 2021-01-25 RX ADMIN — SODIUM CHLORIDE 500 ML: 9 INJECTION, SOLUTION INTRAVENOUS at 22:44

## 2021-01-25 ASSESSMENT — ENCOUNTER SYMPTOMS
COUGH: 1
ABDOMINAL PAIN: 0
RHINORRHEA: 0
DIARRHEA: 0
NAUSEA: 0
SHORTNESS OF BREATH: 0
WHEEZING: 0
VOMITING: 0

## 2021-01-25 NOTE — TELEPHONE ENCOUNTER
Left v/m asking pts wife to call [facility], need to find out if she is going to have warfarin managed by md w/ home machine or if she is interested in having pts warfarin managed by [facility].

## 2021-01-25 NOTE — Clinical Note
Patient Class: Inpatient [101]   REQUIRED: Diagnosis: Pneumonia [467349]   Estimated Length of Stay: Estimated stay of more than 2 midnights   Admitting Provider: Royer Miller [[id number]]

## 2021-01-25 NOTE — FLOWSHEET NOTE
Physical Therapy  Cancellation/No-show Note  Patient Name:  Neville Gutierrez  :  1955   Date:  2021  Cancelled visits to date: 0  No-shows to date: 0    Patient status for today's appointment patient:  [x]  Cancelled  []  Rescheduled appointment  []  No-show     Reason given by patient:  []  Patient ill  []  Conflicting appointment  []  No transportation    []  Conflict with work  []  No reason given  [x]  Other:     Comments:  weather    Phone call information:   []  Phone call made today to patient at _ time at number provided:      []  Patient answered, conversation as follows:    []  Patient did not answer, message left as follows:  [x]  Phone call not made today    Electronically signed by:  Karime Qureshi PT

## 2021-01-26 ENCOUNTER — TELEPHONE (OUTPATIENT)
Dept: CARDIOLOGY CLINIC | Age: 66
End: 2021-01-26

## 2021-01-26 PROBLEM — R79.1 SUPRATHERAPEUTIC INR: Status: ACTIVE | Noted: 2021-01-26

## 2021-01-26 LAB
ABO/RH: NORMAL
ANION GAP SERPL CALCULATED.3IONS-SCNC: 13 MMOL/L (ref 3–16)
ANTIBODY SCREEN: NORMAL
BUN BLDV-MCNC: 26 MG/DL (ref 7–20)
CALCIUM SERPL-MCNC: 8.1 MG/DL (ref 8.3–10.6)
CHLORIDE BLD-SCNC: 98 MMOL/L (ref 99–110)
CO2: 23 MMOL/L (ref 21–32)
CREAT SERPL-MCNC: 1.2 MG/DL (ref 0.8–1.3)
EKG ATRIAL RATE: 107 BPM
EKG DIAGNOSIS: NORMAL
EKG Q-T INTERVAL: 472 MS
EKG QRS DURATION: 144 MS
EKG QTC CALCULATION (BAZETT): 586 MS
EKG R AXIS: -42 DEGREES
EKG T AXIS: 133 DEGREES
EKG VENTRICULAR RATE: 93 BPM
FOLATE: 8.03 NG/ML (ref 4.78–24.2)
GFR AFRICAN AMERICAN: >60
GFR NON-AFRICAN AMERICAN: >60
GLUCOSE BLD-MCNC: 207 MG/DL (ref 70–99)
GLUCOSE BLD-MCNC: 216 MG/DL (ref 70–99)
GLUCOSE BLD-MCNC: 251 MG/DL (ref 70–99)
GLUCOSE BLD-MCNC: 332 MG/DL (ref 70–99)
GLUCOSE BLD-MCNC: 433 MG/DL (ref 70–99)
GLUCOSE BLD-MCNC: 441 MG/DL (ref 70–99)
HCT VFR BLD CALC: 26.9 % (ref 40.5–52.5)
HEMOGLOBIN: 8.6 G/DL (ref 13.5–17.5)
INR BLD: 7.36 (ref 0.86–1.14)
INR BLD: >14.27 (ref 0.86–1.14)
INR BLD: >14.27 (ref 0.86–1.14)
IRON SATURATION: 18 % (ref 20–50)
IRON: 36 UG/DL (ref 59–158)
MAGNESIUM: 1.1 MG/DL (ref 1.8–2.4)
MCH RBC QN AUTO: 26.6 PG (ref 26–34)
MCHC RBC AUTO-ENTMCNC: 32.1 G/DL (ref 31–36)
MCV RBC AUTO: 82.9 FL (ref 80–100)
PDW BLD-RTO: 17.9 % (ref 12.4–15.4)
PERFORMED ON: ABNORMAL
PLATELET # BLD: 317 K/UL (ref 135–450)
PMV BLD AUTO: 7.1 FL (ref 5–10.5)
POTASSIUM REFLEX MAGNESIUM: 3.5 MMOL/L (ref 3.5–5.1)
PROTHROMBIN TIME: 87.1 SEC (ref 10–13.2)
PROTHROMBIN TIME: >170 SEC (ref 10–13.2)
PROTHROMBIN TIME: >170 SEC (ref 10–13.2)
RBC # BLD: 3.25 M/UL (ref 4.2–5.9)
REASON FOR REJECTION: NORMAL
REJECTED TEST: NORMAL
SARS-COV-2, PCR: NOT DETECTED
SODIUM BLD-SCNC: 134 MMOL/L (ref 136–145)
TOTAL IRON BINDING CAPACITY: 202 UG/DL (ref 260–445)
VITAMIN B-12: 1040 PG/ML (ref 211–911)
WBC # BLD: 10.8 K/UL (ref 4–11)

## 2021-01-26 PROCEDURE — 86901 BLOOD TYPING SEROLOGIC RH(D): CPT

## 2021-01-26 PROCEDURE — 2580000003 HC RX 258: Performed by: PHYSICIAN ASSISTANT

## 2021-01-26 PROCEDURE — 6360000002 HC RX W HCPCS: Performed by: PHYSICIAN ASSISTANT

## 2021-01-26 PROCEDURE — 93010 ELECTROCARDIOGRAM REPORT: CPT | Performed by: INTERNAL MEDICINE

## 2021-01-26 PROCEDURE — 82607 VITAMIN B-12: CPT

## 2021-01-26 PROCEDURE — 94640 AIRWAY INHALATION TREATMENT: CPT

## 2021-01-26 PROCEDURE — 2580000003 HC RX 258

## 2021-01-26 PROCEDURE — 6360000002 HC RX W HCPCS: Performed by: HOSPITALIST

## 2021-01-26 PROCEDURE — 83550 IRON BINDING TEST: CPT

## 2021-01-26 PROCEDURE — 99223 1ST HOSP IP/OBS HIGH 75: CPT | Performed by: INTERNAL MEDICINE

## 2021-01-26 PROCEDURE — 2000000000 HC ICU R&B

## 2021-01-26 PROCEDURE — 2580000003 HC RX 258: Performed by: INTERNAL MEDICINE

## 2021-01-26 PROCEDURE — 6360000002 HC RX W HCPCS: Performed by: INTERNAL MEDICINE

## 2021-01-26 PROCEDURE — 83540 ASSAY OF IRON: CPT

## 2021-01-26 PROCEDURE — 83735 ASSAY OF MAGNESIUM: CPT

## 2021-01-26 PROCEDURE — 87040 BLOOD CULTURE FOR BACTERIA: CPT

## 2021-01-26 PROCEDURE — 6370000000 HC RX 637 (ALT 250 FOR IP): Performed by: INTERNAL MEDICINE

## 2021-01-26 PROCEDURE — 82746 ASSAY OF FOLIC ACID SERUM: CPT

## 2021-01-26 PROCEDURE — 83036 HEMOGLOBIN GLYCOSYLATED A1C: CPT

## 2021-01-26 PROCEDURE — 2580000003 HC RX 258: Performed by: HOSPITALIST

## 2021-01-26 PROCEDURE — 86850 RBC ANTIBODY SCREEN: CPT

## 2021-01-26 PROCEDURE — 36415 COLL VENOUS BLD VENIPUNCTURE: CPT

## 2021-01-26 PROCEDURE — 6370000000 HC RX 637 (ALT 250 FOR IP): Performed by: PHYSICIAN ASSISTANT

## 2021-01-26 PROCEDURE — 87081 CULTURE SCREEN ONLY: CPT

## 2021-01-26 PROCEDURE — 85610 PROTHROMBIN TIME: CPT

## 2021-01-26 PROCEDURE — 86900 BLOOD TYPING SEROLOGIC ABO: CPT

## 2021-01-26 PROCEDURE — 85027 COMPLETE CBC AUTOMATED: CPT

## 2021-01-26 PROCEDURE — 80048 BASIC METABOLIC PNL TOTAL CA: CPT

## 2021-01-26 RX ORDER — DEXTROSE MONOHYDRATE 50 MG/ML
100 INJECTION, SOLUTION INTRAVENOUS PRN
Status: DISCONTINUED | OUTPATIENT
Start: 2021-01-26 | End: 2021-01-26 | Stop reason: SDUPTHER

## 2021-01-26 RX ORDER — INSULIN LISPRO 100 [IU]/ML
0-6 INJECTION, SOLUTION INTRAVENOUS; SUBCUTANEOUS NIGHTLY
Status: DISCONTINUED | OUTPATIENT
Start: 2021-01-26 | End: 2021-01-27

## 2021-01-26 RX ORDER — DEXTROSE MONOHYDRATE 25 G/50ML
12.5 INJECTION, SOLUTION INTRAVENOUS PRN
Status: DISCONTINUED | OUTPATIENT
Start: 2021-01-26 | End: 2021-01-26 | Stop reason: SDUPTHER

## 2021-01-26 RX ORDER — SODIUM CHLORIDE 9 MG/ML
INJECTION, SOLUTION INTRAVENOUS
Status: COMPLETED
Start: 2021-01-26 | End: 2021-01-26

## 2021-01-26 RX ORDER — NICOTINE POLACRILEX 4 MG
15 LOZENGE BUCCAL PRN
Status: DISCONTINUED | OUTPATIENT
Start: 2021-01-26 | End: 2021-01-26 | Stop reason: SDUPTHER

## 2021-01-26 RX ORDER — PREDNISONE 20 MG/1
40 TABLET ORAL DAILY
Status: DISCONTINUED | OUTPATIENT
Start: 2021-01-28 | End: 2021-01-26

## 2021-01-26 RX ORDER — NICOTINE POLACRILEX 4 MG
15 LOZENGE BUCCAL PRN
Status: DISCONTINUED | OUTPATIENT
Start: 2021-01-26 | End: 2021-02-08 | Stop reason: HOSPADM

## 2021-01-26 RX ORDER — METHYLPREDNISOLONE SODIUM SUCCINATE 40 MG/ML
40 INJECTION, POWDER, LYOPHILIZED, FOR SOLUTION INTRAMUSCULAR; INTRAVENOUS EVERY 8 HOURS
Status: DISCONTINUED | OUTPATIENT
Start: 2021-01-26 | End: 2021-01-26

## 2021-01-26 RX ORDER — DEXTROSE MONOHYDRATE 50 MG/ML
100 INJECTION, SOLUTION INTRAVENOUS PRN
Status: DISCONTINUED | OUTPATIENT
Start: 2021-01-26 | End: 2021-02-08 | Stop reason: HOSPADM

## 2021-01-26 RX ORDER — METHYLPREDNISOLONE SODIUM SUCCINATE 125 MG/2ML
125 INJECTION, POWDER, LYOPHILIZED, FOR SOLUTION INTRAMUSCULAR; INTRAVENOUS ONCE
Status: COMPLETED | OUTPATIENT
Start: 2021-01-26 | End: 2021-01-26

## 2021-01-26 RX ORDER — SODIUM CHLORIDE 9 MG/ML
INJECTION, SOLUTION INTRAVENOUS PRN
Status: DISCONTINUED | OUTPATIENT
Start: 2021-01-26 | End: 2021-02-08 | Stop reason: HOSPADM

## 2021-01-26 RX ORDER — PREDNISONE 20 MG/1
40 TABLET ORAL DAILY
Status: DISCONTINUED | OUTPATIENT
Start: 2021-01-26 | End: 2021-01-26

## 2021-01-26 RX ORDER — INSULIN LISPRO 100 [IU]/ML
0-12 INJECTION, SOLUTION INTRAVENOUS; SUBCUTANEOUS
Status: DISCONTINUED | OUTPATIENT
Start: 2021-01-26 | End: 2021-01-27

## 2021-01-26 RX ORDER — DEXTROSE MONOHYDRATE 25 G/50ML
12.5 INJECTION, SOLUTION INTRAVENOUS PRN
Status: DISCONTINUED | OUTPATIENT
Start: 2021-01-26 | End: 2021-02-08 | Stop reason: HOSPADM

## 2021-01-26 RX ORDER — PHYTONADIONE 5 MG/1
10 TABLET ORAL ONCE
Status: COMPLETED | OUTPATIENT
Start: 2021-01-26 | End: 2021-01-26

## 2021-01-26 RX ADMIN — ATORVASTATIN CALCIUM 20 MG: 20 TABLET, FILM COATED ORAL at 22:58

## 2021-01-26 RX ADMIN — Medication 1 CAPSULE: at 17:00

## 2021-01-26 RX ADMIN — INSULIN LISPRO 12 UNITS: 100 INJECTION, SOLUTION INTRAVENOUS; SUBCUTANEOUS at 13:30

## 2021-01-26 RX ADMIN — INSULIN LISPRO 6 UNITS: 100 INJECTION, SOLUTION INTRAVENOUS; SUBCUTANEOUS at 23:00

## 2021-01-26 RX ADMIN — TAMSULOSIN HYDROCHLORIDE 0.4 MG: 0.4 CAPSULE ORAL at 22:57

## 2021-01-26 RX ADMIN — METOPROLOL TARTRATE 75 MG: 50 TABLET, FILM COATED ORAL at 22:57

## 2021-01-26 RX ADMIN — Medication 10 ML: at 00:30

## 2021-01-26 RX ADMIN — AZITHROMYCIN MONOHYDRATE 500 MG: 500 INJECTION, POWDER, LYOPHILIZED, FOR SOLUTION INTRAVENOUS at 00:30

## 2021-01-26 RX ADMIN — CEFEPIME 2000 MG: 2 INJECTION, POWDER, FOR SOLUTION INTRAVENOUS at 13:00

## 2021-01-26 RX ADMIN — PHYTONADIONE 10 MG: 5 TABLET ORAL at 14:57

## 2021-01-26 RX ADMIN — Medication 10 ML: at 09:37

## 2021-01-26 RX ADMIN — ACETAMINOPHEN 650 MG: 325 TABLET ORAL at 16:57

## 2021-01-26 RX ADMIN — CEFEPIME 2000 MG: 2 INJECTION, POWDER, FOR SOLUTION INTRAVENOUS at 23:02

## 2021-01-26 RX ADMIN — INSULIN GLARGINE 25 UNITS: 100 INJECTION, SOLUTION SUBCUTANEOUS at 23:00

## 2021-01-26 RX ADMIN — METHYLPREDNISOLONE SODIUM SUCCINATE 40 MG: 40 INJECTION, POWDER, FOR SOLUTION INTRAMUSCULAR; INTRAVENOUS at 09:36

## 2021-01-26 RX ADMIN — METOPROLOL TARTRATE 75 MG: 50 TABLET, FILM COATED ORAL at 09:35

## 2021-01-26 RX ADMIN — ACETAMINOPHEN 650 MG: 325 TABLET ORAL at 00:30

## 2021-01-26 RX ADMIN — Medication 2 PUFF: at 09:42

## 2021-01-26 RX ADMIN — FINASTERIDE 5 MG: 5 TABLET, FILM COATED ORAL at 09:36

## 2021-01-26 RX ADMIN — Medication 10 ML: at 22:51

## 2021-01-26 RX ADMIN — Medication 2 PUFF: at 09:44

## 2021-01-26 RX ADMIN — Medication 1 CAPSULE: at 09:35

## 2021-01-26 RX ADMIN — VANCOMYCIN HYDROCHLORIDE 1000 MG: 1 INJECTION, POWDER, LYOPHILIZED, FOR SOLUTION INTRAVENOUS at 10:00

## 2021-01-26 RX ADMIN — Medication 2 PUFF: at 09:41

## 2021-01-26 RX ADMIN — PHYTONADIONE 5 MG: 10 INJECTION, EMULSION INTRAMUSCULAR; INTRAVENOUS; SUBCUTANEOUS at 20:03

## 2021-01-26 RX ADMIN — CEFEPIME 2000 MG: 2 INJECTION, POWDER, FOR SOLUTION INTRAVENOUS at 07:03

## 2021-01-26 RX ADMIN — METHYLPREDNISOLONE SODIUM SUCCINATE 125 MG: 125 INJECTION, POWDER, FOR SOLUTION INTRAMUSCULAR; INTRAVENOUS at 01:00

## 2021-01-26 RX ADMIN — AMIODARONE HYDROCHLORIDE 200 MG: 200 TABLET ORAL at 09:35

## 2021-01-26 RX ADMIN — INSULIN GLARGINE 25 UNITS: 100 INJECTION, SOLUTION SUBCUTANEOUS at 02:16

## 2021-01-26 RX ADMIN — SODIUM CHLORIDE 250 ML: 9 INJECTION, SOLUTION INTRAVENOUS at 00:47

## 2021-01-26 RX ADMIN — VANCOMYCIN HYDROCHLORIDE 1000 MG: 1 INJECTION, POWDER, LYOPHILIZED, FOR SOLUTION INTRAVENOUS at 22:47

## 2021-01-26 RX ADMIN — INSULIN LISPRO 8 UNITS: 100 INJECTION, SOLUTION INTRAVENOUS; SUBCUTANEOUS at 17:00

## 2021-01-26 RX ADMIN — PANTOPRAZOLE SODIUM 40 MG: 40 TABLET, DELAYED RELEASE ORAL at 07:04

## 2021-01-26 RX ADMIN — ALLOPURINOL 300 MG: 300 TABLET ORAL at 18:01

## 2021-01-26 ASSESSMENT — PAIN DESCRIPTION - PAIN TYPE: TYPE: CHRONIC PAIN

## 2021-01-26 ASSESSMENT — PAIN SCALES - GENERAL
PAINLEVEL_OUTOF10: 0
PAINLEVEL_OUTOF10: 0
PAINLEVEL_OUTOF10: 5
PAINLEVEL_OUTOF10: 1
PAINLEVEL_OUTOF10: 7

## 2021-01-26 ASSESSMENT — PAIN DESCRIPTION - LOCATION: LOCATION: BACK

## 2021-01-26 NOTE — ED NOTES
Patient placed on a non-rebreather, stated he couldn't breath and did not want a nasal cannula due to his nose bleeding sats dropped tp89-90% on RA     Walt Cano RN  01/25/21 2129

## 2021-01-26 NOTE — PROGRESS NOTES
Awake, sitting up at side of bed, watching TV. C/O chronic back pain, gave Tylenol per prn order. SOB on exertion, denies chest pain. No nose bleed noted at this time, denies nausea. VSS,  Assessment completed, see flow charts. No distress noted. matthew

## 2021-01-26 NOTE — ED PROVIDER NOTES
[address] [facility]        Pt Name: Nica Pearson  MRN: [id number]  [place] 1955  Date of evaluation: 1/25/2021  Provider: Reyes Self, PA-C  PCP: Reji Sun MD    ERICK. I have evaluated this patient. My supervising physician was available for consultation. CHIEF COMPLAINT       Chief Complaint   Patient presents with    Fatigue     Pt to ER reporting worsening fatigue and nosebleeds that have been going on for several days, states he takes warfarin at home       HISTORY OF PRESENT ILLNESS   (Location, Timing/Onset, Context/Setting, Quality, Duration, Modifying Factors, Severity, Associated Signs and Symptoms)  Note limiting factors. Nica Pearson is a 72 y.o. male with recent history of MI, cardiac arrest and ICD placement anticoagulant on Coumadin presents for evaluation of generalized weakness, fatigue and junky productive cough for the past several days. He reports increasing shortness of breath. Also states he woke up this morning has had nosebleeds consistently throughout the day. No other easy bruising or bleeding. He denies abdominal pain nausea vomiting diarrhea. No fevers or chills. No known sick contacts with Covid or similar symptoms. He has no other complaints or concerns at this time. Nursing Notes were all reviewed and agreed with or any disagreements were addressed in the HPI. REVIEW OF SYSTEMS    (2-9 systems for level 4, 10 or more for level 5)     Review of Systems   Constitutional: Positive for fatigue. Negative for appetite change, chills and fever. HENT: Positive for congestion. Negative for rhinorrhea. Respiratory: Positive for cough. Negative for shortness of breath and wheezing. Cardiovascular: Negative for chest pain. Gastrointestinal: Negative for abdominal pain, diarrhea, nausea and vomiting. Genitourinary: Negative for difficulty urinating, dysuria and hematuria. Musculoskeletal: Negative for neck pain and neck stiffness. Skin: Negative for rash. Neurological: Positive for weakness. Negative for headaches. Positives and Pertinent negatives as per HPI. Except as noted above in the ROS, all other systems were reviewed and negative.        PAST MEDICAL HISTORY     Past Medical History:   Diagnosis Date    Abdominal pain, LLQ     Allergic rhinitis     Asthma     Atopic dermatitis     Cancer ([facility] [address]     melanoma    Diabetes mellitus ([facility] [address]     Erectile dysfunction     Generalized osteoarthrosis, involving multiple sites     GERD (gastroesophageal reflux disease)     Gynecomastia     Hyperlipidemia     Nasal congestion     Nipple pain     Obstructive sleep apnea syndrome 10/5/2012    Pseudo-gout     Unspecified essential hypertension          SURGICAL HISTORY     Past Surgical History:   Procedure Laterality Date    BACK SURGERY      BRONCHOSCOPY N/A 12/2/2020    BRONCHOSCOPY ALVEOLAR LAVAGE performed by Thompson Angel MD at [facility] Bilateral 2013    FINGER SURGERY Left 3-1-2013    Incision & Drainage of left Thumb Infection    FOOT SURGERY Right     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    HAND SURGERY  2011    LUMBAR NERVE BLOCK Right 12/12/2018    RIGHT L4/L5 LUMBAR INTERLAMINAR EPIDURAL STEROID INJECTION WITH FLUOROSCOPY performed by Jessica Taylor MD at [address] [place] [address] Way right side post    AR NJX DX/THER SBST INTRLMNR LMBR/SAC W/IMG GDN Right 9/24/2018    RIGHT L4/L5 INTERLAMINAR EPIDURAL STEROID INJECTION WITH FLUOROSCOPY performed by Jessica Taylor MD at [address] [facility]       Previous Medications    ALBUTEROL SULFATE  (90 BASE) MCG/ACT INHALER    Inhale 2 puffs into the lungs every 6 hours as needed for Wheezing    ALLOPURINOL (ZYLOPRIM) 300 MG TABLET    TAKE 1 TABLET BY MOUTH ONE TIME A DAY  High Blood Pressure Mother     Emphysema Mother     Osteoporosis Mother     Diabetes Father     Heart Disease Father     High Cholesterol Father     Parkinsonism Father     Diabetes Sister           SOCIAL HISTORY       Social History     Tobacco Use    Smoking status: Former Smoker     Packs/day: 1.00     Years: 10.00     Pack years: 10.00     Types: Cigarettes     Quit date: 4/3/1993     Years since quittin.8    Smokeless tobacco: Never Used    Tobacco comment: quit 25 years ago   Substance Use Topics    Alcohol use: No    Drug use: No       SCREENINGS             PHYSICAL EXAM    (up to 7 for level 4, 8 or more for level 5)     ED Triage Vitals [21 1715]   BP Temp Temp src Pulse Resp SpO2 Height Weight   (!) 109/53 98 °F (36.7 °C) -- 96 15 96 % -- --       Physical Exam  Vitals signs and nursing note reviewed. Constitutional:       General: He is not in acute distress. Appearance: He is well-developed. He is not ill-appearing, toxic-appearing or diaphoretic. HENT:      Head: Normocephalic and atraumatic. Right Ear: External ear normal.      Left Ear: External ear normal.      Nose: Congestion present. Right Nostril: Epistaxis present. Left Nostril: Epistaxis present. Eyes:      General:         Right eye: No discharge. Left eye: No discharge. Extraocular Movements: Extraocular movements intact. Conjunctiva/sclera: Conjunctivae normal.      Pupils: Pupils are equal, round, and reactive to light. Neck:      Musculoskeletal: Normal range of motion and neck supple. Cardiovascular:      Rate and Rhythm: Normal rate and regular rhythm. Heart sounds: Normal heart sounds. Pulmonary:      Effort: Pulmonary effort is normal. Tachypnea present. No respiratory distress. Breath sounds: Rhonchi present. Abdominal:      General: There is no distension. Palpations: Abdomen is soft. Musculoskeletal: Normal range of motion.    Skin: General: Skin is warm and dry. Neurological:      Mental Status: He is alert and oriented to person, place, and time.    Psychiatric:         Behavior: Behavior normal.         DIAGNOSTIC RESULTS   LABS:    Labs Reviewed   CBC WITH AUTO DIFFERENTIAL - Abnormal; Notable for the following components:       Result Value    WBC 14.5 (*)     RBC 3.80 (*)     Hemoglobin 9.8 (*)     Hematocrit 31.6 (*)     MCH 25.9 (*)     RDW 18.4 (*)     Neutrophils Absolute 12.6 (*)     Polychromasia 1+ (*)     Ovalocytes Occasional (*)     All other components within normal limits    Narrative:     CALL  [facility] tel. [phone],  Rejected Test Name PT.PTT/Called to:Barbaragina Bruner, 01/25/2021 18:34, by [facility]  Performed at:  [facility] [address], [facility]   Phone [phone]   COMPREHENSIVE METABOLIC PANEL W/ REFLEX TO MG FOR LOW K - Abnormal; Notable for the following components:    Sodium 132 (*)     Chloride 96 (*)     CO2 20 (*)     Glucose 215 (*)     BUN 23 (*)     Alb 2.6 (*)     Albumin/Globulin Ratio 0.6 (*)     Alkaline Phosphatase 135 (*)     AST 45 (*)     All other components within normal limits    Narrative:     Performed at:  [facility] [address], [place], [facility]   Phone [phone]   APTT - Abnormal; Notable for the following components:    aPTT 108.9 (*)     All other components within normal limits    Narrative:     CALL  [facility] tel. [phone],  Coag results called to and read back by MARY Jarrett, 01/25/2021 20:53, by [facility]  Performed at:  [facility] [address], [place], [facility]   Phone [phone]   TROPONIN - Abnormal; Notable for the following components:    Troponin 0.03 (*)     All other components within normal limits    Narrative:     Performed at:  [facility] [address], [facility]   Phone [phone] PROCALCITONIN - Abnormal; Notable for the following components:    Procalcitonin 0.20 (*)     All other components within normal limits    Narrative:     Performed at:  [facility] [address] [facility],  [place], [facility] [place]   Phone [phone]    Narrative:     Elisabeth Jarvis. [phone],  Rejected Test Name PT.PTT/Called to:Barbara Bruner, 01/25/2021 18:34, by Jamey Velazquez  Performed at:  [facility] [address] [facility],  [place], [facility] [employer]   Phone [phone]   SPECIMEN REJECTION    Narrative:     Elisabeth Jarvis. [phone],  Rejected Test PT Called to: Woo Lim RN, 01/25/2021 21:19, by Erik Mondragon  Performed at:  [facility] [address] [facility], [facility] [employer]   Phone [phone]       All other labs were within normal range or not returned as of this dictation. EKG: All EKG's are interpreted by the Emergency Department Physician in the absence of a cardiologist.  Please see their note for interpretation of EKG. RADIOLOGY:   Non-plain film images such as CT, Ultrasound and MRI are read by the radiologist. Plain radiographic images are visualized and preliminarily interpreted by the ED Provider with the below findings:        Interpretation per the Radiologist below, if available at the time of this note:    XR CHEST PORTABLE   Final Result   Stable cardiomegaly with mild central pulmonary congestion which is more   prominent. Questionable early infiltrate or atelectasis along the right lung base which   is more apparent.            Ct Chest Wo Contrast    Result Date: 1/20/2021 Site: [facility] #: [id number] #: [id number]: BTLRCTAccount #: [de-identified] #: [id number] #: [id number]: CT CHEST WO CONTRASTExam Date/Time: 01/20/2021 01:15 PMAdmitting Diagnosis: Cough, persistent, xray nondiagnosticReason for Exam: Cough, persistent, xray nondiagnostic Dictated by: Elijah Delgado RACH: 01/20/2021 02:01 PMT: This document is confidential medical information. Unauthorized disclosure or use of this information is prohibited by law. If you are not the intended recipient of this document, please advise us by calling immediately [phone]. Impression/Conclusion below HISTORY:   Cough, persistent, xray nondiagnostic lung nodules, cough Other nonspecific abnormal  finding of lung field COMPARISON: None TECHNIQUE: Noncontrast multiplanar CT images of the chest NOTE:  If there are questions about the content of this report, please contact [address] [place] [address] radiology by calling [phone] FINDINGS: LUNGS/AIRWAYS:  Multiple scattered pulmonary nodules largest measures 13 mm. PLEURA: Mild to moderate right pleural effusion. MEDIASTINUM/BRIANNE:  Right hilar mass, given the lack of IV contrast difficult to get accurate measurements. Mediastinal and subcarinal adenopathy. HEART/PERICARDIUM:  Unremarkable VESSELS:  Unremarkable. No aneurysm CHEST WALL/LOWER NECK:  Unremarkable UPPER ABDOMEN:  Unremarkable BONES:  Unremarkable OTHER:  None  IMPRESSION: Right hilar mass, with subcarinal mediastinal adenopathy, highly concerning for malignancy. Even the lack of IV contrast, these entities are difficult to separate and accurately measure. Innumerable pulmonary nodules, concerning for metastatic disease Mild to moderate right pleural effusion. This report was marked for documented transmission to the ordering physician per [facility]  Radiology department protocol. A radiologist can be reached 24/7 by calling [phone]. SIGNED BY: Jm Hernández MD on 1/20/2021  1:58 PM [facility] [phone] -  [facility] Call Center: [phone]       Xr Chest Portable    Result Date: 1/25/2021  EXAMINATION: ONE XRAY VIEW OF THE CHEST 1/25/2021 6:19 pm COMPARISON: 12/31/2020. HISTORY: ORDERING SYSTEM PROVIDED HISTORY: cough TECHNOLOGIST PROVIDED HISTORY: Reason for exam:->cough Reason for Exam: cough Acuity: Unknown Type of Exam: Unknown FINDINGS: The heart is mildly enlarged but unchanged. The pulmonary vessels are engorged centrally more prominent. There is some hazy right basilar interstitial opacity which is more apparent. No effusion is seen. There is left pacemaker in place which is unchanged. The bones are intact. Stable cardiomegaly with mild central pulmonary congestion which is more prominent. Questionable early infiltrate or atelectasis along the right lung base which is more apparent.            PROCEDURES   Unless otherwise noted below, none     Procedures    CRITICAL CARE TIME   N/A    CONSULTS:  IP CONSULT TO HOSPITALIST  PHARMACY TO DOSE WARFARIN  IP CONSULT TO PULMONOLOGY  PHARMACY TO DOSE VANCOMYCIN      EMERGENCY DEPARTMENT COURSE and DIFFERENTIAL DIAGNOSIS/MDM:   Vitals:    Vitals:    01/25/21 1943 01/25/21 2027 01/25/21 2039 01/25/21 2107   BP: 120/76   104/79   Pulse:  97 88 90   Resp:       Temp:       SpO2: 94% 90% 91% 93%       Patient was given the following medications:  Medications   vancomycin 1000 mg IVPB in 250 mL D5W addavial (has no administration in time range)   cefepime (MAXIPIME) 1000 mg IVPB minibag (1,000 mg Intravenous New Bag 1/25/21 8277) CBC and CMP are remarkable for leukocytosis of 14.5. He has chronic anemia at 9.8 and is hyperglycemic with no evidence of diabetic ketoacidosis. Procalcitonin is 0.2 and troponin is 0.03, consistent with baseline. Chest x-ray shows cardiomegaly and mild central vascular congestion as well as an early infiltrate on the right lung base. This consistent with history and presentation. Of concern for bacterial pneumonia though Covid swab is pending. He was given vancomycin and cefepime due to recent hospitalization. Coags are pending. They have been sent off several times and there is lab error. APTT is critically high unable to obtain INR. This will be trended. There is no evidence of active bleeding at this time and therefore reversal be withheld. Ideally she warrants admission for further evaluation management of suspected pneumonia as source of respiratory care with hypoxia generalized weakness and fatigue. Hospitalist resume care the patient at this time. Patient was informed and agreeable. He is stable for admission. Critical Care  There was a high probability of life-threatening clinical deterioration in the patient's condition requiring my urgent intervention. Total critical care time with the patient was 32 minutes excluding separately reportable procedures. Critical care required due to patients presentation, comorbidities and concern for acute life-threatening disease process, pneumonia, sepsis, respiratory for with hypoxia and decompensation. FINAL IMPRESSION      1. Pneumonia of right lower lobe due to infectious organism    2. Acute respiratory failure with hypoxia (HCC)    3. Fatigue, unspecified type    4. Epistaxis    5. Anemia, unspecified type          DISPOSITION/PLAN   DISPOSITION Admitted 01/25/2021 09:27:09 PM      PATIENT REFERREDTO:  No follow-up provider specified.     DISCHARGE MEDICATIONS:  New Prescriptions    No medications on file DISCONTINUED MEDICATIONS:  Discontinued Medications    No medications on file              (Please note that portions of this note were completed with a voice recognition program.  Efforts were made to edit the dictations but occasionally words are mis-transcribed.)    Sergio Mott PA-C (electronically signed)            Gini Lyman, [place]  01/25/21 1937

## 2021-01-26 NOTE — PROGRESS NOTES
Got a call from the lab earlier about patient's critical lab results. His PT/INR was >170.0/>14.27. Paged on call physician and advised to repeat PT/INR but also give vitamin K now. Patient has had vitamin k. No s/s of bleeding noted at this time.  Will continue to monitor

## 2021-01-26 NOTE — H&P
Hospital Medicine History & Physical      Patient Name: Beba Jurado    : 1955    PCP: Gricel Cabrera MD    Date of Service:  Patient seen and examined on 2021     Chief Complaint:  Generalized weakness    History Of Present Illness:    Beba Jurado is a 72 y.o. male who presented to ED with complaint of generalized weakness and fatigue which has been ongoing for the past several days. He also reports increasing shortness of breath and productive cough. He reports that he woke up this morning with a nosebleed which has reocurred intermittently throughout the day. He denies any other easy bruising or bleeding. He denies fever, dizziness, headache, chest pain, edema, abdominal pain, nausea, vomiting, diarrhea, constipation, urinary symptoms. Patient is anticoagulated on Coumadin for A. fib. Work-up initiated in ED. CXR concerning for mild central pulmonary congestion and questionable right basilar early infiltrate vs atelectasis. CMP notable for mild hyponatremia (Na 132) and Cr 1.2 (baseline 0.6). CBC notable for leukocytosis (WBC 14.5) and anemia (Hgb 9.8). Procalcitonin 0.2. Troponin slightly elevated at 0.03 but stable compared to previous. EKG shows rate controlled A. Fib. Of note, patient had a chest CT on 21 at an OSH which was notable for right hilar mass with subcarinal mediastinal adenopathy highly concerning for malignancy, innumerable pulmonary nodules concerning for metastatic disease, and mild to moderate right sided pleural effusion.         Past Medical History: Patient  has a past medical history of Abdominal pain, LLQ, Allergic rhinitis, Asthma, Atopic dermatitis, Cancer ([facility] [address] Diabetes mellitus ([address] Erectile dysfunction, Generalized osteoarthrosis, involving multiple sites, GERD (gastroesophageal reflux disease), Gynecomastia, Hyperlipidemia, Nasal congestion, Nipple pain, Obstructive sleep apnea syndrome, Pseudo-gout, and Unspecified essential hypertension. Past Surgical History:    Patient  has a past surgical history that includes Hand surgery (2011); other surgical history (2002); Finger surgery (Left, 3-1-2013); Foot surgery (Right); Carpal tunnel release (Bilateral, 2013); pr njx dx/ther sbst intrlmnr lmbr/sac w/img gdn (Right, 9/24/2018); lumbar nerve block (Right, 12/12/2018); bronchoscopy (N/A, 12/2/2020); and back surgery. Medications Prior to Admission:      Prior to Admission medications    Medication Sig Start Date End Date Taking?  Authorizing Provider   hydrOXYzine (VISTARIL) 50 MG capsule Take 1 capsule by mouth 3 times daily as needed for Itching or Anxiety 1/14/21 2/13/21  MACIEL Paige NP   ondansetron (ZOFRAN) 4 MG tablet Take 1 tablet by mouth 3 times daily as needed for Nausea or Vomiting 1/14/21   MACIEL Paige NP   insulin glargine (BASAGLAR KWIKPEN) 100 UNIT/ML injection pen Inject 25 Units into the skin nightly 1/13/21   Tatiana Parish MD   Insulin Pen Needle (MEIJER PEN NEEDLES) 31G X 6 MM MISC 1 each by Does not apply route daily 1/13/21   Tatiana Parish MD   pantoprazole (PROTONIX) 40 MG tablet TAKE 1 TABLET BY MOUTH ONE TIME A DAY  1/11/21   Tatiana Parish MD   allopurinol (ZYLOPRIM) 300 MG tablet TAKE 1 TABLET BY MOUTH ONE TIME A DAY  1/11/21   Tatiana Parish MD   metoprolol tartrate (LOPRESSOR) 50 MG tablet Take 1.5 tablets by mouth 2 times daily 1/11/21   Nikunj Betancur MD TOBACCO:   reports that he quit smoking about 27 years ago. His smoking use included cigarettes. He has a 10.00 pack-year smoking history. He has never used smokeless tobacco.  ETOH:   reports no history of alcohol use. DRUGS:  reports no history of drug use. Family History:      Reviewed in detail positive as follows:        Problem Relation Age of Onset    Arthritis Mother     High Blood Pressure Mother     Emphysema Mother     Osteoporosis Mother     Diabetes Father     Heart Disease Father     High Cholesterol Father     Parkinsonism Father     Diabetes Sister        REVIEW OF SYSTEMS:   Pertinent positives as noted in the HPI. All other systems reviewed and negative. PHYSICAL EXAM PERFORMED:    /79   Pulse 90   Temp 98 °F (36.7 °C)   Resp 15   SpO2 93%     General appearance:  Awake, alert, no apparent distress  HEENT:  Dry blood to bilateral nares without active bleeding. Normocephalic, atraumatic without obvious deformity. PERRL. EOM intact. Conjunctivae/corneas clear. Neck: Supple, with full range of motion. No JVD. Trachea midline. Respiratory:  +Rhonchi. +Wheezing. No rales. Normal respiratory effort. Cardiovascular:  Irregularly irregular rhythm. Normal rate. No murmurs, rubs or gallops. Abdomen: Soft, NT, ND, without rebound or guarding. Normal bowel sounds. Extremities:  No clubbing, cyanosis, or edema bilaterally. Full range of motion without deformity. +2 palpable pulses, equal bilaterally. Capillary refill brisk,< 3 seconds   Skin: No rashes or lesions. Warm/dry. Neurologic:  Neurovascularly intact without any focal sensory/motor deficits. Cranial nerves: II-XII intact, grossly non-focal. Alert and oriented x 3. Normal speech. Psychiatric:  Thought content appropriate, normal insight.     Labs:   CBC   Recent Labs     01/25/21  1727   WBC 14.5*   HGB 9.8*   HCT 31.6*           RENAL  Recent Labs     01/25/21  1727   *   K 3.6   CL 96*   CO2 20* Albuterol/Ipratropium inhalers  Pulmonology consulted    NI  Cr baseline 0.6, today 1.2  Will hold off on IVF for now with history of CHF and small to moderate pleural effusion noted on CT 1/20/21  Hold home lasix and lisinopril  Renally dose medications  Monitor for electrolyte abnormalities  Monitor renal function closely    Abnormal CT chest  CT chest 1/20/21 at OSH notable for right hilar mass with subcarinal mediastinal adenopathy highly concerning for malignancy, innumerable pulmonary nodules concerning for metastatic disease, and mild to moderate right sided pleural effusion  Pulmonology consulted    Persistent Atrial Fibrillation   Currently rate controlled   Continue home amiodarone and metoprolol  INR is pending  Pharmacy consulted to dose coumadin     Epistaxis  No active bleeding  INR is pending    Anemia  Hgb 9.8  No signs of active bleeding. Currently no epistaxis. Check iron, Z79, folic acid    DM2  Last HbA1c was 10.6  Continue home lantus   Hold home PO meds   Accuchecks, SSI  Hypoglycemia protocol      Chronic systolic CHF  Last ECHO on 12/2/20 noted EF of 40%  Hold home lasix and ACEI d/t NI  Continue home BB  Daily weights, I/Os    Ventricular tachycardia  S/p AICD      DVT prophylaxis: Coumadin  Probiotic if on abx: Yes    Diet: DIET CARB CONTROL;  Code Status: Full Code    Consults:  IP CONSULT TO HOSPITALIST  PHARMACY TO DOSE WARFARIN  IP CONSULT TO PULMONOLOGY  PHARMACY TO DOSE VANCOMYCIN    Disposition: Admit to Inpatient   ELOS: Greater than two midnights due to medical therapy     Randall Chang PA-C    Thank you Shira Barlow MD for the opportunity to be involved in this patient's care. If you have any questions or concerns please feel free to contact me at [phone].

## 2021-01-26 NOTE — ED PROVIDER NOTES
The Ekg interpreted by me in the absence of a cardiologist shows. atrial fibrillation with a rate of 93  Axis is   Normal  QTc is  normal  +LBBB  No specific ST-T wave changes appreciated. No evidence of acute ischemia. No significant change from prior EKG dated 12/30/20    I only performed EKG interpretation and was not otherwise involved in the care of this patient.             Héctor Coker MD  01/25/21 5254

## 2021-01-26 NOTE — TELEPHONE ENCOUNTER
I spoke with Shashank Suero and gave her the phone number for the patient advocate. [phone]. Short term disability paper work was filled out and ready for  at the . She states she will pick it up this evening.

## 2021-01-26 NOTE — TELEPHONE ENCOUNTER
Pt wife calling extremely upset. She stated that he went to the ER last night with a nose bleed at 5:00pm and is still in the ER without A BED. She also stated that pt any lunch was not brought any lunch, needles were laying all around and he had a BM and his vessel was not emptied. She felt that the care was sub par and that she will let [facility] know how unhappy she is. She also wanted to make sure that we fill out his FMLA paperwork was dropped off. She would like to turn them into pt employer, she will .

## 2021-01-26 NOTE — ED NOTES
Bed: 09  Expected date:   Expected time:   Means of arrival: Walk In  Comments:     Jovany Alvaers RN  01/25/21 1920

## 2021-01-26 NOTE — PROGRESS NOTES
Clinical Pharmacy Note:    Pharmacy consulted to dose warfarin for atrial fibrillation    No INR drawn in ED    Patient is complaining of nosebleeds, so will hold doses for now. A daily dosing placeholder is ordered to indicate to patient's care team that patient is anticoagulated, no action is required from nursing. INR to be drawn. Pharmacy will continue to follow and resume dosing when appropriate     Thanks for the consult!   Mary Gonsalez, Luiz, [facility]

## 2021-01-26 NOTE — CONSULTS
PULMONARY AND CRITICAL CARE MEDICINE CONSULTATION NOTE    CONSULTING PHYSICIAN:      ADMISSION DATE: 1/25/2021  ADMISSION DIAGNOSIS: Pneumonia [J18.9]    REASON FOR CONSULT:   Chief Complaint   Patient presents with    Fatigue     Pt to ER reporting worsening fatigue and nosebleeds that have been going on for several days, states he takes warfarin at home       DATE OF CONSULT: 1/25/2021    HISTORY OF PRESENT ILLNESS: 72y.o. year old male with a past medical history significant for asthma, obstructive sleep apnea, suspicion for lung cancer, previous history of tobacco abuse who presented to the hospital with shortness of breath and generalized fatigue. Additionally patient also reported nosebleed this morning. He denied any bruising or easy bleeding. Does have mild cough which is productive of yellowish expectoration. No chest pain, chest tightness, fevers, night sweats. Patient has atrial fibrillation and is on chronic Coumadin. He was admitted to the hospital in December 2020 when he was diagnosed to have bilateral pulmonary nodules with mediastinal hilar lymphadenopathy. While undergoing bronchoscopic evaluation patient had a brief cardiac arrest during intubation. Extensive bronchoscopic evaluation could not be done. Subsequently patient took a second opinion from [facility] where he is following with a pulmonologist who plans to perform bronchoscopy and possible thoracentesis. At the time of interview patient was sitting in bed in no apparent respiratory distress. Currently on room air saturating well. Patient seemed very angry with his previous care and expressed that he does not want to get any testing at [facility] [address]:     CONSTITUTIONAL SYMPTOMS: The patient denies fever, fatigue, night sweats, weight loss or weight gain. HEENT: No vision changes. No tinnitus, Denies sinus pain. No hoarseness, or dysphagia. NECK: Patient denies swelling in the neck. CARDIOVASCULAR: Denies chest pain, palpitation, syncope. RESPIRATORY: As per HPI. GASTROINTESTINAL: Denies nausea, abdominal pain or change in bowel function. GENITOURINARY: Denies obstructive symptoms. No history of incontinence. BREASTS: No masses or lumps in the breasts. SKIN: No rashes or itching. MUSCULOSKELETAL: Denies weakness or bone pain. NEUROLOGICAL: No headaches or seizures. PSYCHIATRIC: Denies mood swings or depression. ENDOCRINE: Denies heat or cold intolerance or excessive thirst.  HEMATOLOGIC/LYMPHATIC: Denies easy bruising or lymph node swelling. ALLERGIC/IMMUNOLOGIC: No environmental allergies.     PAST MEDICAL HISTORY:   Past Medical History:   Diagnosis Date    Abdominal pain, LLQ     Allergic rhinitis     Asthma     Atopic dermatitis     [facility] ([facility] [address]     melanoma    Diabetes mellitus ([facility] [address]     Erectile dysfunction     Generalized osteoarthrosis, involving multiple sites     GERD (gastroesophageal reflux disease)     Gynecomastia     Hyperlipidemia     Nasal congestion     Nipple pain     Obstructive sleep apnea syndrome 10/5/2012    Pseudo-gout     Unspecified essential hypertension        PAST SURGICAL HISTORY:   Past Surgical History:   Procedure Laterality Date    BACK SURGERY      BRONCHOSCOPY N/A 12/2/2020    BRONCHOSCOPY ALVEOLAR LAVAGE performed by Rina Mckeon MD at [facility] Bilateral 2013    FINGER SURGERY Left 3-1-2013    Incision & Drainage of left Thumb Infection    FOOT SURGERY Right     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    HAND SURGERY  2011    LUMBAR NERVE BLOCK Right 12/12/2018    RIGHT L4/L5 LUMBAR INTERLAMINAR EPIDURAL STEROID INJECTION WITH FLUOROSCOPY performed by Dee Parra MD at [address] [place] [address] Way right side post    [facility] [address] DX/THER SBST INTRLMNR LMBR/SAC W/IMG GDN Right 9/24/2018 RIGHT L4/L5 INTERLAMINAR EPIDURAL STEROID INJECTION WITH FLUOROSCOPY performed by Teto Mccauley MD at [address] [place] [address] Blvd:   Social History     Tobacco Use    Smoking status: Former Smoker     Packs/day: 1.00     Years: 10.00     Pack years: 10.00     Types: Cigarettes     Quit date: 4/3/1993     Years since quittin.8    Smokeless tobacco: Never Used    Tobacco comment: quit 25 years ago   Substance Use Topics    Alcohol use: No    Drug use: No       FAMILY HISTORY:   Family History   Problem Relation Age of Onset    Arthritis Mother     High Blood Pressure Mother     Emphysema Mother     Osteoporosis Mother     Diabetes Father     Heart Disease Father     High Cholesterol Father     Parkinsonism Father     Diabetes Sister        MEDICATIONS:     No current facility-administered medications on file prior to encounter.       Current Outpatient Medications on File Prior to Encounter   Medication Sig Dispense Refill    hydrOXYzine (VISTARIL) 50 MG capsule Take 1 capsule by mouth 3 times daily as needed for Itching or Anxiety 90 capsule 0    ondansetron (ZOFRAN) 4 MG tablet Take 1 tablet by mouth 3 times daily as needed for Nausea or Vomiting 30 tablet 0    insulin glargine (BASAGLAR KWIKPEN) 100 UNIT/ML injection pen Inject 25 Units into the skin nightly 5 pen 2    Insulin Pen Needle (MEIJER PEN NEEDLES) 31G X 6 MM MISC 1 each by Does not apply route daily 100 each 3    pantoprazole (PROTONIX) 40 MG tablet TAKE 1 TABLET BY MOUTH ONE TIME A DAY  90 tablet 0    allopurinol (ZYLOPRIM) 300 MG tablet TAKE 1 TABLET BY MOUTH ONE TIME A DAY  90 tablet 0    metoprolol tartrate (LOPRESSOR) 50 MG tablet Take 1.5 tablets by mouth 2 times daily 90 tablet 3    amiodarone (CORDARONE) 200 MG tablet Take 1 tablet by mouth 2 times daily 200 mg twice daily for 2 weeks    Days,   Then 200 mg daily 45 tablet 3  warfarin (COUMADIN) 5 MG tablet Take 1 tablet by mouth daily Do not resume until 1/7 30 tablet 5    albuterol sulfate  (90 Base) MCG/ACT inhaler Inhale 2 puffs into the lungs every 6 hours as needed for Wheezing 1 Inhaler 0    glimepiride (AMARYL) 4 MG tablet Take 1 tablet by mouth daily 90 tablet 1    furosemide (LASIX) 40 MG tablet Take 1 tablet by mouth daily 90 tablet 1    fluticasone-salmeterol (ADVAIR DISKUS) 250-50 MCG/DOSE AEPB Inhale 1 puff into the lungs every 12 hours 1 Inhaler 6    lisinopril (PRINIVIL;ZESTRIL) 5 MG tablet Take 1 tablet by mouth daily 30 tablet 1    ipratropium-albuterol (DUONEB) 0.5-2.5 (3) MG/3ML SOLN nebulizer solution Inhale 3 mLs into the lungs every 4 hours as needed for Shortness of Breath Dx: Asthma  ICD-10: J45.909 120 vial 5    finasteride (PROSCAR) 5 MG tablet Take 5 mg by mouth daily      tamsulosin (FLOMAX) 0.4 MG capsule Take 0.4 mg by mouth daily      metFORMIN (GLUCOPHAGE) 1000 MG tablet Take 1 tablet by mouth 2 times daily 180 tablet 1    simvastatin (ZOCOR) 40 MG tablet TAKE 1 TABLET BY MOUTH AT BEDTIME 90 tablet 1    FREESTYLE LANCETS MISC 1 each by Does not apply route daily 100 each 3    Blood Glucose Monitoring Suppl (FREESTYLE LITE) CLAUDINE 1 Device by Does not apply route 2 times daily 1 Device 0        methylPREDNISolone  40 mg Intravenous Q8H    Followed by   Jolyne Bloch ON 1/28/2021] predniSONE  40 mg Oral Daily    warfarin (COUMADIN) daily dosing (placeholder)   Other RX Placeholder    cefepime  2,000 mg Intravenous Q8H    vancomycin  1,000 mg Intravenous Q12H    insulin lispro  0-12 Units Subcutaneous TID WC    insulin lispro  0-6 Units Subcutaneous Nightly    allopurinol  300 mg Oral QPM    amiodarone  200 mg Oral Daily    finasteride  5 mg Oral Daily    budesonide-formoterol  2 puff Inhalation BID    insulin glargine  25 Units Subcutaneous Nightly    albuterol sulfate HFA  2 puff Inhalation 4x daily    And  ipratropium  2 puff Inhalation 4x daily    metoprolol tartrate  75 mg Oral BID    pantoprazole  40 mg Oral QAM AC    atorvastatin  20 mg Oral Nightly    tamsulosin  0.4 mg Oral Nightly    sodium chloride flush  10 mL Intravenous 2 times per day    azithromycin  500 mg Intravenous Q24H    lactobacillus  1 capsule Oral BID WC      dextrose       glucose, dextrose, glucagon (rDNA), dextrose, albuterol sulfate HFA, hydrOXYzine, sodium chloride flush, magnesium hydroxide, acetaminophen, ondansetron      ALLERGIES:   Allergies as of 01/25/2021 - Review Complete 01/25/2021   Allergen Reaction Noted    Levaquin [levofloxacin] Diarrhea 12/30/2020      OBJECTIVE:   height is 5' 9\" (1.753 m) and weight is 214 lb (97.1 kg). His axillary temperature is 97.4 °F (36.3 °C). His blood pressure is 112/87 and his pulse is 82. His respiration is 20 and oxygen saturation is 96%. No intake/output data recorded. PHYSICAL EXAM:    CONSTITUTIONAL: He is a 72y.o.-year-old who appears his stated age. He is alert and oriented x 3 and in no acute distress. HEENT: PERRLA, EOMI. No scleral icterus. No thrush, atraumatic, normocephalic. NECK: Supple, without cervical or supraclavicular lymphadenopathy:  CARDIOVASCULAR: S1 S2 RRR. Without murmer  RESPIRATORY & CHEST: Bibasilar decreased breath sounds. Mild scattered crackles are heard. No wheezing heard. GASTROINTESTINAL & ABDOMEN: Soft, nontender, positive bowel sounds in all quadrants, non-distended, without hepatosplenomegaly. GENITOURINARY: Deferred. MUSCULOSKELETAL: No tenderness to palpation of the axial skeleton. There is no clubbing. No cyanosis. No edema of the lower extremities. SKIN OF BODY: No rash or jaundice. PSYCHIATRIC EVALUATION: Normal affect. Patient answers questions appropriately. HEMATOLOGIC/LYMPHATIC/ IMMUNOLOGIC: No palpable lymphadenopathy. NEUROLOGIC: Alert and oriented x 3. Groslly non-focal. Motor strength is 5+/5 in all muscle groups. The patient has a normal sensorium globally. LABS:  Recent Labs     01/25/21  1727 01/26/21  0604 01/26/21  0616   WBC 14.5*  --  10.8   HGB 9.8*  --  8.6*   HCT 31.6*  --  26.9*     --  317   ALT 27  --   --    AST 45*  --   --    * 134*  --    K 3.6 3.5  --    CL 96* 98*  --    CREATININE 1.2 1.2  --    BUN 23* 26*  --    CO2 20* 23  --    INR  --  >14.27*  --        Recent Labs     01/25/21  1727 01/26/21  0604   GLUCOSE 215* 216*   CALCIUM 8.4 8.1*   * 134*   K 3.6 3.5   CO2 20* 23   CL 96* 98*   BUN 23* 26*   CREATININE 1.2 1.2       No results for input(s): PHART, GWD5VMQ, PO2ART, LSI6OMY, L0PBQYFK, BEART, X0KEMQGQ in the last 72 hours.     Lab Results   Component Value Date    INR >14.27 (HH) 01/26/2021    INR 3.30 (H) 01/01/2021    INR 3.25 (H) 12/30/2020    PROTIME >170.0 (H) 01/26/2021    PROTIME 38.7 (H) 01/01/2021    PROTIME 38.2 (H) 12/30/2020     No results found for: AMYLASE   Lab Results   Component Value Date    LABA1C 10.6 12/30/2020     Lab Results   Component Value Date    .5 12/30/2020     Lab Results   Component Value Date    TSH 3.21 02/03/2016     Lab Results   Component Value Date    TROPONINI 0.03 (H) 01/25/2021      No results found for: CRP   No results found for: BNP   No results found for: DDIMER   No results found for: FERRITIN   Lab Results   Component Value Date    LACTA 1.1 12/03/2020           IMAGING:    Narrative   EXAMINATION:   ONE XRAY VIEW OF THE CHEST       1/25/2021 6:19 pm           FINDINGS:   The heart is mildly enlarged but unchanged.  The pulmonary vessels are   engorged centrally more prominent.  There is some hazy right basilar   interstitial opacity which is more apparent.  No effusion is seen. Balinda Calico is   left pacemaker in place which is unchanged.  The bones are intact.           Impression Stable cardiomegaly with mild central pulmonary congestion which is more   prominent.       Questionable early infiltrate or atelectasis along the right lung base which   is more apparent.               IMPRESSION:     1. Acute respiratory distress  2. Community-acquired pneumonia/postobstructive pneumonia  3. Supratherapeutic INR  4. Suspicion for extensive lung malignancy  5. Previous history of tobacco abuse  6. Atrial fibrillation on chronic anticoagulation    RECOMMENDATION:     1. Patient has presented to the hospital with acute respiratory distress and chest imaging showing right lower lobe early infiltrate. 2. A CT chest done at outside facility had shown right lung mass which could have led to postobstructive pneumonia. Will agree to cover him with IV cefepime and azithromycin. 3. His chest imaging is definitely highly suggestive of metastatic lung malignancy. Patient had a bad experience last time he was set up for a bronchoscopic evaluation here. He has made up his mind to not get any testing regarding his lung cancer done at [facility] He would like to follow-up with the pulmonologist at [facility].  4. Currently has supratherapeutic INR which will need to be corrected. 5. Nothing further to add from pulmonary standpoint. Thank you for your consultation. We will sign off. Saritha Cerda MD  Pulmonary Critical Care and Sleep Medicine  1/25/2021, 4:23 PM    This note was completed using dragon medical speech recognition software. Grammatical errors, random word insertions, pronoun errors and incomplete sentences are occasional consequences of this technology due to software limitations. If there are questions or concerns about the content of this note of information contained within the body of this dictation they should be addressed with the provider for clarification.

## 2021-01-27 ENCOUNTER — APPOINTMENT (OUTPATIENT)
Dept: GENERAL RADIOLOGY | Age: 66
DRG: 871 | End: 2021-01-27
Payer: COMMERCIAL

## 2021-01-27 ENCOUNTER — APPOINTMENT (OUTPATIENT)
Dept: INTERVENTIONAL RADIOLOGY/VASCULAR | Age: 66
DRG: 871 | End: 2021-01-27
Payer: COMMERCIAL

## 2021-01-27 ENCOUNTER — TELEPHONE (OUTPATIENT)
Dept: PULMONOLOGY | Age: 66
End: 2021-01-27

## 2021-01-27 LAB
ANION GAP SERPL CALCULATED.3IONS-SCNC: 15 MMOL/L (ref 3–16)
BUN BLDV-MCNC: 34 MG/DL (ref 7–20)
CALCIUM SERPL-MCNC: 8.2 MG/DL (ref 8.3–10.6)
CHLORIDE BLD-SCNC: 97 MMOL/L (ref 99–110)
CO2: 20 MMOL/L (ref 21–32)
CREAT SERPL-MCNC: 1 MG/DL (ref 0.8–1.3)
ESTIMATED AVERAGE GLUCOSE: 254.7 MG/DL
GFR AFRICAN AMERICAN: >60
GFR NON-AFRICAN AMERICAN: >60
GLUCOSE BLD-MCNC: 308 MG/DL (ref 70–99)
GLUCOSE BLD-MCNC: 310 MG/DL (ref 70–99)
GLUCOSE BLD-MCNC: 340 MG/DL (ref 70–99)
GLUCOSE BLD-MCNC: 383 MG/DL (ref 70–99)
GLUCOSE BLD-MCNC: 395 MG/DL (ref 70–99)
HBA1C MFR BLD: 10.5 %
HCT VFR BLD CALC: 24.8 % (ref 40.5–52.5)
HCT VFR BLD CALC: 24.8 % (ref 40.5–52.5)
HCT VFR BLD CALC: 26.4 % (ref 40.5–52.5)
HEMOGLOBIN: 7.9 G/DL (ref 13.5–17.5)
HEMOGLOBIN: 7.9 G/DL (ref 13.5–17.5)
HEMOGLOBIN: 8.3 G/DL (ref 13.5–17.5)
INR BLD: 1.55 (ref 0.86–1.14)
INR BLD: 1.78 (ref 0.86–1.14)
INR BLD: 1.99 (ref 0.86–1.14)
INR BLD: 2.85 (ref 0.86–1.14)
MAGNESIUM: 1.3 MG/DL (ref 1.8–2.4)
PERFORMED ON: ABNORMAL
POTASSIUM SERPL-SCNC: 3.9 MMOL/L (ref 3.5–5.1)
PROTHROMBIN TIME: 18.1 SEC (ref 10–13.2)
PROTHROMBIN TIME: 20.8 SEC (ref 10–13.2)
PROTHROMBIN TIME: 23.3 SEC (ref 10–13.2)
PROTHROMBIN TIME: 33.4 SEC (ref 10–13.2)
SODIUM BLD-SCNC: 132 MMOL/L (ref 136–145)
VANCOMYCIN TROUGH: 10.3 UG/ML (ref 10–20)

## 2021-01-27 PROCEDURE — 85018 HEMOGLOBIN: CPT

## 2021-01-27 PROCEDURE — 36415 COLL VENOUS BLD VENIPUNCTURE: CPT

## 2021-01-27 PROCEDURE — 99255 IP/OBS CONSLTJ NEW/EST HI 80: CPT | Performed by: INTERNAL MEDICINE

## 2021-01-27 PROCEDURE — 76604 US EXAM CHEST: CPT

## 2021-01-27 PROCEDURE — 2580000003 HC RX 258: Performed by: INTERNAL MEDICINE

## 2021-01-27 PROCEDURE — 85014 HEMATOCRIT: CPT

## 2021-01-27 PROCEDURE — 80202 ASSAY OF VANCOMYCIN: CPT

## 2021-01-27 PROCEDURE — 94640 AIRWAY INHALATION TREATMENT: CPT

## 2021-01-27 PROCEDURE — 80048 BASIC METABOLIC PNL TOTAL CA: CPT

## 2021-01-27 PROCEDURE — 6370000000 HC RX 637 (ALT 250 FOR IP): Performed by: INTERNAL MEDICINE

## 2021-01-27 PROCEDURE — 94761 N-INVAS EAR/PLS OXIMETRY MLT: CPT

## 2021-01-27 PROCEDURE — 83735 ASSAY OF MAGNESIUM: CPT

## 2021-01-27 PROCEDURE — 6370000000 HC RX 637 (ALT 250 FOR IP): Performed by: PHYSICIAN ASSISTANT

## 2021-01-27 PROCEDURE — 6360000002 HC RX W HCPCS: Performed by: INTERNAL MEDICINE

## 2021-01-27 PROCEDURE — 85610 PROTHROMBIN TIME: CPT

## 2021-01-27 PROCEDURE — 2000000000 HC ICU R&B

## 2021-01-27 PROCEDURE — 6360000002 HC RX W HCPCS: Performed by: PHYSICIAN ASSISTANT

## 2021-01-27 PROCEDURE — 2580000003 HC RX 258: Performed by: PHYSICIAN ASSISTANT

## 2021-01-27 PROCEDURE — 0W993ZZ DRAINAGE OF RIGHT PLEURAL CAVITY, PERCUTANEOUS APPROACH: ICD-10-PCS | Performed by: RADIOLOGY

## 2021-01-27 PROCEDURE — 32554 ASPIRATE PLEURA W/O IMAGING: CPT

## 2021-01-27 RX ORDER — MAGNESIUM SULFATE IN WATER 40 MG/ML
2000 INJECTION, SOLUTION INTRAVENOUS ONCE
Status: COMPLETED | OUTPATIENT
Start: 2021-01-27 | End: 2021-01-27

## 2021-01-27 RX ORDER — INSULIN LISPRO 100 [IU]/ML
12 INJECTION, SOLUTION INTRAVENOUS; SUBCUTANEOUS
Status: DISCONTINUED | OUTPATIENT
Start: 2021-01-28 | End: 2021-01-31

## 2021-01-27 RX ORDER — INSULIN LISPRO 100 [IU]/ML
0-18 INJECTION, SOLUTION INTRAVENOUS; SUBCUTANEOUS
Status: DISCONTINUED | OUTPATIENT
Start: 2021-01-27 | End: 2021-02-08 | Stop reason: HOSPADM

## 2021-01-27 RX ORDER — INSULIN LISPRO 100 [IU]/ML
0-9 INJECTION, SOLUTION INTRAVENOUS; SUBCUTANEOUS NIGHTLY
Status: DISCONTINUED | OUTPATIENT
Start: 2021-01-27 | End: 2021-02-08 | Stop reason: HOSPADM

## 2021-01-27 RX ORDER — TRAMADOL HYDROCHLORIDE 50 MG/1
25 TABLET ORAL EVERY 6 HOURS PRN
Status: DISCONTINUED | OUTPATIENT
Start: 2021-01-27 | End: 2021-01-28

## 2021-01-27 RX ORDER — INSULIN LISPRO 100 [IU]/ML
10 INJECTION, SOLUTION INTRAVENOUS; SUBCUTANEOUS
Status: DISCONTINUED | OUTPATIENT
Start: 2021-01-27 | End: 2021-01-27

## 2021-01-27 RX ADMIN — Medication 2 PUFF: at 21:08

## 2021-01-27 RX ADMIN — VANCOMYCIN HYDROCHLORIDE 1000 MG: 1 INJECTION, POWDER, LYOPHILIZED, FOR SOLUTION INTRAVENOUS at 12:00

## 2021-01-27 RX ADMIN — Medication 2 PUFF: at 11:23

## 2021-01-27 RX ADMIN — INSULIN LISPRO 12 UNITS: 100 INJECTION, SOLUTION INTRAVENOUS; SUBCUTANEOUS at 17:21

## 2021-01-27 RX ADMIN — AZITHROMYCIN MONOHYDRATE 500 MG: 500 INJECTION, POWDER, LYOPHILIZED, FOR SOLUTION INTRAVENOUS at 02:21

## 2021-01-27 RX ADMIN — MAGNESIUM SULFATE HEPTAHYDRATE 2000 MG: 40 INJECTION, SOLUTION INTRAVENOUS at 16:16

## 2021-01-27 RX ADMIN — FINASTERIDE 5 MG: 5 TABLET, FILM COATED ORAL at 09:20

## 2021-01-27 RX ADMIN — ACETAMINOPHEN 650 MG: 325 TABLET ORAL at 06:28

## 2021-01-27 RX ADMIN — Medication 2 PUFF: at 15:57

## 2021-01-27 RX ADMIN — CEFEPIME 2000 MG: 2 INJECTION, POWDER, FOR SOLUTION INTRAVENOUS at 16:00

## 2021-01-27 RX ADMIN — INSULIN LISPRO 6 UNITS: 100 INJECTION, SOLUTION INTRAVENOUS; SUBCUTANEOUS at 20:21

## 2021-01-27 RX ADMIN — Medication 2 PUFF: at 11:25

## 2021-01-27 RX ADMIN — Medication 10 ML: at 09:20

## 2021-01-27 RX ADMIN — AMIODARONE HYDROCHLORIDE 200 MG: 200 TABLET ORAL at 09:20

## 2021-01-27 RX ADMIN — Medication 2 PUFF: at 08:26

## 2021-01-27 RX ADMIN — Medication 1 CAPSULE: at 09:20

## 2021-01-27 RX ADMIN — Medication 10 ML: at 20:30

## 2021-01-27 RX ADMIN — ALLOPURINOL 300 MG: 300 TABLET ORAL at 17:21

## 2021-01-27 RX ADMIN — INSULIN LISPRO 12 UNITS: 100 INJECTION, SOLUTION INTRAVENOUS; SUBCUTANEOUS at 12:16

## 2021-01-27 RX ADMIN — TRAMADOL HYDROCHLORIDE 25 MG: 50 TABLET, FILM COATED ORAL at 09:20

## 2021-01-27 RX ADMIN — VANCOMYCIN HYDROCHLORIDE 1250 MG: 10 INJECTION, POWDER, LYOPHILIZED, FOR SOLUTION INTRAVENOUS at 23:31

## 2021-01-27 RX ADMIN — CEFEPIME 2000 MG: 2 INJECTION, POWDER, FOR SOLUTION INTRAVENOUS at 05:53

## 2021-01-27 RX ADMIN — TAMSULOSIN HYDROCHLORIDE 0.4 MG: 0.4 CAPSULE ORAL at 20:21

## 2021-01-27 RX ADMIN — ATORVASTATIN CALCIUM 20 MG: 20 TABLET, FILM COATED ORAL at 20:21

## 2021-01-27 RX ADMIN — METOPROLOL TARTRATE 75 MG: 50 TABLET, FILM COATED ORAL at 20:21

## 2021-01-27 RX ADMIN — Medication 1 CAPSULE: at 17:21

## 2021-01-27 RX ADMIN — CEFEPIME 2000 MG: 2 INJECTION, POWDER, FOR SOLUTION INTRAVENOUS at 20:20

## 2021-01-27 RX ADMIN — INSULIN LISPRO 10 UNITS: 100 INJECTION, SOLUTION INTRAVENOUS; SUBCUTANEOUS at 17:21

## 2021-01-27 RX ADMIN — METOPROLOL TARTRATE 75 MG: 50 TABLET, FILM COATED ORAL at 09:20

## 2021-01-27 RX ADMIN — INSULIN LISPRO 10 UNITS: 100 INJECTION, SOLUTION INTRAVENOUS; SUBCUTANEOUS at 12:16

## 2021-01-27 RX ADMIN — Medication 2 PUFF: at 21:09

## 2021-01-27 RX ADMIN — INSULIN LISPRO 10 UNITS: 100 INJECTION, SOLUTION INTRAVENOUS; SUBCUTANEOUS at 09:27

## 2021-01-27 RX ADMIN — PANTOPRAZOLE SODIUM 40 MG: 40 TABLET, DELAYED RELEASE ORAL at 06:28

## 2021-01-27 ASSESSMENT — PAIN SCALES - GENERAL
PAINLEVEL_OUTOF10: 0
PAINLEVEL_OUTOF10: 3
PAINLEVEL_OUTOF10: 0

## 2021-01-27 ASSESSMENT — PAIN DESCRIPTION - ORIENTATION: ORIENTATION: LEFT

## 2021-01-27 ASSESSMENT — PAIN DESCRIPTION - LOCATION: LOCATION: BACK

## 2021-01-27 NOTE — PROGRESS NOTES
Clinical Pharmacy Note: Pharmacy to Dose Vancomcyin    Vancomycin Day: 3  Current Dosin mg q12      Recent Labs     21  0604 21  0930   BUN 26* 34*       Recent Labs     21  0604 21  0930   CREATININE 1.2 1.0       Recent Labs     21  1727 21  0616   WBC 14.5* 10.8         Intake/Output Summary (Last 24 hours) at 2021 1330  Last data filed at 2021 2324  Gross per 24 hour   Intake 780 ml   Output    Net 780 ml         Ht Readings from Last 1 Encounters:   21 5' 9\" (1.753 m)        Wt Readings from Last 1 Encounters:   21 203 lb 11.3 oz (92.4 kg)         Body mass index is 30.08 kg/m². Estimated Creatinine Clearance: 83 mL/min (based on SCr of 1 mg/dL). Trough: 10.3    Assessment/Plan:  Vancomycin level is subtherapeutic. Level was drawn appropriately in respect to last dose given. A vancomycin trough has been ordered for  @0930. Will Increase vancomycin dose to 1250 mg q12 hours. Changes in regimen will be determined based on culture results, renal function, and clinical response. Pharmacy will continue to monitor and adjust regimen as necessary.     Thank you for the consult,    Jannie Mascorro, PharmD, [address]   [id number]    2021

## 2021-01-27 NOTE — CONSULTS
Office : [phone]     Fax :[phone]       Nephrology Consult Note      Patient's Name: Renetta Singh  2:08 PM  1/27/2021    Reason for Consult:  NI, electrolyte disturbances       Requesting Physician:  Navid Youngblood MD      Chief Complaint:    Chief Complaint   Patient presents with    Fatigue     Pt to ER reporting worsening fatigue and nosebleeds that have been going on for several days, states he takes warfarin at home       History of Present Ilness:    Renetta Singh is a 72 y.o. male with who presented to ED with complaint of generalized weakness and fatigue which has been ongoing for the past several days. He also reports increasing shortness of breath and productive cough. He reports that he woke up this morning with a nosebleed which has reocurred intermittently throughout the day. He denies any other easy bruising or bleeding. He is on Coumadin for A. fib.     Work-up initiated in ED. CXR concerning for mild central pulmonary congestion and questionable right basilar early infiltrate vs atelectasis. CMP notable for mild hyponatremia (Na 132) and Cr 1.2 (baseline 0.6). CBC notable for leukocytosis (WBC 14.5) and anemia (Hgb 9.8). Procalcitonin 0.2. Troponin slightly elevated at 0.03 but stable compared to previous. EKG shows rate controlled A. Fib.       chest CT on 1/20/21 at an OSH showed right hilar mass with mediastinal adenopathy highly concerning for malignancy, innumerable pulmonary nodules concerning for metastatic disease, and mild to moderate right sided pleural effusion. Is making urine, denies any dysuria. Vitals stable      I/O last 3 completed shifts:   In: 80 [P.O.:480; IV Piggyback:300]  Out: -     Past Medical History: Diagnosis Date    Abdominal pain, LLQ     Allergic rhinitis     Asthma     Atopic dermatitis     Cancer ([facility] [address]     melanoma    Diabetes mellitus ([facility] [address]     Erectile dysfunction     Generalized osteoarthrosis, involving multiple sites     GERD (gastroesophageal reflux disease)     Gynecomastia     Hyperlipidemia     Nasal congestion     Nipple pain     Obstructive sleep apnea syndrome 10/5/2012    Pseudo-gout     Unspecified essential hypertension        Past Surgical History:   Procedure Laterality Date    BACK SURGERY      BRONCHOSCOPY N/A 12/2/2020    BRONCHOSCOPY ALVEOLAR LAVAGE performed by Alexandra Storey MD at [facility] Bilateral 2013    FINGER SURGERY Left 3-1-2013    Incision & Drainage of left Thumb Infection    FOOT SURGERY Right     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    HAND SURGERY  2011    LUMBAR NERVE BLOCK Right 12/12/2018    RIGHT L4/L5 LUMBAR INTERLAMINAR EPIDURAL STEROID INJECTION WITH FLUOROSCOPY performed by Krista Espinoza MD at [address] [place] [address]  2002    melenoma right side post    [facility] [address] 84 DX/THER SBST INTRLMNR LMBR/SAC W/IMG GDN Right 9/24/2018    RIGHT L4/L5 INTERLAMINAR EPIDURAL STEROID INJECTION WITH FLUOROSCOPY performed by Krista Esp[facility]za MD at [facility] 22 History   Problem Relation Age of Onset    Arthritis Mother     High Blood Pressure Mother     Emphysema Mother     Osteoporosis Mother     Diabetes Father     Heart Disease Father     High Cholesterol Father     Parkinsonism Father     Diabetes Sister         reports that he quit smoking about 27 years ago. His smoking use included cigarettes. He has a 10.00 pack-year smoking history. He has never used smokeless tobacco. He reports that he does not drink alcohol or use drugs.         Allergies:  Levaquin [levofloxacin]    Current Medications:        traMADol (ULTRAM) tablet 25 mg, Q6H PRN      insulin lispro (1 Unit Dial) 10 Units, TID WC   insulin lispro (1 Unit Dial) 0-18 Units, TID WC      insulin lispro (1 Unit Dial) 0-9 Units, Nightly      vancomycin (VANCOCIN) 1,250 mg in dextrose 5 % 250 mL IVPB, Q12H      magnesium sulfate 2000 mg in 50 mL IVPB premix, Once      warfarin (COUMADIN) daily dosing (placeholder), RX Placeholder      cefepime (MAXIPIME) 2000 mg IVPB minibag, Q8H      glucose (GLUTOSE) 40 % oral gel 15 g, PRN      dextrose 50 % IV solution, PRN      glucagon (rDNA) injection 1 mg, PRN      dextrose 5 % solution, PRN      0.9 % sodium chloride infusion, PRN      albuterol sulfate  (90 Base) MCG/ACT inhaler 2 puff, Q6H PRN      allopurinol (ZYLOPRIM) tablet 300 mg, QPM      amiodarone (CORDARONE) tablet 200 mg, Daily      finasteride (PROSCAR) tablet 5 mg, Daily      budesonide-formoterol (SYMBICORT) 160-4.5 MCG/ACT inhaler 2 puff, BID      hydrOXYzine (VISTARIL) capsule 50 mg, TID PRN      insulin glargine (LANTUS;BASAGLAR) injection pen 25 Units, Nightly      albuterol sulfate  (90 Base) MCG/ACT inhaler 2 puff, 4x daily    And      ipratropium (ATROVENT HFA) 17 MCG/ACT inhaler 2 puff, 4x daily      metoprolol tartrate (LOPRESSOR) tablet 75 mg, BID      pantoprazole (PROTONIX) tablet 40 mg, QAM AC      atorvastatin (LIPITOR) tablet 20 mg, Nightly      tamsulosin (FLOMAX) capsule 0.4 mg, Nightly      sodium chloride flush 0.9 % injection 10 mL, 2 times per day      sodium chloride flush 0.9 % injection 10 mL, PRN      magnesium hydroxide (MILK OF MAGNESIA) 400 MG/5ML suspension 30 mL, Daily PRN      acetaminophen (TYLENOL) tablet 650 mg, Q6H PRN      ondansetron (ZOFRAN) injection 4 mg, Q6H PRN      azithromycin (ZITHROMAX) 500 mg in D5W 250ml Vial Mate, Q24H      lactobacillus (CULTURELLE) capsule 1 capsule, BID WC        Review of Systems:   14 point ROS obtained but were negative except mentioned in HPI      Physical exam: Vitals:  /67   Pulse 75   Temp 96.8 °F (36 °C) (Temporal)   Resp 25   Ht 5' 9\" (1.753 m)   Wt 203 lb 11.3 oz (92.4 kg)   SpO2 95%   BMI 30.08 kg/m²   Constitutional:  OAA X3 NAD  Skin: no rash, turgor wnl  Heent:  eomi, mmm  Neck: no bruits or jvd noted  Cardiovascular:  S1, S2 without m/r/g  Respiratory: CTA B without w/r/r  Abdomen:  +bs, soft, nt, nd  Ext: no  lower extremity edema  Psychiatric: mood and affect appropriate  Musculoskeletal:  Rom, muscular strength intact    Labs:  CBC:   Recent Labs     01/25/21 1727 01/26/21  0616 01/27/21  0318 01/27/21  0930   WBC 14.5* 10.8  --   --    HGB 9.8* 8.6* 7.9* 7.9*    317  --   --      BMP:    Recent Labs     01/25/21 1727 01/26/21  0604 01/27/21  0930   * 134* 132*   K 3.6 3.5 3.9   CL 96* 98* 97*   CO2 20* 23 20*   BUN 23* 26* 34*   CREATININE 1.2 1.2 1.0   GLUCOSE 215* 216* 383*     Ca/Mg/Phos:   Recent Labs     01/25/21 1727 01/26/21  0604 01/27/21  0929 01/27/21  0930   CALCIUM 8.4 8.1*  --  8.2*   MG  --  1.10* 1.30*  --      Hepatic:   Recent Labs     01/25/21 1727   AST 45*   ALT 27   BILITOT 0.4   ALKPHOS 135*     Troponin:   Recent Labs     01/25/21 1951   TROPONINI 0.03*     BNP: No results for input(s): BNP in the last 72 hours. Lipids: No results for input(s): CHOL, TRIG, HDL, LDLCALC, LABVLDL in the last 72 hours. ABGs: No results for input(s): PHART, PO2ART, MLI3WYY in the last 72 hours. INR:   Recent Labs     01/26/21  2223 01/27/21  0318 01/27/21  0930   INR 7.36* 2.85* 1.99*     UA:No results for input(s): Serena Gilbert, GLUCOSEU, BILIRUBINUR, Leela Baller, BLOODU, PHUR, PROTEINU, UROBILINOGEN, NITRU, LEUKOCYTESUR, Deitra Nunnery in the last 72 hours. Urine Microscopic: No results for input(s): LABCAST, BACTERIA, COMU, HYALCAST, WBCUA, RBCUA, EPIU in the last 72 hours. Urine Culture: No results for input(s): LABURIN in the last 72 hours. 7.  History of diabetes type 2.   On oral hypoglycemic agent    D/w primary team      Thank you for allowing us to participate in care of Nasim Sifuentes         Electronically signed by: Naun Moran MD, 1/27/2021, 2:08 PM      [facility]  [address]  Office : [phone]  Fax :[phone]

## 2021-01-27 NOTE — ED NOTES
Spoke with hospitalist regarding critical PT INR result at this time. Orders to follow.       Lul Acuna RN  01/26/21 1929

## 2021-01-27 NOTE — TELEPHONE ENCOUNTER
Patients wife called, patient in hospital and has some questions regarding his machine.  [phone]

## 2021-01-27 NOTE — ED NOTES
Spoke with Hospitalist regarding FFP. Okay to d/c FFP at this time. No signs of active bleeding from pt. Pt denies any complaints. Denies sob. Denies cp. Pt IV dressing changed due to old blood at this time. Clean dressing applied without difficulty. Vit. K administered per order at this time. Will continue to monitor.       Ben Aparicio RN  01/26/21 2010

## 2021-01-27 NOTE — PROGRESS NOTES
Perfect serve message sent to on call Dr. Amada Angelucci: Pt PT 87.1 / INR 7.36. Per response will continue to monitor now.

## 2021-01-27 NOTE — PROGRESS NOTES
4 Eyes Skin Assessment     NAME:  Remedios Franco  YOB: 1955  MEDICAL RECORD NUMBER:  [id number]    The patient is being assess for  Transfer to New Unit    I agree that 2 RN's have performed a thorough Head to Toe Skin Assessment on the patient. ALL assessment sites listed below have been assessed. Areas assessed by both nurses:    Head, Face, Ears, Shoulders, Back, Chest, Arms, Elbows, Hands, Sacrum. Buttock, Coccyx, Ischium and Legs. Feet and Heels        Does the Patient have a Wound?  No noted wound(s)       Jean Marie Prevention initiated:  NA   Wound Care Orders initiated:  NA    Pressure Injury (Stage 3,4, Unstageable, DTI, NWPT, and Complex wounds) if present place consult order under [de-identified] NA    New and Established Ostomies if present place consult order under : NA      Nurse 1 eSignature: Electronically signed by Marlyn Bright RN on 1/27/21 at 6:17 AM EST    **SHARE this note so that the co-signing nurse is able to place an eSignature**    Nurse 2 eSignature: {Esignature:[id number]}

## 2021-01-27 NOTE — PROGRESS NOTES
Patient arrived to unit from ED via bed, admitted to room 3912, alert and oriented x4, able to ambulate to transfer beds. Reviewed orders with patient. Fall precautions in place, hourly rounding, call light and belongings in reach, bed in lowest position, wheels locked in place, side rails up x 2, walkways free of clutter. Patient denies pain or discomfort at this time. Admission assessment completed and documented. RT contacted to assist with setting up home cpap machine. Reminded to use call light for assistance. Will continue to monitor.

## 2021-01-27 NOTE — CARE COORDINATION
Discharge Planning Assessment    RN/SW discharge planner met with patient/ (and family member) to discuss reason for admission, current living situation, and potential needs at the time of discharge    Demographics/Insurance verified Yes    Current type of dwelling:   Apartment    Patient from ECF/SW confirmed with:   N/A    Living arrangements: With spouse    Level of function/Support:  Independent - works at [employer]    PCP:  Dr. Dora Christiansen    Last Visit to PCP:   1/14/21    DME:  None    Active with any community resources/agencies/skilled home care: No    Medication compliance issues:  No    Financial issues that could impact healthcare:  Patient and wife both stated the only assistance they needed was for cleaning - referral made to [place] [facility]    Tentative discharge plan:  Home w/no needs anticipated    Discussed with patient and/or family that on the day of discharge home tentative time of discharge will be between 10 AM and noon. Transportation at the time of discharge:   Wife    Met with patient and his wife at bedside to confirm information. Currently in ICU on IV Vancomycin, IV Zithromax & IV Cefepime. Case management will follow progress and assist with discharge planning.     Shelby Ragland RN BSN  Case Management  [phone]  `

## 2021-01-27 NOTE — PROGRESS NOTES
Hospitalist Progress Note      PCP: Jennifer Ramirez MD    Date of Admission: 1/25/2021    Chief Complaint: Weakness    Hospital Course:   Currently reports feeling better  Denies any chest pain  No epistaxis  No bleeding from anywhere else  No chest pain shortness of breath  No fevers  Prefers to see Endocrinologist inpatient for elevated BS. Medications:  Reviewed      Exam:    /67   Pulse 75   Temp 96.8 °F (36 °C) (Temporal)   Resp 25   Ht 5' 9\" (1.753 m)   Wt 203 lb 11.3 oz (92.4 kg)   SpO2 95%   BMI 30.08 kg/m²     General appearance: No apparent distress, appears stated age and cooperative. HEENT: Pupils equal, round, and reactive to light. Conjunctivae/corneas clear. Neck: Supple, with full range of motion. No jugular venous distention. Trachea midline. Respiratory:  Normal respiratory effort. Clear to auscultation, bilaterally without RALES/WHEEZES/Rhonchi. Cardiovascular: Regular rate and rhythm with normal S1/S2 without MURMURS, rubs or gallops. Abdomen: Soft, non-tender, non-distended with normal bowel sounds. Musculoskeletal: No clubbing, cyanosis or EDEMA bilaterally. Full range of motion without deformity. Skin: Skin color, texture, turgor normal.  No rashes or lesions. Neurologic:  Neurovascularly intact without any focal sensory/motor deficits.  Cranial nerves: II-XII intact, grossly non-focal.  Left pacemaker site has been dressed small hematoma palpable otherwise site looks clean      Labs:   Recent Labs     01/25/21  1727 01/26/21  0616 01/27/21  0318 01/27/21  0930   WBC 14.5* 10.8  --   --    HGB 9.8* 8.6* 7.9* 7.9*   HCT 31.6* 26.9* 24.8* 24.8*    317  --   --      Recent Labs     01/25/21  1727 01/26/21  0604 01/27/21  0930   * 134* 132*   K 3.6 3.5 3.9   CL 96* 98* 97*   CO2 20* 23 20*   BUN 23* 26* 34*   CREATININE 1.2 1.2 1.0   CALCIUM 8.4 8.1* 8.2*     Recent Labs     01/25/21  1727   AST 45*   ALT 27   BILITOT 0.4   ALKPHOS 135* Recent Labs     01/26/21  2223 01/27/21  0318 01/27/21  0930   INR 7.36* 2.85* 1.99*     Recent Labs     01/25/21 1951   TROPONINI 0.03*       Urinalysis:      Lab Results   Component Value Date    NITRU Negative 01/01/2021    BACTERIA 0 02/13/2020    RBCUA 0 02/13/2020    BLOODU Negative 01/01/2021    SPECGRAV 1.015 01/01/2021    GLUCOSEU >=1000 01/01/2021       Radiology:  XR CHEST PORTABLE   Final Result   Stable cardiomegaly with mild central pulmonary congestion which is more   prominent. Questionable early infiltrate or atelectasis along the right lung base which   is more apparent.          IR GUIDED THORACENTESIS PLEURAL    (Results Pending)   IR NEEDLE BIOPSY LUNG/MEDIASTINUM PERC    (Results Pending)       Assessment/Plan:    Active Hospital Problems    Diagnosis Date Noted    Supratherapeutic INR [R79.1] 01/26/2021    Pneumonia [J18.9] 01/25/2021       Acute Medical Issues Being Addressed:    42-year-old gentleman admitted to the hospital with some weakness    Pneumonia suspected gram-positive/gram-negative  On vancomycin cefepime and azithromycin  COVID-19 negative  Afebrile  Currently off oxygen    Lung mass with nodules  Suspected cancer  Previously bronchoscopy attempted had a brief cardiac arrest  Seen by pulmonary  Plan to continue work-up at outside facility per patient preference    Chronic atrial fibrillation s/p pacemaker insertion on the first of this month  Pacemaker site has a small hematoma which was also noticed on discharge  Cardiology consulted    Supratherapeutic INR  INR noted noted to be greater than 14 on admission  Vitamin K 10 mg given  No evidence of bleeding no epistaxis  INR monitoring every 6 hours  Hemoglobin monitoring every 8 hours    Chronic moderate systolic heart failure  Currently patient seems euvolemic    Type 2 diabetes mellitus  Last A1c 10.6  Sliding scale insulin  Insulin dose adjusted  Endocrinology consulted     Anemia  No evidence of active bleeding Iron studies B12 folate reflect studies consistent with anemia of chronic disease    Acute kidney injury prerenal  Given his history of CHF will hold off on any IV fluids  Lasix and lisinopril on hold  Nephrology consult  Monitor creatinine  Strict intake and output      DVT Prophylaxis: SCD  Diet: DIET CARB CONTROL; Carb Control: 4 carb choices (60 gms)/meal  Diet NPO, After Midnight  Code Status: Full Code      Dispo - once acute medical processes have resolved    Anita Cavanaugh MD

## 2021-01-27 NOTE — CONSULTS
[address]   Electrophysiology Consultation   Date: 1/27/2021  Reason for Consultation: Coagulopathy   Consult Requesting Physician: Rachel Jones MD   Chief Complaint   Patient presents with    Fatigue     Pt to ER reporting worsening fatigue and nosebleeds that have been going on for several days, states he takes warfarin at home       CC: Coagulopathy   HPI: Natalie Partida is a 72 y.o. male who has presented to hospital with increasing fatigue and occasional nosebleed. He has a complex medical history which include obesity, obstructive sleep apnea, poorly controlled diabetes, obesity, and hypertension. He was initially seen by pulmonology for abnormal CT scan which showed multiple nodules and lymphadenopathy and was having bronchoscopy which she became hypoxic and had brief PEA arrest.  Coronary angiography showed no significant epicardial disease. He developed atrial fibrillation and was cardioverted with recurrence of his atrial fibrillation. An outpatient Holter monitoring revealed sustained ventricular tachycardia. Underwent dual-chamber AICD placement on 12/31/2020 for secondary prevention. On follow-up he had AICD shock on 1/4/2021. Device interrogation revealed atrial fibrillation with RVR which then triggered ventricular tachycardia followed by AICD shock. Options including ablation and antiarrhythmic therapies were discussed. Patient was started on amiodarone therapy. Initially he was not taking his amiodarone therapy because he complained of having nausea however he has started taking his amiodarone and states that his nausea was related to antibiotics. He was also on anticoagulation. He has poorly controlled diabetes and has history of smoking in the past.  He states that he has lost significant weight. Assessment and plan:     -Persistent atrial fibrillation    Patient had cardioversion in the past with recurrence of atrial fibrillation. Component Value Date    LVEF 40 2020    LVEFMODE Cardiac Cath 2020     Lab Results   Component Value Date    TSH 3.21 2016       Physical Examination:  Vitals:    21 1200   BP: 111/67   Pulse: 75   Resp: 25   Temp: 96.8 °F (36 °C)   SpO2: 95%      In: 120 [P.O.:120]  Out: -    Wt Readings from Last 3 Encounters:   21 203 lb 11.3 oz (92.4 kg)   21 214 lb (97.1 kg)   21 210 lb 9.6 oz (95.5 kg)     Temp  Av.4 °F (36.3 °C)  Min: 96.8 °F (36 °C)  Max: 97.9 °F (36.6 °C)  Pulse  Av.3  Min: 75  Max: 89  BP  Min: 109/65  Max: 128/72  SpO2  Av.9 %  Min: 92 %  Max: 96 %    Intake/Output Summary (Last 24 hours) at 2021 1334  Last data filed at 2021 2324  Gross per 24 hour   Intake 780 ml   Output    Net 780 ml         I independently reviewed all cardiac tracing from cardiac telemetry. · Constitutional: Oriented. No distress. · Head: Normocephalic and atraumatic. · Mouth/Throat: Oropharynx is clear and moist.   · Eyes: Conjunctivae normal. EOM are normal.   · Neck: Neck supple. No JVD present. · Cardiovascular: Normal rate, Irregular rhythm, S1&S2. · Pulmonary/Chest: Bilateral respiratory sounds. No rhonchi. · Abdominal: Soft. No tenderness. · Musculoskeletal: No tenderness. No edema    · Lymphadenopathy: Has no cervical adenopathy. · Neurological: Alert and oriented. Follows command, No Gross deficit   · Skin: Skin is warm, No rash noted.    · Psychiatric: Has a normal behavior       Scheduled Meds:   insulin lispro  10 Units Subcutaneous TID WC    insulin lispro  0-18 Units Subcutaneous TID WC    insulin lispro  0-9 Units Subcutaneous Nightly    vancomycin  1,250 mg Intravenous Q12H    warfarin (COUMADIN) daily dosing (placeholder)   Other RX Placeholder    cefepime  2,000 mg Intravenous Q8H    allopurinol  300 mg Oral QPM    amiodarone  200 mg Oral Daily    finasteride  5 mg Oral Daily  budesonide-formoterol  2 puff Inhalation BID    insulin glargine  25 Units Subcutaneous Nightly    albuterol sulfate HFA  2 puff Inhalation 4x daily    And    ipratropium  2 puff Inhalation 4x daily    metoprolol tartrate  75 mg Oral BID    pantoprazole  40 mg Oral QAM AC    atorvastatin  20 mg Oral Nightly    tamsulosin  0.4 mg Oral Nightly    sodium chloride flush  10 mL Intravenous 2 times per day    azithromycin  500 mg Intravenous Q24H    lactobacillus  1 capsule Oral BID WC     Continuous Infusions:   dextrose      sodium chloride       PRN Meds:.traMADol, glucose, dextrose, glucagon (rDNA), dextrose, sodium chloride, albuterol sulfate HFA, hydrOXYzine, sodium chloride flush, magnesium hydroxide, acetaminophen, ondansetron     Review of System:  [x] Full ROS obtained and negative except as mentioned in HPI    Prior to Admission medications    Medication Sig Start Date End Date Taking?  Authorizing Provider   hydrOXYzine (VISTARIL) 50 MG capsule Take 1 capsule by mouth 3 times daily as needed for Itching or Anxiety 1/14/21 2/13/21  MACIEL Soria NP   ondansetron (ZOFRAN) 4 MG tablet Take 1 tablet by mouth 3 times daily as needed for Nausea or Vomiting 1/14/21   MACIEL Soria NP   insulin glargine (BASAGLAR KWIKPEN) 100 UNIT/ML injection pen Inject 25 Units into the skin nightly 1/13/21   Jacquelin Sarabia MD   Insulin Pen Needle (MEIJER PEN NEEDLES) 31G X 6 MM MISC 1 each by Does not apply route daily 1/13/21   Jacquelin Sarabia MD   pantoprazole (PROTONIX) 40 MG tablet TAKE 1 TABLET BY MOUTH ONE TIME A DAY  1/11/21   Jacquelin Sarabia MD   allopurinol (ZYLOPRIM) 300 MG tablet TAKE 1 TABLET BY MOUTH ONE TIME A DAY  1/11/21   Jacquelin Sarabia MD   metoprolol tartrate (LOPRESSOR) 50 MG tablet Take 1.5 tablets by mouth 2 times daily 1/11/21   Lance Auguste MD amiodarone (CORDARONE) 200 MG tablet Take 1 tablet by mouth 2 times daily 200 mg twice daily for 2 weeks    Days,   Then 200 mg daily 1/5/21   Gerri Adorno MD   warfarin (COUMADIN) 5 MG tablet Take 1 tablet by mouth daily Do not resume until 1/7 1/2/21   MACIEL Armas - CNP   albuterol sulfate  (90 Base) MCG/ACT inhaler Inhale 2 puffs into the lungs every 6 hours as needed for Wheezing 12/30/20 12/30/21  Chandler Mock MD   glimepiride (AMARYL) 4 MG tablet Take 1 tablet by mouth daily 12/24/20   Ignacio Guardado MD   furosemide (LASIX) 40 MG tablet Take 1 tablet by mouth daily 12/24/20   Ignacio Guardado MD   fluticasone-salmeterol (ADVAIR DISKUS) 250-50 MCG/DOSE AEPB Inhale 1 puff into the lungs every 12 hours 12/14/20   Chandler Mock MD   lisinopril (PRINIVIL;ZESTRIL) 5 MG tablet Take 1 tablet by mouth daily 12/4/20   Camron GelMD ric   ipratropium-albuterol (DUONEB) 0.5-2.5 (3) MG/3ML SOLN nebulizer solution Inhale 3 mLs into the lungs every 4 hours as needed for Shortness of Breath Dx: Asthma  ICD-10: J45.909 11/25/20   Chandler Mock MD   finasteride (PROSCAR) 5 MG tablet Take 5 mg by mouth daily    Historical Provider, MD   tamsulosin (FLOMAX) 0.4 MG capsule Take 0.4 mg by mouth daily    Historical Provider, MD   metFORMIN (GLUCOPHAGE) 1000 MG tablet Take 1 tablet by mouth 2 times daily 7/8/20   Ignacio Guardado MD   simvastatin (ZOCOR) 40 MG tablet TAKE 1 TABLET BY MOUTH AT BEDTIME 7/8/20   MD JOSE LUIS MitchellYLE LANCETS MISC 1 each by Does not apply route daily 2/15/17   Ignacio Guardado MD   Blood Glucose Monitoring Suppl (FREESTYLE LITE) CLAUDINE 1 Device by Does not apply route 2 times daily 2/15/17   Igancio Guardado MD       Past Medical History:   Diagnosis Date    Abdominal pain, LLQ     Allergic rhinitis     Asthma     Atopic dermatitis     Cancer ([address]     melanoma    Diabetes mellitus ([facility]     Erectile dysfunction  Generalized osteoarthrosis, involving multiple sites     GERD (gastroesophageal reflux disease)     Gynecomastia     Hyperlipidemia     Nasal congestion     Nipple pain     Obstructive sleep apnea syndrome 10/5/2012    Pseudo-gout     Unspecified essential hypertension         Past Surgical History:   Procedure Laterality Date    BACK SURGERY      BRONCHOSCOPY N/A 12/2/2020    BRONCHOSCOPY ALVEOLAR LAVAGE performed [facility]illy Ambriz [facility] Bilateral 2013    FINGER SURGERY Left 3-1-2013    Incision & Drainage of left Thumb Infection    FOOT SURGERY Right     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    HAND SURGERY  2011    LUMBAR NERVE BLOCK Right 12/12/2018    RIGHT L4/L5 LUMBAR INTERLAMINAR EPIDURAL STEROID INJECTION WITH FLUOROSCOPY performed by Adonis Parnell MD at [address] [place] [address]  2002    melenoma right side post    [place] [facility] [address] DX/THER SBST INTRLMNR LMBR/SAC W/IMG GDN Right 9/24/2018    RIGHT L4/L5 INTERLAMINAR EPIDURAL STEROID INJECTION WITH FLUOROSCOPY performed by Adonis Parnell MD at [facility]   Allergen Reactions    Levaquin [Levofloxacin] Diarrhea       Social History:  Reviewed. reports that he quit smoking about 27 years ago. His smoking use included cigarettes. He has a 10.00 pack-year smoking history. He has never used smokeless tobacco. He reports that he does not drink alcohol or use drugs. Family History:  Reviewed. Reviewed. No family history of SCD. Relevant and available labs, and cardiovascular diagnostics reviewed. Reviewed.    Recent Labs     01/25/21  1727 01/26/21  0604 01/27/21  0930   * 134* 132*   K 3.6 3.5 3.9   CL 96* 98* 97*   CO2 20* 23 20*   BUN 23* 26* 34*   CREATININE 1.2 1.2 1.0     Recent Labs     01/25/21  1727 01/26/21  0616 01/27/21  0318 01/27/21  0930   WBC 14.5* 10.8  --   --    HGB 9.8* 8.6* 7.9* 7.9*   HCT 31.6* 26.9* 24.8* 24.8*   MCV 83.2 82.9  --   --  317  --   --      Estimated Creatinine Clearance: 83 mL/min (based on SCr of 1 mg/dL). No results found for: BNP    I independently reviewed all cardiac tracing from cardiac telemetry. I independently reviewed relevant and available cardiac diagnostic tests ECG, CXR, Echo, Stress test, Device interrogation, Holter, CT scan. Complex medical condition with multiple medical problems affecting prognosis and outcome of EP interventions  Severe exacerbation of underlying medical condition requiring hospitalization and at risk of decompensation. All questions and concerns were addressed to the patient/family. Alternatives to my treatment were discussed. I have discussed the above stated plan and the patient verbalized understanding and agreed with the plan. NOTE: This report was transcribed using voice recognition software. Every effort was made to ensure accuracy, however, inadvertent computerized transcription errors may be present.      Leslie Turner MD, MPH  [facility]   Office: [phone]  Fax: [phone]

## 2021-01-27 NOTE — PROGRESS NOTES
Hospitalist Progress Note      PCP: Stephani Angulo MD    Date of Admission: 1/25/2021    Chief Complaint: Weakness    Hospital Course:   Patient admitting history reviewed and confirmed  Currently reports feeling better  Denies any chest pain  No epistaxis  No bleeding from anywhere else  No chest pain shortness of breath  No fevers  I saw this patient at around 9:15 AM then again in the afternoon at around 2 PM and then in the evening at around 7 PM      Medications:  Reviewed      Exam:    /75   Pulse 89   Temp 97.9 °F (36.6 °C) (Oral)   Resp 18   Ht 5' 9\" (1.753 m)   Wt 214 lb (97.1 kg)   SpO2 94%   BMI 31.60 kg/m²     General appearance: No apparent distress, appears stated age and cooperative. HEENT: Pupils equal, round, and reactive to light. Conjunctivae/corneas clear. Neck: Supple, with full range of motion. No jugular venous distention. Trachea midline. Respiratory:  Normal respiratory effort. Clear to auscultation, bilaterally without RALES/WHEEZES/Rhonchi. Cardiovascular: Regular rate and rhythm with normal S1/S2 without MURMURS, rubs or gallops. Abdomen: Soft, non-tender, non-distended with normal bowel sounds. Musculoskeletal: No clubbing, cyanosis or EDEMA bilaterally. Full range of motion without deformity. Skin: Skin color, texture, turgor normal.  No rashes or lesions. Neurologic:  Neurovascularly intact without any focal sensory/motor deficits.  Cranial nerves: II-XII intact, grossly non-focal.  Left pacemaker site has been dressed small hematoma palpable otherwise site looks clean      Labs:   Recent Labs     01/25/21  1727 01/26/21  0616   WBC 14.5* 10.8   HGB 9.8* 8.6*   HCT 31.6* 26.9*    317     Recent Labs     01/25/21  1727 01/26/21  0604   * 134*   K 3.6 3.5   CL 96* 98*   CO2 20* 23   BUN 23* 26*   CREATININE 1.2 1.2   CALCIUM 8.4 8.1*     Recent Labs     01/25/21  1727   AST 45*   ALT 27   BILITOT 0.4   ALKPHOS 135*     Recent Labs     01/26/21 Also having discussed with the lab it seems that these are probably real results  I again reviewed the patient at around 720  Patient feels very well  Has not had any bleeding no chest pain no headache  At this point given his history of systolic heart failure EF of 40% noted to be in December 2020-risk for volume overload ands suspected underlying cancer putting him at at risk of venous thromboembolism I have elected not to give him any fresh frozen plasma particularly since he is not actively bleeding anywhere  I discussed this with the patient explained rationale behind treatment with vitamin K and the need for fresh frozen plasma if any bleeding does arise. Patient agreeable  I have discussed with the patient the rationale for blood transfusion, its benefits in treating or preventing reversal anticoagulant effect which could lead to fatigue, organ damage or death, and its risk which include: mild transfusion reactions, rare risk of blood borne infection, or more serious but rare allergic reactions. I have discussed the alternatives to transfusion, including the risk and consequences of not receiving transfusion. The patient had an opportunity to ask questions and had agreed to proceed with transfusion of fresh frozen plasma.  Co  I have signed out to the on-call hospitalist if he has any bleeding he will get fresh frozen plasma  I have ordered INR monitoring every 6 hours  Hemoglobin monitoring every 8 hours  He will be admitted to the ICU  I discussed all of this with the nurse taking care of him    Chronic moderate systolic heart failure  Currently patient seems euvolemic    Type 2 diabetes mellitus  Last A1c 10.6  Sliding scale insulin    Anemia  No evidence of active bleeding  Iron studies B12 folate reflect studies consistent with anemia of chronic disease    Acute kidney injury prerenal  Given his history of CHF will hold off on any IV fluids  Lasix and lisinopril on hold  Nephrology consult Monitor creatinine  Strict intake and output      DVT Prophylaxis: SCD  Diet: DIET CARB CONTROL;  Code Status: Full Code      Dispo - once acute medical processes have resolved    Rosa Huynh MD

## 2021-01-27 NOTE — TELEPHONE ENCOUNTER
Spoke with spouse. Pt is in the hospital per spouse he has pneumonia. Pt is having difficulty getting a good mask fit.  Spouse to ask pt to shave to see if that helps the seal. Since he is in the hospital there are respiratory therapists that may also be able to assist. Spouse states that he was not having difficulty at home with the seal.

## 2021-01-28 ENCOUNTER — APPOINTMENT (OUTPATIENT)
Dept: GENERAL RADIOLOGY | Age: 66
DRG: 871 | End: 2021-01-28
Payer: COMMERCIAL

## 2021-01-28 ENCOUNTER — APPOINTMENT (OUTPATIENT)
Dept: INTERVENTIONAL RADIOLOGY/VASCULAR | Age: 66
DRG: 871 | End: 2021-01-28
Payer: COMMERCIAL

## 2021-01-28 LAB
ANION GAP SERPL CALCULATED.3IONS-SCNC: 12 MMOL/L (ref 3–16)
APPEARANCE FLUID: NORMAL
BUN BLDV-MCNC: 29 MG/DL (ref 7–20)
CALCIUM SERPL-MCNC: 8.7 MG/DL (ref 8.3–10.6)
CELL COUNT FLUID TYPE: NORMAL
CHLORIDE BLD-SCNC: 101 MMOL/L (ref 99–110)
CLOT EVALUATION: NORMAL
CO2: 24 MMOL/L (ref 21–32)
COLOR FLUID: NORMAL
CREAT SERPL-MCNC: 0.9 MG/DL (ref 0.8–1.3)
FLUID PATH CONSULT: YES
FLUID TYPE: NORMAL
GFR AFRICAN AMERICAN: >60
GFR NON-AFRICAN AMERICAN: >60
GLUCOSE BLD-MCNC: 216 MG/DL (ref 70–99)
GLUCOSE BLD-MCNC: 238 MG/DL (ref 70–99)
GLUCOSE BLD-MCNC: 257 MG/DL (ref 70–99)
GLUCOSE BLD-MCNC: 260 MG/DL (ref 70–99)
GLUCOSE BLD-MCNC: 348 MG/DL (ref 70–99)
GLUCOSE, FLUID: 267 MG/DL
HCT VFR BLD CALC: 24.6 % (ref 40.5–52.5)
HCT VFR BLD CALC: 24.7 % (ref 40.5–52.5)
HCT VFR BLD CALC: 25.7 % (ref 40.5–52.5)
HCT VFR BLD CALC: 27.1 % (ref 40.5–52.5)
HEMOGLOBIN: 7.9 G/DL (ref 13.5–17.5)
HEMOGLOBIN: 7.9 G/DL (ref 13.5–17.5)
HEMOGLOBIN: 8 G/DL (ref 13.5–17.5)
HEMOGLOBIN: 8.5 G/DL (ref 13.5–17.5)
INR BLD: 1.45 (ref 0.86–1.14)
INR BLD: 1.52 (ref 0.86–1.14)
INR BLD: 1.52 (ref 0.86–1.14)
INR BLD: 1.71 (ref 0.86–1.14)
LD, FLUID: 1330 U/L
LYMPHOCYTES, BODY FLUID: 71 %
MACROPHAGE FLUID: 6 %
MESOTHELIAL FLUID: 15 %
NEUTROPHIL, FLUID: 8 %
NUCLEATED CELLS FLUID: 786 /CUMM
NUMBER OF CELLS COUNTED FLUID: 100
PERFORMED ON: ABNORMAL
POTASSIUM REFLEX MAGNESIUM: 3.6 MMOL/L (ref 3.5–5.1)
POTASSIUM SERPL-SCNC: 3.6 MMOL/L (ref 3.5–5.1)
PROTEIN FLUID: 2.8 G/DL
PROTHROMBIN TIME: 16.9 SEC (ref 10–13.2)
PROTHROMBIN TIME: 17.7 SEC (ref 10–13.2)
PROTHROMBIN TIME: 17.7 SEC (ref 10–13.2)
PROTHROMBIN TIME: 20 SEC (ref 10–13.2)
RBC FLUID: NORMAL /CUMM
SODIUM BLD-SCNC: 137 MMOL/L (ref 136–145)

## 2021-01-28 PROCEDURE — 2700000000 HC OXYGEN THERAPY PER DAY

## 2021-01-28 PROCEDURE — 36415 COLL VENOUS BLD VENIPUNCTURE: CPT

## 2021-01-28 PROCEDURE — 6370000000 HC RX 637 (ALT 250 FOR IP): Performed by: INTERNAL MEDICINE

## 2021-01-28 PROCEDURE — 32555 ASPIRATE PLEURA W/ IMAGING: CPT

## 2021-01-28 PROCEDURE — 88342 IMHCHEM/IMCYTCHM 1ST ANTB: CPT

## 2021-01-28 PROCEDURE — 89051 BODY FLUID CELL COUNT: CPT

## 2021-01-28 PROCEDURE — 94761 N-INVAS EAR/PLS OXIMETRY MLT: CPT

## 2021-01-28 PROCEDURE — 6360000002 HC RX W HCPCS: Performed by: INTERNAL MEDICINE

## 2021-01-28 PROCEDURE — C1729 CATH, DRAINAGE: HCPCS

## 2021-01-28 PROCEDURE — 6370000000 HC RX 637 (ALT 250 FOR IP): Performed by: PHYSICIAN ASSISTANT

## 2021-01-28 PROCEDURE — 2580000003 HC RX 258: Performed by: PHYSICIAN ASSISTANT

## 2021-01-28 PROCEDURE — 2500000003 HC RX 250 WO HCPCS: Performed by: INTERNAL MEDICINE

## 2021-01-28 PROCEDURE — 85610 PROTHROMBIN TIME: CPT

## 2021-01-28 PROCEDURE — 2580000003 HC RX 258: Performed by: INTERNAL MEDICINE

## 2021-01-28 PROCEDURE — 80048 BASIC METABOLIC PNL TOTAL CA: CPT

## 2021-01-28 PROCEDURE — 0W993ZZ DRAINAGE OF RIGHT PLEURAL CAVITY, PERCUTANEOUS APPROACH: ICD-10-PCS | Performed by: INTERNAL MEDICINE

## 2021-01-28 PROCEDURE — 84157 ASSAY OF PROTEIN OTHER: CPT

## 2021-01-28 PROCEDURE — 88341 IMHCHEM/IMCYTCHM EA ADD ANTB: CPT

## 2021-01-28 PROCEDURE — 82728 ASSAY OF FERRITIN: CPT

## 2021-01-28 PROCEDURE — 71045 X-RAY EXAM CHEST 1 VIEW: CPT

## 2021-01-28 PROCEDURE — 82945 GLUCOSE OTHER FLUID: CPT

## 2021-01-28 PROCEDURE — 85014 HEMATOCRIT: CPT

## 2021-01-28 PROCEDURE — 88305 TISSUE EXAM BY PATHOLOGIST: CPT

## 2021-01-28 PROCEDURE — 2000000000 HC ICU R&B

## 2021-01-28 PROCEDURE — 88112 CYTOPATH CELL ENHANCE TECH: CPT

## 2021-01-28 PROCEDURE — 85018 HEMOGLOBIN: CPT

## 2021-01-28 PROCEDURE — 94640 AIRWAY INHALATION TREATMENT: CPT

## 2021-01-28 PROCEDURE — 6360000002 HC RX W HCPCS: Performed by: PHYSICIAN ASSISTANT

## 2021-01-28 PROCEDURE — 83615 LACTATE (LD) (LDH) ENZYME: CPT

## 2021-01-28 RX ORDER — IPRATROPIUM BROMIDE AND ALBUTEROL SULFATE 2.5; .5 MG/3ML; MG/3ML
1 SOLUTION RESPIRATORY (INHALATION)
Status: DISCONTINUED | OUTPATIENT
Start: 2021-01-28 | End: 2021-01-31

## 2021-01-28 RX ORDER — LIDOCAINE HYDROCHLORIDE 10 MG/ML
20 INJECTION, SOLUTION EPIDURAL; INFILTRATION; INTRACAUDAL; PERINEURAL ONCE
Status: COMPLETED | OUTPATIENT
Start: 2021-01-28 | End: 2021-01-28

## 2021-01-28 RX ORDER — TRAMADOL HYDROCHLORIDE 50 MG/1
50 TABLET ORAL EVERY 6 HOURS PRN
Status: DISCONTINUED | OUTPATIENT
Start: 2021-01-28 | End: 2021-02-06

## 2021-01-28 RX ADMIN — Medication 2 PUFF: at 08:45

## 2021-01-28 RX ADMIN — PANTOPRAZOLE SODIUM 40 MG: 40 TABLET, DELAYED RELEASE ORAL at 06:33

## 2021-01-28 RX ADMIN — CEFEPIME 2000 MG: 2 INJECTION, POWDER, FOR SOLUTION INTRAVENOUS at 14:07

## 2021-01-28 RX ADMIN — ENOXAPARIN SODIUM 90 MG: 100 INJECTION SUBCUTANEOUS at 20:17

## 2021-01-28 RX ADMIN — TRAMADOL HYDROCHLORIDE 50 MG: 50 TABLET, FILM COATED ORAL at 20:33

## 2021-01-28 RX ADMIN — METOPROLOL TARTRATE 75 MG: 50 TABLET, FILM COATED ORAL at 10:33

## 2021-01-28 RX ADMIN — INSULIN LISPRO 12 UNITS: 100 INJECTION, SOLUTION INTRAVENOUS; SUBCUTANEOUS at 17:29

## 2021-01-28 RX ADMIN — IPRATROPIUM BROMIDE AND ALBUTEROL SULFATE 1 AMPULE: .5; 3 SOLUTION RESPIRATORY (INHALATION) at 22:17

## 2021-01-28 RX ADMIN — INSULIN LISPRO 9 UNITS: 100 INJECTION, SOLUTION INTRAVENOUS; SUBCUTANEOUS at 10:37

## 2021-01-28 RX ADMIN — INSULIN LISPRO 3 UNITS: 100 INJECTION, SOLUTION INTRAVENOUS; SUBCUTANEOUS at 20:19

## 2021-01-28 RX ADMIN — INSULIN LISPRO 12 UNITS: 100 INJECTION, SOLUTION INTRAVENOUS; SUBCUTANEOUS at 12:23

## 2021-01-28 RX ADMIN — Medication 1 CAPSULE: at 10:34

## 2021-01-28 RX ADMIN — INSULIN GLARGINE 20 UNITS: 100 INJECTION, SOLUTION SUBCUTANEOUS at 20:19

## 2021-01-28 RX ADMIN — INSULIN LISPRO 12 UNITS: 100 INJECTION, SOLUTION INTRAVENOUS; SUBCUTANEOUS at 10:37

## 2021-01-28 RX ADMIN — Medication 1 CAPSULE: at 17:27

## 2021-01-28 RX ADMIN — Medication 2 PUFF: at 12:48

## 2021-01-28 RX ADMIN — INSULIN GLARGINE 10 UNITS: 100 INJECTION, SOLUTION SUBCUTANEOUS at 10:36

## 2021-01-28 RX ADMIN — TAMSULOSIN HYDROCHLORIDE 0.4 MG: 0.4 CAPSULE ORAL at 20:18

## 2021-01-28 RX ADMIN — AZITHROMYCIN MONOHYDRATE 500 MG: 500 INJECTION, POWDER, LYOPHILIZED, FOR SOLUTION INTRAVENOUS at 01:48

## 2021-01-28 RX ADMIN — ACETAMINOPHEN 650 MG: 325 TABLET ORAL at 17:27

## 2021-01-28 RX ADMIN — VANCOMYCIN HYDROCHLORIDE 1250 MG: 10 INJECTION, POWDER, LYOPHILIZED, FOR SOLUTION INTRAVENOUS at 23:00

## 2021-01-28 RX ADMIN — INSULIN LISPRO 6 UNITS: 100 INJECTION, SOLUTION INTRAVENOUS; SUBCUTANEOUS at 17:29

## 2021-01-28 RX ADMIN — LIDOCAINE HYDROCHLORIDE 8 ML: 10 INJECTION, SOLUTION EPIDURAL; INFILTRATION; INTRACAUDAL; PERINEURAL at 11:10

## 2021-01-28 RX ADMIN — Medication 10 ML: at 21:15

## 2021-01-28 RX ADMIN — CEFEPIME 2000 MG: 2 INJECTION, POWDER, FOR SOLUTION INTRAVENOUS at 20:18

## 2021-01-28 RX ADMIN — Medication 10 ML: at 10:22

## 2021-01-28 RX ADMIN — ALLOPURINOL 300 MG: 300 TABLET ORAL at 17:27

## 2021-01-28 RX ADMIN — CEFEPIME 2000 MG: 2 INJECTION, POWDER, FOR SOLUTION INTRAVENOUS at 04:53

## 2021-01-28 RX ADMIN — ACETAMINOPHEN 650 MG: 325 TABLET ORAL at 23:54

## 2021-01-28 RX ADMIN — METOPROLOL TARTRATE 75 MG: 50 TABLET, FILM COATED ORAL at 20:18

## 2021-01-28 RX ADMIN — FINASTERIDE 5 MG: 5 TABLET, FILM COATED ORAL at 10:33

## 2021-01-28 RX ADMIN — AMIODARONE HYDROCHLORIDE 200 MG: 200 TABLET ORAL at 10:33

## 2021-01-28 RX ADMIN — ATORVASTATIN CALCIUM 20 MG: 20 TABLET, FILM COATED ORAL at 20:18

## 2021-01-28 RX ADMIN — IPRATROPIUM BROMIDE AND ALBUTEROL SULFATE 1 AMPULE: .5; 3 SOLUTION RESPIRATORY (INHALATION) at 17:15

## 2021-01-28 RX ADMIN — TRAMADOL HYDROCHLORIDE 25 MG: 50 TABLET, FILM COATED ORAL at 10:22

## 2021-01-28 RX ADMIN — Medication 2 PUFF: at 22:19

## 2021-01-28 RX ADMIN — VANCOMYCIN HYDROCHLORIDE 1250 MG: 10 INJECTION, POWDER, LYOPHILIZED, FOR SOLUTION INTRAVENOUS at 12:10

## 2021-01-28 ASSESSMENT — PAIN SCALES - GENERAL
PAINLEVEL_OUTOF10: 5
PAINLEVEL_OUTOF10: 7
PAINLEVEL_OUTOF10: 10
PAINLEVEL_OUTOF10: 0
PAINLEVEL_OUTOF10: 4

## 2021-01-28 ASSESSMENT — PAIN DESCRIPTION - ORIENTATION: ORIENTATION: RIGHT;LEFT;MID

## 2021-01-28 NOTE — PROGRESS NOTES
Prior to doing the thoracentsis I spoke extensively to the patient about options for diagnosis. Because the lung nodules are very small the diagnostic yield is low but the typical complications (pneumothorax, bleeding) still exist. If the Pleural fluid is negative for malignancy, the need for CT guided lung bx or bronchoscopy can be reconsidered.

## 2021-01-28 NOTE — CONSULTS
Endocrinology    Thanks for calling. .    A 72year-old type 2 diabetic [facility] man (a  by trade) who has started insulin about 2 weeks ago has uncontrolled blood sugars near 300 mg/dl . Pres Ill: Patient developed increased insulin requirements after he had a cardiac arrest with attempted intubation on 12-2-2020. He eventually required a pacemaker (he thinks AICD). He has history of pneumonia with a right side pleural based nodule suspicious for neoplasm. He has anorexia and has lost weight from 240 to 210 lbs in the last month or so although he is unaware of thyroid disease and has no other overt symptoms of hyperthyroidism (other than palpitations), no heat intolerance, no diarrhea, no goiter. His baseline HbA1c was 6.5% in the summer, but more recently he has HbA1c near 10%. He had a distant history of cigarette smoking, but not for many years. Until he became insulin dependent he was controlling diabetes with diet and metformin. Pat hx ROS:  Neuro: No history of stroke. Resp: Distant smoking history but not for > 30 years. Recent pneumonia, suspect pleural based lung ca but no biopsy as yet, planned needle biopsy tomorrow. CV: Arrest after bronchospasm 12-2-2020, AICD since, marked weight loss, ? Diuresis   GI: Denies abdominal pain, no diarrhea  : Has one child, son  Endocrine: No history of thyroid disease known, Blood sugars near 300 mg/dl on 10 units insulin at meals and 25 units lantus each evening.   Heme-Onc: Anticoagulated but INR up to 7.36 on 1-, now down to 1.99 as of 1- at 09:30.    /72; Resp 16; Pulse 79; Temp 97.2 deg F  HEENT: EOM;s intact  Neck: Not stiff  Chest: No wheezing  Heart: S1 and S2  Abd: Nontender  Ext: No cyanosis    Assessment: 1. Type 2 IDDM exacerbated by stress of illness. Split Lantus dose may help along with slightly higher bolus dose and basal dose. Eventually may be GLP-1 or SGLT-2 candidate, he would like to try [facility] as well. 2. Needle biopsy CT directid per interventional radiology. as INR down to acceptable level. 3. NAFLD: Likely with uncontrolled IDDM, also probably contributed to coumadin sensitivity. 4. Post cardiac arrest, and pacemaker  5. Levaquin allergic per chart    Plan:  1. Change to 20 units Lantus twice daily but give only half of the 20 units when NPO for a biopsy in AM.  2. Increase mealtime insulin to 12 units but aim for 1 unit per 7 grams of carbohydrates. Glucagon and IV glucose ordered for any hypoglycemia. 3. Freestyle Hong when possible. 4. Follow-up in my office in about a week. 5. Antibiotics currently per ICU staff. JOSÉ Cartwright M.D.  [phone], please call for any questions.

## 2021-01-28 NOTE — PROGRESS NOTES
Hospitalist Progress Note      PCP: Altagracia Rogel MD    Date of Admission: 1/25/2021    Chief Complaint: Weakness    Hospital Course:   Currently reports feeling better  Denies any chest pain  No epistaxis  No bleeding from anywhere else  No chest pain shortness of breath  Thoracentesis planned for today. Medications:  Reviewed      Exam:    /86   Pulse 72   Temp 97.7 °F (36.5 °C) (Temporal)   Resp 20   Ht 5' 9\" (1.753 m)   Wt 205 lb 0.4 oz (93 kg)   SpO2 95%   BMI 30.28 kg/m²     General appearance: No apparent distress, appears stated age and cooperative. HEENT: Pupils equal, round, and reactive to light. Conjunctivae/corneas clear. Neck: Supple, with full range of motion. No jugular venous distention. Trachea midline. Respiratory:  Normal respiratory effort. Clear to auscultation, bilaterally without RALES/WHEEZES/Rhonchi. Cardiovascular: Regular rate and rhythm with normal S1/S2 without MURMURS, rubs or gallops. Abdomen: Soft, non-tender, non-distended with normal bowel sounds. Musculoskeletal: No clubbing, cyanosis or EDEMA bilaterally. Full range of motion without deformity. Skin: Skin color, texture, turgor normal.  No rashes or lesions. Neurologic:  Neurovascularly intact without any focal sensory/motor deficits. Cranial nerves: II-XII intact, grossly non-focal.      Labs:   Recent Labs     01/25/21  1727 01/26/21  0616 01/26/21  0616 01/27/21  1959 01/28/21  0205 01/28/21  1000   WBC 14.5* 10.8  --   --   --   --    HGB 9.8* 8.6*   < > 8.3* 7.9* 8.5*   HCT 31.6* 26.9*   < > 26.4* 24.7* 27.1*    317  --   --   --   --     < > = values in this interval not displayed.      Recent Labs     01/26/21  0604 01/27/21  0930 01/28/21  1000   * 132* 137   K 3.5 3.9 3.6  3.6   CL 98* 97* 101   CO2 23 20* 24   BUN 26* 34* 29*   CREATININE 1.2 1.0 0.9   CALCIUM 8.1* 8.2* 8.7     Recent Labs     01/25/21  1727   AST 45*   ALT 27   BILITOT 0.4   ALKPHOS 135* Recent Labs     01/27/21 1959 01/28/21  0205 01/28/21  1000   INR 1.55* 1.52* 1.45*     Recent Labs     01/25/21 1951   TROPONINI 0.03*       Urinalysis:      Lab Results   Component Value Date    NITRU Negative 01/01/2021    BACTERIA 0 02/13/2020    RBCUA 0 02/13/2020    BLOODU Negative 01/01/2021    SPECGRAV 1.015 01/01/2021    GLUCOSEU >=1000 01/01/2021       Radiology:  XR CHEST 1 VIEW   Final Result   Slight pulmonary vascular congestion. Mild right basilar atelectasis. No   pneumothorax following thoracentesis. IR GUIDED THORACENTESIS PLEURAL   Final Result   Successful ultrasound guided thoracentesis. IR GUIDED THORACENTESIS PLEURAL   Final Result   Trace right pleural effusion. No safe window for procedure at this time. Thoracentesis was not performed. XR CHEST PORTABLE   Final Result   Stable cardiomegaly with mild central pulmonary congestion which is more   prominent. Questionable early infiltrate or atelectasis along the right lung base which   is more apparent. CT NEEDLE BIOPSY LUNG PERCUTANEOUS    (Results Pending)       Assessment/Plan:    Active Hospital Problems    Diagnosis Date Noted    Supratherapeutic INR [R79.1] 01/26/2021    Pneumonia [J18.9] 01/25/2021     72year-old gentleman admitted to the hospital with some weakness    Pneumonia suspected gram-positive/gram-negative  On vancomycin cefepime and azithromycin  COVID-19 negative  Afebrile  Currently off oxygen    Lung mass with nodules  Suspected cancer  Previously bronchoscopy attempted had a brief cardiac arrest  Seen by pulmonary  He would like to follow-up with the pulmonologist at [facility].  Thoracentesis today  If the Pleural fluid is negative for malignancy, then need for CT guided lung bx or bronchoscopy can be reconsidered. Hold home coumadin in case biopsy is indicated.  Switch to therapeutic Lovenox for now     Supratherapeutic INR INR noted noted to be greater than 14 on admission  On coumadin at home for Afib   Vitamin K 10 mg given  No evidence of bleeding no epistaxis  Close INR monitoring   Resume anticoagulation after workup is complete per EP    Chronic moderate systolic heart failure  Currently patient seems euvolemic    Type 2 diabetes mellitus  Last A1c 10.6  Sliding scale insulin  Insulin dose adjusted  Endocrinology consulted     Anemia  No evidence of active bleeding  Iron studies B12 folate reflect studies consistent with anemia of chronic disease    Acute kidney injury prerenal  Given his history of CHF will hold off on any IV fluids  Lasix and lisinopril on hold  Nephrology following   Monitor creatinine  Strict intake and output    Chronic atrial fibrillation s/p pacemaker insertion on the first of this month  Pacemaker site has a small hematoma which was also noticed on discharge  Seen by Cardiology     DVT Prophylaxis: Lovenox  Diet: DIET CARB CONTROL; Carb Control: 4 carb choices (60 gms)/meal  Code Status: Full Code      Dispo - once acute medical processes have resolved    Johnny Nixon MD

## 2021-01-28 NOTE — PROGRESS NOTES
Nephrology Progress Note        [phone]        [email or web address]          Assessment/Plan:    Non-oliguric NI:  -Baseline Cr 0.7-0.9 per chart review  -Etiology of NI likely due to diminished renal perfusion in setting of ACEi/diuretic  -Cr improving this AM and back to baseline  -Can likely restart diuretics tomorrow given LE edema  -Continue to hold ACEi at this time    Hypertension:  -At goal  -Continue current regimen  -Hold ACEi as above    ABLA/Anemia of Malignancy:  -Near goal  -Defer GIGI therapy to Oncology    PNA:  -On abx  -Management per Primary Team    Lung Mass with LAD:  -S/p thoracentesis today  -May need CT guided lung biopsy vs. bronchoscopy (previously had cardiac arrest during bronchoscopy in 12/2020)  -Management per Primary Team/Oncology    Supratherapeutic INR/Epistaxis:  -Initial INR >14  -Management per Primary Team    Persistent AFib:  -S/p recent AICD  -Management per EP        Jahaira Ruth MD, [facility]  1/28/2021  [facility]                                                                                                                                                                                                                                                                                                    Consult: NI  Brief HPI: 72 y.o. male admitted for PNA with known R lung mass, found with epistaxis in setting of supratherapeutic INR      Subjective:  -No acute events overnight  -Cr improving this AM and back to baseline      Review of Systems: No new/active complaints. All other ROS negative. Social History: Visitor at bedside.  All questions answered      Medications:  Scheduled Meds:   enoxaparin  1 mg/kg Subcutaneous BID    insulin lispro  0-18 Units Subcutaneous TID WC    insulin lispro  0-9 Units Subcutaneous Nightly    vancomycin  1,250 mg Intravenous Q12H    insulin lispro  12 Units Subcutaneous TID  LYMPHSABS 1.0 01/25/2021    EOSABS 0.0 01/25/2021    BASOSABS 0.0 01/25/2021     BMP:    Lab Results   Component Value Date     01/28/2021    K 3.6 01/28/2021    K 3.6 01/28/2021     01/28/2021    CO2 24 01/28/2021    BUN 29 01/28/2021    LABALBU 2.6 01/25/2021    CREATININE 0.9 01/28/2021    CALCIUM 8.7 01/28/2021    GFRAA >60 01/28/2021    LABGLOM >60 01/28/2021    LABGLOM 92 06/08/2018    GLUCOSE 260 01/28/2021     U/A:    Lab Results   Component Value Date    COLORU YELLOW 01/01/2021    PROTEINU Negative 01/01/2021    PHUR 5.5 01/01/2021    RBCUA 0 02/13/2020    YEAST 0 02/13/2020    BACTERIA 0 02/13/2020    CLARITYU Clear 01/01/2021    SPECGRAV 1.015 01/01/2021    LEUKOCYTESUR Negative 01/01/2021    UROBILINOGEN 0.2 01/01/2021    BILIRUBINUR Negative 01/01/2021    BLOODU Negative 01/01/2021    GLUCOSEU >=1000 01/01/2021

## 2021-01-29 LAB
ANION GAP SERPL CALCULATED.3IONS-SCNC: 10 MMOL/L (ref 3–16)
BUN BLDV-MCNC: 25 MG/DL (ref 7–20)
CALCIUM SERPL-MCNC: 8.5 MG/DL (ref 8.3–10.6)
CHLORIDE BLD-SCNC: 100 MMOL/L (ref 99–110)
CO2: 23 MMOL/L (ref 21–32)
CREAT SERPL-MCNC: 0.8 MG/DL (ref 0.8–1.3)
FERRITIN: 547.6 NG/ML (ref 30–400)
GFR AFRICAN AMERICAN: >60
GFR NON-AFRICAN AMERICAN: >60
GLUCOSE BLD-MCNC: 180 MG/DL (ref 70–99)
GLUCOSE BLD-MCNC: 206 MG/DL (ref 70–99)
GLUCOSE BLD-MCNC: 217 MG/DL (ref 70–99)
GLUCOSE BLD-MCNC: 248 MG/DL (ref 70–99)
GLUCOSE BLD-MCNC: 349 MG/DL (ref 70–99)
HCT VFR BLD CALC: 25.5 % (ref 40.5–52.5)
HCT VFR BLD CALC: 26.4 % (ref 40.5–52.5)
HCT VFR BLD CALC: 26.6 % (ref 40.5–52.5)
HEMOGLOBIN: 8.2 G/DL (ref 13.5–17.5)
HEMOGLOBIN: 8.3 G/DL (ref 13.5–17.5)
HEMOGLOBIN: 8.3 G/DL (ref 13.5–17.5)
INR BLD: 1.67 (ref 0.86–1.14)
LACTATE DEHYDROGENASE: 2494 U/L (ref 100–190)
MRSA CULTURE ONLY: NORMAL
PERFORMED ON: ABNORMAL
POTASSIUM SERPL-SCNC: 3.6 MMOL/L (ref 3.5–5.1)
PROTHROMBIN TIME: 19.5 SEC (ref 10–13.2)
SODIUM BLD-SCNC: 133 MMOL/L (ref 136–145)
VANCOMYCIN TROUGH: 11.7 UG/ML (ref 10–20)

## 2021-01-29 PROCEDURE — 2060000000 HC ICU INTERMEDIATE R&B

## 2021-01-29 PROCEDURE — 94640 AIRWAY INHALATION TREATMENT: CPT

## 2021-01-29 PROCEDURE — 2580000003 HC RX 258: Performed by: INTERNAL MEDICINE

## 2021-01-29 PROCEDURE — 6370000000 HC RX 637 (ALT 250 FOR IP): Performed by: INTERNAL MEDICINE

## 2021-01-29 PROCEDURE — 85014 HEMATOCRIT: CPT

## 2021-01-29 PROCEDURE — 6370000000 HC RX 637 (ALT 250 FOR IP): Performed by: PHYSICIAN ASSISTANT

## 2021-01-29 PROCEDURE — 36415 COLL VENOUS BLD VENIPUNCTURE: CPT

## 2021-01-29 PROCEDURE — 94761 N-INVAS EAR/PLS OXIMETRY MLT: CPT

## 2021-01-29 PROCEDURE — 83735 ASSAY OF MAGNESIUM: CPT

## 2021-01-29 PROCEDURE — 2580000003 HC RX 258: Performed by: PHYSICIAN ASSISTANT

## 2021-01-29 PROCEDURE — 83615 LACTATE (LD) (LDH) ENZYME: CPT

## 2021-01-29 PROCEDURE — 2700000000 HC OXYGEN THERAPY PER DAY

## 2021-01-29 PROCEDURE — 6360000002 HC RX W HCPCS: Performed by: INTERNAL MEDICINE

## 2021-01-29 PROCEDURE — 6360000002 HC RX W HCPCS: Performed by: PHYSICIAN ASSISTANT

## 2021-01-29 PROCEDURE — 80048 BASIC METABOLIC PNL TOTAL CA: CPT

## 2021-01-29 PROCEDURE — 85018 HEMOGLOBIN: CPT

## 2021-01-29 PROCEDURE — 85610 PROTHROMBIN TIME: CPT

## 2021-01-29 PROCEDURE — 80202 ASSAY OF VANCOMYCIN: CPT

## 2021-01-29 RX ORDER — POTASSIUM CHLORIDE 20 MEQ/1
40 TABLET, EXTENDED RELEASE ORAL
Status: DISCONTINUED | OUTPATIENT
Start: 2021-01-29 | End: 2021-02-08 | Stop reason: HOSPADM

## 2021-01-29 RX ORDER — ZOLPIDEM TARTRATE 5 MG/1
5 TABLET ORAL NIGHTLY PRN
Status: DISCONTINUED | OUTPATIENT
Start: 2021-01-29 | End: 2021-02-08 | Stop reason: HOSPADM

## 2021-01-29 RX ORDER — FUROSEMIDE 10 MG/ML
40 INJECTION INTRAMUSCULAR; INTRAVENOUS 2 TIMES DAILY
Status: DISCONTINUED | OUTPATIENT
Start: 2021-01-29 | End: 2021-02-01

## 2021-01-29 RX ADMIN — PANTOPRAZOLE SODIUM 40 MG: 40 TABLET, DELAYED RELEASE ORAL at 06:17

## 2021-01-29 RX ADMIN — IPRATROPIUM BROMIDE AND ALBUTEROL SULFATE 1 AMPULE: .5; 3 SOLUTION RESPIRATORY (INHALATION) at 15:31

## 2021-01-29 RX ADMIN — CEFEPIME 2000 MG: 2 INJECTION, POWDER, FOR SOLUTION INTRAVENOUS at 21:02

## 2021-01-29 RX ADMIN — Medication 1 CAPSULE: at 16:24

## 2021-01-29 RX ADMIN — ALLOPURINOL 300 MG: 300 TABLET ORAL at 16:24

## 2021-01-29 RX ADMIN — METOPROLOL TARTRATE 75 MG: 50 TABLET, FILM COATED ORAL at 21:02

## 2021-01-29 RX ADMIN — HYDROXYZINE PAMOATE 50 MG: 25 CAPSULE ORAL at 17:11

## 2021-01-29 RX ADMIN — INSULIN GLARGINE 20 UNITS: 100 INJECTION, SOLUTION SUBCUTANEOUS at 09:12

## 2021-01-29 RX ADMIN — INSULIN LISPRO 6 UNITS: 100 INJECTION, SOLUTION INTRAVENOUS; SUBCUTANEOUS at 09:12

## 2021-01-29 RX ADMIN — CEFEPIME 2000 MG: 2 INJECTION, POWDER, FOR SOLUTION INTRAVENOUS at 13:42

## 2021-01-29 RX ADMIN — INSULIN GLARGINE 20 UNITS: 100 INJECTION, SOLUTION SUBCUTANEOUS at 21:01

## 2021-01-29 RX ADMIN — Medication 2 PUFF: at 19:33

## 2021-01-29 RX ADMIN — ACETAMINOPHEN 650 MG: 325 TABLET ORAL at 16:24

## 2021-01-29 RX ADMIN — IPRATROPIUM BROMIDE AND ALBUTEROL SULFATE 1 AMPULE: .5; 3 SOLUTION RESPIRATORY (INHALATION) at 23:00

## 2021-01-29 RX ADMIN — Medication 10 ML: at 10:09

## 2021-01-29 RX ADMIN — POTASSIUM CHLORIDE 40 MEQ: 1500 TABLET, EXTENDED RELEASE ORAL at 16:24

## 2021-01-29 RX ADMIN — AMIODARONE HYDROCHLORIDE 200 MG: 200 TABLET ORAL at 09:59

## 2021-01-29 RX ADMIN — CEFEPIME 2000 MG: 2 INJECTION, POWDER, FOR SOLUTION INTRAVENOUS at 04:47

## 2021-01-29 RX ADMIN — ATORVASTATIN CALCIUM 20 MG: 20 TABLET, FILM COATED ORAL at 21:02

## 2021-01-29 RX ADMIN — IPRATROPIUM BROMIDE AND ALBUTEROL SULFATE 1 AMPULE: .5; 3 SOLUTION RESPIRATORY (INHALATION) at 07:56

## 2021-01-29 RX ADMIN — FINASTERIDE 5 MG: 5 TABLET, FILM COATED ORAL at 09:59

## 2021-01-29 RX ADMIN — INSULIN LISPRO 12 UNITS: 100 INJECTION, SOLUTION INTRAVENOUS; SUBCUTANEOUS at 17:13

## 2021-01-29 RX ADMIN — FUROSEMIDE 40 MG: 10 INJECTION, SOLUTION INTRAMUSCULAR; INTRAVENOUS at 17:18

## 2021-01-29 RX ADMIN — METOPROLOL TARTRATE 75 MG: 50 TABLET, FILM COATED ORAL at 09:59

## 2021-01-29 RX ADMIN — TRAMADOL HYDROCHLORIDE 50 MG: 50 TABLET, FILM COATED ORAL at 09:38

## 2021-01-29 RX ADMIN — TAMSULOSIN HYDROCHLORIDE 0.4 MG: 0.4 CAPSULE ORAL at 21:02

## 2021-01-29 RX ADMIN — Medication 1 CAPSULE: at 09:58

## 2021-01-29 RX ADMIN — ENOXAPARIN SODIUM 90 MG: 100 INJECTION SUBCUTANEOUS at 21:01

## 2021-01-29 RX ADMIN — VANCOMYCIN HYDROCHLORIDE 1250 MG: 10 INJECTION, POWDER, LYOPHILIZED, FOR SOLUTION INTRAVENOUS at 10:04

## 2021-01-29 RX ADMIN — AZITHROMYCIN MONOHYDRATE 500 MG: 500 INJECTION, POWDER, LYOPHILIZED, FOR SOLUTION INTRAVENOUS at 01:50

## 2021-01-29 RX ADMIN — IPRATROPIUM BROMIDE AND ALBUTEROL SULFATE 1 AMPULE: .5; 3 SOLUTION RESPIRATORY (INHALATION) at 11:37

## 2021-01-29 RX ADMIN — INSULIN LISPRO 12 UNITS: 100 INJECTION, SOLUTION INTRAVENOUS; SUBCUTANEOUS at 13:22

## 2021-01-29 RX ADMIN — Medication 2 PUFF: at 07:56

## 2021-01-29 RX ADMIN — INSULIN LISPRO 3 UNITS: 100 INJECTION, SOLUTION INTRAVENOUS; SUBCUTANEOUS at 17:13

## 2021-01-29 RX ADMIN — INSULIN LISPRO 3 UNITS: 100 INJECTION, SOLUTION INTRAVENOUS; SUBCUTANEOUS at 21:00

## 2021-01-29 RX ADMIN — ENOXAPARIN SODIUM 90 MG: 100 INJECTION SUBCUTANEOUS at 09:59

## 2021-01-29 RX ADMIN — INSULIN LISPRO 12 UNITS: 100 INJECTION, SOLUTION INTRAVENOUS; SUBCUTANEOUS at 09:14

## 2021-01-29 RX ADMIN — INSULIN LISPRO 12 UNITS: 100 INJECTION, SOLUTION INTRAVENOUS; SUBCUTANEOUS at 13:20

## 2021-01-29 ASSESSMENT — PAIN SCALES - GENERAL
PAINLEVEL_OUTOF10: 5
PAINLEVEL_OUTOF10: 8
PAINLEVEL_OUTOF10: 8
PAINLEVEL_OUTOF10: 5

## 2021-01-29 ASSESSMENT — PAIN DESCRIPTION - ONSET
ONSET: ON-GOING

## 2021-01-29 ASSESSMENT — PAIN - FUNCTIONAL ASSESSMENT: PAIN_FUNCTIONAL_ASSESSMENT: ACTIVITIES ARE NOT PREVENTED

## 2021-01-29 ASSESSMENT — PAIN DESCRIPTION - DESCRIPTORS
DESCRIPTORS: ACHING;CONSTANT
DESCRIPTORS: ACHING;CONSTANT;DISCOMFORT;NAGGING

## 2021-01-29 ASSESSMENT — PAIN DESCRIPTION - PROGRESSION
CLINICAL_PROGRESSION: NOT CHANGED
CLINICAL_PROGRESSION: NOT CHANGED

## 2021-01-29 ASSESSMENT — PAIN DESCRIPTION - FREQUENCY
FREQUENCY: CONTINUOUS
FREQUENCY: CONTINUOUS

## 2021-01-29 ASSESSMENT — PAIN DESCRIPTION - ORIENTATION
ORIENTATION: RIGHT;LEFT;MID
ORIENTATION: LOWER;MID
ORIENTATION: RIGHT;LEFT;MID

## 2021-01-29 ASSESSMENT — PAIN DESCRIPTION - LOCATION
LOCATION: BACK

## 2021-01-29 NOTE — PROGRESS NOTES
[facility]   Progress Note      SUBJECTIVE:      Events noted. Feeling better. Had thoracentesis-30 mL removed. No bleeding. OBJECTIVE    Physical  VITALS:  /68   Pulse 78   Temp 97.6 °F (36.4 °C) (Temporal)   Resp 19   Ht 5' 9\" (1.753 m)   Wt 205 lb 0.4 oz (93 kg)   SpO2 98%   BMI 30.28 kg/m²   TEMPERATURE:  Current - Temp: 97.6 °F (36.4 °C); Max - Temp  Av.3 °F (36.3 °C)  Min: 96.8 °F (36 °C)  Max: 97.7 °F (36.5 °C)  BLOOD PRESSURE RANGE:  Systolic (95KPU), LDM:149 , Min:110 , ADQ:710   ; Diastolic (29MBL), GEH:11, Min:62, Max:86    24HR INTAKE/OUTPUT:  No intake or output data in the 24 hours ending 21 2150    Conscious alert oriented. Appears comfortable. No neck fullness. Respiratory efforts are normal.  Abdomen is not distended. No leg edema  No focal deficits.     Data  Labs:  General Labs:  CBC with Differential:    Lab Results   Component Value Date    WBC 10.8 2021    RBC 3.25 2021    HGB 8.0 2021    HCT 25.7 2021     2021    MCV 82.9 2021    MCH 26.6 2021    MCHC 32.1 2021    RDW 17.9 2021    SEGSPCT 68.9 2020    LYMPHOPCT 7.0 2021    MONOPCT 6.0 2021    BASOPCT 0.0 2021    MONOSABS 0.9 2021    LYMPHSABS 1.0 2021    EOSABS 0.0 2021    BASOSABS 0.0 2021     BMP:    Lab Results   Component Value Date     2021    K 3.6 2021    K 3.6 2021     2021    CO2 24 2021    BUN 29 2021    LABALBU 2.6 2021    CREATININE 0.9 2021    CALCIUM 8.7 2021    GFRAA >60 2021    LABGLOM >60 2021    LABGLOM 92 2018    GLUCOSE 260 2021     Hepatic Function Panel:    Lab Results   Component Value Date    ALKPHOS 135 2021    ALT 27 2021    AST 45 2021    PROT 6.9 2021    PROT 6.2 2012    BILITOT 0.4 2021    BILIDIR <0.2 2021 IBILI see below 01/05/2021    LABALBU 2.6 01/25/2021     LDH:  No results found for: LDH  PT/INR:    Lab Results   Component Value Date    PROTIME 17.7 01/28/2021    INR 1.52 01/28/2021     PTT:    Lab Results   Component Value Date    APTT 108.9 01/25/2021   [APTT    Current Medications  Current Facility-Administered Medications: traMADol (ULTRAM) tablet 50 mg, 50 mg, Oral, Q6H PRN  enoxaparin (LOVENOX) injection 90 mg, 1 mg/kg, Subcutaneous, BID  ipratropium-albuterol (DUONEB) nebulizer solution 1 ampule, 1 ampule, Inhalation, Q4H WA  insulin lispro (1 Unit Dial) 0-18 Units, 0-18 Units, Subcutaneous, TID WC  insulin lispro (1 Unit Dial) 0-9 Units, 0-9 Units, Subcutaneous, Nightly  vancomycin (VANCOCIN) 1,250 mg in dextrose 5 % 250 mL IVPB, 1,250 mg, Intravenous, Q12H  insulin lispro (1 Unit Dial) 12 Units, 12 Units, Subcutaneous, TID WC  [COMPLETED] insulin glargine (LANTUS;BASAGLAR) injection pen 10 Units, 10 Units, Subcutaneous, Once **FOLLOWED BY** insulin glargine (LANTUS;BASAGLAR) injection pen 20 Units, 20 Units, Subcutaneous, Q12H  warfarin (COUMADIN) daily dosing (placeholder), , Other, RX Placeholder  cefepime (MAXIPIME) 2000 mg IVPB minibag, 2,000 mg, Intravenous, Q8H  glucose (GLUTOSE) 40 % oral gel 15 g, 15 g, Oral, PRN  dextrose 50 % IV solution, 12.5 g, Intravenous, PRN  glucagon (rDNA) injection 1 mg, 1 mg, Intramuscular, PRN  dextrose 5 % solution, 100 mL/hr, Intravenous, PRN  0.9 % sodium chloride infusion, , Intravenous, PRN  albuterol sulfate  (90 Base) MCG/ACT inhaler 2 puff, 2 puff, Inhalation, Q6H PRN  allopurinol (ZYLOPRIM) tablet 300 mg, 300 mg, Oral, QPM  amiodarone (CORDARONE) tablet 200 mg, 200 mg, Oral, Daily  finasteride (PROSCAR) tablet 5 mg, 5 mg, Oral, Daily  budesonide-formoterol (SYMBICORT) 160-4.5 MCG/ACT inhaler 2 puff, 2 puff, Inhalation, BID  hydrOXYzine (VISTARIL) capsule 50 mg, 50 mg, Oral, TID PRN  metoprolol tartrate (LOPRESSOR) tablet 75 mg, 75 mg, Oral, BID pantoprazole (PROTONIX) tablet 40 mg, 40 mg, Oral, QAM AC  atorvastatin (LIPITOR) tablet 20 mg, 20 mg, Oral, Nightly  tamsulosin (FLOMAX) capsule 0.4 mg, 0.4 mg, Oral, Nightly  sodium chloride flush 0.9 % injection 10 mL, 10 mL, Intravenous, 2 times per day  sodium chloride flush 0.9 % injection 10 mL, 10 mL, Intravenous, PRN  magnesium hydroxide (MILK OF MAGNESIA) 400 MG/5ML suspension 30 mL, 30 mL, Oral, Daily PRN  acetaminophen (TYLENOL) tablet 650 mg, 650 mg, Oral, Q6H PRN  ondansetron (ZOFRAN) injection 4 mg, 4 mg, Intravenous, Q6H PRN  azithromycin (ZITHROMAX) 500 mg in D5W 250ml Vial Mate, 500 mg, Intravenous, Q24H  lactobacillus (CULTURELLE) capsule 1 capsule, 1 capsule, Oral, BID WC    ASSESSMENT AND PLAN    61-year-old gentleman who recently had a pacemaker placement and is on warfarin is admitted with     1. Supratherapeutic INR epistaxis:     -Received FFP and vitamin K on 1/26/2021  -INR is 1.52  -Patient had poor appetite for last couple of weeks which might have caused vitamin K deficiency leading to supratherapeutic INR.  - Anticoagulation at the discretion of cardiologist.     2. Right hilar mass, mediastinal lymphadenopathy:     -Patient has new pleural effusion per CT chest done at [facility] on 1/20/2021  - Had thoracentesis done today. 30 mL removed. Cytology pending     3.  Cardiac arrest after bronchoscopy in December 2020, has pacemaker  Cardiology is following.     4.   Anemia:     -Most likely due to blood loss  -Might be anemia of chronic disease  - ferritin pending  -Transfuse PRBCs if hemoglobin is less than 8      Altagracia Liang MD

## 2021-01-29 NOTE — PROGRESS NOTES
Nephrology Attending  Progress Note        SUMMARY :  We are following this patient for NI/ Hyponatremia    Admitted  With generalized weakness , epistaxis ( High PT ) , dyspnea, cough  Has a known Rt Hilar mass   Had Rt side Thoracocentesis 1/28        SUBJECTIVE:   Patient progress reviewed. The patient is awake, alert, wife by bedside     Physical Exam:    VITALS:  /86   Pulse 77   Temp 97.6 °F (36.4 °C) (Temporal)   Resp 18   Ht 5' 9\" (1.753 m)   Wt 209 lb 7 oz (95 kg)   SpO2 94%   BMI 30.93 kg/m²   BLOOD PRESSURE RANGE:  Systolic (97HHO), RORY:859 , Min:110 , STV:474   ; Diastolic (33SIO), NIO:71, Min:62, Max:86    24HR INTAKE/OUTPUT:  No intake or output data in the 24 hours ending 01/29/21 0934    Gen:  alert, oriented x 3  PERRL , EOM +  Pallor +, No icterus  JVP not raised   CV: IRRR no murmur or rub . Lungs:B/ L air entry, Reduced Rt base, Dull to percussion rt base, no pleural rub heard  Abd: soft, bowel sounds + , No organomegaly   Ext: No edema, no cyanosis  Skin: Warm.   No rash  Neuro: nonfocal.      DATA:    CBC with Differential:    Lab Results   Component Value Date    WBC 10.8 01/26/2021    RBC 3.25 01/26/2021    HGB 8.2 01/29/2021    HCT 25.5 01/29/2021     01/26/2021    MCV 82.9 01/26/2021    MCH 26.6 01/26/2021    MCHC 32.1 01/26/2021    RDW 17.9 01/26/2021    SEGSPCT 68.9 12/09/2020    LYMPHOPCT 7.0 01/25/2021    MONOPCT 6.0 01/25/2021    BASOPCT 0.0 01/25/2021    MONOSABS 0.9 01/25/2021    LYMPHSABS 1.0 01/25/2021    EOSABS 0.0 01/25/2021    BASOSABS 0.0 01/25/2021     CMP:    Lab Results   Component Value Date     01/29/2021    K 3.6 01/29/2021    K 3.6 01/28/2021     01/29/2021    CO2 23 01/29/2021    BUN 25 01/29/2021    CREATININE 0.8 01/29/2021    GFRAA >60 01/29/2021    AGRATIO 0.6 01/25/2021    LABGLOM >60 01/29/2021    LABGLOM 92 06/08/2018    GLUCOSE 217 01/29/2021    PROT 6.9 01/25/2021    PROT 6.2 12/08/2012    LABALBU 2.6 01/25/2021 CALCIUM 8.5 01/29/2021    BILITOT 0.4 01/25/2021    ALKPHOS 135 01/25/2021    AST 45 01/25/2021    ALT 27 01/25/2021     Magnesium:    Lab Results   Component Value Date    MG 1.30 01/27/2021     Phosphorus:    Lab Results   Component Value Date    PHOS 2.6 11/11/2020     Uric Acid:  No results found for: Vishal Erika  Troponin:    Lab Results   Component Value Date    TROPONINI 0.03 01/25/2021     U/A:    Lab Results   Component Value Date    COLORU YELLOW 01/01/2021    PROTEINU Negative 01/01/2021    PHUR 5.5 01/01/2021    RBCUA 0 02/13/2020    YEAST 0 02/13/2020    BACTERIA 0 02/13/2020    CLARITYU Clear 01/01/2021    SPECGRAV 1.015 01/01/2021    LEUKOCYTESUR Negative 01/01/2021    UROBILINOGEN 0.2 01/01/2021    BILIRUBINUR Negative 01/01/2021    BLOODU Negative 01/01/2021    GLUCOSEU >=1000 01/01/2021         IMPRESSION/RECOMMENDATIONS:      Diagnosis and Plan :     1. NI   Non Oliguric   Recovered  ACEI and diuretic on Hold  Will restart diuretic     2. HTN   Controlled     3. Lung Mass - Hilar mass with Lymphadenopathy    4. Pneumonia    5. Epistaxis  High INR     6.  Persistent Atrial Fibrillation         Emelina Million

## 2021-01-29 NOTE — PROGRESS NOTES
AST 45 01/25/2021    PROT 6.9 01/25/2021    PROT 6.2 12/08/2012    BILITOT 0.4 01/25/2021    BILIDIR <0.2 01/05/2021    IBILI see below 01/05/2021    LABALBU 2.6 01/25/2021     LDH:  No results found for: LDH  PT/INR:    Lab Results   Component Value Date    PROTIME 19.5 01/29/2021    INR 1.67 01/29/2021     PTT:    Lab Results   Component Value Date    APTT 108.9 01/25/2021   [APTT    Current Medications  Current Facility-Administered Medications: furosemide (LASIX) injection 40 mg, 40 mg, Intravenous, BID  potassium chloride (KLOR-CON M) extended release tablet 40 mEq, 40 mEq, Oral, Daily with breakfast  zolpidem (AMBIEN) tablet 5 mg, 5 mg, Oral, Nightly PRN  traMADol (ULTRAM) tablet 50 mg, 50 mg, Oral, Q6H PRN  enoxaparin (LOVENOX) injection 90 mg, 1 mg/kg, Subcutaneous, BID  ipratropium-albuterol (DUONEB) nebulizer solution 1 ampule, 1 ampule, Inhalation, Q4H WA  insulin lispro (1 Unit Dial) 0-18 Units, 0-18 Units, Subcutaneous, TID WC  insulin lispro (1 Unit Dial) 0-9 Units, 0-9 Units, Subcutaneous, Nightly  insulin lispro (1 Unit Dial) 12 Units, 12 Units, Subcutaneous, TID WC  [COMPLETED] insulin glargine (LANTUS;BASAGLAR) injection pen 10 Units, 10 Units, Subcutaneous, Once **FOLLOWED BY** insulin glargine (LANTUS;BASAGLAR) injection pen 20 Units, 20 Units, Subcutaneous, Q12H  warfarin (COUMADIN) daily dosing (placeholder), , Other, RX Placeholder  cefepime (MAXIPIME) 2000 mg IVPB minibag, 2,000 mg, Intravenous, Q8H  glucose (GLUTOSE) 40 % oral gel 15 g, 15 g, Oral, PRN  dextrose 50 % IV solution, 12.5 g, Intravenous, PRN  glucagon (rDNA) injection 1 mg, 1 mg, Intramuscular, PRN  dextrose 5 % solution, 100 mL/hr, Intravenous, PRN  0.9 % sodium chloride infusion, , Intravenous, PRN  albuterol sulfate  (90 Base) MCG/ACT inhaler 2 puff, 2 puff, Inhalation, Q6H PRN  allopurinol (ZYLOPRIM) tablet 300 mg, 300 mg, Oral, QPM  amiodarone (CORDARONE) tablet 200 mg, 200 mg, Oral, Daily finasteride (PROSCAR) tablet 5 mg, 5 mg, Oral, Daily  budesonide-formoterol (SYMBICORT) 160-4.5 MCG/ACT inhaler 2 puff, 2 puff, Inhalation, BID  hydrOXYzine (VISTARIL) capsule 50 mg, 50 mg, Oral, TID PRN  metoprolol tartrate (LOPRESSOR) tablet 75 mg, 75 mg, Oral, BID  pantoprazole (PROTONIX) tablet 40 mg, 40 mg, Oral, QAM AC  atorvastatin (LIPITOR) tablet 20 mg, 20 mg, Oral, Nightly  tamsulosin (FLOMAX) capsule 0.4 mg, 0.4 mg, Oral, Nightly  sodium chloride flush 0.9 % injection 10 mL, 10 mL, Intravenous, 2 times per day  sodium chloride flush 0.9 % injection 10 mL, 10 mL, Intravenous, PRN  magnesium hydroxide (MILK OF MAGNESIA) 400 MG/5ML suspension 30 mL, 30 mL, Oral, Daily PRN  acetaminophen (TYLENOL) tablet 650 mg, 650 mg, Oral, Q6H PRN  ondansetron (ZOFRAN) injection 4 mg, 4 mg, Intravenous, Q6H PRN  azithromycin (ZITHROMAX) 500 mg in D5W 250ml Vial Mate, 500 mg, Intravenous, Q24H  lactobacillus (CULTURELLE) capsule 1 capsule, 1 capsule, Oral, BID WC    ASSESSMENT AND PLAN    80-year-old gentleman who recently had a pacemaker placement and is on warfarin is admitted with     1. Supratherapeutic INR epistaxis:     -Received FFP and vitamin K on 1/26/2021  -INR is 1.52  -Patient had poor appetite for last couple of weeks which might have caused vitamin K deficiency leading to supratherapeutic INR.  - Anticoagulation at the discretion of cardiologist.     2. Right hilar mass, mediastinal lymphadenopathy:     -Patient has new pleural effusion per CT chest done at [facility] on 1/20/2021  - Had thoracentesis done on 1/28/2020.  30 mL removed. -Preliminary cytology report was discussed with the pathologist.  It is consistent with non-small cell lung cancer possible adenocarcinoma. Immunostains are pending.    -Discussed diagnosis with the patient and wife. -Plan would be to get a PET scan as outpatient. -MRI brain needs to be done. Patient is not keen on doing MRI brain. Therefore CT brain with contrast will be obtained.  -We will send next generation sequencing on the cytology if adequate sample is applicable. -Based on above studies further treatment decisions will be made.     3.  Cardiac arrest after bronchoscopy in December 2020, has pacemaker  Cardiology is following.     4.   Anemia:     -Most likely due to blood loss  - anemia of chronic disease  - ferritin high  -Transfuse PRBCs if hemoglobin is less than 8      Pebbles Corrales MD

## 2021-01-29 NOTE — PROGRESS NOTES
Hospitalist Progress Note      PCP: Danyel Eaton MD    Date of Admission: 1/25/2021    Hospital Course:   70-year-old gentleman admitted to the hospital with some weakness. He is noted to have acute respiratory failure due to probably post obstructive pneumonia. He has right lung mass on outside hospital CT. He declined pulmonary workup opting for resumption of workup with [facility] Pulmonologist.    Subjective:   He is anxious at night. He has dyspnea in between inhalors. He feels unwell.     Medications:  Reviewed    Infusion Medications    dextrose      sodium chloride       Scheduled Medications    furosemide  40 mg Intravenous BID    potassium chloride  40 mEq Oral Daily with breakfast    enoxaparin  1 mg/kg Subcutaneous BID    ipratropium-albuterol  1 ampule Inhalation Q4H WA    insulin lispro  0-18 Units Subcutaneous TID WC    insulin lispro  0-9 Units Subcutaneous Nightly    vancomycin  1,250 mg Intravenous Q12H    insulin lispro  12 Units Subcutaneous TID WC    insulin glargine  20 Units Subcutaneous Q12H    warfarin (COUMADIN) daily dosing (placeholder)   Other RX Placeholder    cefepime  2,000 mg Intravenous Q8H    allopurinol  300 mg Oral QPM    amiodarone  200 mg Oral Daily    finasteride  5 mg Oral Daily    budesonide-formoterol  2 puff Inhalation BID    metoprolol tartrate  75 mg Oral BID    pantoprazole  40 mg Oral QAM AC    atorvastatin  20 mg Oral Nightly    tamsulosin  0.4 mg Oral Nightly    sodium chloride flush  10 mL Intravenous 2 times per day    azithromycin  500 mg Intravenous Q24H    lactobacillus  1 capsule Oral BID      PRN Meds: traMADol, glucose, dextrose, glucagon (rDNA), dextrose, sodium chloride, albuterol sulfate HFA, hydrOXYzine, sodium chloride flush, magnesium hydroxide, acetaminophen, ondansetron      Intake/Output Summary (Last 24 hours) at 1/29/2021 1320  Last data filed at 1/29/2021 1130  Gross per 24 hour   Intake 838 ml   Output  Net 838 ml       Physical Exam Performed:    /73   Pulse 98   Temp 98.1 °F (36.7 °C) (Temporal)   Resp 22   Ht 5' 9\" (1.753 m)   Wt 209 lb 7 oz (95 kg)   SpO2 94%   BMI 30.93 kg/m²      General appearance: No apparent distress, appears stated age and cooperative. Obese male,on nasal 02  HEENT: Pupils equal, round, and reactive to light. Conjunctivae/corneas clear. Neck: Supple, with full range of motion. No jugular venous distention. Trachea midline. Respiratory:  Normal respiratory effort. Diminished sounds at right lung base,crackles in left lung  Cardiovascular: Regular rate and rhythm with normal S1/S2 without murmurs, rubs or gallops. Abdomen: Soft, non-tender, non-distended with normal bowel sounds. Musculoskeletal: No clubbing, cyanosis. ++ LE  edema bilaterally. Full range of motion without deformity. Skin: Skin color, texture, turgor normal.  No rashes or lesions. Neurologic:  Neurovascularly intact without any focal sensory/motor deficits. Cranial nerves: II-XII intact, grossly non-focal.  Psychiatric: Alert and oriented, thought content appropriate, normal insight      Labs:   Recent Labs     01/28/21  2300 01/29/21  0451 01/29/21  0943   HGB 7.9* 8.2* 8.3*   HCT 24.6* 25.5* 26.4*     Recent Labs     01/27/21  0930 01/28/21  1000 01/29/21  0451   * 137 133*   K 3.9 3.6  3.6 3.6   CL 97* 101 100   CO2 20* 24 23   BUN 34* 29* 25*   CREATININE 1.0 0.9 0.8   CALCIUM 8.2* 8.7 8.5     No results for input(s): AST, ALT, BILIDIR, BILITOT, ALKPHOS in the last 72 hours. Recent Labs     01/28/21  1704 01/28/21  2300 01/29/21  0451   INR 1.52* 1.71* 1.67*     No results for input(s): CKTOTAL, TROPONINI in the last 72 hours.     Urinalysis:      Lab Results   Component Value Date    NITRU Negative 01/01/2021    BACTERIA 0 02/13/2020    RBCUA 0 02/13/2020    BLOODU Negative 01/01/2021    SPECGRAV 1.015 01/01/2021    GLUCOSEU >=1000 01/01/2021       Radiology:  XR CHEST 1 VIEW   Final Result Addendum 1 of 1   ADDENDUM:   .         Final      IR GUIDED THORACENTESIS PLEURAL   Final Result   Successful ultrasound guided thoracentesis. IR GUIDED THORACENTESIS PLEURAL   Final Result   Trace right pleural effusion. No safe window for procedure at this time. Thoracentesis was not performed. XR CHEST PORTABLE   Final Result   Stable cardiomegaly with mild central pulmonary congestion which is more   prominent. Questionable early infiltrate or atelectasis along the right lung base which   is more apparent. CT NEEDLE BIOPSY LUNG PERCUTANEOUS    (Results Pending)           Assessment/Plan:    Active Hospital Problems    Diagnosis    Supratherapeutic INR [R79.1]    Pneumonia [J18.9]     Community acquired Pneumonia- Possibly post obstructive form lung mass. suspected gram-positive/gram-negative  COVID-19 negative  Continue cefepime and azithromycin  -D/C vancomycin given negative MRSA culture    Acute respiratory failure with hypoxia-due to pneumonia  -Currently on 2L NC. Wean for 02 sats >90%. -Continue inhalors     Right Lung mass with nodules-Metastasis suspected  Previously bronchoscopy attempted had a brief cardiac arrest  -Declined further workup by Pulmonologist at this hospital.He would like to complete workup with Pulmonologist at [facility] guided lung bx or bronchoscopy planned.   -Thoracentesis done 1/28/21 at request of patient. Await cytology  -Continue to Hold home coumadin and continue lovenox due to procedures     Supratherapeutic INR  INR noted noted to be greater than 14 on admission  -On coumadin at home for Afib . Vitamin K 10 mg given. INR now subtherapeutic. Continue lovenox.  -Resume anticoagulation after workup is complete per EP     Chronic moderate systolic heart failure   -Patient is volume overloaded. IV diuresis started    Hx VT s/p AICD   -Continue amiodarone    Type 2 diabetes mellitus  Last A1c 10.6  -Management per Endocrinology     Chronic Anemia  Iron studies B12 folate reflect studies consistent with anemia of chronic disease  -Hgb stable. Management per Oncology. Acute kidney injury-due to  prerenal  -Resolved. Continue to hold lisinopril    Chronic atrial fibrillation s/p pacemaker insertion on the first of this month  Pacemaker site has a small hematoma which was also noticed on discharge  -Continue amiodarone  -Continue lovenox for anticoagulation  -EP recommendations noted    Anxiety  -Vistaril PRN    Insomnia  -start ambien PRN    Wife at bedside. Questions and concerns were addressed. DVT Prophylaxis: Therapeutic lovenox  Diet: DIET CARB CONTROL; Carb Control: 4 carb choices (60 gms)/meal  Code Status: Full Code    PT/OT Eval Status: as needed    Dispo - Home when workup complete. Possibly early next week.     Candance Harpin, MD

## 2021-01-30 ENCOUNTER — APPOINTMENT (OUTPATIENT)
Dept: CT IMAGING | Age: 66
DRG: 871 | End: 2021-01-30
Payer: COMMERCIAL

## 2021-01-30 LAB
ABO/RH: NORMAL
ANION GAP SERPL CALCULATED.3IONS-SCNC: 10 MMOL/L (ref 3–16)
ANTIBODY SCREEN: NORMAL
BLOOD BANK DISPENSE STATUS: NORMAL
BLOOD BANK PRODUCT CODE: NORMAL
BLOOD CULTURE, ROUTINE: NORMAL
BPU ID: NORMAL
BUN BLDV-MCNC: 19 MG/DL (ref 7–20)
CALCIUM SERPL-MCNC: 8.4 MG/DL (ref 8.3–10.6)
CHLORIDE BLD-SCNC: 102 MMOL/L (ref 99–110)
CO2: 22 MMOL/L (ref 21–32)
CREAT SERPL-MCNC: 0.6 MG/DL (ref 0.8–1.3)
CULTURE, BLOOD 2: NORMAL
DESCRIPTION BLOOD BANK: NORMAL
GFR AFRICAN AMERICAN: >60
GFR NON-AFRICAN AMERICAN: >60
GLUCOSE BLD-MCNC: 162 MG/DL (ref 70–99)
GLUCOSE BLD-MCNC: 170 MG/DL (ref 70–99)
GLUCOSE BLD-MCNC: 221 MG/DL (ref 70–99)
GLUCOSE BLD-MCNC: 227 MG/DL (ref 70–99)
GLUCOSE BLD-MCNC: 374 MG/DL (ref 70–99)
HCT VFR BLD CALC: 24 % (ref 40.5–52.5)
HCT VFR BLD CALC: 25.4 % (ref 40.5–52.5)
HEMOGLOBIN: 7.6 G/DL (ref 13.5–17.5)
HEMOGLOBIN: 8.1 G/DL (ref 13.5–17.5)
INR BLD: 1.85 (ref 0.86–1.14)
MAGNESIUM: 1.5 MG/DL (ref 1.8–2.4)
MAGNESIUM: 1.6 MG/DL (ref 1.8–2.4)
MAGNESIUM: 1.6 MG/DL (ref 1.8–2.4)
MAGNESIUM: 1.7 MG/DL (ref 1.8–2.4)
MCH RBC QN AUTO: 26.9 PG (ref 26–34)
MCHC RBC AUTO-ENTMCNC: 31.9 G/DL (ref 31–36)
MCV RBC AUTO: 84.3 FL (ref 80–100)
PDW BLD-RTO: 18.9 % (ref 12.4–15.4)
PERFORMED ON: ABNORMAL
PLATELET # BLD: 289 K/UL (ref 135–450)
PMV BLD AUTO: 7.1 FL (ref 5–10.5)
POTASSIUM REFLEX MAGNESIUM: 3.6 MMOL/L (ref 3.5–5.1)
PROTHROMBIN TIME: 21.6 SEC (ref 10–13.2)
RBC # BLD: 3.01 M/UL (ref 4.2–5.9)
SODIUM BLD-SCNC: 134 MMOL/L (ref 136–145)
WBC # BLD: 14.6 K/UL (ref 4–11)

## 2021-01-30 PROCEDURE — 83735 ASSAY OF MAGNESIUM: CPT

## 2021-01-30 PROCEDURE — 36415 COLL VENOUS BLD VENIPUNCTURE: CPT

## 2021-01-30 PROCEDURE — 86850 RBC ANTIBODY SCREEN: CPT

## 2021-01-30 PROCEDURE — 85018 HEMOGLOBIN: CPT

## 2021-01-30 PROCEDURE — 85610 PROTHROMBIN TIME: CPT

## 2021-01-30 PROCEDURE — 6370000000 HC RX 637 (ALT 250 FOR IP): Performed by: PHYSICIAN ASSISTANT

## 2021-01-30 PROCEDURE — 6360000004 HC RX CONTRAST MEDICATION: Performed by: INTERNAL MEDICINE

## 2021-01-30 PROCEDURE — 6360000002 HC RX W HCPCS: Performed by: INTERNAL MEDICINE

## 2021-01-30 PROCEDURE — 94761 N-INVAS EAR/PLS OXIMETRY MLT: CPT

## 2021-01-30 PROCEDURE — 80048 BASIC METABOLIC PNL TOTAL CA: CPT

## 2021-01-30 PROCEDURE — 85027 COMPLETE CBC AUTOMATED: CPT

## 2021-01-30 PROCEDURE — 2060000000 HC ICU INTERMEDIATE R&B

## 2021-01-30 PROCEDURE — 94660 CPAP INITIATION&MGMT: CPT

## 2021-01-30 PROCEDURE — 86923 COMPATIBILITY TEST ELECTRIC: CPT

## 2021-01-30 PROCEDURE — 85014 HEMATOCRIT: CPT

## 2021-01-30 PROCEDURE — 6370000000 HC RX 637 (ALT 250 FOR IP): Performed by: INTERNAL MEDICINE

## 2021-01-30 PROCEDURE — 86900 BLOOD TYPING SEROLOGIC ABO: CPT

## 2021-01-30 PROCEDURE — 2580000003 HC RX 258: Performed by: PHYSICIAN ASSISTANT

## 2021-01-30 PROCEDURE — 6360000002 HC RX W HCPCS: Performed by: HOSPITALIST

## 2021-01-30 PROCEDURE — 94640 AIRWAY INHALATION TREATMENT: CPT

## 2021-01-30 PROCEDURE — P9016 RBC LEUKOCYTES REDUCED: HCPCS

## 2021-01-30 PROCEDURE — 2580000003 HC RX 258: Performed by: INTERNAL MEDICINE

## 2021-01-30 PROCEDURE — 2700000000 HC OXYGEN THERAPY PER DAY

## 2021-01-30 PROCEDURE — 70470 CT HEAD/BRAIN W/O & W/DYE: CPT

## 2021-01-30 PROCEDURE — 6360000002 HC RX W HCPCS: Performed by: PHYSICIAN ASSISTANT

## 2021-01-30 PROCEDURE — 86901 BLOOD TYPING SEROLOGIC RH(D): CPT

## 2021-01-30 RX ORDER — MAGNESIUM SULFATE IN WATER 40 MG/ML
2000 INJECTION, SOLUTION INTRAVENOUS ONCE
Status: COMPLETED | OUTPATIENT
Start: 2021-01-30 | End: 2021-01-30

## 2021-01-30 RX ORDER — SODIUM CHLORIDE 9 MG/ML
INJECTION, SOLUTION INTRAVENOUS PRN
Status: DISCONTINUED | OUTPATIENT
Start: 2021-01-30 | End: 2021-02-08 | Stop reason: HOSPADM

## 2021-01-30 RX ADMIN — MAGNESIUM SULFATE HEPTAHYDRATE 2000 MG: 40 INJECTION, SOLUTION INTRAVENOUS at 06:21

## 2021-01-30 RX ADMIN — Medication 2 PUFF: at 23:26

## 2021-01-30 RX ADMIN — INSULIN LISPRO 12 UNITS: 100 INJECTION, SOLUTION INTRAVENOUS; SUBCUTANEOUS at 16:46

## 2021-01-30 RX ADMIN — CEFEPIME 2000 MG: 2 INJECTION, POWDER, FOR SOLUTION INTRAVENOUS at 20:51

## 2021-01-30 RX ADMIN — IPRATROPIUM BROMIDE AND ALBUTEROL SULFATE 1 AMPULE: .5; 3 SOLUTION RESPIRATORY (INHALATION) at 20:42

## 2021-01-30 RX ADMIN — CEFEPIME 2000 MG: 2 INJECTION, POWDER, FOR SOLUTION INTRAVENOUS at 14:09

## 2021-01-30 RX ADMIN — AZITHROMYCIN MONOHYDRATE 500 MG: 500 INJECTION, POWDER, LYOPHILIZED, FOR SOLUTION INTRAVENOUS at 01:20

## 2021-01-30 RX ADMIN — IPRATROPIUM BROMIDE AND ALBUTEROL SULFATE 1 AMPULE: .5; 3 SOLUTION RESPIRATORY (INHALATION) at 12:04

## 2021-01-30 RX ADMIN — METOPROLOL TARTRATE 75 MG: 50 TABLET, FILM COATED ORAL at 09:54

## 2021-01-30 RX ADMIN — FINASTERIDE 5 MG: 5 TABLET, FILM COATED ORAL at 09:54

## 2021-01-30 RX ADMIN — IPRATROPIUM BROMIDE AND ALBUTEROL SULFATE 1 AMPULE: .5; 3 SOLUTION RESPIRATORY (INHALATION) at 08:49

## 2021-01-30 RX ADMIN — INSULIN LISPRO 6 UNITS: 100 INJECTION, SOLUTION INTRAVENOUS; SUBCUTANEOUS at 16:45

## 2021-01-30 RX ADMIN — POTASSIUM CHLORIDE 40 MEQ: 1500 TABLET, EXTENDED RELEASE ORAL at 09:56

## 2021-01-30 RX ADMIN — Medication 1 CAPSULE: at 16:34

## 2021-01-30 RX ADMIN — IOPAMIDOL 75 ML: 755 INJECTION, SOLUTION INTRAVENOUS at 10:30

## 2021-01-30 RX ADMIN — INSULIN GLARGINE 20 UNITS: 100 INJECTION, SOLUTION SUBCUTANEOUS at 10:03

## 2021-01-30 RX ADMIN — TAMSULOSIN HYDROCHLORIDE 0.4 MG: 0.4 CAPSULE ORAL at 20:50

## 2021-01-30 RX ADMIN — ALLOPURINOL 300 MG: 300 TABLET ORAL at 16:34

## 2021-01-30 RX ADMIN — Medication 1 CAPSULE: at 09:54

## 2021-01-30 RX ADMIN — AMIODARONE HYDROCHLORIDE 200 MG: 200 TABLET ORAL at 09:55

## 2021-01-30 RX ADMIN — Medication 10 ML: at 09:56

## 2021-01-30 RX ADMIN — ENOXAPARIN SODIUM 90 MG: 100 INJECTION SUBCUTANEOUS at 20:50

## 2021-01-30 RX ADMIN — PANTOPRAZOLE SODIUM 40 MG: 40 TABLET, DELAYED RELEASE ORAL at 06:21

## 2021-01-30 RX ADMIN — Medication 10 ML: at 20:52

## 2021-01-30 RX ADMIN — INSULIN GLARGINE 20 UNITS: 100 INJECTION, SOLUTION SUBCUTANEOUS at 20:54

## 2021-01-30 RX ADMIN — ATORVASTATIN CALCIUM 20 MG: 20 TABLET, FILM COATED ORAL at 20:49

## 2021-01-30 RX ADMIN — INSULIN LISPRO 15 UNITS: 100 INJECTION, SOLUTION INTRAVENOUS; SUBCUTANEOUS at 11:31

## 2021-01-30 RX ADMIN — FUROSEMIDE 40 MG: 10 INJECTION, SOLUTION INTRAMUSCULAR; INTRAVENOUS at 09:54

## 2021-01-30 RX ADMIN — FUROSEMIDE 40 MG: 10 INJECTION, SOLUTION INTRAMUSCULAR; INTRAVENOUS at 16:35

## 2021-01-30 RX ADMIN — CEFEPIME 2000 MG: 2 INJECTION, POWDER, FOR SOLUTION INTRAVENOUS at 04:57

## 2021-01-30 RX ADMIN — ENOXAPARIN SODIUM 90 MG: 100 INJECTION SUBCUTANEOUS at 09:58

## 2021-01-30 RX ADMIN — INSULIN LISPRO 12 UNITS: 100 INJECTION, SOLUTION INTRAVENOUS; SUBCUTANEOUS at 11:32

## 2021-01-30 RX ADMIN — Medication 2 PUFF: at 20:50

## 2021-01-30 RX ADMIN — INSULIN LISPRO 3 UNITS: 100 INJECTION, SOLUTION INTRAVENOUS; SUBCUTANEOUS at 10:04

## 2021-01-30 RX ADMIN — INSULIN LISPRO 3 UNITS: 100 INJECTION, SOLUTION INTRAVENOUS; SUBCUTANEOUS at 20:53

## 2021-01-30 RX ADMIN — INSULIN LISPRO 12 UNITS: 100 INJECTION, SOLUTION INTRAVENOUS; SUBCUTANEOUS at 10:04

## 2021-01-30 RX ADMIN — Medication 2 PUFF: at 08:50

## 2021-01-30 RX ADMIN — METOPROLOL TARTRATE 75 MG: 50 TABLET, FILM COATED ORAL at 20:49

## 2021-01-30 ASSESSMENT — PAIN SCALES - WONG BAKER
WONGBAKER_NUMERICALRESPONSE: 0

## 2021-01-30 ASSESSMENT — PAIN SCALES - GENERAL
PAINLEVEL_OUTOF10: 0

## 2021-01-30 NOTE — PROGRESS NOTES
gus hospitalist:    \" Patient has a run of V-tach while in CT scan, patient asymptomatic BP 98/62 MAP 71 HR 86\"

## 2021-01-30 NOTE — PROGRESS NOTES
Awakened by lab for blood draw, VSS, assessment unchanged. Assisted to BR for void on NC, returned to bed, CPAP replaced. Positioned for comfort, denies other needs @present. Warm blanket provided to patient's wife.

## 2021-01-30 NOTE — PROGRESS NOTES
Shift assessment done, see flow sheet. medication administrated per MAR. Patient A/Ox4 denies pain and needs, spouse at the bedside.  Will continue to monitor

## 2021-01-30 NOTE — PROGRESS NOTES
Nephrology Attending  Progress Note        SUMMARY :  We are following this patient for NI/ Hyponatremia    Admitted  With generalized weakness , epistaxis ( High PT ) , dyspnea, cough  Has a known Rt Hilar mass   Had Rt side Thoracocentesis 1/28        SUBJECTIVE:   Patient progress reviewed. The patient is awake, alert, wife by bedside     Physical Exam:    VITALS:  /71   Pulse 79   Temp 97.6 °F (36.4 °C) (Temporal)   Resp 18   Ht 5' 9\" (1.753 m)   Wt 210 lb 12.2 oz (95.6 kg)   SpO2 96%   BMI 31.12 kg/m²   BLOOD PRESSURE RANGE:  Systolic (95DBF), IGJ:323 , Min:108 , ZHT:374   ; Diastolic (42TWU), DKR:56, Min:64, Max:76    24HR INTAKE/OUTPUT:      Intake/Output Summary (Last 24 hours) at 1/30/2021 1015  Last data filed at 1/30/2021 0459  Gross per 24 hour   Intake 1196 ml   Output 150 ml   Net 1046 ml       Gen:  alert, oriented x 3  PERRL , EOM +  Pallor +, No icterus  JVP not raised   CV: IRRR no murmur or rub . Lungs:B/ L air entry, Reduced Rt base, Dull to percussion rt base, no pleural rub heard  Abd: soft, bowel sounds + , No organomegaly   Ext: 1+ leg  edema, no cyanosis  Skin: Warm.   No rash  Neuro: nonfocal.      DATA:    CBC with Differential:    Lab Results   Component Value Date    WBC 10.8 01/26/2021    RBC 3.25 01/26/2021    HGB 7.6 01/30/2021    HCT 24.0 01/30/2021     01/26/2021    MCV 82.9 01/26/2021    MCH 26.6 01/26/2021    MCHC 32.1 01/26/2021    RDW 17.9 01/26/2021    SEGSPCT 68.9 12/09/2020    LYMPHOPCT 7.0 01/25/2021    MONOPCT 6.0 01/25/2021    BASOPCT 0.0 01/25/2021    MONOSABS 0.9 01/25/2021    LYMPHSABS 1.0 01/25/2021    EOSABS 0.0 01/25/2021    BASOSABS 0.0 01/25/2021     CMP:    Lab Results   Component Value Date     01/30/2021    K 3.6 01/30/2021     01/30/2021    CO2 22 01/30/2021    BUN 19 01/30/2021    CREATININE 0.6 01/30/2021    GFRAA >60 01/30/2021    AGRATIO 0.6 01/25/2021    LABGLOM >60 01/30/2021    LABGLOM 92 06/08/2018 GLUCOSE 162 01/30/2021    PROT 6.9 01/25/2021    PROT 6.2 12/08/2012    LABALBU 2.6 01/25/2021    CALCIUM 8.4 01/30/2021    BILITOT 0.4 01/25/2021    ALKPHOS 135 01/25/2021    AST 45 01/25/2021    ALT 27 01/25/2021     Magnesium:    Lab Results   Component Value Date    MG 1.60 01/30/2021     Phosphorus:    Lab Results   Component Value Date    PHOS 2.6 11/11/2020     Uric Acid:  No results found for: Darrin Arguello  Troponin:    Lab Results   Component Value Date    TROPONINI 0.03 01/25/2021     U/A:    Lab Results   Component Value Date    COLORU YELLOW 01/01/2021    PROTEINU Negative 01/01/2021    PHUR 5.5 01/01/2021    RBCUA 0 02/13/2020    YEAST 0 02/13/2020    BACTERIA 0 02/13/2020    CLARITYU Clear 01/01/2021    SPECGRAV 1.015 01/01/2021    LEUKOCYTESUR Negative 01/01/2021    UROBILINOGEN 0.2 01/01/2021    BILIRUBINUR Negative 01/01/2021    BLOODU Negative 01/01/2021    GLUCOSEU >=1000 01/01/2021         IMPRESSION/RECOMMENDATIONS:      Diagnosis and Plan :     1. NI   Non Oliguric   Recovered  ACEI on Hold, back on Furosemide   Cr 0.6. CST     2. HTN   Controlled     3. Lung Mass - Hilar mass with Lymphadenopathy    4. Pneumonia    5. Epistaxis  High INR     6. Persistent Atrial Fibrillation      7. Hyponatremia    Na 134 , ?  SIADH       Asael Harmon

## 2021-01-30 NOTE — PROGRESS NOTES
Patient called out c/o difficulty breathing. To room, pulse ox and O2 off, O2 sat 90% on RA. O2 replaced @2L per NC, had been wearing his CPAP with no O2 bled in and said he felt SOB immediately upon putting it on. Coached with pursed lip breathing, expiratory wheezes noted upper lobes posteriorly, fine crackles to left base. VSS, O2 sat improved to 96% on 2L, RT called and requested they bleed O2 into home CPAP. Other assessment as charted, wife @bedside. Patient requested warm blanket, wife requested bottled water from refrigerator she brought in, both provided. RT here, nipple placed for O2 bleed in, lights out, no other needs voiced.

## 2021-01-30 NOTE — PROGRESS NOTES
Noted bedside monitor was turned off, turned back on but no display on screen. Info is transmitting to central monitor, charge nurse notified. Security called, attempted to reset breaker, but still no display on screen, suspect monitor is out.

## 2021-01-30 NOTE — PROGRESS NOTES
Right AC saline lock patent to flush, Zithromax hung. Has lowered HOB and turned onto left side, resting comfortably on home CPAP w/O2 bled in.

## 2021-01-30 NOTE — PROGRESS NOTES
Hospitalist Progress Note      PCP: Renetta Lynch MD    Date of Admission: 1/25/2021    Hospital Course:   70-year-old gentleman admitted to the hospital with some weakness. He is noted to have acute respiratory failure due to probably post obstructive pneumonia. He has right lung mass on outside hospital CT. He declined pulmonary workup opting for resumption of workup with [facility] Pulmonologist.    Subjective:   He had runs of Vtach. Pt remains on NC.     Medications:  Reviewed    Infusion Medications    dextrose      sodium chloride       Scheduled Medications    furosemide  40 mg Intravenous BID    potassium chloride  40 mEq Oral Daily with breakfast    enoxaparin  1 mg/kg Subcutaneous BID    ipratropium-albuterol  1 ampule Inhalation Q4H WA    insulin lispro  0-18 Units Subcutaneous TID WC    insulin lispro  0-9 Units Subcutaneous Nightly    insulin lispro  12 Units Subcutaneous TID WC    insulin glargine  20 Units Subcutaneous Q12H    warfarin (COUMADIN) daily dosing (placeholder)   Other RX Placeholder    cefepime  2,000 mg Intravenous Q8H    allopurinol  300 mg Oral QPM    amiodarone  200 mg Oral Daily    finasteride  5 mg Oral Daily    budesonide-formoterol  2 puff Inhalation BID    metoprolol tartrate  75 mg Oral BID    pantoprazole  40 mg Oral QAM AC    atorvastatin  20 mg Oral Nightly    tamsulosin  0.4 mg Oral Nightly    sodium chloride flush  10 mL Intravenous 2 times per day    azithromycin  500 mg Intravenous Q24H    lactobacillus  1 capsule Oral BID WC     PRN Meds: zolpidem, traMADol, glucose, dextrose, glucagon (rDNA), dextrose, sodium chloride, albuterol sulfate HFA, hydrOXYzine, sodium chloride flush, magnesium hydroxide, acetaminophen, ondansetron      Intake/Output Summary (Last 24 hours) at 1/30/2021 1240  Last data filed at 1/30/2021 1001  Gross per 24 hour   Intake 598 ml   Output 550 ml   Net 48 ml       Physical Exam Performed: BP 98/62   Pulse 79   Temp 97.5 °F (36.4 °C) (Temporal)   Resp 18   Ht 5' 9\" (1.753 m)   Wt 210 lb 12.2 oz (95.6 kg)   SpO2 96%   BMI 31.12 kg/m²      General appearance: No apparent distress, appears stated age and cooperative. Obese male,on nasal 02  HEENT: Pupils equal, round, and reactive to light. Conjunctivae/corneas clear. Neck: Supple, with full range of motion. No jugular venous distention. Trachea midline. Respiratory:  Normal respiratory effort. Diminished sounds at right lung base,crackles in left lung  Cardiovascular: Regular rate and rhythm with normal S1/S2 without murmurs, rubs or gallops. Abdomen: Soft, non-tender, non-distended with normal bowel sounds. Musculoskeletal: No clubbing, cyanosis. ++ LE  edema bilaterally. Full range of motion without deformity. Skin: Skin color, texture, turgor normal.  No rashes or lesions. Neurologic:  Neurovascularly intact without any focal sensory/motor deficits. Cranial nerves: II-XII intact, grossly non-focal.  Psychiatric: Alert and oriented, thought content appropriate, normal insight      Labs:   Recent Labs     01/29/21  0943 01/29/21  1824 01/30/21  0411   HGB 8.3* 8.3* 7.6*   HCT 26.4* 26.6* 24.0*     Recent Labs     01/28/21  1000 01/29/21  0451 01/30/21  0410    133* 134*   K 3.6  3.6 3.6 3.6    100 102   CO2 24 23 22   BUN 29* 25* 19   CREATININE 0.9 0.8 0.6*   CALCIUM 8.7 8.5 8.4     No results for input(s): AST, ALT, BILIDIR, BILITOT, ALKPHOS in the last 72 hours. Recent Labs     01/28/21  2300 01/29/21  0451 01/30/21  0410   INR 1.71* 1.67* 1.85*     No results for input(s): CKTOTAL, TROPONINI in the last 72 hours.     Urinalysis:      Lab Results   Component Value Date    NITRU Negative 01/01/2021    BACTERIA 0 02/13/2020    RBCUA 0 02/13/2020    BLOODU Negative 01/01/2021    SPECGRAV 1.015 01/01/2021    GLUCOSEU >=1000 01/01/2021       Radiology:  CT HEAD W WO CONTRAST   Preliminary Result 1. No evidence of metastatic disease. 2. Cerebral parenchymal volume loss, commensurate with the patient's age. XR CHEST 1 VIEW   Final Result   Addendum 1 of 1   ADDENDUM:   .         Final      IR GUIDED THORACENTESIS PLEURAL   Final Result   Successful ultrasound guided thoracentesis. IR GUIDED THORACENTESIS PLEURAL   Final Result   Trace right pleural effusion. No safe window for procedure at this time. Thoracentesis was not performed. XR CHEST PORTABLE   Final Result   Stable cardiomegaly with mild central pulmonary congestion which is more   prominent. Questionable early infiltrate or atelectasis along the right lung base which   is more apparent. CT NEEDLE BIOPSY LUNG PERCUTANEOUS    (Results Pending)           Assessment/Plan:    Active Hospital Problems    Diagnosis    Supratherapeutic INR [R79.1]    Pneumonia [J18.9]     Community acquired Pneumonia- Possibly post obstructive form lung mass. suspected gram-positive/gram-negative  COVID-19 negative  Continue cefepime and azithromycin  -D/Cd vancomycin given negative MRSA culture    Acute respiratory failure with hypoxia-due to pneumonia  -Currently on 2L NC. Wean for 02 sats >90%. -Continue inhalors     Right metastatic non small cancer  Previously bronchoscopy attempted had a brief cardiac arrest  -Declined further workup by Pulmonologist at this hospital.He would like to complete workup with Pulmonologist at [facility] guided lung bx or bronchoscopy planned.   -Thoracentesis done 1/28/21 at request of patient.  -Continue to Hold home coumadin and continue lovenox due to procedures  -Oncology recommendations noted. MRI brain declined by pt. CT head w contrast didn't show metastasis. PET scan planned outpatient.     Supratherapeutic INR  INR noted noted to be greater than 14 on admission  -On coumadin at home for Afib . Vitamin K 10 mg given. INR now subtherapeutic. Continue lovenox. -Resume anticoagulation after workup is complete per EP     Chronic moderate systolic heart failure   -Patient is volume overloaded. IV diuresis and potassium supplementation. Hx VT s/p AICD   -Continue amiodarone  -Runs of VT. Check and replace electrolytes    Type 2 diabetes mellitus  Last A1c 10.6  -Management per Endocrinology      Chronic Anemia  Iron studies B12 folate reflect studies consistent with anemia of chronic disease  -Hgb stable. Management per Oncology. Acute kidney injury-due to  prerenal  -Resolved. Continue to hold lisinopril    Chronic atrial fibrillation s/p pacemaker insertion on the first of this month  Pacemaker site has a small hematoma which was also noticed on discharge  -Continue amiodarone  -Continue lovenox for anticoagulation  -EP recommendations noted    Anxiety  -Vistaril PRN    Insomnia  -Continue ambien PRN    DVT Prophylaxis: Therapeutic lovenox  Diet: DIET CARB CONTROL; Carb Control: 4 carb choices (60 gms)/meal  Code Status: Full Code    PT/OT Eval Status: as needed    Dispo - D/C Monday with home 02. He still needs diuresis    La Rodrigues MD

## 2021-01-30 NOTE — PROGRESS NOTES
CT scan done today, Monitoring BP and BS  Good urine out put and appetite. Using C_pap machine during day while napping.

## 2021-01-31 LAB
ANION GAP SERPL CALCULATED.3IONS-SCNC: 13 MMOL/L (ref 3–16)
BLOOD BANK DISPENSE STATUS: NORMAL
BLOOD BANK PRODUCT CODE: NORMAL
BPU ID: NORMAL
BUN BLDV-MCNC: 19 MG/DL (ref 7–20)
CALCIUM SERPL-MCNC: 8.7 MG/DL (ref 8.3–10.6)
CHLORIDE BLD-SCNC: 100 MMOL/L (ref 99–110)
CO2: 22 MMOL/L (ref 21–32)
CREAT SERPL-MCNC: 0.8 MG/DL (ref 0.8–1.3)
DESCRIPTION BLOOD BANK: NORMAL
GFR AFRICAN AMERICAN: >60
GFR NON-AFRICAN AMERICAN: >60
GLUCOSE BLD-MCNC: 143 MG/DL (ref 70–99)
GLUCOSE BLD-MCNC: 155 MG/DL (ref 70–99)
GLUCOSE BLD-MCNC: 176 MG/DL (ref 70–99)
GLUCOSE BLD-MCNC: 189 MG/DL (ref 70–99)
GLUCOSE BLD-MCNC: 208 MG/DL (ref 70–99)
HCT VFR BLD CALC: 27.5 % (ref 40.5–52.5)
HEMOGLOBIN: 8.9 G/DL (ref 13.5–17.5)
INR BLD: 1.61 (ref 0.86–1.14)
MCH RBC QN AUTO: 27.6 PG (ref 26–34)
MCHC RBC AUTO-ENTMCNC: 32.4 G/DL (ref 31–36)
MCV RBC AUTO: 85.3 FL (ref 80–100)
PDW BLD-RTO: 18.6 % (ref 12.4–15.4)
PERFORMED ON: ABNORMAL
PLATELET # BLD: 254 K/UL (ref 135–450)
PMV BLD AUTO: 6.8 FL (ref 5–10.5)
POTASSIUM REFLEX MAGNESIUM: 3.9 MMOL/L (ref 3.5–5.1)
PROTHROMBIN TIME: 18.8 SEC (ref 10–13.2)
RBC # BLD: 3.22 M/UL (ref 4.2–5.9)
SODIUM BLD-SCNC: 135 MMOL/L (ref 136–145)
WBC # BLD: 13.7 K/UL (ref 4–11)

## 2021-01-31 PROCEDURE — 6370000000 HC RX 637 (ALT 250 FOR IP): Performed by: INTERNAL MEDICINE

## 2021-01-31 PROCEDURE — 2700000000 HC OXYGEN THERAPY PER DAY

## 2021-01-31 PROCEDURE — 6370000000 HC RX 637 (ALT 250 FOR IP): Performed by: PHYSICIAN ASSISTANT

## 2021-01-31 PROCEDURE — 1200000000 HC SEMI PRIVATE

## 2021-01-31 PROCEDURE — 2580000003 HC RX 258: Performed by: INTERNAL MEDICINE

## 2021-01-31 PROCEDURE — 80048 BASIC METABOLIC PNL TOTAL CA: CPT

## 2021-01-31 PROCEDURE — 94640 AIRWAY INHALATION TREATMENT: CPT

## 2021-01-31 PROCEDURE — 6360000002 HC RX W HCPCS: Performed by: INTERNAL MEDICINE

## 2021-01-31 PROCEDURE — 6360000002 HC RX W HCPCS: Performed by: PHYSICIAN ASSISTANT

## 2021-01-31 PROCEDURE — 85610 PROTHROMBIN TIME: CPT

## 2021-01-31 PROCEDURE — 85027 COMPLETE CBC AUTOMATED: CPT

## 2021-01-31 PROCEDURE — 6370000000 HC RX 637 (ALT 250 FOR IP)

## 2021-01-31 PROCEDURE — 2580000003 HC RX 258: Performed by: PHYSICIAN ASSISTANT

## 2021-01-31 PROCEDURE — 36415 COLL VENOUS BLD VENIPUNCTURE: CPT

## 2021-01-31 RX ORDER — BUDESONIDE AND FORMOTEROL FUMARATE DIHYDRATE 160; 4.5 UG/1; UG/1
2 AEROSOL RESPIRATORY (INHALATION) 2 TIMES DAILY
Status: DISCONTINUED | OUTPATIENT
Start: 2021-01-31 | End: 2021-02-08 | Stop reason: HOSPADM

## 2021-01-31 RX ORDER — IPRATROPIUM BROMIDE AND ALBUTEROL SULFATE 2.5; .5 MG/3ML; MG/3ML
SOLUTION RESPIRATORY (INHALATION)
Status: COMPLETED
Start: 2021-01-31 | End: 2021-01-31

## 2021-01-31 RX ORDER — IPRATROPIUM BROMIDE AND ALBUTEROL SULFATE 2.5; .5 MG/3ML; MG/3ML
1 SOLUTION RESPIRATORY (INHALATION)
Status: DISCONTINUED | OUTPATIENT
Start: 2021-01-31 | End: 2021-02-08 | Stop reason: HOSPADM

## 2021-01-31 RX ORDER — INSULIN LISPRO 100 [IU]/ML
14 INJECTION, SOLUTION INTRAVENOUS; SUBCUTANEOUS
Status: DISCONTINUED | OUTPATIENT
Start: 2021-02-01 | End: 2021-02-08 | Stop reason: HOSPADM

## 2021-01-31 RX ORDER — SENNA AND DOCUSATE SODIUM 50; 8.6 MG/1; MG/1
2 TABLET, FILM COATED ORAL DAILY PRN
Status: DISCONTINUED | OUTPATIENT
Start: 2021-01-31 | End: 2021-02-08 | Stop reason: HOSPADM

## 2021-01-31 RX ORDER — MAGNESIUM SULFATE IN WATER 40 MG/ML
4000 INJECTION, SOLUTION INTRAVENOUS ONCE
Status: COMPLETED | OUTPATIENT
Start: 2021-01-31 | End: 2021-01-31

## 2021-01-31 RX ORDER — MAGNESIUM SULFATE 1 G/100ML
1000 INJECTION INTRAVENOUS PRN
Status: DISCONTINUED | OUTPATIENT
Start: 2021-01-31 | End: 2021-02-08 | Stop reason: HOSPADM

## 2021-01-31 RX ADMIN — TRAMADOL HYDROCHLORIDE 50 MG: 50 TABLET, FILM COATED ORAL at 04:36

## 2021-01-31 RX ADMIN — FUROSEMIDE 40 MG: 10 INJECTION, SOLUTION INTRAMUSCULAR; INTRAVENOUS at 08:17

## 2021-01-31 RX ADMIN — FUROSEMIDE 40 MG: 10 INJECTION, SOLUTION INTRAMUSCULAR; INTRAVENOUS at 16:57

## 2021-01-31 RX ADMIN — FINASTERIDE 5 MG: 5 TABLET, FILM COATED ORAL at 08:17

## 2021-01-31 RX ADMIN — ACETAMINOPHEN 650 MG: 325 TABLET ORAL at 00:13

## 2021-01-31 RX ADMIN — PANTOPRAZOLE SODIUM 40 MG: 40 TABLET, DELAYED RELEASE ORAL at 06:07

## 2021-01-31 RX ADMIN — Medication 10 ML: at 08:23

## 2021-01-31 RX ADMIN — METOPROLOL TARTRATE 75 MG: 50 TABLET, FILM COATED ORAL at 08:17

## 2021-01-31 RX ADMIN — CEFEPIME 2000 MG: 2 INJECTION, POWDER, FOR SOLUTION INTRAVENOUS at 20:55

## 2021-01-31 RX ADMIN — INSULIN LISPRO 3 UNITS: 100 INJECTION, SOLUTION INTRAVENOUS; SUBCUTANEOUS at 12:34

## 2021-01-31 RX ADMIN — INSULIN LISPRO 2 UNITS: 100 INJECTION, SOLUTION INTRAVENOUS; SUBCUTANEOUS at 21:07

## 2021-01-31 RX ADMIN — INSULIN LISPRO 3 UNITS: 100 INJECTION, SOLUTION INTRAVENOUS; SUBCUTANEOUS at 08:23

## 2021-01-31 RX ADMIN — AZITHROMYCIN MONOHYDRATE 500 MG: 500 INJECTION, POWDER, LYOPHILIZED, FOR SOLUTION INTRAVENOUS at 00:08

## 2021-01-31 RX ADMIN — IPRATROPIUM BROMIDE AND ALBUTEROL SULFATE 1 AMPULE: .5; 3 SOLUTION RESPIRATORY (INHALATION) at 20:12

## 2021-01-31 RX ADMIN — ENOXAPARIN SODIUM 90 MG: 100 INJECTION SUBCUTANEOUS at 20:54

## 2021-01-31 RX ADMIN — POTASSIUM CHLORIDE 40 MEQ: 1500 TABLET, EXTENDED RELEASE ORAL at 08:17

## 2021-01-31 RX ADMIN — Medication 1 CAPSULE: at 16:58

## 2021-01-31 RX ADMIN — TRAMADOL HYDROCHLORIDE 50 MG: 50 TABLET, FILM COATED ORAL at 18:41

## 2021-01-31 RX ADMIN — Medication 2 PUFF: at 20:20

## 2021-01-31 RX ADMIN — IPRATROPIUM BROMIDE AND ALBUTEROL SULFATE 1 AMPULE: .5; 3 SOLUTION RESPIRATORY (INHALATION) at 16:02

## 2021-01-31 RX ADMIN — CEFEPIME 2000 MG: 2 INJECTION, POWDER, FOR SOLUTION INTRAVENOUS at 14:12

## 2021-01-31 RX ADMIN — AMIODARONE HYDROCHLORIDE 200 MG: 200 TABLET ORAL at 08:17

## 2021-01-31 RX ADMIN — METOPROLOL TARTRATE 50 MG: 50 TABLET, FILM COATED ORAL at 20:55

## 2021-01-31 RX ADMIN — TAMSULOSIN HYDROCHLORIDE 0.4 MG: 0.4 CAPSULE ORAL at 21:09

## 2021-01-31 RX ADMIN — INSULIN LISPRO 12 UNITS: 100 INJECTION, SOLUTION INTRAVENOUS; SUBCUTANEOUS at 12:34

## 2021-01-31 RX ADMIN — ATORVASTATIN CALCIUM 20 MG: 20 TABLET, FILM COATED ORAL at 20:55

## 2021-01-31 RX ADMIN — MAGNESIUM SULFATE HEPTAHYDRATE 4000 MG: 40 INJECTION, SOLUTION INTRAVENOUS at 05:26

## 2021-01-31 RX ADMIN — IPRATROPIUM BROMIDE AND ALBUTEROL SULFATE 1 AMPULE: .5; 3 SOLUTION RESPIRATORY (INHALATION) at 12:12

## 2021-01-31 RX ADMIN — INSULIN LISPRO 6 UNITS: 100 INJECTION, SOLUTION INTRAVENOUS; SUBCUTANEOUS at 17:03

## 2021-01-31 RX ADMIN — Medication 10 ML: at 21:08

## 2021-01-31 RX ADMIN — ENOXAPARIN SODIUM 90 MG: 100 INJECTION SUBCUTANEOUS at 08:17

## 2021-01-31 RX ADMIN — INSULIN LISPRO 12 UNITS: 100 INJECTION, SOLUTION INTRAVENOUS; SUBCUTANEOUS at 08:23

## 2021-01-31 RX ADMIN — CEFEPIME 2000 MG: 2 INJECTION, POWDER, FOR SOLUTION INTRAVENOUS at 04:42

## 2021-01-31 RX ADMIN — IPRATROPIUM BROMIDE AND ALBUTEROL SULFATE 1 AMPULE: .5; 3 SOLUTION RESPIRATORY (INHALATION) at 07:49

## 2021-01-31 RX ADMIN — Medication 1 CAPSULE: at 08:17

## 2021-01-31 RX ADMIN — Medication 2 PUFF: at 07:51

## 2021-01-31 RX ADMIN — INSULIN LISPRO 12 UNITS: 100 INJECTION, SOLUTION INTRAVENOUS; SUBCUTANEOUS at 17:04

## 2021-01-31 RX ADMIN — ALLOPURINOL 300 MG: 300 TABLET ORAL at 16:58

## 2021-01-31 ASSESSMENT — PAIN SCALES - GENERAL
PAINLEVEL_OUTOF10: 3
PAINLEVEL_OUTOF10: 0

## 2021-01-31 ASSESSMENT — PAIN DESCRIPTION - LOCATION
LOCATION: LEG
LOCATION: LEG;BACK

## 2021-01-31 ASSESSMENT — PAIN DESCRIPTION - PAIN TYPE: TYPE: ACUTE PAIN

## 2021-01-31 ASSESSMENT — PAIN DESCRIPTION - ORIENTATION: ORIENTATION: LEFT

## 2021-01-31 NOTE — PROGRESS NOTES
Shift assessment complete. VSS. Pt assisted to bed and in resting comfortably. Medication administered per MAR. Wife is at bedside. Pt encouraged to use call light for any needs, states understanding. Call light in reach and fall precautions in place.

## 2021-01-31 NOTE — PROGRESS NOTES
Hospitalist Progress Note      PCP: Stephani Angulo MD    Date of Admission: 1/25/2021    Hospital Course:   77-year-old gentleman admitted to the hospital with some weakness. He is noted to have acute respiratory failure due to probably post obstructive pneumonia. He has right lung mass on outside hospital CT. He declined pulmonary workup opting for resumption of workup with [facility] Pulmonologist.    Subjective:   Transfused PRBC this morning. He c/o chronic dyspnea.     Medications:  Reviewed    Infusion Medications    sodium chloride      dextrose      sodium chloride       Scheduled Medications    insulin glargine  22 Units Subcutaneous Q12H    furosemide  40 mg Intravenous BID    potassium chloride  40 mEq Oral Daily with breakfast    enoxaparin  1 mg/kg Subcutaneous BID    ipratropium-albuterol  1 ampule Inhalation Q4H WA    insulin lispro  0-18 Units Subcutaneous TID WC    insulin lispro  0-9 Units Subcutaneous Nightly    insulin lispro  12 Units Subcutaneous TID WC    warfarin (COUMADIN) daily dosing (placeholder)   Other RX Placeholder    cefepime  2,000 mg Intravenous Q8H    allopurinol  300 mg Oral QPM    amiodarone  200 mg Oral Daily    finasteride  5 mg Oral Daily    budesonide-formoterol  2 puff Inhalation BID    metoprolol tartrate  75 mg Oral BID    pantoprazole  40 mg Oral QAM AC    atorvastatin  20 mg Oral Nightly    tamsulosin  0.4 mg Oral Nightly    sodium chloride flush  10 mL Intravenous 2 times per day    azithromycin  500 mg Intravenous Q24H    lactobacillus  1 capsule Oral BID WC     PRN Meds: sodium chloride, zolpidem, traMADol, glucose, dextrose, glucagon (rDNA), dextrose, sodium chloride, albuterol sulfate HFA, hydrOXYzine, sodium chloride flush, magnesium hydroxide, acetaminophen, ondansetron      Intake/Output Summary (Last 24 hours) at 1/31/2021 1557  Last data filed at 1/31/2021 0800  Gross per 24 hour   Intake 563.33 ml   Output 550 ml   Net 13.33 ml Physical Exam Performed:    BP (!) 145/66   Pulse 81   Temp 97.7 °F (36.5 °C) (Temporal)   Resp 18   Ht 5' 9\" (1.753 m)   Wt 205 lb 0.4 oz (93 kg)   SpO2 97%   BMI 30.28 kg/m²      General appearance: No apparent distress, appears stated age and cooperative. Obese male,on nasal 02  HEENT: Pupils equal, round, and reactive to light. Conjunctivae/corneas clear. Neck: Supple, with full range of motion. No jugular venous distention. Trachea midline. Respiratory:  Normal respiratory effort. Diminished sounds at right lung base,crackles in left lung  Cardiovascular: Regular rate and rhythm with normal S1/S2 without murmurs, rubs or gallops. Abdomen: Soft, non-tender, non-distended with normal bowel sounds. Musculoskeletal: No clubbing, cyanosis. ++ LE  edema bilaterally. Full range of motion without deformity. Skin: Skin color, texture, turgor normal.  No rashes or lesions. Neurologic:  Neurovascularly intact without any focal sensory/motor deficits. Cranial nerves: II-XII intact, grossly non-focal.  Psychiatric: Alert and oriented, thought content appropriate, normal insight      Labs:   Recent Labs     01/30/21  0411 01/30/21  2200 01/31/21  1142   WBC  --  14.6* 13.7*   HGB 7.6* 8.1* 8.9*   HCT 24.0* 25.4* 27.5*   PLT  --  289 254     Recent Labs     01/29/21  0451 01/30/21  0410 01/31/21  1142   * 134* 135*   K 3.6 3.6 3.9    102 100   CO2 23 22 22   BUN 25* 19 19   CREATININE 0.8 0.6* 0.8   CALCIUM 8.5 8.4 8.7     No results for input(s): AST, ALT, BILIDIR, BILITOT, ALKPHOS in the last 72 hours. Recent Labs     01/29/21  0451 01/30/21  0410 01/31/21  1142   INR 1.67* 1.85* 1.61*     No results for input(s): CKTOTAL, TROPONINI in the last 72 hours.     Urinalysis:      Lab Results   Component Value Date    NITRU Negative 01/01/2021    BACTERIA 0 02/13/2020    RBCUA 0 02/13/2020    BLOODU Negative 01/01/2021    SPECGRAV 1.015 01/01/2021    GLUCOSEU >=1000 01/01/2021       Radiology: CT HEAD W WO CONTRAST   Final Result   1. No evidence of metastatic disease. 2. Cerebral parenchymal volume loss, commensurate with the patient's age. XR CHEST 1 VIEW   Final Result   Addendum 1 of 1   ADDENDUM:   .         Final      IR GUIDED THORACENTESIS PLEURAL   Final Result   Successful ultrasound guided thoracentesis. IR GUIDED THORACENTESIS PLEURAL   Final Result   Trace right pleural effusion. No safe window for procedure at this time. Thoracentesis was not performed. XR CHEST PORTABLE   Final Result   Stable cardiomegaly with mild central pulmonary congestion which is more   prominent. Questionable early infiltrate or atelectasis along the right lung base which   is more apparent. CT NEEDLE BIOPSY LUNG PERCUTANEOUS    (Results Pending)           Assessment/Plan:    Active Hospital Problems    Diagnosis    Supratherapeutic INR [R79.1]    Pneumonia [J18.9]     Community acquired Pneumonia- Possibly post obstructive form lung mass. suspected gram-positive/gram-negative  COVID-19 negative  Continue cefepime(day 6/7). Completed azithromycin(day 5/5)  -D/Cd vancomycin given negative MRSA culture  -WBC trending down    Acute respiratory failure with hypoxia-due to pneumonia  -Currently on 2L NC. Wean for 02 sats >90%. -Continue inhalors     Right metastatic non small cancer  Previously bronchoscopy attempted had a brief cardiac arrest  -Declined further workup by Pulmonologist at this hospital.He would like to complete workup with Pulmonologist at [facility] guided lung bx or bronchoscopy planned.   -Thoracentesis done 1/28/21 at request of patient.  -Continue to hold home coumadin and continue lovenox due to procedures  -Oncology recommendations noted. MRI brain declined by pt. CT head w contrast didn't show metastasis. PET scan planned outpatient.     Supratherapeutic INR  INR noted noted to be greater than 14 on admission -On coumadin at home for Afib . Vitamin K 10 mg given. INR now subtherapeutic. Continue lovenox.  -Resume anticoagulation after workup is complete per EP     Chronic moderate systolic heart failure   -Patient is volume overloaded. IV diuresis and potassium supplementation. Hx VT s/p AICD  -Continue amiodarone  -Runs of VT. Monitor and replace electrolytes    Type 2 diabetes mellitus  Last A1c 10.6  -Management per Endocrinology      Chronic Anemia  Iron studies B12 folate reflect studies consistent with anemia of chronic disease  -Hgb stable. Management per Oncology. Acute kidney injury-due to  prerenal  -Resolved. Continue to hold lisinopril    Chronic atrial fibrillation s/p pacemaker insertion on the first of this month  Pacemaker site has a small hematoma which was also noticed on discharge  -Continue amiodarone  -Continue lovenox for anticoagulation  -EP recommendations noted    Anxiety  -Vistaril PRN    Insomnia  -Continue ambien PRN    Wife present at bedside. Questions and concerns were addressed. DVT Prophylaxis: Therapeutic lovenox  Diet: DIET CARB CONTROL; Carb Control: 4 carb choices (60 gms)/meal  Code Status: Full Code    PT/OT Eval Status: as needed    Dispo - D/C Monday with home 02. He still needs diuresis and await further workup with Oncology.     Boom Raman MD

## 2021-01-31 NOTE — PROGRESS NOTES
Endocrinology Progress    Patient doing fairly well with blood sugars except near lunch when he said he did not feel like eating, so got no insulin coverage for the meal, then ate a portion of his lunch, with resulting blood sugar near 372 mg/dl. He had head CT today which was negative for metastasis and showed cerebral atrophy felt appropriate for his age. Pulse: 82; /60; resp 20 Temp 97.7 deg    Alert, oriented,       Assessment:  1. Progressive anemia, hgb down to 7.6, patient is fatigued, dyspneic, has barely enough energy to get up out of bed, pulse and respirations increasing gradually which I deem as hemodynamic instability and therefore appropriate to transfuse 1 unit of packed RBC. 2. Acute renal failure, resolved, now has normal creatinine. 3. Lung mass, per patient thoracentesis showed malignant cells consitent with lung cancer, which is likely correct, but I do not find this result in chart. 4. Pneumonia, treated  5. Uncontrolled type 2 IDDM    Plan:  1. Suggest that insulin be given immediately after partial meals consumed instead of being held. 2. Transfuse 1 unit of packed RBC's in AM.  3. Increase Lantus by 10% to 22 units every 12 hours. JOSÉ Arzate M.D.

## 2021-01-31 NOTE — PROGRESS NOTES
Assessment complete. See flow sheet. Pain evaluation complete. No complaints of pain at this time. VSS. Discussed POC for this shift. 1 unit of PRBC done transfusing at this time. Patient tolerated well. Patient encouraged to use call light with any needs. Patient states understanding, call light in reach, bed alarm on. Will continue to monitor.

## 2021-01-31 NOTE — PROGRESS NOTES
Consent to receive blood obtained. Unit of PRBC verified by 2 RN's. This RN stayed with patient first 15 minutes, VSS. Will continue to monitor.

## 2021-02-01 LAB
A/G RATIO: 0.8 (ref 1.1–2.2)
ALBUMIN SERPL-MCNC: 2.7 G/DL (ref 3.4–5)
ALP BLD-CCNC: 140 U/L (ref 40–129)
ALT SERPL-CCNC: 32 U/L (ref 10–40)
ANION GAP SERPL CALCULATED.3IONS-SCNC: 11 MMOL/L (ref 3–16)
ANION GAP SERPL CALCULATED.3IONS-SCNC: 11 MMOL/L (ref 3–16)
AST SERPL-CCNC: 32 U/L (ref 15–37)
BASE EXCESS ARTERIAL: 2.2 MMOL/L (ref -3–3)
BASOPHILS ABSOLUTE: 0 K/UL (ref 0–0.2)
BASOPHILS RELATIVE PERCENT: 0.3 %
BILIRUB SERPL-MCNC: 0.6 MG/DL (ref 0–1)
BUN BLDV-MCNC: 19 MG/DL (ref 7–20)
BUN BLDV-MCNC: 23 MG/DL (ref 7–20)
CALCIUM SERPL-MCNC: 8.5 MG/DL (ref 8.3–10.6)
CALCIUM SERPL-MCNC: 8.6 MG/DL (ref 8.3–10.6)
CARBOXYHEMOGLOBIN ARTERIAL: 2.3 % (ref 0–1.5)
CHLORIDE BLD-SCNC: 101 MMOL/L (ref 99–110)
CHLORIDE BLD-SCNC: 99 MMOL/L (ref 99–110)
CO2: 23 MMOL/L (ref 21–32)
CO2: 24 MMOL/L (ref 21–32)
CREAT SERPL-MCNC: 0.7 MG/DL (ref 0.8–1.3)
CREAT SERPL-MCNC: 0.9 MG/DL (ref 0.8–1.3)
EOSINOPHILS ABSOLUTE: 0 K/UL (ref 0–0.6)
EOSINOPHILS RELATIVE PERCENT: 0.4 %
GFR AFRICAN AMERICAN: >60
GFR AFRICAN AMERICAN: >60
GFR NON-AFRICAN AMERICAN: >60
GFR NON-AFRICAN AMERICAN: >60
GLOBULIN: 3.2 G/DL
GLUCOSE BLD-MCNC: 104 MG/DL (ref 70–99)
GLUCOSE BLD-MCNC: 211 MG/DL (ref 70–99)
GLUCOSE BLD-MCNC: 246 MG/DL (ref 70–99)
GLUCOSE BLD-MCNC: 254 MG/DL (ref 70–99)
GLUCOSE BLD-MCNC: 280 MG/DL (ref 70–99)
GLUCOSE BLD-MCNC: 92 MG/DL (ref 70–99)
HCO3 ARTERIAL: 25.3 MMOL/L (ref 21–29)
HCT VFR BLD CALC: 26.3 % (ref 40.5–52.5)
HCT VFR BLD CALC: 27.2 % (ref 40.5–52.5)
HEMOGLOBIN, ART, EXTENDED: 8.7 G/DL (ref 13.5–17.5)
HEMOGLOBIN: 8.4 G/DL (ref 13.5–17.5)
HEMOGLOBIN: 8.5 G/DL (ref 13.5–17.5)
INR BLD: 1.6 (ref 0.86–1.14)
INR BLD: 1.67 (ref 0.86–1.14)
LYMPHOCYTES ABSOLUTE: 1 K/UL (ref 1–5.1)
LYMPHOCYTES RELATIVE PERCENT: 9.2 %
MAGNESIUM: 2.5 MG/DL (ref 1.8–2.4)
MCH RBC QN AUTO: 26.8 PG (ref 26–34)
MCH RBC QN AUTO: 27.2 PG (ref 26–34)
MCHC RBC AUTO-ENTMCNC: 31.1 G/DL (ref 31–36)
MCHC RBC AUTO-ENTMCNC: 31.9 G/DL (ref 31–36)
MCV RBC AUTO: 85.2 FL (ref 80–100)
MCV RBC AUTO: 86.2 FL (ref 80–100)
METHEMOGLOBIN ARTERIAL: 0 %
MONOCYTES ABSOLUTE: 0.9 K/UL (ref 0–1.3)
MONOCYTES RELATIVE PERCENT: 8.2 %
NEUTROPHILS ABSOLUTE: 9.1 K/UL (ref 1.7–7.7)
NEUTROPHILS RELATIVE PERCENT: 81.9 %
O2 CONTENT ARTERIAL: 12 ML/DL
O2 SAT, ARTERIAL: 96.3 %
O2 THERAPY: ABNORMAL
PCO2 ARTERIAL: 32.7 MMHG (ref 35–45)
PDW BLD-RTO: 18.1 % (ref 12.4–15.4)
PDW BLD-RTO: 19.2 % (ref 12.4–15.4)
PERFORMED ON: ABNORMAL
PH ARTERIAL: 7.5 (ref 7.35–7.45)
PLATELET # BLD: 234 K/UL (ref 135–450)
PLATELET # BLD: 244 K/UL (ref 135–450)
PMV BLD AUTO: 7.2 FL (ref 5–10.5)
PMV BLD AUTO: 7.3 FL (ref 5–10.5)
PO2 ARTERIAL: 74.3 MMHG (ref 75–108)
POTASSIUM REFLEX MAGNESIUM: 3.7 MMOL/L (ref 3.5–5.1)
POTASSIUM REFLEX MAGNESIUM: 4.1 MMOL/L (ref 3.5–5.1)
PROTHROMBIN TIME: 18.7 SEC (ref 10–13.2)
PROTHROMBIN TIME: 19.5 SEC (ref 10–13.2)
RBC # BLD: 3.09 M/UL (ref 4.2–5.9)
RBC # BLD: 3.16 M/UL (ref 4.2–5.9)
SODIUM BLD-SCNC: 133 MMOL/L (ref 136–145)
SODIUM BLD-SCNC: 136 MMOL/L (ref 136–145)
TCO2 ARTERIAL: 59 MMOL/L
TOTAL PROTEIN: 5.9 G/DL (ref 6.4–8.2)
TROPONIN: 0.03 NG/ML
WBC # BLD: 11.1 K/UL (ref 4–11)
WBC # BLD: 12.5 K/UL (ref 4–11)

## 2021-02-01 PROCEDURE — 6360000002 HC RX W HCPCS: Performed by: NURSE PRACTITIONER

## 2021-02-01 PROCEDURE — 94640 AIRWAY INHALATION TREATMENT: CPT

## 2021-02-01 PROCEDURE — 6370000000 HC RX 637 (ALT 250 FOR IP): Performed by: PHYSICIAN ASSISTANT

## 2021-02-01 PROCEDURE — 6370000000 HC RX 637 (ALT 250 FOR IP): Performed by: INTERNAL MEDICINE

## 2021-02-01 PROCEDURE — 2580000003 HC RX 258: Performed by: PHYSICIAN ASSISTANT

## 2021-02-01 PROCEDURE — 2580000003 HC RX 258: Performed by: INTERNAL MEDICINE

## 2021-02-01 PROCEDURE — 85025 COMPLETE CBC W/AUTO DIFF WBC: CPT

## 2021-02-01 PROCEDURE — 6360000002 HC RX W HCPCS: Performed by: INTERNAL MEDICINE

## 2021-02-01 PROCEDURE — 2580000003 HC RX 258: Performed by: NURSE PRACTITIONER

## 2021-02-01 PROCEDURE — 6360000002 HC RX W HCPCS

## 2021-02-01 PROCEDURE — 93005 ELECTROCARDIOGRAM TRACING: CPT | Performed by: NURSE PRACTITIONER

## 2021-02-01 PROCEDURE — 80053 COMPREHEN METABOLIC PANEL: CPT

## 2021-02-01 PROCEDURE — 82803 BLOOD GASES ANY COMBINATION: CPT

## 2021-02-01 PROCEDURE — 36415 COLL VENOUS BLD VENIPUNCTURE: CPT

## 2021-02-01 PROCEDURE — 94761 N-INVAS EAR/PLS OXIMETRY MLT: CPT

## 2021-02-01 PROCEDURE — 2000000000 HC ICU R&B

## 2021-02-01 PROCEDURE — 85610 PROTHROMBIN TIME: CPT

## 2021-02-01 PROCEDURE — 85027 COMPLETE CBC AUTOMATED: CPT

## 2021-02-01 PROCEDURE — 2700000000 HC OXYGEN THERAPY PER DAY

## 2021-02-01 PROCEDURE — 83735 ASSAY OF MAGNESIUM: CPT

## 2021-02-01 PROCEDURE — 84484 ASSAY OF TROPONIN QUANT: CPT

## 2021-02-01 RX ORDER — WARFARIN SODIUM 2.5 MG/1
2.5 TABLET ORAL DAILY
Status: DISCONTINUED | OUTPATIENT
Start: 2021-02-01 | End: 2021-02-06

## 2021-02-01 RX ORDER — MIDAZOLAM HYDROCHLORIDE 2 MG/2ML
2 INJECTION, SOLUTION INTRAMUSCULAR; INTRAVENOUS ONCE
Status: COMPLETED | OUTPATIENT
Start: 2021-02-01 | End: 2021-02-01

## 2021-02-01 RX ORDER — METOPROLOL TARTRATE 50 MG/1
50 TABLET, FILM COATED ORAL 2 TIMES DAILY
Status: DISCONTINUED | OUTPATIENT
Start: 2021-02-01 | End: 2021-02-02

## 2021-02-01 RX ORDER — LORAZEPAM 2 MG/ML
1 INJECTION INTRAMUSCULAR ONCE
Status: DISCONTINUED | OUTPATIENT
Start: 2021-02-01 | End: 2021-02-08 | Stop reason: HOSPADM

## 2021-02-01 RX ORDER — LISINOPRIL 5 MG/1
5 TABLET ORAL DAILY
Status: DISCONTINUED | OUTPATIENT
Start: 2021-02-01 | End: 2021-02-08 | Stop reason: HOSPADM

## 2021-02-01 RX ORDER — FUROSEMIDE 40 MG/1
40 TABLET ORAL 2 TIMES DAILY
Status: DISCONTINUED | OUTPATIENT
Start: 2021-02-01 | End: 2021-02-03

## 2021-02-01 RX ORDER — MIDAZOLAM HYDROCHLORIDE 1 MG/ML
INJECTION INTRAMUSCULAR; INTRAVENOUS
Status: COMPLETED
Start: 2021-02-01 | End: 2021-02-01

## 2021-02-01 RX ADMIN — INSULIN LISPRO 3 UNITS: 100 INJECTION, SOLUTION INTRAVENOUS; SUBCUTANEOUS at 21:38

## 2021-02-01 RX ADMIN — IPRATROPIUM BROMIDE AND ALBUTEROL SULFATE 1 AMPULE: .5; 3 SOLUTION RESPIRATORY (INHALATION) at 14:33

## 2021-02-01 RX ADMIN — LISINOPRIL 5 MG: 5 TABLET ORAL at 15:54

## 2021-02-01 RX ADMIN — ENOXAPARIN SODIUM 90 MG: 100 INJECTION SUBCUTANEOUS at 10:07

## 2021-02-01 RX ADMIN — AMIODARONE HYDROCHLORIDE 1 MG/MIN: 50 INJECTION, SOLUTION INTRAVENOUS at 20:56

## 2021-02-01 RX ADMIN — INSULIN LISPRO 14 UNITS: 100 INJECTION, SOLUTION INTRAVENOUS; SUBCUTANEOUS at 17:57

## 2021-02-01 RX ADMIN — MIDAZOLAM HYDROCHLORIDE 2 MG: 1 INJECTION, SOLUTION INTRAMUSCULAR; INTRAVENOUS at 19:39

## 2021-02-01 RX ADMIN — MIDAZOLAM: 1 INJECTION INTRAMUSCULAR; INTRAVENOUS at 19:30

## 2021-02-01 RX ADMIN — TAMSULOSIN HYDROCHLORIDE 0.4 MG: 0.4 CAPSULE ORAL at 21:37

## 2021-02-01 RX ADMIN — STANDARDIZED SENNA CONCENTRATE AND DOCUSATE SODIUM 2 TABLET: 8.6; 5 TABLET ORAL at 10:25

## 2021-02-01 RX ADMIN — INSULIN LISPRO 14 UNITS: 100 INJECTION, SOLUTION INTRAVENOUS; SUBCUTANEOUS at 12:38

## 2021-02-01 RX ADMIN — ALLOPURINOL 300 MG: 300 TABLET ORAL at 17:59

## 2021-02-01 RX ADMIN — INSULIN LISPRO 9 UNITS: 100 INJECTION, SOLUTION INTRAVENOUS; SUBCUTANEOUS at 17:57

## 2021-02-01 RX ADMIN — IPRATROPIUM BROMIDE AND ALBUTEROL SULFATE 1 AMPULE: .5; 3 SOLUTION RESPIRATORY (INHALATION) at 08:24

## 2021-02-01 RX ADMIN — INSULIN LISPRO 6 UNITS: 100 INJECTION, SOLUTION INTRAVENOUS; SUBCUTANEOUS at 12:42

## 2021-02-01 RX ADMIN — Medication 10 ML: at 10:08

## 2021-02-01 RX ADMIN — FINASTERIDE 5 MG: 5 TABLET, FILM COATED ORAL at 10:06

## 2021-02-01 RX ADMIN — ATORVASTATIN CALCIUM 20 MG: 20 TABLET, FILM COATED ORAL at 21:37

## 2021-02-01 RX ADMIN — WARFARIN SODIUM 2.5 MG: 2.5 TABLET ORAL at 17:59

## 2021-02-01 RX ADMIN — Medication 2 PUFF: at 08:24

## 2021-02-01 RX ADMIN — INSULIN LISPRO 14 UNITS: 100 INJECTION, SOLUTION INTRAVENOUS; SUBCUTANEOUS at 10:10

## 2021-02-01 RX ADMIN — CEFEPIME 2000 MG: 2 INJECTION, POWDER, FOR SOLUTION INTRAVENOUS at 12:37

## 2021-02-01 RX ADMIN — AMIODARONE HYDROCHLORIDE 200 MG: 200 TABLET ORAL at 10:06

## 2021-02-01 RX ADMIN — Medication 1 CAPSULE: at 17:59

## 2021-02-01 RX ADMIN — Medication 1 CAPSULE: at 10:07

## 2021-02-01 RX ADMIN — METOPROLOL TARTRATE 75 MG: 50 TABLET, FILM COATED ORAL at 10:06

## 2021-02-01 RX ADMIN — POTASSIUM CHLORIDE 40 MEQ: 1500 TABLET, EXTENDED RELEASE ORAL at 10:25

## 2021-02-01 RX ADMIN — CEFEPIME 2000 MG: 2 INJECTION, POWDER, FOR SOLUTION INTRAVENOUS at 05:33

## 2021-02-01 RX ADMIN — FUROSEMIDE 40 MG: 10 INJECTION, SOLUTION INTRAMUSCULAR; INTRAVENOUS at 10:07

## 2021-02-01 RX ADMIN — ENOXAPARIN SODIUM 90 MG: 100 INJECTION SUBCUTANEOUS at 21:37

## 2021-02-01 RX ADMIN — HYDROXYZINE PAMOATE 50 MG: 25 CAPSULE ORAL at 12:43

## 2021-02-01 RX ADMIN — PANTOPRAZOLE SODIUM 40 MG: 40 TABLET, DELAYED RELEASE ORAL at 05:33

## 2021-02-01 RX ADMIN — TRAMADOL HYDROCHLORIDE 50 MG: 50 TABLET, FILM COATED ORAL at 05:33

## 2021-02-01 ASSESSMENT — PAIN SCALES - GENERAL: PAINLEVEL_OUTOF10: 0

## 2021-02-01 NOTE — PROGRESS NOTES
Physician Progress Note      PATIENT:               Corrina Breaux  CSN #:                  [id number]  :                       1955  ADMIT DATE:       2021 7:20 PM  [address] DATE:  RESPONDING  PROVIDER #:        Iraida Tim MD          QUERY TEXT:    Pt admitted with PNA. Pt noted to have tachycardia in the 90's and WBC 14.5 on   admission. If possible, please document in the progress notes and discharge   summary if you are evaluating and /or treating any of the following: The medical record reflects the following:  Risk Factors: PNA, possible undiagnosed lung CA  Clinical Indicators: temp  1200 96.8,  0000 96.8, WBC on admission   14.5, procal 0.20, HR 96 on admission. Documented sepsis by ED. Treatment: IVF, azithromax, maxipime and vancomycin. Monitoring. Options provided:  -- Sepsis, present on admission  -- Sepsis, present on admission, now resolved  -- Sepsis, not present on admission  -- No Sepsis, PNA only  -- Sepsis was ruled out  -- Other - I will add my own diagnosis  -- Disagree - Not applicable / Not valid  -- Disagree - Clinically unable to determine / Unknown  -- Refer to Clinical Documentation Reviewer    PROVIDER RESPONSE TEXT:    This patient has sepsis which was present on admission.     Query created by: Ivon Man on 2021 8:32 AM      Electronically signed by:  Iraida Tim MD 2021 4:30 PM

## 2021-02-01 NOTE — PROGRESS NOTES
Patient complaining of feeling short of breath. 02 sat 91% on room air.  02 at 2L per NC applied and sat quickly leyda to 95%.  Patient also missed 1200 duoneb HHN, respiratory called and will come now to administer douneb HHN

## 2021-02-01 NOTE — PROGRESS NOTES
Hospitalist Progress Note      PCP: Jorge Rodgers MD    Date of Admission: 1/25/2021    Hospital Course:   42-year-old gentleman admitted to the hospital with some weakness. He is noted to have acute respiratory failure due to probably post obstructive pneumonia. He has right lung mass on outside hospital CT. He declined pulmonary workup opting for resumption of workup with [facility] Pulmonologist.  Cytology from 1/28 consistent with metastatic melanoma    Subjective:   Patient is weak and tired. No CP, HA or abdominal pain. No fevers.     Medications:  Reviewed    Infusion Medications    sodium chloride      dextrose      sodium chloride       Scheduled Medications    furosemide  40 mg Oral BID    lisinopril  5 mg Oral Daily    metoprolol tartrate  50 mg Oral BID    warfarin  2.5 mg Oral Daily    LORazepam  1 mg Intravenous Once    budesonide-formoterol  2 puff Inhalation BID    ipratropium-albuterol  1 ampule Inhalation Q4H WA    insulin lispro  14 Units Subcutaneous TID WC    insulin glargine  22 Units Subcutaneous Q12H    potassium chloride  40 mEq Oral Daily with breakfast    enoxaparin  1 mg/kg Subcutaneous BID    insulin lispro  0-18 Units Subcutaneous TID WC    insulin lispro  0-9 Units Subcutaneous Nightly    cefepime  2,000 mg Intravenous Q8H    allopurinol  300 mg Oral QPM    amiodarone  200 mg Oral Daily    finasteride  5 mg Oral Daily    pantoprazole  40 mg Oral QAM AC    atorvastatin  20 mg Oral Nightly    tamsulosin  0.4 mg Oral Nightly    sodium chloride flush  10 mL Intravenous 2 times per day    lactobacillus  1 capsule Oral BID WC     PRN Meds: magnesium sulfate, sennosides-docusate sodium, sodium chloride, zolpidem, traMADol, glucose, dextrose, glucagon (rDNA), dextrose, sodium chloride, albuterol sulfate HFA, hydrOXYzine, sodium chloride flush, magnesium hydroxide, acetaminophen, ondansetron      Intake/Output Summary (Last 24 hours) at 2/1/2021 4165 Last data filed at 2/1/2021 1000  Gross per 24 hour   Intake 220 ml   Output    Net 220 ml       Physical Exam Performed:    /74   Pulse 86   Temp 98 °F (36.7 °C) (Oral)   Resp 18   Ht 5' 9\" (1.753 m)   Wt 205 lb 0.4 oz (93 kg)   SpO2 95%   BMI 30.28 kg/m²      General appearance: No apparent distress, appears stated age and cooperative. Obese male,on RA  HEENT: Pupils equal, round, and reactive to light. Conjunctivae/corneas clear. Neck: Supple, with full range of motion. No jugular venous distention. Trachea midline. Respiratory:  Normal respiratory effort. Diminished sounds at right lung base,crackles in left lung  Cardiovascular: Regular rate and rhythm with normal S1/S2 without murmurs, rubs or gallops. Abdomen: Soft, non-tender, non-distended with normal bowel sounds. Musculoskeletal: No clubbing, cyanosis. 1+ LE  edema bilaterally. Full range of motion without deformity. Skin: Skin color, texture, turgor normal.  No rashes or lesions. Neurologic:  Neurovascularly intact without any focal sensory/motor deficits. Cranial nerves: II-XII intact, grossly non-focal.  Psychiatric: Alert and oriented, thought content appropriate, normal insight      Labs:   Recent Labs     01/30/21 2200 01/31/21  1142 02/01/21  0620   WBC 14.6* 13.7* 12.5*   HGB 8.1* 8.9* 8.4*   HCT 25.4* 27.5* 26.3*    254 244     Recent Labs     01/30/21  0410 01/31/21  1142 02/01/21  0620   * 135* 136   K 3.6 3.9 3.7    100 101   CO2 22 22 24   BUN 19 19 19   CREATININE 0.6* 0.8 0.7*   CALCIUM 8.4 8.7 8.6     No results for input(s): AST, ALT, BILIDIR, BILITOT, ALKPHOS in the last 72 hours. Recent Labs     01/30/21  0410 01/31/21  1142 02/01/21  0620   INR 1.85* 1.61* 1.67*     No results for input(s): CKTOTAL, TROPONINI in the last 72 hours.     Urinalysis:      Lab Results   Component Value Date    NITRU Negative 01/01/2021    BACTERIA 0 02/13/2020    RBCUA 0 02/13/2020    BLOODU Negative 01/01/2021 INR noted noted to be greater than 14 on admission  -On coumadin at home for Afib subtherapeutic. Continue lovenox bid  -Resume Coumadin today     Chronic moderate systolic heart failure   -Patient is volume overloaded. IV diuresis to PO today and potassium supplementation. Hx VT s/p AICD  -Continue amiodarone  -Runs of VT. Monitor and replace electrolytes    Type 2 diabetes mellitus  Last A1c 10.6  -Management per Endocrinology      Chronic Anemia  Iron studies B12 folate reflect studies consistent with anemia of chronic disease  -Hgb stable. Management per Oncology. Acute kidney injury-due to  prerenal  -Resolved. Continue to hold lisinopril    Chronic atrial fibrillation s/p pacemaker insertion on the first of this month  Pacemaker site has a small hematoma which was also noticed on discharge  -Continue amiodarone  -Continue lovenox for anticoagulation  -EP recommendations noted    Anxiety  -Vistaril PRN    Insomnia  -Continue ambien PRN      DVT Prophylaxis: Therapeutic lovenox  Diet: DIET CARB CONTROL; Carb Control: 4 carb choices (60 gms)/meal; Daily Fluid Restriction: 1000 ml  Dietary Nutrition Supplements: Diabetic Oral Supplement  Code Status: Full Code    PT/OT Eval Status: as needed    Dispo -     Discussed with patient, Dr Catrachita Granados (Onc), nursing and CM. D/W wife at bedside. Given complexity of his condition, it would be of value to exhaust work up as inpatient. Patient agrees.     Mirtha Aguirre MD

## 2021-02-01 NOTE — PROGRESS NOTES
Comprehensive Nutrition Assessment    Type and Reason for Visit:  Initial(LOS assessment)    Nutrition Recommendations/Plan:   1. Trial Glucerna BID    Nutrition Assessment:  Pt is at risk for nutrition compromise given decreased appetite and PO intake over the past 2 months. Note 10 lb (5%) weight loss over the past month per hx in EMR. Pt states his appetite is starting to return, but PO intake depends on if he likes the food. Pt agreeable to trial Glucerna BID to promote nutrition given hx poor PO, weight loss and current inconsistent PO intake. Will monitor for tolerance. Malnutrition Assessment:  Malnutrition Status: At risk for malnutrition (Comment)      Estimated Daily Nutrient Needs:  Energy (kcal):  8842-4030; Weight Used for Energy Requirements:  Current(93 kg)     Protein (g):  79-99 grams; Weight Used for Protein Requirements:  Ideal(66 kg; 1.2-1.5 grams per kg)        Fluid (ml/day):   ; Method Used for Fluid Requirements:  1 ml/kcal      Nutrition Related Findings:  +2 pitting BLE edema. +1.7 liters. Wounds:  None       Current Nutrition Therapies:    DIET CARB CONTROL; Carb Control: 4 carb choices (60 gms)/meal; Daily Fluid Restriction: 1000 ml  Dietary Nutrition Supplements: Diabetic Oral Supplement    Anthropometric Measures:  · Height: 5' 9\" (175.3 cm)  · Current Body Weight: 205 lb (93 kg)   · Ideal Body Weight: 160 lbs  · BMI: 30.3  · BMI Categories: Obese Class 1 (BMI 30.0-34. 9)       Nutrition Diagnosis:   · Unintended weight loss related to inadequate protein-energy intake as evidenced by weight loss greater than or equal to 5% in 1 month      Nutrition Interventions:   Food and/or Nutrient Delivery:  Continue Current Diet, Start Oral Nutrition Supplement  Nutrition Education/Counseling:  Education not indicated   Coordination of Nutrition Care:  Continue to monitor while inpatient    Goals:  Pt will consistently consume at least 50% of meals and supplements Nutrition Monitoring and Evaluation:   Behavioral-Environmental Outcomes:  None Identified   Food/Nutrient Intake Outcomes:  Food and Nutrient Intake, Supplement Intake  Physical Signs/Symptoms Outcomes:  Weight     Discharge Planning:    Continue Oral Nutrition Supplement     Electronically signed by Allison Mock RD, LD on 2/1/21 at 2:09 PM EST    Contact: [phone]

## 2021-02-01 NOTE — ACP (ADVANCE CARE PLANNING)
Advanced Care Planning Note. Purpose of Encounter: Advanced care planning in light of VT  Parties In Attendance: Patient, wife  Decisional Capacity: Yes  Subjective: Patient with SOB and pain in legs  Objective: Cr 0.7  Goals of Care Determination: Patient/POA wants full support (CPR, vent, surgery, HD, trach, PEG)  Plan:  Onc/Renal consults. MRI Brain  Code Status: Full code   Time spent on Advanced care Plannin minutes  Advanced Care Planning Documents: Completed advanced directives on chart, wife is the POA.     Hua Iraheta MD  2021 4:48 PM

## 2021-02-01 NOTE — PROGRESS NOTES
Attempted to call report to [facility] RN, states they will have RN return call when finished in pt room.

## 2021-02-01 NOTE — PROGRESS NOTES
[facility]   Progress Note      2/1/2021 11:02 AM        Name: Lynne Lakhani . Admitted: 1/25/2021    SUBJECTIVE:  He is doing ok, breathing is better, he has an occasional cough, wife at bedside.      Reviewed interval ancillary notes    Current Medications      magnesium sulfate 1000 mg in dextrose 5% 100 mL IVPB, PRN      sennosides-docusate sodium (SENOKOT-S) 8.6-50 MG tablet 2 tablet, Daily PRN      budesonide-formoterol (SYMBICORT) 160-4.5 MCG/ACT inhaler 2 puff, BID      ipratropium-albuterol (DUONEB) nebulizer solution 1 ampule, Q4H WA      insulin lispro (1 Unit Dial) 14 Units, TID WC      0.9 % sodium chloride infusion, PRN      insulin glargine (LANTUS;BASAGLAR) injection pen 22 Units, Q12H      furosemide (LASIX) injection 40 mg, BID      potassium chloride (KLOR-CON M) extended release tablet 40 mEq, Daily with breakfast      zolpidem (AMBIEN) tablet 5 mg, Nightly PRN      traMADol (ULTRAM) tablet 50 mg, Q6H PRN      enoxaparin (LOVENOX) injection 90 mg, BID      insulin lispro (1 Unit Dial) 0-18 Units, TID WC      insulin lispro (1 Unit Dial) 0-9 Units, Nightly      warfarin (COUMADIN) daily dosing (placeholder), RX Placeholder      cefepime (MAXIPIME) 2000 mg IVPB minibag, Q8H      glucose (GLUTOSE) 40 % oral gel 15 g, PRN      dextrose 50 % IV solution, PRN      glucagon (rDNA) injection 1 mg, PRN      dextrose 5 % solution, PRN      0.9 % sodium chloride infusion, PRN      albuterol sulfate  (90 Base) MCG/ACT inhaler 2 puff, Q6H PRN      allopurinol (ZYLOPRIM) tablet 300 mg, QPM      amiodarone (CORDARONE) tablet 200 mg, Daily      finasteride (PROSCAR) tablet 5 mg, Daily      hydrOXYzine (VISTARIL) capsule 50 mg, TID PRN      metoprolol tartrate (LOPRESSOR) tablet 75 mg, BID      pantoprazole (PROTONIX) tablet 40 mg, QAM AC      atorvastatin (LIPITOR) tablet 20 mg, Nightly      tamsulosin (FLOMAX) capsule 0.4 mg, Nightly   sodium chloride flush 0.9 % injection 10 mL, 2 times per day      sodium chloride flush 0.9 % injection 10 mL, PRN      magnesium hydroxide (MILK OF MAGNESIA) 400 MG/5ML suspension 30 mL, Daily PRN      acetaminophen (TYLENOL) tablet 650 mg, Q6H PRN      ondansetron (ZOFRAN) injection 4 mg, Q6H PRN      lactobacillus (CULTURELLE) capsule 1 capsule, BID WC        Objective:  /70   Pulse 95   Temp 97.9 °F (36.6 °C) (Oral)   Resp 18   Ht 5' 9\" (1.753 m)   Wt 205 lb 0.4 oz (93 kg)   SpO2 97%   BMI 30.28 kg/m²     Intake/Output Summary (Last 24 hours) at 2/1/2021 1102  Last data filed at 2/1/2021 1000  Gross per 24 hour   Intake 220 ml   Output    Net 220 ml      Wt Readings from Last 3 Encounters:   01/31/21 205 lb 0.4 oz (93 kg)   01/14/21 214 lb (97.1 kg)   01/05/21 210 lb 9.6 oz (95.5 kg)       General appearance:  Appears comfortable  Eyes: Sclera clear. Pupils equal.  ENT: Moist oral mucosa. Trachea midline, no adenopathy. Cardiovascular: Regular rhythm, normal S1, S2. No murmur. No edema in lower extremities  Respiratory: Not using accessory muscles. Good inspiratory effort. Clear to auscultation bilaterally, no wheeze or crackles. GI: Abdomen soft, no tenderness, not distended  Musculoskeletal: No cyanosis in digits, neck supple  Neurology: CN 2-12 grossly intact. No speech or motor deficits  Psych: Normal affect. Alert and oriented in time, place and person  Skin: Warm, dry, normal turgor    Labs and Tests:  CBC:   Recent Labs     01/30/21  2200 01/31/21  1142 02/01/21  0620   WBC 14.6* 13.7* 12.5*   HGB 8.1* 8.9* 8.4*    254 244     BMP:    Recent Labs     01/30/21  0410 01/31/21  1142 02/01/21  0620   * 135* 136   K 3.6 3.9 3.7    100 101   CO2 22 22 24   BUN 19 19 19   CREATININE 0.6* 0.8 0.7*   GLUCOSE 162* 189* 92     Hepatic: No results for input(s): AST, ALT, ALB, BILITOT, ALKPHOS in the last 72 hours.     ASSESSMENT AND PLAN    Active Problems:    Pneumonia Supratherapeutic INR  Resolved Problems:    * No resolved hospital problems. *      1.  Supratherapeutic INR epistaxis:     -Received FFP and vitamin K on 1/26/2021  -INR is 1.52  -Patient had poor appetite for last couple of weeks which might have caused vitamin K deficiency leading to supratherapeutic INR.  - Anticoagulation at the discretion of cardiologist.     2.  Right hilar mass, mediastinal lymphadenopathy:     -Patient has new pleural effusion per CT chest done at [facility] on 1/20/2021  - Had thoracentesis done on 1/28/2020.  30 mL removed. - Preliminary cytology report was discussed with the pathologist.  It is consistent with non-small cell lung cancer possible adenocarcinoma. Immunostains are pending.     -Diagnosis has been discussed with the patient and wife. -Plan would be to get a PET scan as outpatient. -MRI brain needs to be done. Patient is not keen on doing MRI brain. CT brain showed no evidence of metastatic disease  -We will send next generation sequencing on the cytology if adequate sample is applicable. -Based on above studies further treatment decisions will be made.     3.  Cardiac arrest after bronchoscopy in December 2020, has pacemaker  Cardiology is following.     4.  Anemia:     - Most likely due to blood loss  - anemia of chronic disease  - ferritin high  - Transfuse PRBCs if hemoglobin is less than 8       Ivana Turner CNP  [facility]    Patient was seen and examined. Discussed cytology with the pathologist.  This appears to be metastatic melanoma. CT brain is negative for metastatic disease. Plan-await final pathology. Obtain molecular markers specifically BRAF on the pathology specimen. We will plan on doing systemic therapy as outpatient. Discussed with the patient and patient's wife at length. Told him that we are dealing with stage IV malignant melanoma. Will obtain a PET scan as outpatient. Final treatment decisions will be made after molecular markers are back. Okay to discharge from medical oncology perspective.     Delana Boas, MD

## 2021-02-01 NOTE — PROGRESS NOTES
Endocrinology    Patient considerably more comfortable today after 1 unit packed RBC transfusion which increased hgb from 7.6 to 8.9. He is moving out of ICU to 4 telemetry. Pulse 81; Resp 21; /81; Temp 98.7 deg F    HEENT: EOM's intact  Neck: Not stiff  Chest: Surprisingly clear anteriorly and superiorly. Heart: S1 and S2  Abd: Soft, nontender  Ext: No cyanosis nor edema    Assessment:  1. Type 2 IDDM with presently reasonable glycemic control, blood sugars 143 to 208 mg/dl on basal L glargine 22 units q 12 and bolus lispro 12 units at meals + sliding scale insulin. Needing some sliding scale coverage so could use slightly more bolus. 2. Probable lung cancer but cytology results still not on chart to my review. 3. Hypertension. 4. Anemia of chronic disease, helped quite a bit symptomatically by 1 unit packed RBC transfusion, appreciate nurses assistance obtaining consent which I had discussed with patient and his wife earlier. 5. Borderline low serum sodium, nephrology (Dr. Barbara Lares M.D.) questioned SIADH which is a possibility as multiple types of lung disease can lead to release of ADH, but CHF alone also causes mild hyponatremia. As hyperglycemia controlled fairly well patient can be mildly fluid restricted to hopefully improve hyponatremia. 6. Liver disease likely with IDDM, high INR 1.6 likely due to coumadin therapy which is subtherapeutic usual desired > 1.8,  but reasonable in light of epistaxis history and fairly severe anemia requiring recent transfusion. Plan:  1. Fluid restrict to 1000 ml oral daily. 2. Increase bolus insulin to 14 units with meals instead of 12 unitsl  3. Continue glargine basal insulin 22 units q 12 hrs. 4. Await cytology results, presumably needs Heme-oncolpgy consult when tumor type known. JOSÉ Mariano M.D.

## 2021-02-01 NOTE — PROGRESS NOTES
Nephrology Attending  Progress Note        SUMMARY :  We are following this patient for NI/ Hyponatremia    Admitted  With generalized weakness , epistaxis ( High PT ) , dyspnea, cough  Has a known Rt Hilar mass   Had Rt side Thoracocentesis 1/28      SUBJECTIVE:   Patient progress reviewed. The patient is awake, alert, wife by bedside       Physical Exam:    VITALS:  /70   Pulse 95   Temp 97.9 °F (36.6 °C) (Oral)   Resp 18   Ht 5' 9\" (1.753 m)   Wt 205 lb 0.4 oz (93 kg)   SpO2 97%   BMI 30.28 kg/m²   BLOOD PRESSURE RANGE:  Systolic (93RON), VOR:238 , Min:107 , NPJ:412   ; Diastolic (25RDP), JOSE:10, Min:65, Max:89    24HR INTAKE/OUTPUT:      Intake/Output Summary (Last 24 hours) at 2/1/2021 1057  Last data filed at 2/1/2021 1000  Gross per 24 hour   Intake 220 ml   Output    Net 220 ml       Gen:  alert, oriented x 3  PERRL , EOM +  Pallor +, No icterus  JVP not raised   CV: IRRR no murmur or rub . Lungs:B/ L air entry, Reduced Rt base, Dull to percussion rt base, no pleural rub heard  Abd: soft, bowel sounds + , No organomegaly   Ext: 1+ leg  edema, no cyanosis  Skin: Warm.   No rash  Neuro: nonfocal.      DATA:    CBC with Differential:    Lab Results   Component Value Date    WBC 12.5 02/01/2021    RBC 3.09 02/01/2021    HGB 8.4 02/01/2021    HCT 26.3 02/01/2021     02/01/2021    MCV 85.2 02/01/2021    MCH 27.2 02/01/2021    MCHC 31.9 02/01/2021    RDW 18.1 02/01/2021    SEGSPCT 68.9 12/09/2020    LYMPHOPCT 7.0 01/25/2021    MONOPCT 6.0 01/25/2021    BASOPCT 0.0 01/25/2021    MONOSABS 0.9 01/25/2021    LYMPHSABS 1.0 01/25/2021    EOSABS 0.0 01/25/2021    BASOSABS 0.0 01/25/2021     CMP:    Lab Results   Component Value Date     02/01/2021    K 3.7 02/01/2021     02/01/2021    CO2 24 02/01/2021    BUN 19 02/01/2021    CREATININE 0.7 02/01/2021    GFRAA >60 02/01/2021    AGRATIO 0.6 01/25/2021    LABGLOM >60 02/01/2021    LABGLOM 92 06/08/2018    GLUCOSE 92 02/01/2021 PROT 6.9 01/25/2021    PROT 6.2 12/08/2012    LABALBU 2.6 01/25/2021    CALCIUM 8.6 02/01/2021    BILITOT 0.4 01/25/2021    ALKPHOS 135 01/25/2021    AST 45 01/25/2021    ALT 27 01/25/2021     Magnesium:    Lab Results   Component Value Date    MG 1.60 01/30/2021     Phosphorus:    Lab Results   Component Value Date    PHOS 2.6 11/11/2020     Uric Acid:  No results found for: LABURIC, URICACID  Troponin:    Lab Results   Component Value Date    TROPONINI 0.03 01/25/2021     U/A:    Lab Results   Component Value Date    COLORU YELLOW 01/01/2021    PROTEINU Negative 01/01/2021    PHUR 5.5 01/01/2021    RBCUA 0 02/13/2020    YEAST 0 02/13/2020    BACTERIA 0 02/13/2020    CLARITYU Clear 01/01/2021    SPECGRAV 1.015 01/01/2021    LEUKOCYTESUR Negative 01/01/2021    UROBILINOGEN 0.2 01/01/2021    BILIRUBINUR Negative 01/01/2021    BLOODU Negative 01/01/2021    GLUCOSEU >=1000 01/01/2021     Lab Results   Component Value Date     02/01/2021    K 3.7 02/01/2021     02/01/2021    CO2 24 02/01/2021    BUN 19 02/01/2021    CREATININE 0.7 02/01/2021    GLUCOSE 92 02/01/2021    CALCIUM 8.6 02/01/2021          IMPRESSION/RECOMMENDATIONS:      Diagnosis and Plan :     1. NI   Non Oliguric   Recovered   back on Furosemide   Cr 0.7     2. HTN   Controlled , edema - continue diuretics- switch to po  Restart ACEI  , reduce dose of metoprolol 75 bid to 50 bid     3. Lung Mass - Hilar mass with Lymphadenopathy    4. Pneumonia    5. Epistaxis  Had High INR , controlled     6. Persistent Atrial Fibrillation      7.  Hyponatremia    Na 136, corrected        Leticia Bur

## 2021-02-01 NOTE — CONSULTS
Pharmacy to Dose Warfarin    Pharmacy consulted to dose warfarin for afib. INR Goal: 2-3    INR today: 1.67    Assessment/Plan:  - Plan to resume warfarin today at lower dose of 2.5 mg. Pharmacy will continue to follow.     Martin ZelayaD, BCPS

## 2021-02-02 LAB
ANION GAP SERPL CALCULATED.3IONS-SCNC: 10 MMOL/L (ref 3–16)
BUN BLDV-MCNC: 22 MG/DL (ref 7–20)
CALCIUM SERPL-MCNC: 8.8 MG/DL (ref 8.3–10.6)
CHLORIDE BLD-SCNC: 99 MMOL/L (ref 99–110)
CO2: 25 MMOL/L (ref 21–32)
CREAT SERPL-MCNC: 0.7 MG/DL (ref 0.8–1.3)
EKG ATRIAL RATE: 73 BPM
EKG DIAGNOSIS: NORMAL
EKG P AXIS: 67 DEGREES
EKG P-R INTERVAL: 170 MS
EKG Q-T INTERVAL: 414 MS
EKG QRS DURATION: 142 MS
EKG QTC CALCULATION (BAZETT): 456 MS
EKG R AXIS: -43 DEGREES
EKG T AXIS: 115 DEGREES
EKG VENTRICULAR RATE: 73 BPM
GFR AFRICAN AMERICAN: >60
GFR NON-AFRICAN AMERICAN: >60
GLUCOSE BLD-MCNC: 147 MG/DL (ref 70–99)
GLUCOSE BLD-MCNC: 156 MG/DL (ref 70–99)
GLUCOSE BLD-MCNC: 250 MG/DL (ref 70–99)
GLUCOSE BLD-MCNC: 298 MG/DL (ref 70–99)
GLUCOSE BLD-MCNC: 96 MG/DL (ref 70–99)
HCT VFR BLD CALC: 26.8 % (ref 40.5–52.5)
HEMOGLOBIN: 8.6 G/DL (ref 13.5–17.5)
INR BLD: 1.48 (ref 0.86–1.14)
MCH RBC QN AUTO: 27.8 PG (ref 26–34)
MCHC RBC AUTO-ENTMCNC: 32.1 G/DL (ref 31–36)
MCV RBC AUTO: 86.5 FL (ref 80–100)
PDW BLD-RTO: 19.8 % (ref 12.4–15.4)
PERFORMED ON: ABNORMAL
PERFORMED ON: NORMAL
PLATELET # BLD: 208 K/UL (ref 135–450)
PMV BLD AUTO: 6.7 FL (ref 5–10.5)
POTASSIUM REFLEX MAGNESIUM: 3.7 MMOL/L (ref 3.5–5.1)
PROTHROMBIN TIME: 17.2 SEC (ref 10–13.2)
RBC # BLD: 3.1 M/UL (ref 4.2–5.9)
SODIUM BLD-SCNC: 134 MMOL/L (ref 136–145)
TROPONIN: 0.03 NG/ML
TROPONIN: 0.04 NG/ML
WBC # BLD: 9.4 K/UL (ref 4–11)

## 2021-02-02 PROCEDURE — 6360000002 HC RX W HCPCS: Performed by: PHYSICIAN ASSISTANT

## 2021-02-02 PROCEDURE — 2700000000 HC OXYGEN THERAPY PER DAY

## 2021-02-02 PROCEDURE — 6370000000 HC RX 637 (ALT 250 FOR IP): Performed by: INTERNAL MEDICINE

## 2021-02-02 PROCEDURE — 6360000002 HC RX W HCPCS: Performed by: INTERNAL MEDICINE

## 2021-02-02 PROCEDURE — 80048 BASIC METABOLIC PNL TOTAL CA: CPT

## 2021-02-02 PROCEDURE — 2580000003 HC RX 258: Performed by: INTERNAL MEDICINE

## 2021-02-02 PROCEDURE — 2000000000 HC ICU R&B

## 2021-02-02 PROCEDURE — 2580000003 HC RX 258: Performed by: PHYSICIAN ASSISTANT

## 2021-02-02 PROCEDURE — 94761 N-INVAS EAR/PLS OXIMETRY MLT: CPT

## 2021-02-02 PROCEDURE — 6370000000 HC RX 637 (ALT 250 FOR IP): Performed by: PHYSICIAN ASSISTANT

## 2021-02-02 PROCEDURE — 94640 AIRWAY INHALATION TREATMENT: CPT

## 2021-02-02 PROCEDURE — 36415 COLL VENOUS BLD VENIPUNCTURE: CPT

## 2021-02-02 PROCEDURE — 93010 ELECTROCARDIOGRAM REPORT: CPT | Performed by: INTERNAL MEDICINE

## 2021-02-02 PROCEDURE — 84484 ASSAY OF TROPONIN QUANT: CPT

## 2021-02-02 PROCEDURE — 2580000003 HC RX 258: Performed by: NURSE PRACTITIONER

## 2021-02-02 PROCEDURE — 85027 COMPLETE CBC AUTOMATED: CPT

## 2021-02-02 PROCEDURE — 85610 PROTHROMBIN TIME: CPT

## 2021-02-02 PROCEDURE — 6360000002 HC RX W HCPCS: Performed by: NURSE PRACTITIONER

## 2021-02-02 PROCEDURE — 99291 CRITICAL CARE FIRST HOUR: CPT | Performed by: INTERNAL MEDICINE

## 2021-02-02 RX ORDER — MEGESTROL ACETATE 40 MG/ML
200 SUSPENSION ORAL DAILY
Status: DISCONTINUED | OUTPATIENT
Start: 2021-02-02 | End: 2021-02-08 | Stop reason: HOSPADM

## 2021-02-02 RX ORDER — LIDOCAINE HYDROCHLORIDE ANHYDROUS AND DEXTROSE MONOHYDRATE .4; 5 G/100ML; G/100ML
1 INJECTION, SOLUTION INTRAVENOUS CONTINUOUS
Status: DISCONTINUED | OUTPATIENT
Start: 2021-02-02 | End: 2021-02-04

## 2021-02-02 RX ORDER — MIDAZOLAM HYDROCHLORIDE 1 MG/ML
INJECTION INTRAMUSCULAR; INTRAVENOUS
Status: DISCONTINUED
Start: 2021-02-02 | End: 2021-02-02 | Stop reason: WASHOUT

## 2021-02-02 RX ORDER — METOPROLOL TARTRATE 50 MG/1
100 TABLET, FILM COATED ORAL 2 TIMES DAILY
Status: DISCONTINUED | OUTPATIENT
Start: 2021-02-02 | End: 2021-02-08 | Stop reason: HOSPADM

## 2021-02-02 RX ORDER — HYDROMORPHONE HYDROCHLORIDE 1 MG/ML
1 INJECTION, SOLUTION INTRAMUSCULAR; INTRAVENOUS; SUBCUTANEOUS
Status: DISCONTINUED | OUTPATIENT
Start: 2021-02-02 | End: 2021-02-04

## 2021-02-02 RX ORDER — MORPHINE SULFATE 2 MG/ML
2 INJECTION, SOLUTION INTRAMUSCULAR; INTRAVENOUS
Status: DISCONTINUED | OUTPATIENT
Start: 2021-02-02 | End: 2021-02-02

## 2021-02-02 RX ADMIN — MORPHINE SULFATE 2 MG: 2 INJECTION, SOLUTION INTRAMUSCULAR; INTRAVENOUS at 09:24

## 2021-02-02 RX ADMIN — FUROSEMIDE 40 MG: 40 TABLET ORAL at 09:24

## 2021-02-02 RX ADMIN — PANTOPRAZOLE SODIUM 40 MG: 40 TABLET, DELAYED RELEASE ORAL at 05:14

## 2021-02-02 RX ADMIN — ATORVASTATIN CALCIUM 20 MG: 20 TABLET, FILM COATED ORAL at 20:26

## 2021-02-02 RX ADMIN — INSULIN LISPRO 14 UNITS: 100 INJECTION, SOLUTION INTRAVENOUS; SUBCUTANEOUS at 09:37

## 2021-02-02 RX ADMIN — INSULIN LISPRO 9 UNITS: 100 INJECTION, SOLUTION INTRAVENOUS; SUBCUTANEOUS at 12:45

## 2021-02-02 RX ADMIN — ENOXAPARIN SODIUM 90 MG: 100 INJECTION SUBCUTANEOUS at 20:27

## 2021-02-02 RX ADMIN — FINASTERIDE 5 MG: 5 TABLET, FILM COATED ORAL at 09:24

## 2021-02-02 RX ADMIN — INSULIN LISPRO 14 UNITS: 100 INJECTION, SOLUTION INTRAVENOUS; SUBCUTANEOUS at 16:15

## 2021-02-02 RX ADMIN — ENOXAPARIN SODIUM 90 MG: 100 INJECTION SUBCUTANEOUS at 09:28

## 2021-02-02 RX ADMIN — Medication 2 PUFF: at 09:14

## 2021-02-02 RX ADMIN — INSULIN LISPRO 5 UNITS: 100 INJECTION, SOLUTION INTRAVENOUS; SUBCUTANEOUS at 20:32

## 2021-02-02 RX ADMIN — CEFEPIME 2000 MG: 2 INJECTION, POWDER, FOR SOLUTION INTRAVENOUS at 05:13

## 2021-02-02 RX ADMIN — IPRATROPIUM BROMIDE AND ALBUTEROL SULFATE 1 AMPULE: .5; 3 SOLUTION RESPIRATORY (INHALATION) at 09:14

## 2021-02-02 RX ADMIN — WARFARIN SODIUM 2.5 MG: 2.5 TABLET ORAL at 20:27

## 2021-02-02 RX ADMIN — MORPHINE SULFATE 2 MG: 2 INJECTION, SOLUTION INTRAMUSCULAR; INTRAVENOUS at 05:12

## 2021-02-02 RX ADMIN — MORPHINE SULFATE 2 MG: 2 INJECTION, SOLUTION INTRAMUSCULAR; INTRAVENOUS at 12:45

## 2021-02-02 RX ADMIN — ONDANSETRON 4 MG: 2 INJECTION INTRAMUSCULAR; INTRAVENOUS at 16:20

## 2021-02-02 RX ADMIN — MORPHINE SULFATE 2 MG: 2 INJECTION, SOLUTION INTRAMUSCULAR; INTRAVENOUS at 15:47

## 2021-02-02 RX ADMIN — Medication 1 CAPSULE: at 09:24

## 2021-02-02 RX ADMIN — METOPROLOL TARTRATE 100 MG: 50 TABLET, FILM COATED ORAL at 20:26

## 2021-02-02 RX ADMIN — Medication 10 ML: at 00:21

## 2021-02-02 RX ADMIN — ALLOPURINOL 300 MG: 300 TABLET ORAL at 20:26

## 2021-02-02 RX ADMIN — POTASSIUM CHLORIDE 40 MEQ: 1500 TABLET, EXTENDED RELEASE ORAL at 09:24

## 2021-02-02 RX ADMIN — IPRATROPIUM BROMIDE AND ALBUTEROL SULFATE 1 AMPULE: .5; 3 SOLUTION RESPIRATORY (INHALATION) at 17:11

## 2021-02-02 RX ADMIN — INSULIN LISPRO 3 UNITS: 100 INJECTION, SOLUTION INTRAVENOUS; SUBCUTANEOUS at 09:39

## 2021-02-02 RX ADMIN — TAMSULOSIN HYDROCHLORIDE 0.4 MG: 0.4 CAPSULE ORAL at 20:27

## 2021-02-02 RX ADMIN — Medication 10 ML: at 20:26

## 2021-02-02 RX ADMIN — IPRATROPIUM BROMIDE AND ALBUTEROL SULFATE 1 AMPULE: .5; 3 SOLUTION RESPIRATORY (INHALATION) at 21:11

## 2021-02-02 RX ADMIN — Medication 2 PUFF: at 21:11

## 2021-02-02 RX ADMIN — FUROSEMIDE 40 MG: 40 TABLET ORAL at 20:26

## 2021-02-02 RX ADMIN — LISINOPRIL 5 MG: 5 TABLET ORAL at 09:24

## 2021-02-02 RX ADMIN — ZOLPIDEM TARTRATE 5 MG: 5 TABLET ORAL at 21:42

## 2021-02-02 RX ADMIN — AMIODARONE HYDROCHLORIDE 0.5 MG/MIN: 50 INJECTION, SOLUTION INTRAVENOUS at 23:36

## 2021-02-02 RX ADMIN — IPRATROPIUM BROMIDE AND ALBUTEROL SULFATE 1 AMPULE: .5; 3 SOLUTION RESPIRATORY (INHALATION) at 12:19

## 2021-02-02 ASSESSMENT — PAIN SCALES - GENERAL
PAINLEVEL_OUTOF10: 6
PAINLEVEL_OUTOF10: 0

## 2021-02-02 NOTE — PROGRESS NOTES
[facility]   Progress Note      2/2/2021 12:28 PM        Name: Lorena Gavin . Admitted: 1/25/2021    SUBJECTIVE:  Went into SVT last night, rapid response called. Patient c/o shortness of breath, nasal congestion. Chest pain subsided with morphine. Has large bruises on chest, small scattered scabs and bruising on extremities.      Reviewed interval ancillary notes    Current Medications      lidocaine (CARDIAC INFUSION) 2 g in dextrose 5% 500 mL infusion, Continuous      morphine (PF) injection 2 mg, Q3H PRN      metoprolol tartrate (LOPRESSOR) tablet 100 mg, BID      furosemide (LASIX) tablet 40 mg, BID      lisinopril (PRINIVIL;ZESTRIL) tablet 5 mg, Daily      warfarin (COUMADIN) tablet 2.5 mg, Daily      LORazepam (ATIVAN) injection 1 mg, Once      amiodarone (CORDARONE) 150 mg in dextrose 5 % 100 mL bolus, Once    Followed by      amiodarone (CORDARONE) 450 mg in dextrose 5 % 250 mL infusion, Continuous      magnesium sulfate 1000 mg in dextrose 5% 100 mL IVPB, PRN      sennosides-docusate sodium (SENOKOT-S) 8.6-50 MG tablet 2 tablet, Daily PRN      budesonide-formoterol (SYMBICORT) 160-4.5 MCG/ACT inhaler 2 puff, BID      ipratropium-albuterol (DUONEB) nebulizer solution 1 ampule, Q4H WA      insulin lispro (1 Unit Dial) 14 Units, TID WC      0.9 % sodium chloride infusion, PRN      insulin glargine (LANTUS;BASAGLAR) injection pen 22 Units, Q12H      potassium chloride (KLOR-CON M) extended release tablet 40 mEq, Daily with breakfast      zolpidem (AMBIEN) tablet 5 mg, Nightly PRN      traMADol (ULTRAM) tablet 50 mg, Q6H PRN      enoxaparin (LOVENOX) injection 90 mg, BID      insulin lispro (1 Unit Dial) 0-18 Units, TID WC      insulin lispro (1 Unit Dial) 0-9 Units, Nightly      cefepime (MAXIPIME) 2000 mg IVPB minibag, Q8H      glucose (GLUTOSE) 40 % oral gel 15 g, PRN      dextrose 50 % IV solution, PRN      glucagon (rDNA) injection 1 mg, PRN   dextrose 5 % solution, PRN      0.9 % sodium chloride infusion, PRN      albuterol sulfate  (90 Base) MCG/ACT inhaler 2 puff, Q6H PRN      allopurinol (ZYLOPRIM) tablet 300 mg, QPM      finasteride (PROSCAR) tablet 5 mg, Daily      hydrOXYzine (VISTARIL) capsule 50 mg, TID PRN      pantoprazole (PROTONIX) tablet 40 mg, QAM AC      atorvastatin (LIPITOR) tablet 20 mg, Nightly      tamsulosin (FLOMAX) capsule 0.4 mg, Nightly      sodium chloride flush 0.9 % injection 10 mL, 2 times per day      sodium chloride flush 0.9 % injection 10 mL, PRN      magnesium hydroxide (MILK OF MAGNESIA) 400 MG/5ML suspension 30 mL, Daily PRN      acetaminophen (TYLENOL) tablet 650 mg, Q6H PRN      ondansetron (ZOFRAN) injection 4 mg, Q6H PRN      lactobacillus (CULTURELLE) capsule 1 capsule, BID WC        Objective:  /63   Pulse 74   Temp 97.1 °F (36.2 °C) (Temporal)   Resp 17   Ht 5' 9\" (1.753 m)   Wt 207 lb 14.3 oz (94.3 kg)   SpO2 96%   BMI 30.70 kg/m²     Intake/Output Summary (Last 24 hours) at 2/2/2021 1228  Last data filed at 2/1/2021 2000  Gross per 24 hour   Intake 570 ml   Output 750 ml   Net -180 ml      Wt Readings from Last 3 Encounters:   02/02/21 207 lb 14.3 oz (94.3 kg)   01/14/21 214 lb (97.1 kg)   01/05/21 210 lb 9.6 oz (95.5 kg)       General appearance:  Appears comfortable  Eyes: Sclera clear. Pupils equal.  ENT: Moist oral mucosa. Trachea midline, no adenopathy. Cardiovascular: Regular rhythm, normal S1, S2. No murmur. No edema in lower extremities  Respiratory: Clear to auscultation bilaterally, no wheeze or crackles. GI: Abdomen soft, no tenderness, not distended  Musculoskeletal: No cyanosis in digits, neck supple  Neurology: CN 2-12 grossly intact. No speech or motor deficits  Psych: Normal affect. Alert and oriented in time, place and person  Skin: Has large bruises on chest, small scattered scabs and bruising on extremities.        Labs and Tests:  CBC:   Recent Labs 02/01/21 0620 02/01/21 1942 02/02/21  0532   WBC 12.5* 11.1* 9.4   HGB 8.4* 8.5* 8.6*    234 208     BMP:    Recent Labs     02/01/21  0620 02/01/21 1942 02/02/21  0532    133* 134*   K 3.7 4.1 3.7    99 99   CO2 24 23 25   BUN 19 23* 22*   CREATININE 0.7* 0.9 0.7*   GLUCOSE 92 254* 156*     Hepatic:   Recent Labs     02/01/21 1942   AST 32   ALT 32   BILITOT 0.6   ALKPHOS 140*       ASSESSMENT AND PLAN    Active Problems:    Pneumonia    Supratherapeutic INR  Resolved Problems:    * No resolved hospital problems. *         1.  Supratherapeutic INR epistaxis:     -Received FFP and vitamin K on 1/26/2021  -INR is 1.48  -Patient had poor appetite for last couple of weeks which might have caused vitamin K deficiency leading to supratherapeutic INR.  - Anticoagulation at the discretion of cardiologist.     2.  Right hilar mass, mediastinal lymphadenopathy:     -Patient has new pleural effusion per CT chest [facility] at [facility] on 1/20/2021  - Had thoracentesis done on 1/28/2020.  30 mL removed.    - Preliminary cytology report was discussed with the pathologist. This appears to be metastatic melanoma. Await final pathology. Obtain molecular markers specifically BRAF on the pathology specimen.     -Diagnosis has been discussed with the patient and wife. -Plan would be to get a PET scan as outpatient. -MRI brain needs to be done.  Patient is not keen on doing MRI brain.  CT brain showed no evidence of metastatic disease  -We will send next generation sequencing on the cytology if adequate sample is applicable.   -Based on above studies further treatment decisions will be made.     3.  Cardiac arrest after bronchoscopy in December 2020, has pacemaker  Cardiology is following.     4.  Anemia:     - Most likely due to blood loss  - anemia of chronic disease  - ferritin high  - Transfuse PRBCs if hemoglobin is less than 8     5. V-tach:    -Cardiology has been consulted.        Camron Keys, CNP Oncology Hematology [address]  [phone]    Patient asking for cardiac consultation after episode of tachcardia last nite Will need treatment for metastatic melanoma

## 2021-02-02 NOTE — DISCHARGE INSTR - COC
Continuity of Care Form    Patient Name: Natalie Partida   :    MRN:  [id number]    Admit date:  2021  Discharge date:  2021    Code Status Order: Full Code   Advance Directives:      Admitting Physician:  Meri Henson MD  PCP: Ignacio Guardado MD    Discharging Nurse: [facility] Unit/Room#: TKS-2281/2572-00  Discharging Unit Phone Number: ***    Emergency Contact:   Extended Emergency Contact Information  Primary Emergency Contact: Dannielle Hightower  Address: [address] [place] [address], [address] [place] [address] [address] Phone: [phone]  Mobile Phone: [phone]  Relation: Spouse    Past Surgical History:  Past Surgical History:   Procedure Laterality Date    BACK SURGERY      BRONCHOSCOPY N/A 2020    BRONCHOSCOPY ALVEOLAR LAVAGE performed by Chandler Mock MD at [facility] 2013    FINGER SURGERY Left 3-1-2013    Incision & Drainage of left Thumb Infection    FOOT SURGERY Right     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    HAND SURGERY  2011    LUMBAR NERVE BLOCK Right 2018    RIGHT L4/L5 LUMBAR INTERLAMINAR EPIDURAL STEROID INJECTION WITH FLUOROSCOPY performed by Caridad Victoria MD at [address] [place] [address] Way right side post    CA [employer] [address] DX/THER SBST INTRLMNR LMBR/SAC W/IMG GDN Right 2018    RIGHT L4/L5 INTERLAMINAR EPIDURAL STEROID INJECTION WITH FLUOROSCOPY performed by Caridad Victoria MD at [facility]       Immunization History:   Immunization History   Administered Date(s) Administered    Influenza Vaccine, unspecified formulation 02/10/2017    Influenza Virus Vaccine 2017, 10/01/2018    Influenza, Intradermal, Preservative free 10/05/2012, 2014    Influenza, MDCK Quadv, IM, PF (Flucelvax 4 yrs and older) 01/15/2020    Influenza, Quadv, adjuvanted, 65 yrs +, IM, PF (Fluad) 10/21/2020    Pneumococcal Conjugate 13-valent ([place]y Gould) 2016  Pneumococcal Polysaccharide ([id number]) 11/14/2011, 10/21/2020    Td, unspecified formulation 08/07/2009    Tdap (Boostrix, Adacel) 02/26/2013, 10/07/2016       Active Problems:  Patient Active Problem List   Diagnosis Code    Essential hypertension I10    Generalized osteoarthrosis, involving multiple sites M15.9    Pure hypercholesterolemia E78.00    SYLVIE (obstructive sleep apnea) G47.33    Allergic rhinitis J30.9    Pseudogout M11.20    Melanoma in situ of lower leg ([facility] [address] D03.70    Type 2 diabetes mellitus with diabetic polyneuropathy, without long-term current use of insulin (HCC) E11.42    Eczema L30.9    Dyshidrotic eczema L30.1    GERD without esophagitis K21.9    Non morbid obesity, unspecified obesity type E66.9    Moderate persistent asthma without complication V21.88    Mediastinal adenopathy R59.0    Multiple lung nodules on CT R91.8    Cardiac arrest (HCC) I46.9    Persistent atrial fibrillation ([facility]) I48.19    Ischemic cardiomyopathy I25.5    Paroxysmal atrial fibrillation (HCC) I48.0    VT (ventricular tachycardia) (HCC) I47.2    Atrial fibrillation (HCC) I48.91    Dilated cardiomyopathy (HCC) I42.0    AICD (automatic cardioverter/defibrillator) present Z95.810    Hyponatremia E87.1    Hypokalemia E87.6    Pneumonia J18.9    Supratherapeutic INR R79.1       Isolation/Infection:   Isolation            No Isolation          Patient Infection Status       Infection Onset Added Last Indicated Last Indicated By Review Planned Expiration Resolved Resolved By    None active    Resolved    COVID-19 Rule Out 01/25/21 01/25/21 01/25/21 COVID-19 (Ordered)   01/26/21 Rule-Out Test Resulted    COVID-19 Rule Out 12/30/20 12/30/20 12/30/20 COVID-19 (Ordered)   12/30/20 Rule-Out Test Resulted    MRSA  03/04/13 03/04/13 Santa Burns, RN   12/02/20 Wayne Diaz, RN    2/26/13 wound finger            Nurse Assessment: Last Vital Signs: /63   Pulse 74   Temp 97.1 °F (36.2 °C) (Temporal)   Resp 17   Ht 5' 9\" (1.753 m)   Wt 207 lb 14.3 oz (94.3 kg)   SpO2 96%   BMI 30.70 kg/m²     Last documented pain score (0-10 scale): Pain Level: 6  Last Weight:   Wt Readings from Last 1 Encounters:   02/02/21 207 lb 14.3 oz (94.3 kg)     Mental Status:  {IP PT MENTAL STATUS:20030}    IV Access:  { MARK IV ACCESS:730672897}    Nursing Mobility/ADLs:  Walking   {CHP DME OQXY:701780721}  Transfer  {CHP DME OVVU:338122827}  Bathing  {CHP DME SPSI:802649142}  Dressing  {CHP DME NFBR:494442765}  Toileting  {CHP DME PACO:982311907}  Feeding  {CHP DME QPOA:529355103}  Med Admin  {P DME AQGK:524886963}  Med Delivery   { MARK MED Delivery:673646483}    Wound Care Documentation and Therapy:        Elimination:  Continence:   · Bowel: {YES / XE:41312}  · Bladder: {YES / RY:75413}  Urinary Catheter: {Urinary Catheter:438355534}   Colostomy/Ileostomy/Ileal Conduit: {YES / VD:70701}       Date of Last BM: ***    Intake/Output Summary (Last 24 hours) at 2/2/2021 1456  Last data filed at 2/1/2021 2000  Gross per 24 hour   Intake 570 ml   Output 750 ml   Net -180 ml     I/O last 3 completed shifts:   In: 5 [P.O.:780; I.V.:10]  Out: 750 [Urine:750]    Safety Concerns:     [address] [employer] Safety Concerns:687530965}    Impairments/Disabilities:      [address] [employer] Impairments/Disabilities:824036321}    Nutrition Therapy:  Current Nutrition Therapy:   [address] [employer] Diet List:400134606}    Routes of Feeding: {CHP DME Other Feedings:991827481}  Liquids: {Slp liquid thickness:12018}  Daily Fluid Restriction: {CHP DME Yes amt example:494396320}  Last Modified Barium Swallow with Video (Video Swallowing Test): {Done Not Done VDGY:447640154}    Treatments at the Time of Hospital Discharge:   Respiratory Treatments: ***  Oxygen Therapy:  {Therapy; copd oxygen:09942}  Ventilator:    { CC Vent XQED:292585559}    Rehab Therapies: SN,PT,MSW Weight Bearing Status/Restrictions: [address] Weight Bearin}  Other Medical Equipment (for information only, NOT a DME order):  {EQUIPMENT:586783008}  Other Treatments: ***    Patient's personal belongings (please select all that are sent with patient):  {CHP DME Belongings:643140825}    RN SIGNATURE:  {Esignature:895270629}    CASE MANAGEMENT/SOCIAL WORK SECTION    Inpatient Status Date: 21    Readmission Risk Assessment Score:  Readmission Risk              Risk of Unplanned Readmission:        40           Discharging to Facility/ Agency   Name: Haim Golden will call for Appointment  Phone: [phone]  Fax: [phone]    Dialysis Facility (if applicable)   · Name:  · Address:  · Dialysis Schedule:  · Phone:  · Fax:    / signature: Electronically signed by Agata Green RN on 21 @ 11:18 am    PHYSICIAN SECTION    Prognosis: Guarded    Condition at Discharge: Stable    Rehab Potential (if transferring to Rehab): Guarded    Recommended Labs or Other Treatments After Discharge:     Physician Certification: I certify the above information and transfer of Vandana Grimes  is necessary for the continuing treatment of the diagnosis listed and that he requires [address] for less 30 days.      Update Admission H&P: No change in H&P    PHYSICIAN SIGNATURE:  Electronically signed by Candelario Baugh MD on 21 at 9:53 AM EST

## 2021-02-02 NOTE — PROGRESS NOTES
Patient heart rhythm went into SVT  at 0426, self converted back to A FIB at 0434. Rapid Response was called, crash cart at bedside, Gómez Sevilla MD at bedside. Patient currently on amio gtt, lidocaine gtt was ordered but placed on hold after patient self converted during IV placement. Patient c/o right upper chest pain, 6/10, dull and constant. Morphine 2mg administered IVP, patient states \"I do not have any more pain or chest discomfort at this time. \" Patient back in Afib, HR 77, /58 (71). Patient states he feels \"stuffy\" in nose and requested decongestant or breathing treatment. No new orders received at this time. MD wants to hold off for now. Cardiology consult added. Will continue to monitor patient and report any changes.  Sae

## 2021-02-02 NOTE — PROGRESS NOTES
Patient states \" I do not recall what happened, all I know is I was in the shower in my other room and the next thing I know everyone is rushing in my room and it scared me. \" \" I did not even feel it when they shocked me. \" Wife is at bedside and expresses that  is very anxious/confused about what happened. Explained to patient and wife rhythm changes that took place causing the rapid response team to intervene. Explained amio gtt to patient and wife and purpose of drug. Answered questions and concerns with family. Patient states Nayanchristal Sadler did my defibrillator in my pacemaker not kick in\"? \"why did they have to shock me? \" Patient is now calm and resting in bed. Will continue to monitor patient at this time. Amio gtt infusing and patients heart rhythm A fib rate of 68bpm. Family is requesting cardiologist see him.  Sae

## 2021-02-02 NOTE — CARE COORDINATION
CM met with patient and spouse, he is concerned about expenses, he has not been able to work due to his illness and is concerned about expenses. CM made a referral to [place] [facility]. He agrees to home health at discharge, previously used [facility]), referral called to [facility].     MATEUSZ PrettyN, CCM, RN  [facility]  [phone]

## 2021-02-02 NOTE — PROGRESS NOTES
Rapid response called for V-Tach Appears on monitor. Pt. Reporting pressure. /68, pulse 187. Pt. Cassidy Offer on monitor.

## 2021-02-02 NOTE — PROGRESS NOTES
[address]   Electrophysiology Progress Note     Admit Date: 1/25/2021     Reason for follow up: VT     HPI and Interval History: 72 y.o. male presented with increasing fatigue, coagulopathy and occasional nosebleeding on 1/25/2021. Patient has a complex medical history including obesity, SYLVIE, poorly controlled diabetes, obesity, hypertension, ventricular tachycardia and recent diagnosis of lung cancer. He was initially seen by EP for atrial fibrillation which was cardioverted. Outpatient Holter monitor revealed ventricular tachycardia and underwent a dual-chamber AICD placement on 12/31/2020 for secondary prevention. On follow-up he had AICD shock on 1/4/2021. Device interrogation at the time revealed atrial fibrillation with rapid ventricular rate which then triggered ventricular tachycardia followed by AICD shock. He was started on amiodarone but he was not taking it because of nausea which turned out to be related to antibiotics therapy. He was later started on p.o. amiodarone therapy. He was found to have lung mass and mediastinal lymphadenopathy and pleural effusion. Per oncology preliminary cytology is consistent with non-small cell lung cancer. Overnight he has developed sustained ventricular tachycardia with a heart rate of 180 bpm and had to be cardioverted. He has been started on IV amiodarone therapy and has been transferred to ICU. Patient seen and examined. Clinical notes reviewed. Telemetry reviewed. No new complaint today. Assessment and plan:     - Ventricular tachycardia:    Device interrogation revealed ventricular tachycardia. Ventricular tachycardia rate was slower than the therapy zone program before. Prior to this episode he has had very fast ventricular tachycardia. Agree with continuation of IV amiodarone loading. He was not taking his amiodarone consistently. Increase metoprolol 200 mg twice daily. I interrogated his device and reprogrammed the device to detect slower ventricular tachycardia and treat. Discussed this with him and his wife at bedside. He has been recently diagnosed with non-small cell cancer and not a good candidate for invasive EP intervention including ablation at this time. Keep K > 4 and Mg > 2     - Persistent atrial fibrillation:    Patient has had cardioversion in the past with recurrence of atrial fibrillation. We had discussed treatment options including triple therapy. He is on amiodarone. Needs close monitoring of LFTs and TSH. Not a good candidate for invasive EP intervention including ablation at this time due to recent diagnosis of lung cancer. Needs anticoagulation therapy. - Lung mass and non-small cell cancer. Discussed with nursing staff. Active Hospital Problems    Diagnosis Date Noted    Supratherapeutic INR [R79.1] 2021    Pneumonia [J18.9] 2021       Diagnostic studies:     Echo:   Overall left ventricular systolic function is moderately depressed .   -Ejection fraction is visually estimated to be 40%. E/e'= 7.4 cm   -There is global hypokinesis that appears worse in the inferior and apical   walls.   -Indeterminate diastolic function.   -The aortic valve appears sclerotic but opens well.   -Mild aortic regurgitation.   -Mild tricuspid regurgitation. I independently reviewed the cardiac diagnostic studies, ECG and relevant imaging studies.      Physical Examination:  Vitals:    21 0441   BP: 100/63   Pulse: 74   Resp: 17   Temp:    SpO2: 96%      In: 570 [P.O.:560; I.V.:10]  Out: 750    Wt Readings from Last 3 Encounters:   21 207 lb 14.3 oz (94.3 kg)   21 214 lb (97.1 kg)   21 210 lb 9.6 oz (95.5 kg)     Temp  Av.2 °F (36.2 °C)  Min: 96.6 °F (35.9 °C)  Max: 98 °F (36.7 °C)  Pulse  Av.5  Min: 65  Max: 86  BP  Min: 99/59  Max: 116/74  SpO2  Av %  Min: 91 %  Max: 97 % Intake/Output Summary (Last 24 hours) at 2/2/2021 1054  Last data filed at 2/1/2021 2000  Gross per 24 hour   Intake 570 ml   Output 750 ml   Net -180 ml       I independently reviewed all cardiac tracing from cardiac telemetry. · Constitutional: Oriented. No distress. · Head: Normocephalic and atraumatic. · Mouth/Throat: Oropharynx is clear and moist.   · Eyes: Conjunctivae normal. EOM are normal.   · Neck: Neck supple. No JVD present. · Cardiovascular: Normal rate, Irregular rhythm, S1&S2. · Pulmonary/Chest: Coarse breath sounds. No rhonchi. · Abdominal: Soft. No tenderness. · Musculoskeletal: No tenderness. No edema    · Lymphadenopathy: Has no cervical adenopathy. · Neurological: Alert and oriented. Follows command, No Gross deficit   · Skin: Skin is warm, No rash noted.    · Psychiatric: Has a normal behavior     Scheduled Meds:   metoprolol tartrate  100 mg Oral BID    furosemide  40 mg Oral BID    lisinopril  5 mg Oral Daily    warfarin  2.5 mg Oral Daily    LORazepam  1 mg Intravenous Once    amiodarone  150 mg Intravenous Once    budesonide-formoterol  2 puff Inhalation BID    ipratropium-albuterol  1 ampule Inhalation Q4H WA    insulin lispro  14 Units Subcutaneous TID     insulin glargine  22 Units Subcutaneous Q12H    potassium chloride  40 mEq Oral Daily with breakfast    enoxaparin  1 mg/kg Subcutaneous BID    insulin lispro  0-18 Units Subcutaneous TID WC    insulin lispro  0-9 Units Subcutaneous Nightly    cefepime  2,000 mg Intravenous Q8H    allopurinol  300 mg Oral QPM    finasteride  5 mg Oral Daily    pantoprazole  40 mg Oral QAM AC    atorvastatin  20 mg Oral Nightly    tamsulosin  0.4 mg Oral Nightly    sodium chloride flush  10 mL Intravenous 2 times per day    lactobacillus  1 capsule Oral BID      Continuous Infusions:   lidocaine      amiodarone 0.5 mg/min (02/02/21 0216)    sodium chloride      dextrose      sodium chloride PRN Meds:morphine, magnesium sulfate, sennosides-docusate sodium, sodium chloride, zolpidem, traMADol, glucose, dextrose, glucagon (rDNA), dextrose, sodium chloride, albuterol sulfate HFA, hydrOXYzine, sodium chloride flush, magnesium hydroxide, acetaminophen, ondansetron     Prior to Admission medications    Medication Sig Start Date End Date Taking?  Authorizing Provider   hydrOXYzine (VISTARIL) 50 MG capsule Take 1 capsule by mouth 3 times daily as needed for Itching or Anxiety 1/14/21 2/13/21  MACIEL Lovell NP   ondansetron (ZOFRAN) 4 MG tablet Take 1 tablet by mouth 3 times daily as needed for Nausea or Vomiting 1/14/21   MACIEL Lovell NP   insulin glargine (BASAGLAR KWIKPEN) 100 UNIT/ML injection pen Inject 25 Units into the skin nightly 1/13/21   Jennifer Ramirez MD   Insulin Pen Needle (MEIJER PEN NEEDLES) 31G X 6 MM MISC 1 each by Does not apply route daily 1/13/21   Jennifer Ramirez MD   pantoprazole (PROTONIX) 40 MG tablet TAKE 1 TABLET BY MOUTH ONE TIME A DAY  1/11/21   Jennifer Ramirez MD   allopurinol (ZYLOPRIM) 300 MG tablet TAKE 1 TABLET BY MOUTH ONE TIME A DAY  1/11/21   Jennifer Ramirez MD   metoprolol tartrate (LOPRESSOR) 50 MG tablet Take 1.5 tablets by mouth 2 times daily 1/11/21   Starr Yao MD   amiodarone (CORDARONE) 200 MG tablet Take 1 tablet by mouth 2 times daily 200 mg twice daily for 2 weeks    Days,   Then 200 mg daily 1/5/21   Starr Yao MD   warfarin (COUMADIN) 5 MG tablet Take 1 tablet by mouth daily Do not resume until 1/7 1/2/21   ourMACIEL Holder CNP   albuterol sulfate  (90 Base) MCG/ACT inhaler Inhale 2 puffs into the lungs every 6 hours as needed for Wheezing 12/30/20 12/30/21  Josefina Falk MD   glimepiride (AMARYL) 4 MG tablet Take 1 tablet by mouth daily 12/24/20   Jennifer Ramirez MD   furosemide (LASIX) 40 MG tablet Take 1 tablet by mouth daily 12/24/20   Jennifer Ramirez MD fluticasone-salmeterol (ADVAIR DISKUS) 250-50 MCG/DOSE AEPB Inhale 1 puff into the lungs every 12 hours 12/14/20   Jeanine Modi MD   lisinopril (PRINIVIL;ZESTRIL) 5 MG tablet Take 1 tablet by mouth daily 12/4/20   Poornima Draper MD   ipratropium-albuterol (DUONEB) 0.5-2.5 (3) MG/3ML SOLN nebulizer solution Inhale 3 mLs into the lungs every 4 hours as needed for Shortness of Breath Dx: Asthma  ICD-10: J45.909 11/25/20   Jeanine Modi MD   finasteride (PROSCAR) 5 MG tablet Take 5 mg by mouth daily    Historical Provider, MD   tamsulosin (FLOMAX) 0.4 MG capsule Take 0.4 mg by mouth daily    Historical Provider, MD   metFORMIN (GLUCOPHAGE) 1000 MG tablet Take 1 tablet by mouth 2 times daily 7/8/20   Danyel Eaton MD   simvastatin (ZOCOR) 40 MG tablet TAKE 1 TABLET BY MOUTH AT BEDTIME 7/8/20   Danyel Eaton MD   FREESTYLE LANCETS MISC 1 each by Does not apply route daily 2/15/17   Danyel Eaton MD   Blood Glucose Monitoring Suppl (FREESTYLE LITE) CLAUDINE 1 Device by Does not apply route 2 times daily 2/15/17   Danyel Eaton MD       Review of System:  [x] Full ROS obtained and negative except as mentioned in HPI    Relevant and available labs, and cardiovascular diagnostics reviewed. Reviewed. Recent Labs     02/01/21 0620 02/01/21 1942 02/02/21  0532    133* 134*   K 3.7 4.1 3.7    99 99   CO2 24 23 25   BUN 19 23* 22*   CREATININE 0.7* 0.9 0.7*     Recent Labs     02/01/21 0620 02/01/21 1942 02/02/21  0532   WBC 12.5* 11.1* 9.4   HGB 8.4* 8.5* 8.6*   HCT 26.3* 27.2* 26.8*   MCV 85.2 86.2 86.5    234 208     Estimated Creatinine Clearance: 119 mL/min (A) (based on SCr of 0.7 mg/dL (L)). No results found for: BNP    I independently reviewed all cardiac tracing from cardiac telemetry. I independently reviewed relevant and available cardiac diagnostic tests ECG, CXR, Echo, Stress test, Device interrogation, Holter, CT scan. Complex medical condition with multiple medical problems affecting prognosis and outcome of EP interventions  Total critical care time was 35 minutes, for caring of this patient with life-threatening condition and organ failure, excluding separately reportable procedures. Services, included in critical care time were chart data review, documentation time, obtaining info from patient, review of nursing notes, and vital sign assessment and management of the patient. Thank you for allowing me to participate in the care of Lorena Gavin     All questions and concerns were addressed to the patient/family. Alternatives to my treatment were discussed. I have discussed the above stated plan and the patient verbalized understanding and agreed with the plan. NOTE: This report was transcribed using voice recognition software. Every effort was made to ensure accuracy, however, inadvertent computerized transcription errors may be present.      Gaston Gaspar MD, MPH  [facility]   Office: [phone]  Fax: [phone]

## 2021-02-02 NOTE — ACP (ADVANCE CARE PLANNING)
Advanced Care Planning Note. Purpose of Encounter: Advanced care planning in light of melanoma  Parties In Attendance: Patient, wife  Decisional Capacity: Yes  Subjective: Patient with pain in legs and back  Objective: Cr 0.7  Goals of Care Determination: Patient/POA wants full support (CPR, vent, surgery, HD, trach, PEG)  Plan:  Onc/Renal/Cardio/Endocrine/Palliative care consults. Code Status: Full code   Time spent on Advanced care Plannin minutes  Advanced Care Planning Documents: Completed advanced directives on chart, wife is the POA.     Ifrah Phelps MD  2021 6:40 PM

## 2021-02-02 NOTE — PROGRESS NOTES
Pharmacy to Dose Warfarin    Pharmacy consulted to dose warfarin for afib. INR Goal: 2-3    INR today: 1.48    Assessment/Plan:  - Cont warfarin at 2.5 mg tonight as patient is now on amiodarone. Pharmacy will continue to follow.     Braden Alejandro, PharmD, [place].   [phone]

## 2021-02-02 NOTE — PROGRESS NOTES
Rapid response called at this time patient VTach with pulse talking and in no distress a this time; patient asked to bear down and blow hard; no change in heart rhythm ; RT Charge, and rapid response team at bedside 1907

## 2021-02-02 NOTE — PROGRESS NOTES
Nephrology Attending  Progress Note        SUMMARY :  We are following this patient for NI/ Hyponatremia    Admitted  With generalized weakness , epistaxis ( High PT ) , dyspnea, cough  Has a known Rt Hilar mass   Had Rt side Thoracocentesis 1/28      SUBJECTIVE:   This am sustained ventricular tachycardia with a heart rate of 180 bpm and had to be ? cardioverted. He has been started on IV amiodarone therapy and has been transferred to ICU. , chest pain        Physical Exam:    VITALS:  /63   Pulse 74   Temp 97.1 °F (36.2 °C) (Temporal)   Resp 17   Ht 5' 9\" (1.753 m)   Wt 207 lb 14.3 oz (94.3 kg)   SpO2 96%   BMI 30.70 kg/m²   BLOOD PRESSURE RANGE:  Systolic (14OQS), UCM:875 , Min:99 , QEQ:398   ; Diastolic (29CMQ), TOK:99, Min:55, Max:74    24HR INTAKE/OUTPUT:      Intake/Output Summary (Last 24 hours) at 2/2/2021 0957  Last data filed at 2/1/2021 2000  Gross per 24 hour   Intake 790 ml   Output 750 ml   Net 40 ml       Gen:  alert, oriented x 3  PERRL , EOM +  Pallor +, No icterus  JVP not raised   CV: IRRR no murmur or rub . Left side AICD - tender  Lungs:B/ L air entry, Reduced Rt base, Dull to percussion rt base, no pleural rub heard  Abd: soft, bowel sounds + , No organomegaly   Ext: Trace leg  edema, no cyanosis  Skin: Warm.   No rash  Neuro: nonfocal.      DATA:    CBC with Differential:    Lab Results   Component Value Date    WBC 9.4 02/02/2021    RBC 3.10 02/02/2021    HGB 8.6 02/02/2021    HCT 26.8 02/02/2021     02/02/2021    MCV 86.5 02/02/2021    MCH 27.8 02/02/2021    MCHC 32.1 02/02/2021    RDW 19.8 02/02/2021    SEGSPCT 68.9 12/09/2020    LYMPHOPCT 9.2 02/01/2021    MONOPCT 8.2 02/01/2021    BASOPCT 0.3 02/01/2021    MONOSABS 0.9 02/01/2021    LYMPHSABS 1.0 02/01/2021    EOSABS 0.0 02/01/2021    BASOSABS 0.0 02/01/2021     CMP:    Lab Results   Component Value Date     02/02/2021    K 3.7 02/02/2021    CL 99 02/02/2021    CO2 25 02/02/2021    BUN 22 02/02/2021 CREATININE 0.7 02/02/2021    GFRAA >60 02/02/2021    AGRATIO 0.8 02/01/2021    LABGLOM >60 02/02/2021    LABGLOM 92 06/08/2018    GLUCOSE 156 02/02/2021    PROT 5.9 02/01/2021    PROT 6.2 12/08/2012    LABALBU 2.7 02/01/2021    CALCIUM 8.8 02/02/2021    BILITOT 0.6 02/01/2021    ALKPHOS 140 02/01/2021    AST 32 02/01/2021    ALT 32 02/01/2021     Magnesium:    Lab Results   Component Value Date    MG 2.50 02/01/2021     Phosphorus:    Lab Results   Component Value Date    PHOS 2.6 11/11/2020     Uric Acid:  No results found for: LABURIC, URICACID  Troponin:    Lab Results   Component Value Date    TROPONINI 0.04 02/02/2021     U/A:    Lab Results   Component Value Date    COLORU YELLOW 01/01/2021    PROTEINU Negative 01/01/2021    PHUR 5.5 01/01/2021    RBCUA 0 02/13/2020    YEAST 0 02/13/2020    BACTERIA 0 02/13/2020    CLARITYU Clear 01/01/2021    SPECGRAV 1.015 01/01/2021    LEUKOCYTESUR Negative 01/01/2021    UROBILINOGEN 0.2 01/01/2021    BILIRUBINUR Negative 01/01/2021    BLOODU Negative 01/01/2021    GLUCOSEU >=1000 01/01/2021     Lab Results   Component Value Date     02/02/2021    K 3.7 02/02/2021    CL 99 02/02/2021    CO2 25 02/02/2021    BUN 22 02/02/2021    CREATININE 0.7 02/02/2021    GLUCOSE 156 02/02/2021    CALCIUM 8.8 02/02/2021          IMPRESSION/RECOMMENDATIONS:      Diagnosis and Plan :     1. NI   Non Oliguric   Recovered   back on Furosemide   Cr 0.7     2. HTN   Controlled , edema - continue diuretics- switch to po  Restart ACEI  , reduce dose of metoprolol 75 bid to 50 bid     3. Lung Mass - Hilar mass with Lymphadenopathy    4. Pneumonia    5. Epistaxis  Had High INR , controlled     6. Persistent Atrial Fibrillation      7.  Hyponatremia    Na 134        Asael Phil Campbell

## 2021-02-02 NOTE — PROGRESS NOTES
Hospitalist Progress Note      PCP: Leopold Hope, MD    Date of Admission: 1/25/2021    Hospital Course:   42-year-old M with history of ischemic CMP with VT s/p ICD, HTN, h/o melanoma, DM 2, Afib, SYLVIE admitted to the hospital with some weakness. Admitted as inpatient with acute respiratory failure due to post obstructive pneumonia with sepsis POA  . He has right lung mass on outside hospital CT. He declined pulmonary workup opting for resumption of workup with [facility] Pulmonologist.  Cytology from 1/28 consistent with metastatic melanoma. Had VT on 2/1 and required defibrillation. Moved to ICU. Seen by Cardio. Subjective:   Overnight events noted. No SOB. No CP, HA or abdominal pain. No fevers. Complaining of pain in back and legs.     Medications:  Reviewed    Infusion Medications    lidocaine      amiodarone 0.5 mg/min (02/02/21 0216)    sodium chloride      dextrose      sodium chloride       Scheduled Medications    metoprolol tartrate  100 mg Oral BID    megestrol  200 mg Oral Daily    furosemide  40 mg Oral BID    lisinopril  5 mg Oral Daily    warfarin  2.5 mg Oral Daily    LORazepam  1 mg Intravenous Once    amiodarone  150 mg Intravenous Once    budesonide-formoterol  2 puff Inhalation BID    ipratropium-albuterol  1 ampule Inhalation Q4H WA    insulin lispro  14 Units Subcutaneous TID WC    insulin glargine  22 Units Subcutaneous Q12H    potassium chloride  40 mEq Oral Daily with breakfast    enoxaparin  1 mg/kg Subcutaneous BID    insulin lispro  0-18 Units Subcutaneous TID WC    insulin lispro  0-9 Units Subcutaneous Nightly    cefepime  2,000 mg Intravenous Q8H    allopurinol  300 mg Oral QPM    finasteride  5 mg Oral Daily    pantoprazole  40 mg Oral QAM AC    atorvastatin  20 mg Oral Nightly    tamsulosin  0.4 mg Oral Nightly    sodium chloride flush  10 mL Intravenous 2 times per day    lactobacillus  1 capsule Oral BID WC AST 32   ALT 32   BILITOT 0.6   ALKPHOS 140*     Recent Labs     02/01/21  0620 02/01/21  1942 02/02/21  0532   INR 1.67* 1.60* 1.48*     Recent Labs     02/01/21  1942 02/02/21  0139 02/02/21  0532   TROPONINI 0.03* 0.03* 0.04*       Urinalysis:      Lab Results   Component Value Date    NITRU Negative 01/01/2021    BACTERIA 0 02/13/2020    RBCUA 0 02/13/2020    BLOODU Negative 01/01/2021    SPECGRAV 1.015 01/01/2021    GLUCOSEU >=1000 01/01/2021       Radiology:  CT HEAD W WO CONTRAST   Final Result   1. No evidence of metastatic disease. 2. Cerebral parenchymal volume loss, commensurate with the patient's age. XR CHEST 1 VIEW   Final Result   Addendum 1 of 1   ADDENDUM:   .         Final      IR GUIDED THORACENTESIS PLEURAL   Final Result   Successful ultrasound guided thoracentesis. IR GUIDED THORACENTESIS PLEURAL   Final Result   Trace right pleural effusion. No safe window for procedure at this time. Thoracentesis was not performed. XR CHEST PORTABLE   Final Result   Stable cardiomegaly with mild central pulmonary congestion which is more   prominent. Questionable early infiltrate or atelectasis along the right lung base which   is more apparent.          CT NEEDLE BIOPSY LUNG PERCUTANEOUS    (Results Pending)   MRI BRAIN W WO CONTRAST    (Results Pending)           Assessment/Plan:    Active Hospital Problems    Diagnosis    Supratherapeutic INR [R79.1]    Pneumonia [J18.9]    AICD (automatic cardioverter/defibrillator) present [Z95.810]    Dilated cardiomyopathy ([facility] [address] [I42.0]    VT (ventricular tachycardia) ([facility] [address] [I47.2]    Persistent atrial fibrillation (HCC) [I48.19]    Ischemic cardiomyopathy [I25.5]    GERD without esophagitis [K21.9]    Type 2 diabetes mellitus with diabetic polyneuropathy, without long-term current use of insulin (HCC) [E11.42]    Melanoma in situ of lower leg ([address] [D03.70]    SYLVIE (obstructive sleep apnea) [G47.33]  Essential hypertension [I10]     Community acquired Pneumonia- Possibly post obstructive form lung mass. suspected gram-positive/gram-negative  COVID-19 negative  DC cefepime(day 7/7). Completed azithromycin(day 5/5)  -D/Cd vancomycin given negative MRSA culture  -WBC trending down    Acute respiratory failure with hypoxia-due to pneumonia  -Currently on RA. Wean for 02 sats >90%. -Continue inhalors     Right metastatic non small cancer  Previously bronchoscopy attempted had a brief cardiac arrest  -Declined further workup by Pulmonologist at this hospital.He would like to complete workup with Pulmonologist at [facility] guided lung bx or bronchoscopy planned.   -Thoracentesis done 1/28/21 at request of patient, cytology with metastatic melanoma  -Continue to hold home coumadin and continue lovenox due to procedures  -Oncology recommendations noted     Supratherapeutic INR  INR noted noted to be greater than 14 on admission  -On coumadin at home for Afib subtherapeutic. Continue lovenox bid  -Cont Coumadin      Chronic moderate systolic heart failure   -Cont PO Lasix and potassium supplementation. Hx VT s/p AICD  -On Amio gtt  -EP following    Type 2 diabetes mellitus  Last A1c 10.6  -Management per Endocrinology      Chronic Anemia  Iron studies B12 folate reflect studies consistent with anemia of chronic disease  -Hgb stable. Management per Oncology. Acute kidney injury-due to  prerenal  -Resolved.  Continue to hold lisinopril    Chronic atrial fibrillation s/p pacemaker insertion on the first of this month  Pacemaker site has a small hematoma which was also noticed on discharge  -Continue amiodarone  -Continue lovenox/Coumadin for anticoagulation  -EP recommendations noted    Anxiety  -Vistaril PRN    Insomnia  -Continue ambien PRN      DVT Prophylaxis: Therapeutic lovenox  Diet: DIET CARB CONTROL; Carb Control: 4 carb choices (60 gms)/meal; Daily Fluid Restriction: 1000 ml Dietary Nutrition Supplements: Diabetic Oral Supplement  Code Status: Full Code    PT/OT Eval Status: as needed    Dispo -     Discussed with patient, Dr Joan Acosta (Onc), nursing and CM. D/W wife at bedside. Palliative care consult to discuss goals of care. He wants to be in [place] [facility] [place] with family. Needs cardiac clearance to fly.     Rosangela Hunter MD

## 2021-02-02 NOTE — PROGRESS NOTES
[facility] unable to staff @ this time . [facility] called and they have accepted this patient for home care. Discharge planner notified.

## 2021-02-03 LAB
ANION GAP SERPL CALCULATED.3IONS-SCNC: 10 MMOL/L (ref 3–16)
BUN BLDV-MCNC: 18 MG/DL (ref 7–20)
CALCIUM SERPL-MCNC: 8.4 MG/DL (ref 8.3–10.6)
CHLORIDE BLD-SCNC: 100 MMOL/L (ref 99–110)
CO2: 23 MMOL/L (ref 21–32)
CREAT SERPL-MCNC: 0.8 MG/DL (ref 0.8–1.3)
GFR AFRICAN AMERICAN: >60
GFR NON-AFRICAN AMERICAN: >60
GLUCOSE BLD-MCNC: 172 MG/DL (ref 70–99)
GLUCOSE BLD-MCNC: 181 MG/DL (ref 70–99)
GLUCOSE BLD-MCNC: 234 MG/DL (ref 70–99)
GLUCOSE BLD-MCNC: 288 MG/DL (ref 70–99)
GLUCOSE BLD-MCNC: 99 MG/DL (ref 70–99)
INR BLD: 1.66 (ref 0.86–1.14)
PERFORMED ON: ABNORMAL
PERFORMED ON: NORMAL
POTASSIUM REFLEX MAGNESIUM: 4.4 MMOL/L (ref 3.5–5.1)
PROTHROMBIN TIME: 19.3 SEC (ref 10–13.2)
SODIUM BLD-SCNC: 133 MMOL/L (ref 136–145)

## 2021-02-03 PROCEDURE — 6370000000 HC RX 637 (ALT 250 FOR IP): Performed by: INTERNAL MEDICINE

## 2021-02-03 PROCEDURE — 6360000002 HC RX W HCPCS: Performed by: INTERNAL MEDICINE

## 2021-02-03 PROCEDURE — 2580000003 HC RX 258: Performed by: PHYSICIAN ASSISTANT

## 2021-02-03 PROCEDURE — 2000000000 HC ICU R&B

## 2021-02-03 PROCEDURE — 6370000000 HC RX 637 (ALT 250 FOR IP): Performed by: PHYSICIAN ASSISTANT

## 2021-02-03 PROCEDURE — 94640 AIRWAY INHALATION TREATMENT: CPT

## 2021-02-03 PROCEDURE — 36415 COLL VENOUS BLD VENIPUNCTURE: CPT

## 2021-02-03 PROCEDURE — 80048 BASIC METABOLIC PNL TOTAL CA: CPT

## 2021-02-03 PROCEDURE — 94761 N-INVAS EAR/PLS OXIMETRY MLT: CPT

## 2021-02-03 PROCEDURE — 2700000000 HC OXYGEN THERAPY PER DAY

## 2021-02-03 PROCEDURE — 99291 CRITICAL CARE FIRST HOUR: CPT | Performed by: INTERNAL MEDICINE

## 2021-02-03 PROCEDURE — 85610 PROTHROMBIN TIME: CPT

## 2021-02-03 RX ORDER — AMIODARONE HYDROCHLORIDE 200 MG/1
400 TABLET ORAL 2 TIMES DAILY
Status: DISCONTINUED | OUTPATIENT
Start: 2021-02-03 | End: 2021-02-06

## 2021-02-03 RX ORDER — FUROSEMIDE 40 MG/1
40 TABLET ORAL DAILY
Status: DISCONTINUED | OUTPATIENT
Start: 2021-02-04 | End: 2021-02-08 | Stop reason: HOSPADM

## 2021-02-03 RX ADMIN — IPRATROPIUM BROMIDE AND ALBUTEROL SULFATE 1 AMPULE: .5; 3 SOLUTION RESPIRATORY (INHALATION) at 16:31

## 2021-02-03 RX ADMIN — ATORVASTATIN CALCIUM 20 MG: 20 TABLET, FILM COATED ORAL at 20:06

## 2021-02-03 RX ADMIN — TAMSULOSIN HYDROCHLORIDE 0.4 MG: 0.4 CAPSULE ORAL at 20:06

## 2021-02-03 RX ADMIN — ALLOPURINOL 300 MG: 300 TABLET ORAL at 18:06

## 2021-02-03 RX ADMIN — FINASTERIDE 5 MG: 5 TABLET, FILM COATED ORAL at 08:53

## 2021-02-03 RX ADMIN — INSULIN LISPRO 14 UNITS: 100 INJECTION, SOLUTION INTRAVENOUS; SUBCUTANEOUS at 08:56

## 2021-02-03 RX ADMIN — METOPROLOL TARTRATE 100 MG: 50 TABLET, FILM COATED ORAL at 08:51

## 2021-02-03 RX ADMIN — HYDROMORPHONE HYDROCHLORIDE 1 MG: 1 INJECTION, SOLUTION INTRAMUSCULAR; INTRAVENOUS; SUBCUTANEOUS at 03:31

## 2021-02-03 RX ADMIN — Medication 1 CAPSULE: at 08:52

## 2021-02-03 RX ADMIN — INSULIN LISPRO 3 UNITS: 100 INJECTION, SOLUTION INTRAVENOUS; SUBCUTANEOUS at 09:03

## 2021-02-03 RX ADMIN — Medication 2 PUFF: at 19:39

## 2021-02-03 RX ADMIN — AMIODARONE HYDROCHLORIDE 400 MG: 200 TABLET ORAL at 20:06

## 2021-02-03 RX ADMIN — Medication 2 PUFF: at 02:05

## 2021-02-03 RX ADMIN — INSULIN LISPRO 5 UNITS: 100 INJECTION, SOLUTION INTRAVENOUS; SUBCUTANEOUS at 20:13

## 2021-02-03 RX ADMIN — Medication 1 CAPSULE: at 18:06

## 2021-02-03 RX ADMIN — MEGESTROL ACETATE 200 MG: 40 SUSPENSION ORAL at 08:54

## 2021-02-03 RX ADMIN — ENOXAPARIN SODIUM 90 MG: 100 INJECTION SUBCUTANEOUS at 08:54

## 2021-02-03 RX ADMIN — PANTOPRAZOLE SODIUM 40 MG: 40 TABLET, DELAYED RELEASE ORAL at 06:23

## 2021-02-03 RX ADMIN — IPRATROPIUM BROMIDE AND ALBUTEROL SULFATE 1 AMPULE: .5; 3 SOLUTION RESPIRATORY (INHALATION) at 09:15

## 2021-02-03 RX ADMIN — FUROSEMIDE 40 MG: 40 TABLET ORAL at 08:53

## 2021-02-03 RX ADMIN — POTASSIUM CHLORIDE 40 MEQ: 1500 TABLET, EXTENDED RELEASE ORAL at 08:52

## 2021-02-03 RX ADMIN — METOPROLOL TARTRATE 100 MG: 50 TABLET, FILM COATED ORAL at 20:05

## 2021-02-03 RX ADMIN — IPRATROPIUM BROMIDE AND ALBUTEROL SULFATE 1 AMPULE: .5; 3 SOLUTION RESPIRATORY (INHALATION) at 12:19

## 2021-02-03 RX ADMIN — ZOLPIDEM TARTRATE 5 MG: 5 TABLET ORAL at 23:55

## 2021-02-03 RX ADMIN — Medication 2 PUFF: at 09:14

## 2021-02-03 RX ADMIN — AMIODARONE HYDROCHLORIDE 400 MG: 200 TABLET ORAL at 13:30

## 2021-02-03 RX ADMIN — WARFARIN SODIUM 2.5 MG: 2.5 TABLET ORAL at 18:06

## 2021-02-03 RX ADMIN — TRAMADOL HYDROCHLORIDE 50 MG: 50 TABLET, FILM COATED ORAL at 20:25

## 2021-02-03 RX ADMIN — ENOXAPARIN SODIUM 90 MG: 100 INJECTION SUBCUTANEOUS at 20:05

## 2021-02-03 RX ADMIN — LISINOPRIL 5 MG: 5 TABLET ORAL at 08:53

## 2021-02-03 RX ADMIN — Medication 10 ML: at 09:12

## 2021-02-03 RX ADMIN — ACETAMINOPHEN 650 MG: 325 TABLET ORAL at 12:20

## 2021-02-03 RX ADMIN — IPRATROPIUM BROMIDE AND ALBUTEROL SULFATE 1 AMPULE: .5; 3 SOLUTION RESPIRATORY (INHALATION) at 19:36

## 2021-02-03 RX ADMIN — INSULIN LISPRO 6 UNITS: 100 INJECTION, SOLUTION INTRAVENOUS; SUBCUTANEOUS at 18:11

## 2021-02-03 RX ADMIN — Medication 10 ML: at 20:05

## 2021-02-03 ASSESSMENT — PAIN DESCRIPTION - LOCATION
LOCATION: BACK
LOCATION: BACK

## 2021-02-03 ASSESSMENT — PAIN DESCRIPTION - PAIN TYPE
TYPE: CHRONIC PAIN

## 2021-02-03 ASSESSMENT — PAIN SCALES - GENERAL
PAINLEVEL_OUTOF10: 0
PAINLEVEL_OUTOF10: 0
PAINLEVEL_OUTOF10: 5

## 2021-02-03 ASSESSMENT — PAIN DESCRIPTION - ORIENTATION: ORIENTATION: LOWER

## 2021-02-03 ASSESSMENT — PAIN DESCRIPTION - DESCRIPTORS: DESCRIPTORS: ACHING

## 2021-02-03 ASSESSMENT — PAIN DESCRIPTION - FREQUENCY: FREQUENCY: CONTINUOUS

## 2021-02-03 NOTE — PROGRESS NOTES
Patient called out d/t wheezing. Expiratory wheezing audible throughout. RT to bedside for PRN inhaler.

## 2021-02-03 NOTE — ADT AUTH CERT
Recovered    back on Furosemide    Cr 0.7        2. HTN    Controlled , edema - continue diuretics- switch to po   Restart ACEI  , reduce dose of metoprolol 75 bid to 50 bid        3. Lung Mass - Hilar mass with Lymphadenopathy       4. Pneumonia       5. Epistaxis   Had High INR , controlled        6. Persistent Atrial Fibrillation         7. Hyponatremia                Na 136, corrected      --------------------   Oncology   He is doing ok, breathing is better, he has an occasional cough, wife at bedside. ASSESSMENT AND PLAN       Active Problems:     Pneumonia     Supratherapeutic INR   Resolved Problems:     * No resolved hospital problems. *           1.  Supratherapeutic INR epistaxis:       -Received FFP and vitamin K on 1/26/2021   -INR is 1.52   -Patient had poor appetite for last couple of weeks which might have caused vitamin K deficiency leading to supratherapeutic INR.   - Anticoagulation at the discretion of cardiologist.       2.  Right hilar mass, mediastinal lymphadenopathy:       -Patient has new pleural effusion per CT chest done at [facility] on 1/20/2021   - Had thoracentesis done on 1/28/2020.  30 mL removed.     - Preliminary cytology report was discussed with the pathologist. Maximilian Quevedo is consistent with non-small cell lung cancer possible adenocarcinoma.  Immunostains are pending.       -Diagnosis has been discussed with the patient and wife. -Plan would be to get a PET scan as outpatient. -MRI brain needs to be done.  Patient is not keen on doing MRI brain.  CT brain showed no evidence of metastatic disease   -We will send next generation sequencing on the cytology if adequate sample is applicable.    -Based on above studies further treatment decisions will be made.       3.  Cardiac arrest after bronchoscopy in December 2020, has pacemaker   Cardiology is following.       4.  Anemia:       - Most likely due to blood loss   - anemia of chronic disease   - ferritin high - Transfuse PRBCs if hemoglobin is less than 8   ------------------------      2/1/2021 06:20   Sodium: 136   Potassium: 3.7   Chloride: 101   CO2: 24   BUN: 19   Creatinine: 0.7 (L)   Anion Gap: 11   GFR Non-: >60   GFR African American: >60   Glucose: 92   Calcium: 8.6   WBC: 12.5 (H)   RBC: 3.09 (L)   Hemoglobin Quant: 8.4 (L)   Hematocrit: 26.3 (L)   MCV: 85.2   MCH: 27.2   MCHC: 31.9   MPV: 7.2   RDW: 18.1 (H)   Platelet Count: 696   Prothrombin Time: 19.5 (H)   INR: 1.67 (H)      2/1/2021 07:47   POC Glucose: 104 (H)      2/1/2021 11:42   POC Glucose: 246 (H)      2/1/2021 16:38   POC Glucose: 280 (H)      2/1/2021 19:30   O2 Therapy: Unknown   Hemoglobin, Art, Extended: 8.7 (L)   pH, Arterial: 7.497 (H)   pCO2, Arterial: 32.7 (L)   pO2, Arterial: 74.3 (L)   HCO3, Arterial: 25.3   TCO2 (calc), Art: 59.0   Base Excess, Arterial: 2.2   O2 Sat, Arterial: 96.3   O2 Content, Arterial: 12   Methemoglobin, Arterial: 0.0   Carboxyhgb, Arterial: 2.3 (H)      2/1/2021 19:42   Sodium: 133 (L)   Potassium: 4.1   Chloride: 99   CO2: 23   BUN: 23 (H)   Creatinine: 0.9   Anion Gap: 11   GFR Non-: >60   GFR African American: >60   Magnesium: 2.50 (H)   Glucose: 254 (H)   Calcium: 8.5   Total Protein: 5.9 (L)   Troponin: 0.03 (H)   Albumin: 2.7 (L)   Globulin: 3.2   Albumin/Globulin Ratio: 0.8 (L)   Alk Phos: 140 (H)   ALT: 32   AST: 32   Bilirubin: 0.6   WBC: 11.1 (H)   RBC: 3.16 (L)   Hemoglobin Quant: 8.5 (L)   Hematocrit: 27.2 (L)   MCV: 86.2   MCH: 26.8   MCHC: 31.1   MPV: 7.3   RDW: 19.2 (H)   Platelet Count: 154   Neutrophils %: 81.9   Lymphocyte %: 9.2   Monocytes %: 8.2   Eosinophils %: 0.4   Basophils %: 0.3   Neutrophils Absolute: 9.1 (H)   Lymphocytes Absolute: 1.0   Monocytes Absolute: 0.9   Eosinophils Absolute: 0.0   Basophils Absolute: 0.0   Prothrombin Time: 18.7 (H)   INR: 1.60 (H)      2/1/2021 21:06   POC Glucose: 211 (H)   --------------------------      2/1/2021 19:38 Atrial Rate: 73   P Axis: 67   P-R Interval: 170   Q-T Interval: 414   QRS Duration: 142   QTc Calculation (Bazett): 456   R Axis: -37   T Axis: 115   Ventricular Rate: 73   Sinus rhythm with occasional Premature ventricular complexesLeft axis deviationLeft bundle branch blockAbnormal ECGNo previous ECGs available   --------------------   Scheduled Meds:   · metoprolol tartrate 100 mg Oral BID   · megestrol 200 mg Oral Daily   · furosemide 40 mg Oral BID   · lisinopril 5 mg Oral Daily   · warfarin 2.5 mg Oral Daily   · LORazepam 1 mg Intravenous Once   · amiodarone 150 mg Intravenous Once   · budesonide-formoterol 2 puff Inhalation BID   · ipratropium-albuterol 1 ampule Inhalation Q4H WA   · insulin lispro 14 Units Subcutaneous TID WC   · insulin glargine 22 Units Subcutaneous Q12H   · potassium chloride 40 mEq Oral Daily with breakfast   · enoxaparin 1 mg/kg Subcutaneous BID   · insulin lispro 0-18 Units Subcutaneous TID WC   · insulin lispro 0-9 Units Subcutaneous Nightly   · cefepime 2,000 mg Intravenous Q8H   · allopurinol 300 mg Oral QPM   · finasteride 5 mg Oral Daily   · pantoprazole 40 mg Oral QAM AC   · atorvastatin 20 mg Oral Nightly   · tamsulosin 0.4 mg Oral Nightly   · sodium chloride flush 10 mL Intravenous 2 times per day   · lactobacillus 1 capsule Oral BID WC      Continuous Infusions:    · amiodarone 0.5 mg/min Start: 02/01/21 1945 End: 02/02/21 0144      PRN   hydrOXYzine (VISTARIL) capsule 50 mg x1   sennosides-docusate sodium (SENOKOT-S) 8.6-50 MG tablet 2 tablet x1   traMADol (ULTRAM) tablet 50 mg x1

## 2021-02-03 NOTE — CONSULTS
Palliative Care:     a 72 y.o. male who presented to ED with complaint of generalized weakness and fatigue which has been ongoing for the past several days. He also reports increasing shortness of breath and productive cough. He reports that he woke up this morning with a nosebleed which has reocurred intermittently throughout the day. He denies any other easy bruising or bleeding. He denies fever, dizziness, headache, chest pain, edema, abdominal pain, nausea, vomiting, diarrhea, constipation, urinary symptoms. Patient is anticoagulated on Coumadin for A. fib.     Work-up initiated in ED. CXR concerning for mild central pulmonary congestion and questionable right basilar early infiltrate vs atelectasis. CMP notable for mild hyponatremia (Na 132) and Cr 1.2 (baseline 0.6). CBC notable for leukocytosis (WBC 14.5) and anemia (Hgb 9.8). Procalcitonin 0.2. Troponin slightly elevated at 0.03 but stable compared to previous. EKG shows rate controlled A. Fib.       Of note, patient had a chest CT on 1/20/21 at an OSH which was notable for right hilar mass with subcarinal mediastinal adenopathy highly concerning for malignancy, innumerable pulmonary nodules concerning for metastatic disease, and mild to moderate right sided pleural effusion. Admitted on 1/25/21 for sx. Management. Palliative consult ordered 2/2/21. CT Chest 1/20/21:   IMPRESSION:           Right hilar mass, with subcarinal mediastinal adenopathy, highly concerning for malignancy.     Even the lack of IV contrast, these entities are difficult to separate and accurately measure.       Innumerable pulmonary nodules, concerning for metastatic disease       Mild to moderate right pleural effusion. Echocardiogram 12/2/20:   Summary   -Overall left ventricular systolic function is moderately depressed .   -Ejection fraction is visually estimated to be 40%.  E/e'= 7.4 cm   -There is global hypokinesis that appears worse in the inferior and apical walls.   -Indeterminate diastolic function.   -The aortic valve appears sclerotic but opens well. -Mild aortic regurgitation.   -Mild tricuspid regurgitation. Past Medical History:   has a past medical history of Abdominal pain, LLQ, Allergic rhinitis, Asthma, Atopic dermatitis, Cancer ([facility] [address] Diabetes mellitus ([facility] [address] Erectile dysfunction, Generalized osteoarthrosis, involving multiple sites, GERD (gastroesophageal reflux disease), Gynecomastia, Hyperlipidemia, Nasal congestion, Nipple pain, Obstructive sleep apnea syndrome, Pseudo-gout, and Unspecified essential hypertension. Past Surgical History:   has a past surgical history that includes Hand surgery (2011); other surgical history (2002); Finger surgery (Left, 3-1-2013); Foot surgery (Right); Carpal tunnel release (Bilateral, 2013); pr njx dx/ther sbst intrlmnr lmbr/sac w/img gdn (Right, 9/24/2018); lumbar nerve block (Right, 12/12/2018); bronchoscopy (N/A, 12/2/2020); and back surgery. Advance Directives:    Full Code confirmed 2/2/21. Wife Loan Salcido. No ACP in EMR. Problem Severity: Pain/Other Symptoms:  Pain not managed with PRN Morphine. Request Change 2/2/21 in PM to try Dilaudid PRN. Cardiomegaly. AICD active. Bed Mobility/Toileting/Transfer:    One assist OOB. PT/OT pending. Performance Status:        Palliative Performance Scale:  100% []Full Normal activity & work No evidence of disease  90%   [] Full Normal activity & work Some evidence of disease  80%   [] Full Normal activity with Effort Some evidence of disease  70%   [] Reduced Unable Normal Job/Work Significant disease Full Normal or reduced  60%   [] Ambulation reduced; Significant disease; Can't do hobbies/housework; intake normal   or reduced; occasional assist; LOC full/confusion  50%   [x] Mainly sit/lie;  Extensive disease; Can't do any work; Considerable assist; intake normal  Or reduced; LOC full/confusion 40%   [] Mainly in bed; Extensive disease; Mainly assist; intake normal or reduced; occasional assist; LOC full/confusion  30%   [] Bed Bound; Extensive disease; Total care; intake reduced; LOC full/confusion  20%   [] Bed Bound; Extensive disease; Total care; intake minimal; Drowsy/coma  10%   [] Bed Bound; Extensive disease; Total care; Mouth care only; Drowsy/coma    PPS 50%    Symptom Assessment: Appetite/Nausea/Bowels/Fatigue:   Appetite poor. Diabetic. Megace ordered 2/2/21. . Daily WTS required. Poor endurance. Requiring 3L 02 presently. Social History:   reports that he quit smoking about 27 years ago. His smoking use included cigarettes. He has a 10.00 pack-year smoking history. He has never used smokeless tobacco. He reports that he does not drink alcohol or use drugs. Family History:  family history includes Arthritis in his mother; Diabetes in his father and sister; Emphysema in his mother; Heart Disease in his father; High Blood Pressure in his mother; High Cholesterol in his father; Osteoporosis in his mother; Parkinsonism in his father. Psychological/Spiritual:  . No children. No affiliation with a Hoahaoism. Believes in god. Wife very emotional and at times irrational and needing frequent reassurance. Family Discussion:      Patient sitting up in chair with initial discussion on 2/2/21 @ 1600 with wife at side. Both patient/wife very emotional and angry with \"overall care\". Patient admits to excessive overall pain rating at \"24\" out of \"10\". Yet, when allowed to express concerns patient has recently had discussion with Oncology and new information diagnosis of potential Metastatic melanoma with preliminary cytology report was discussed with patient/wife. Potental next steps:  -Plan would be to get a PET scan as outpatient.   -MRI brain needs to be done.  Patient is not keen on doing MRI brain.  CT brain showed no evidence of metastatic disease -We will send next generation sequencing on the cytology if adequate sample is applicable. -Based on above studies further treatment decisions will be made. After second review with Palliative on these discussions note per Oncology 2/2/21; patient agrees to allow MRI today/pending. Will need anti-anxiety medication prior. Regarding PET scan as out-patient patient/wife state they can not complete this request due to financial hardship as patient has not worked since [address] due to overall physical decline. Will notify Financial TEAM Gabino Kaminski) regarding patient's financial concerns. Discussed ACP/Code. Patient had discussion with Dr. Lg Beltran regarding code status and wishes are to remain Full Code. Discussed Alevism. Does have valentina. Agreeable for  support; as wife very emotional and needs opportunities to express her anticipatory loss of . She is very fearful and needs much emotional support.  notified. Perfect Serve to physician to discuss pain management and patient agreement to do MRI submitted. Nurse Noni Nassar updated of our discussions. DC goal expressed from patient is to return to home in [facility]. He did call his sister and notified her of current medical status. Will continue to follow and provide any educational/emotional support as needed for patient and his wife.

## 2021-02-03 NOTE — PROGRESS NOTES
Oncology and Hematology Care   Progress Note      2/3/2021 9:28 AM        Name: Tonia Franco . Admitted: 1/25/2021    SUBJECTIVE:      Feels better this morning. Worried about the diagnosis of malignancy.     Reviewed interval ancillary notes    Current Medications      lidocaine (CARDIAC INFUSION) 2 g in dextrose 5% 500 mL infusion, Continuous      metoprolol tartrate (LOPRESSOR) tablet 100 mg, BID      HYDROmorphone HCl PF (DILAUDID) injection 1 mg, Q3H PRN      megestrol (MEGACE) 40 MG/ML suspension 200 mg, Daily      furosemide (LASIX) tablet 40 mg, BID      lisinopril (PRINIVIL;ZESTRIL) tablet 5 mg, Daily      warfarin (COUMADIN) tablet 2.5 mg, Daily      LORazepam (ATIVAN) injection 1 mg, Once      amiodarone (CORDARONE) 150 mg in dextrose 5 % 100 mL bolus, Once    Followed by      amiodarone (CORDARONE) 450 mg in dextrose 5 % 250 mL infusion, Continuous      magnesium sulfate 1000 mg in dextrose 5% 100 mL IVPB, PRN      sennosides-docusate sodium (SENOKOT-S) 8.6-50 MG tablet 2 tablet, Daily PRN      budesonide-formoterol (SYMBICORT) 160-4.5 MCG/ACT inhaler 2 puff, BID      ipratropium-albuterol (DUONEB) nebulizer solution 1 ampule, Q4H WA      insulin lispro (1 Unit Dial) 14 Units, TID WC      0.9 % sodium chloride infusion, PRN      insulin glargine (LANTUS;BASAGLAR) injection pen 22 Units, Q12H      potassium chloride (KLOR-CON M) extended release tablet 40 mEq, Daily with breakfast      zolpidem (AMBIEN) tablet 5 mg, Nightly PRN      traMADol (ULTRAM) tablet 50 mg, Q6H PRN      enoxaparin (LOVENOX) injection 90 mg, BID      insulin lispro (1 Unit Dial) 0-18 Units, TID WC      insulin lispro (1 Unit Dial) 0-9 Units, Nightly      glucose (GLUTOSE) 40 % oral gel 15 g, PRN      dextrose 50 % IV solution, PRN      glucagon (rDNA) injection 1 mg, PRN      dextrose 5 % solution, PRN      0.9 % sodium chloride infusion, PRN   albuterol sulfate  (90 Base) MCG/ACT inhaler 2 puff, Q6H PRN      allopurinol (ZYLOPRIM) tablet 300 mg, QPM      finasteride (PROSCAR) tablet 5 mg, Daily      hydrOXYzine (VISTARIL) capsule 50 mg, TID PRN      pantoprazole (PROTONIX) tablet 40 mg, QAM AC      atorvastatin (LIPITOR) tablet 20 mg, Nightly      tamsulosin (FLOMAX) capsule 0.4 mg, Nightly      sodium chloride flush 0.9 % injection 10 mL, 2 times per day      sodium chloride flush 0.9 % injection 10 mL, PRN      magnesium hydroxide (MILK OF MAGNESIA) 400 MG/5ML suspension 30 mL, Daily PRN      acetaminophen (TYLENOL) tablet 650 mg, Q6H PRN      ondansetron (ZOFRAN) injection 4 mg, Q6H PRN      lactobacillus (CULTURELLE) capsule 1 capsule, BID WC        Objective:  /70   Pulse 78   Temp 97.4 °F (36.3 °C) (Temporal)   Resp 25   Ht 5' 9\" (1.753 m)   Wt 209 lb 10.5 oz (95.1 kg)   SpO2 90%   BMI 30.96 kg/m²     Intake/Output Summary (Last 24 hours) at 2/3/2021 0928  Last data filed at 2/3/2021 0600  Gross per 24 hour   Intake 896.3 ml   Output 925 ml   Net -28.7 ml      Wt Readings from Last 3 Encounters:   02/03/21 209 lb 10.5 oz (95.1 kg)   01/14/21 214 lb (97.1 kg)   01/05/21 210 lb 9.6 oz (95.5 kg)       Conscious alert oriented. Appears comfortable. No neck fullness. Respiratory efforts - mild distress noted. Abdomen is not distended. No leg edema    No focal deficits.       Labs and Tests:  CBC:   Recent Labs     02/01/21  0620 02/01/21 1942 02/02/21  0532   WBC 12.5* 11.1* 9.4   HGB 8.4* 8.5* 8.6*    234 208     BMP:    Recent Labs     02/01/21 1942 02/02/21  0532 02/03/21  0527   * 134* 133*   K 4.1 3.7 4.4   CL 99 99 100   CO2 23 25 23   BUN 23* 22* 18   CREATININE 0.9 0.7* 0.8   GLUCOSE 254* 156* 181*     Hepatic:   Recent Labs     02/01/21 1942   AST 32   ALT 32   BILITOT 0.6   ALKPHOS 140*       ASSESSMENT AND PLAN    Principal Problem:    Pneumonia  Active Problems: Essential hypertension    SYLVIE (obstructive sleep apnea)    Melanoma in situ of lower leg (HCC)    Type 2 diabetes mellitus with diabetic polyneuropathy, without long-term current use of insulin (HCC)    GERD without esophagitis    Persistent atrial fibrillation (HCC)    Ischemic cardiomyopathy    VT (ventricular tachycardia) (HCC)    Dilated cardiomyopathy ([facility] [address]    AICD (automatic cardioverter/defibrillator) present    Supratherapeutic INR  Resolved Problems:    * No resolved hospital problems. *         1.  Metastatic melanoma with lung metastasis and malignant pleural effusion:    -Diagnosed during hospitalization when he presented with supratherapeutic INR and was noted to have pleural effusion in late Jan 2021.  -LDH is elevated.  -Had lengthy discussion with the patient and patient's wife. -We will obtain a PET scan as outpatient  -CT brain with contrast was negative for metastatic disease  -I have ordered next generation sequencing on the pathology specimen which will help us to determine treatment. -Typically treatment will be immunotherapy or targeted agents against BRAF if positive mutation is noted. -Treatments will be done as outpatient.  -He seems to have a lot of cardiac issues-V. tach etc.  Cardiology is following.       1.  Supratherapeutic INR epistaxis:     -Received FFP and vitamin K on 1/26/2021  -INR is 1.48  -Patient had poor appetite for last couple of weeks which might have caused vitamin K deficiency leading to supratherapeutic INR.  - Anticoagulation at the discretion of cardiologist.        3.  Cardiac arrest after bronchoscopy in December 2020, has pacemaker  Cardiology is following.     4.  Anemia:     - Most likely due to blood loss  - anemia of chronic disease  - ferritin high  - Transfuse PRBCs if hemoglobin is less than 8     5. V-tach:    -Cardiology following       Altagracia Liang MD

## 2021-02-03 NOTE — PROGRESS NOTES
Responded to referral for spiritual support made by Natalee Perez, Palliative Care RN. Visited with patient and his wife. Patient spoke of historically, \"growing up in the [facility],\" but is not currently affiliated with any particular [employer]. Although not affiliated with any particular valentina tradition, patient states he would prefer continued visits with a [employer] . Patient and wife were eating dinner and requested a follow-up visit tomorrow, February 3. [employer]  to follow-up. No further needs expressed by patient and/or his wife at this time.

## 2021-02-03 NOTE — PROGRESS NOTES
Patient called out during report wanting his bed made. Up to chair and wife in room. Both very upset with \"[facility]\". Wife stated that, \"If one more thing happens she will go to the press about [place]. \" Some contradicting concerns; they wanted the patient to be able to walk the unit, but wife also said we were pushing him too hard when he was sore. [place] accents + their frustration making it hard to understand. Patient requesting PT/OT. Complete linen change. Patient assisted to bathroom with front wheel walker after extension tubing added to 3 L NC. Tolerated very well. Independently shaved and performed denture (upper partial) care. Returned to bed. Apologized for the emergency that occurred during day-shift causing change in nurses and told them I would be back after receiving report.

## 2021-02-03 NOTE — PROGRESS NOTES
Shift summary: This morning pt in bed, reports it was uncomfortable, this RN offered to help pt up to chair however MD in to see pt so pt agreed to wait until after breakfast. Pain medication administered per request along with morning medications. Defibrillator pads removed and IV amiodarone switched to left arm PIV so that pt may bend right arm to eat without occlusion. Pt called out after breakfast to request to get up to chair, when RN entered the room pt had unhooked his own PIV site infusing amiodarone and gotten up to the chair on his own. Pt educated about the importance of the drip given his prior dysrhythmias which brought him to the ICU. Pt also re-educated on the importance of allowing staff to help him safely move. Pt reconnected to monitoring equipment which he had removed. Pt called out again to get a shower, this RN suggested a bath basin with washcloths instead. Pt stated while he understands a shower isn't the best idea today, he wants to get up to the bathroom on his own and refused staff help. At this time charge RN requested to see patient regarding refusal of staff assistance when up and patient's irritation with staff. Pt ultimately given supplies and pt's wife assisted him to get cleaned up in his chair. Pt requested his bed be made. Clean linens brought to room as well as requested pain medication. Shortly after pt became nauseated, IV zofran given. Palliative care RN in seeing pt at that time. Pt becomes irritated when requests are not fulfilled immediately. This RN spoke with pt multiple times regarding concerns and rounded frequently to fulfill needs before pt needs to call out. Pt and wife are struggling with pt's health issues. Pt stated early in the morning \"I'm done I can't do this anymore\" Wife becomes tearful. This RN encouraged pt to elaborate. Pt stated \"This has been going on for so long with my heart and I'm sick of it\" This RN attempted to assess pt's understanding of adjustments to pt's defibrillator and how that may remedy the issues that brought him to the ICU. Pt demonstrated understanding but wife did not, wife educated by both pt and this RN. Pt and wife seem quite emotionally distressed regarding pt's health.

## 2021-02-03 NOTE — PROGRESS NOTES
EOSABS 0.0 02/01/2021    BASOSABS 0.0 02/01/2021     CMP:    Lab Results   Component Value Date     02/03/2021    K 4.4 02/03/2021     02/03/2021    CO2 23 02/03/2021    BUN 18 02/03/2021    CREATININE 0.8 02/03/2021    GFRAA >60 02/03/2021    AGRATIO 0.8 02/01/2021    LABGLOM >60 02/03/2021    LABGLOM 92 06/08/2018    GLUCOSE 181 02/03/2021    PROT 5.9 02/01/2021    PROT 6.2 12/08/2012    LABALBU 2.7 02/01/2021    CALCIUM 8.4 02/03/2021    BILITOT 0.6 02/01/2021    ALKPHOS 140 02/01/2021    AST 32 02/01/2021    ALT 32 02/01/2021     Magnesium:    Lab Results   Component Value Date    MG 2.50 02/01/2021     Phosphorus:    Lab Results   Component Value Date    PHOS 2.6 11/11/2020     Uric Acid:  No results found for: LABURIC, URICACID  Troponin:    Lab Results   Component Value Date    TROPONINI 0.04 02/02/2021     U/A:    Lab Results   Component Value Date    COLORU YELLOW 01/01/2021    PROTEINU Negative 01/01/2021    PHUR 5.5 01/01/2021    RBCUA 0 02/13/2020    YEAST 0 02/13/2020    BACTERIA 0 02/13/2020    CLARITYU Clear 01/01/2021    SPECGRAV 1.015 01/01/2021    LEUKOCYTESUR Negative 01/01/2021    UROBILINOGEN 0.2 01/01/2021    BILIRUBINUR Negative 01/01/2021    BLOODU Negative 01/01/2021    GLUCOSEU >=1000 01/01/2021     Lab Results   Component Value Date     02/03/2021    K 4.4 02/03/2021     02/03/2021    CO2 23 02/03/2021    BUN 18 02/03/2021    CREATININE 0.8 02/03/2021    GLUCOSE 181 02/03/2021    CALCIUM 8.4 02/03/2021          IMPRESSION/RECOMMENDATIONS:      Diagnosis and Plan :     1. NI   Non Oliguric   Recovered   back on Furosemide , will reduce dose as less edema   Cr 0.8     2. HTN   Controlled , edema - continue diuretics- switch to po  Restart ACEI  , reduce dose of metoprolol 75 bid to 50 bid     3. Lung Mass - Hilar mass with Lymphadenopathy    4. Pneumonia- treated       5. Persistent Atrial Fibrillation and Vt     7.  Hyponatremia    Na 133        Darcus Birkenhead

## 2021-02-03 NOTE — PROGRESS NOTES
[facility]   Electrophysiology Progress Note     Admit Date: 1/25/2021     Reason for follow up: VT     HPI and Interval History: 72 y.o. male presented with increasing fatigue, coagulopathy and occasional nosebleeding on 1/25/2021. Patient has a complex medical history including obesity, SYLVIE, poorly controlled diabetes, obesity, hypertension, ventricular tachycardia and recent diagnosis of lung cancer. He was initially seen by EP for atrial fibrillation which was cardioverted. Outpatient Holter monitor revealed ventricular tachycardia and underwent a dual-chamber AICD placement on 12/31/2020 for secondary prevention. On follow-up he had AICD shock on 1/4/2021. Device interrogation at the time revealed atrial fibrillation with rapid ventricular rate which then triggered ventricular tachycardia followed by AICD shock. He was started on amiodarone but he was not taking it because of nausea which turned out to be related to antibiotics therapy. He was later started on p.o. amiodarone therapy. He was found to have lung mass and mediastinal lymphadenopathy and pleural effusion. Per oncology preliminary cytology is consistent with non-small cell lung cancer. He developed sustained ventricular tachycardia with a heart rate of 180 bpm and had to be cardioverted. He has been started on IV amiodarone therapy and has been transferred to ICU. Device setting changed. Patient seen and examined. Clinical notes reviewed. Telemetry reviewed. No new complaint today. No VT overnight. Assessment and plan:     - Ventricular tachycardia:    Patient is on IV amiodarone therapy for ventricular tachycardia. D/c IV amiodarone. Change to PO amiodarone 400 bid for one week, followed by 400 mg/day for one week, and then 200 mg/day   I have discussed amiodarone therapy including side effects with patient and he agreed to continue with it.       Monitor LFTs, TSH Device setting was adjusted to detect slower rate VT and treated appropriately. Keep K > 4 and Mg > 2     He has been recently diagnosed with melanoma with lung metastasis and malignant pleural effusion.        - Persistent atrial fibrillation:    Patient has had cardioversion in the past with recurrence of atrial fibrillation. Not interested for cardioversion and not a candidate for ablation due to malignancy. Continue with anticoagulation. Monitor the INR closely. He had coagulopathy.     - Metastatic melanoma    Oncology following. Discussed with patient. Discussed with nursing staff. Active Hospital Problems    Diagnosis Date Noted    Supratherapeutic INR [R79.1] 01/26/2021    Pneumonia [J18.9] 01/25/2021    AICD (automatic cardioverter/defibrillator) present [Z95.810] 12/31/2020    Dilated cardiomyopathy ([address] [I42.0]     VT (ventricular tachycardia) ([address] [I47.2] 12/30/2020    Persistent atrial fibrillation ([address] [I48.19] 12/24/2020    Ischemic cardiomyopathy [I25.5] 12/24/2020    GERD without esophagitis [K21.9] 09/04/2019    Type 2 diabetes mellitus with diabetic polyneuropathy, without long-term current use of insulin ([address] [E11.42] 08/11/2017    Melanoma in situ of lower leg ([address] [D03.70] 02/01/2017    SYLVIE (obstructive sleep apnea) [G47.33] 10/05/2012    Essential hypertension [I10] 10/01/2012       Diagnostic studies:     Echo:   Overall left ventricular systolic function is moderately depressed .   -Ejection fraction is visually estimated to be 40%. E/e'= 7.4 cm   -There is global hypokinesis that appears worse in the inferior and apical   walls.   -Indeterminate diastolic function.   -The aortic valve appears sclerotic but opens well.   -Mild aortic regurgitation.   -Mild tricuspid regurgitation. I independently reviewed the cardiac diagnostic studies, ECG and relevant imaging studies.      Physical Examination:  Vitals:    02/03/21 0851   BP: 127/70 Pulse: 78   Resp:    Temp:    SpO2:       In: 896.3 [I.V.:845]  Out: 925    Wt Readings from Last 3 Encounters:   21 209 lb 10.5 oz (95.1 kg)   21 214 lb (97.1 kg)   21 210 lb 9.6 oz (95.5 kg)     Temp  Av.5 °F (36.4 °C)  Min: 97.1 °F (36.2 °C)  Max: 98 °F (36.7 °C)  Pulse  Av.5  Min: 60  Max: 89  BP  Min: 88/53  Max: 127/70  SpO2  Av.1 %  Min: 87 %  Max: 99 %    Intake/Output Summary (Last 24 hours) at 2/3/2021 1146  Last data filed at 2/3/2021 0600  Gross per 24 hour   Intake 896.3 ml   Output 925 ml   Net -28.7 ml       I independently reviewed all cardiac tracing from cardiac telemetry. · Constitutional: Oriented. No distress. · Head: Normocephalic and atraumatic. · Mouth/Throat: Oropharynx is clear and moist.   · Eyes: Conjunctivae normal. EOM are normal.   · Neck: Neck supple. No JVD present. · Cardiovascular: Normal rate, Irregular rhythm, S1&S2. · Pulmonary/Chest: Coarse breath sounds. No rhonchi. · Abdominal: Soft. No tenderness. · Musculoskeletal: No tenderness. No edema    · Lymphadenopathy: Has no cervical adenopathy. · Neurological: Alert and oriented. Follows command, No Gross deficit   · Skin: Skin is warm, No rash noted.    · Psychiatric: Has a normal behavior     Scheduled Meds:   amiodarone  400 mg Oral BID    metoprolol tartrate  100 mg Oral BID    megestrol  200 mg Oral Daily    furosemide  40 mg Oral BID    lisinopril  5 mg Oral Daily    warfarin  2.5 mg Oral Daily    LORazepam  1 mg Intravenous Once    amiodarone  150 mg Intravenous Once    budesonide-formoterol  2 puff Inhalation BID    ipratropium-albuterol  1 ampule Inhalation Q4H WA    insulin lispro  14 Units Subcutaneous TID WC    insulin glargine  22 Units Subcutaneous Q12H    potassium chloride  40 mEq Oral Daily with breakfast    enoxaparin  1 mg/kg Subcutaneous BID    insulin lispro  0-18 Units Subcutaneous TID WC    insulin lispro  0-9 Units Subcutaneous Nightly  allopurinol  300 mg Oral QPM    finasteride  5 mg Oral Daily    pantoprazole  40 mg Oral QAM AC    atorvastatin  20 mg Oral Nightly    tamsulosin  0.4 mg Oral Nightly    sodium chloride flush  10 mL Intravenous 2 times per day    lactobacillus  1 capsule Oral BID WC     Continuous Infusions:   lidocaine      sodium chloride      dextrose      sodium chloride       PRN Meds:HYDROmorphone, magnesium sulfate, sennosides-docusate sodium, sodium chloride, zolpidem, traMADol, glucose, dextrose, glucagon (rDNA), dextrose, sodium chloride, albuterol sulfate HFA, hydrOXYzine, sodium chloride flush, magnesium hydroxide, acetaminophen, ondansetron     Prior to Admission medications    Medication Sig Start Date End Date Taking?  Authorizing Provider   hydrOXYzine (VISTARIL) 50 MG capsule Take 1 capsule by mouth 3 times daily as needed for Itching or Anxiety 1/14/21 2/13/21  MACIEL Sosa NP   ondansetron (ZOFRAN) 4 MG tablet Take 1 tablet by mouth 3 times daily as needed for Nausea or Vomiting 1/14/21   MACIEL Sosa NP   insulin glargine (BASAGLAR KWIKPEN) 100 UNIT/ML injection pen Inject 25 Units into the skin nightly 1/13/21   Duy Erazo MD   Insulin Pen Needle (MEIJER PEN NEEDLES) 31G X 6 MM MISC 1 each by Does not apply route daily 1/13/21   Duy Erazo MD   pantoprazole (PROTONIX) 40 MG tablet TAKE 1 TABLET BY MOUTH ONE TIME A DAY  1/11/21   Duy Erazo MD   allopurinol (ZYLOPRIM) 300 MG tablet TAKE 1 TABLET BY MOUTH ONE TIME A DAY  1/11/21   Duy Erazo MD   metoprolol tartrate (LOPRESSOR) 50 MG tablet Take 1.5 tablets by mouth 2 times daily 1/11/21   Gaston Gaspar MD   amiodarone (CORDARONE) 200 MG tablet Take 1 tablet by mouth 2 times daily 200 mg twice daily for 2 weeks    Days,   Then 200 mg daily 1/5/21   Gaston Gaspar MD 0505 02/01/21 1942 02/02/21  0532   WBC 12.5* 11.1* 9.4   HGB 8.4* 8.5* 8.6*   HCT 26.3* 27.2* 26.8*   MCV 85.2 86.2 86.5    234 208     Estimated Creatinine Clearance: 105 mL/min (based on SCr of 0.8 mg/dL). No results found for: BNP    I independently reviewed all cardiac tracing from cardiac telemetry. I independently reviewed relevant and available cardiac diagnostic tests ECG, CXR, Echo, Stress test, Device interrogation, Holter, CT scan. Complex medical condition with multiple medical problems affecting prognosis and outcome of EP interventions    Total critical care time was 35 minutes, for caring of this patient with life-threatening condition and organ failure, excluding separately reportable procedures. Services, included in critical care time were chart data review, documentation time, obtaining info from patient, review of nursing notes, and vital sign assessment and management of the patient. Thank you for allowing me to participate in the care of Aashish Austin     All questions and concerns were addressed to the patient/family. Alternatives to my treatment were discussed. I have discussed the above stated plan and the patient verbalized understanding and agreed with the plan. NOTE: This report was transcribed using voice recognition software. Every effort was made to ensure accuracy, however, inadvertent computerized transcription errors may be present.      Crescencio Gallego MD, MPH  [place] [address]   Office: [phone]  Fax: [phone]

## 2021-02-03 NOTE — PROGRESS NOTES
Shift assessment complete. Patient A&Ox4. Follows commands and demonstrates appropriate safety awareness. Independent/ambulatory at baseline. Sating high 90s on 3 L NC. RA at baseline with home cpap machine at night. Diminished breath sounds in BLL. In NSR with occasional PVCs. AICD placed 12/2020. Amio gtt after Rapid Response 02/01 for V tach with pulse and cardioversion. Abdomen soft and rounded with active bowel sounds in all 4 quadrants. Reports eating 50% of meal and c/o very little appetite. +2 pitting edema in BLE. Scattered bruising. Mid R side of back thoracentesis puncture site with old blood on pillow behind him. Otherwise, skin intact. Denies being in any pain at this time.

## 2021-02-03 NOTE — ACP (ADVANCE CARE PLANNING)
Advanced Care Planning Note. Purpose of Encounter: Advanced care planning in light of melanoma  Parties In Attendance: Patient  Decisional Capacity: Yes  Subjective: Patient with pain in legs and back  Objective: Cr 0.8  Goals of Care Determination: Patient/POA wants full support (CPR, vent, surgery, HD, trach, PEG)  Plan:  Onc/Renal/Cardio/Endocrine/Palliative care consults. Code Status: Full code   Time spent on Advanced care Plannin minutes  Advanced Care Planning Documents: Completed advanced directives on chart, wife is the POA.     Bree Pinzon MD  2/3/2021 4:06 PM

## 2021-02-04 LAB
ANION GAP SERPL CALCULATED.3IONS-SCNC: 10 MMOL/L (ref 3–16)
BUN BLDV-MCNC: 22 MG/DL (ref 7–20)
CALCIUM SERPL-MCNC: 8.5 MG/DL (ref 8.3–10.6)
CHLORIDE BLD-SCNC: 101 MMOL/L (ref 99–110)
CO2: 24 MMOL/L (ref 21–32)
CREAT SERPL-MCNC: 0.7 MG/DL (ref 0.8–1.3)
GFR AFRICAN AMERICAN: >60
GFR NON-AFRICAN AMERICAN: >60
GLUCOSE BLD-MCNC: 106 MG/DL (ref 70–99)
GLUCOSE BLD-MCNC: 143 MG/DL (ref 70–99)
GLUCOSE BLD-MCNC: 151 MG/DL (ref 70–99)
GLUCOSE BLD-MCNC: 182 MG/DL (ref 70–99)
GLUCOSE BLD-MCNC: 185 MG/DL (ref 70–99)
INR BLD: 2.04 (ref 0.86–1.14)
PERFORMED ON: ABNORMAL
POTASSIUM REFLEX MAGNESIUM: 4.5 MMOL/L (ref 3.5–5.1)
PROTHROMBIN TIME: 23.8 SEC (ref 10–13.2)
SODIUM BLD-SCNC: 135 MMOL/L (ref 136–145)

## 2021-02-04 PROCEDURE — 6370000000 HC RX 637 (ALT 250 FOR IP): Performed by: PHYSICIAN ASSISTANT

## 2021-02-04 PROCEDURE — 6370000000 HC RX 637 (ALT 250 FOR IP): Performed by: INTERNAL MEDICINE

## 2021-02-04 PROCEDURE — 99233 SBSQ HOSP IP/OBS HIGH 50: CPT | Performed by: INTERNAL MEDICINE

## 2021-02-04 PROCEDURE — 80048 BASIC METABOLIC PNL TOTAL CA: CPT

## 2021-02-04 PROCEDURE — 36415 COLL VENOUS BLD VENIPUNCTURE: CPT

## 2021-02-04 PROCEDURE — 94640 AIRWAY INHALATION TREATMENT: CPT

## 2021-02-04 PROCEDURE — 94761 N-INVAS EAR/PLS OXIMETRY MLT: CPT

## 2021-02-04 PROCEDURE — 6360000002 HC RX W HCPCS: Performed by: INTERNAL MEDICINE

## 2021-02-04 PROCEDURE — 97530 THERAPEUTIC ACTIVITIES: CPT

## 2021-02-04 PROCEDURE — 97166 OT EVAL MOD COMPLEX 45 MIN: CPT

## 2021-02-04 PROCEDURE — 2580000003 HC RX 258: Performed by: PHYSICIAN ASSISTANT

## 2021-02-04 PROCEDURE — 2000000000 HC ICU R&B

## 2021-02-04 PROCEDURE — 97116 GAIT TRAINING THERAPY: CPT

## 2021-02-04 PROCEDURE — 2700000000 HC OXYGEN THERAPY PER DAY

## 2021-02-04 PROCEDURE — 97162 PT EVAL MOD COMPLEX 30 MIN: CPT

## 2021-02-04 PROCEDURE — 85610 PROTHROMBIN TIME: CPT

## 2021-02-04 RX ORDER — GUAIFENESIN/DEXTROMETHORPHAN 100-10MG/5
10 SYRUP ORAL EVERY 4 HOURS PRN
Status: DISCONTINUED | OUTPATIENT
Start: 2021-02-04 | End: 2021-02-05

## 2021-02-04 RX ORDER — BISACODYL 10 MG
10 SUPPOSITORY, RECTAL RECTAL DAILY PRN
Status: DISCONTINUED | OUTPATIENT
Start: 2021-02-04 | End: 2021-02-08 | Stop reason: HOSPADM

## 2021-02-04 RX ORDER — POLYETHYLENE GLYCOL 3350 17 G/17G
17 POWDER, FOR SOLUTION ORAL 2 TIMES DAILY
Status: DISCONTINUED | OUTPATIENT
Start: 2021-02-04 | End: 2021-02-08 | Stop reason: HOSPADM

## 2021-02-04 RX ORDER — HYDROCODONE BITARTRATE AND ACETAMINOPHEN 5; 325 MG/1; MG/1
1 TABLET ORAL EVERY 6 HOURS PRN
Status: DISCONTINUED | OUTPATIENT
Start: 2021-02-04 | End: 2021-02-06

## 2021-02-04 RX ADMIN — IPRATROPIUM BROMIDE AND ALBUTEROL SULFATE 1 AMPULE: .5; 3 SOLUTION RESPIRATORY (INHALATION) at 19:49

## 2021-02-04 RX ADMIN — Medication 2 PUFF: at 07:48

## 2021-02-04 RX ADMIN — TAMSULOSIN HYDROCHLORIDE 0.4 MG: 0.4 CAPSULE ORAL at 21:05

## 2021-02-04 RX ADMIN — Medication 2 PUFF: at 13:52

## 2021-02-04 RX ADMIN — STANDARDIZED SENNA CONCENTRATE AND DOCUSATE SODIUM 2 TABLET: 8.6; 5 TABLET ORAL at 08:44

## 2021-02-04 RX ADMIN — INSULIN LISPRO 3 UNITS: 100 INJECTION, SOLUTION INTRAVENOUS; SUBCUTANEOUS at 12:56

## 2021-02-04 RX ADMIN — Medication 2 PUFF: at 19:52

## 2021-02-04 RX ADMIN — Medication 10 ML: at 23:07

## 2021-02-04 RX ADMIN — INSULIN LISPRO 14 UNITS: 100 INJECTION, SOLUTION INTRAVENOUS; SUBCUTANEOUS at 18:50

## 2021-02-04 RX ADMIN — ENOXAPARIN SODIUM 90 MG: 100 INJECTION SUBCUTANEOUS at 08:46

## 2021-02-04 RX ADMIN — ALLOPURINOL 300 MG: 300 TABLET ORAL at 18:48

## 2021-02-04 RX ADMIN — Medication 1 CAPSULE: at 18:48

## 2021-02-04 RX ADMIN — INSULIN LISPRO 14 UNITS: 100 INJECTION, SOLUTION INTRAVENOUS; SUBCUTANEOUS at 08:53

## 2021-02-04 RX ADMIN — MEGESTROL ACETATE 200 MG: 40 SUSPENSION ORAL at 08:44

## 2021-02-04 RX ADMIN — TRAMADOL HYDROCHLORIDE 50 MG: 50 TABLET, FILM COATED ORAL at 23:04

## 2021-02-04 RX ADMIN — IPRATROPIUM BROMIDE AND ALBUTEROL SULFATE 1 AMPULE: .5; 3 SOLUTION RESPIRATORY (INHALATION) at 15:38

## 2021-02-04 RX ADMIN — Medication 10 ML: at 08:48

## 2021-02-04 RX ADMIN — INSULIN LISPRO 3 UNITS: 100 INJECTION, SOLUTION INTRAVENOUS; SUBCUTANEOUS at 18:50

## 2021-02-04 RX ADMIN — FINASTERIDE 5 MG: 5 TABLET, FILM COATED ORAL at 08:45

## 2021-02-04 RX ADMIN — WARFARIN SODIUM 2.5 MG: 2.5 TABLET ORAL at 18:48

## 2021-02-04 RX ADMIN — INSULIN LISPRO 3 UNITS: 100 INJECTION, SOLUTION INTRAVENOUS; SUBCUTANEOUS at 08:53

## 2021-02-04 RX ADMIN — ZOLPIDEM TARTRATE 5 MG: 5 TABLET ORAL at 23:04

## 2021-02-04 RX ADMIN — FUROSEMIDE 40 MG: 40 TABLET ORAL at 08:45

## 2021-02-04 RX ADMIN — METOPROLOL TARTRATE 100 MG: 50 TABLET, FILM COATED ORAL at 08:44

## 2021-02-04 RX ADMIN — NALOXEGOL OXALATE 25 MG: 25 TABLET, FILM COATED ORAL at 14:33

## 2021-02-04 RX ADMIN — POTASSIUM CHLORIDE 40 MEQ: 1500 TABLET, EXTENDED RELEASE ORAL at 08:44

## 2021-02-04 RX ADMIN — LISINOPRIL 5 MG: 5 TABLET ORAL at 08:45

## 2021-02-04 RX ADMIN — Medication 2 PUFF: at 02:40

## 2021-02-04 RX ADMIN — PANTOPRAZOLE SODIUM 40 MG: 40 TABLET, DELAYED RELEASE ORAL at 08:44

## 2021-02-04 RX ADMIN — HYDROMORPHONE HYDROCHLORIDE 1 MG: 1 INJECTION, SOLUTION INTRAMUSCULAR; INTRAVENOUS; SUBCUTANEOUS at 08:46

## 2021-02-04 RX ADMIN — BISACODYL 10 MG: 10 SUPPOSITORY RECTAL at 14:32

## 2021-02-04 RX ADMIN — IPRATROPIUM BROMIDE AND ALBUTEROL SULFATE 1 AMPULE: .5; 3 SOLUTION RESPIRATORY (INHALATION) at 11:28

## 2021-02-04 RX ADMIN — Medication 1 CAPSULE: at 08:44

## 2021-02-04 RX ADMIN — ATORVASTATIN CALCIUM 20 MG: 20 TABLET, FILM COATED ORAL at 21:05

## 2021-02-04 RX ADMIN — INSULIN LISPRO 14 UNITS: 100 INJECTION, SOLUTION INTRAVENOUS; SUBCUTANEOUS at 12:57

## 2021-02-04 RX ADMIN — AMIODARONE HYDROCHLORIDE 400 MG: 200 TABLET ORAL at 21:05

## 2021-02-04 RX ADMIN — IPRATROPIUM BROMIDE AND ALBUTEROL SULFATE 1 AMPULE: .5; 3 SOLUTION RESPIRATORY (INHALATION) at 07:48

## 2021-02-04 RX ADMIN — GUAIFENESIN AND DEXTROMETHORPHAN 10 ML: 100; 10 SYRUP ORAL at 23:04

## 2021-02-04 RX ADMIN — AMIODARONE HYDROCHLORIDE 400 MG: 200 TABLET ORAL at 08:45

## 2021-02-04 RX ADMIN — METOPROLOL TARTRATE 100 MG: 50 TABLET, FILM COATED ORAL at 21:05

## 2021-02-04 ASSESSMENT — PAIN SCALES - GENERAL
PAINLEVEL_OUTOF10: 7
PAINLEVEL_OUTOF10: 4
PAINLEVEL_OUTOF10: 5
PAINLEVEL_OUTOF10: 0

## 2021-02-04 ASSESSMENT — PAIN DESCRIPTION - ORIENTATION: ORIENTATION: LOWER

## 2021-02-04 ASSESSMENT — PAIN DESCRIPTION - LOCATION: LOCATION: BACK

## 2021-02-04 ASSESSMENT — PAIN DESCRIPTION - PAIN TYPE: TYPE: CHRONIC PAIN

## 2021-02-04 NOTE — PROGRESS NOTES
Patient got up to chair with therapy, requested to get back to bed after 45 minutes, is agreeable to get up to the chair again later today. Pt reports passing gas- had stool softener this morning, still no BM.

## 2021-02-04 NOTE — PROGRESS NOTES
Shift assessment completed, patient is awake, alert and oriented in bed, complains of mild back pain that is chronic for him, states he was up all night with cough, bloody thick sputum noted. States no BM in a long time, stool softener given, will do enema later today, PT/OT ordered. Dr. Kajal Longo at the bedside. Up to bedside commode, no BM, tolerated the activity well, will see PT/Ot today.

## 2021-02-04 NOTE — PROGRESS NOTES
Occupational Therapy   Occupational Therapy Initial Assessment  Date: 2021   Patient Name: Kathy Borrero  MRN: [id number]     : 1955    Date of Service: 2021    Discharge Recommendations:Tam Hightower scored a 17/24 on the AM-PAC ADL Inpatient form. Current research shows that an AM-PAC score of 17 or less is typically not associated with a discharge to the patient's home setting. Based on the patient's AM-PAC score and their current ADL deficits, it is recommended that the patient have 5-7 sessions per week of Occupational Therapy at d/c to increase the patient's independence. At this time, this patient demonstrates the endurance, and/or tolerance for 3 hours of therapy each day, with a treatment frequency of 5-7x/wk. Please see assessment section for further patient specific details. If patient discharges prior to next session this note will serve as a discharge summary. Please see below for the latest assessment towards goals. OT Equipment Recommendations  Equipment Needed: Yes  Mobility Devices: ADL Assistive Devices  ADL Assistive Devices: Shower Chair with back  Other: . Assessment   Performance deficits / Impairments: Decreased functional mobility ; Decreased ADL status; Decreased endurance;Decreased balance;Decreased high-level IADLs  Treatment Diagnosis: Decreased ADLs, IADLs, transfers, mobility associated with Pneumonia  Prognosis: Good  Decision Making: Medium Complexity  History: Patient I PTA, working as a [employer], no device  Exam: as above  Assistance / Modification: requiring 4L O2 for activity, RW required  OT Education: OT Role;Plan of Care  Patient Education: Eval, discharge recommendations-patient verbalized understanding  REQUIRES OT FOLLOW UP: Yes  Activity Tolerance  Activity Tolerance: Patient limited by fatigue;Treatment limited secondary to medical complications (free text)  Safety Devices  Safety Devices in place:  Yes Type of devices: All fall risk precautions in place;Call light within reach; Chair alarm in place; Patient at risk for falls;Gait belt;Left in chair;Nurse notified           Patient Diagnosis(es): The primary encounter diagnosis was Pneumonia of right lower lobe due to infectious organism. Diagnoses of Acute respiratory failure with hypoxia ([address] Fatigue, unspecified type, Epistaxis, Anemia, unspecified type, and Status post thoracentesis were also pertinent to this visit. has a past medical history of Abdominal pain, LLQ, Allergic rhinitis, Asthma, Atopic dermatitis, Cancer ([address] Diabetes mellitus ([address] Erectile dysfunction, Generalized osteoarthrosis, involving multiple sites, GERD (gastroesophageal reflux disease), Gynecomastia, Hyperlipidemia, Nasal congestion, Nipple pain, Obstructive sleep apnea syndrome, Pseudo-gout, and Unspecified essential hypertension. has a past surgical history that includes Hand surgery (2011); other surgical history (2002); Finger surgery (Left, 3-1-2013); Foot surgery (Right); Carpal tunnel release (Bilateral, 2013); pr njx dx/ther sbst intrlmnr lmbr/sac w/img gdn (Right, 9/24/2018); lumbar nerve block (Right, 12/12/2018); bronchoscopy (N/A, 12/2/2020); and back surgery. Treatment Diagnosis: Decreased ADLs, IADLs, transfers, mobility associated with Pneumonia      Restrictions  Restrictions/Precautions  Restrictions/Precautions: Up as Tolerated, Fall Risk(AICD placement Simultaneous filing.  User may not have seen previous data.)  Implants present? : Pacemaker(AICD)  Position Activity Restriction Other position/activity restrictions: 57-year-old M with history of ischemic CMP with VT s/p ICD, HTN, h/o melanoma, DM 2, Afib, SYLVIE admitted to the hospital with some weakness. Admitted as inpatient with acute respiratory failure due to post obstructive pneumonia with sepsis POA  . He has right lung mass on outside hospital CT. He declined pulmonary workup opting for resumption of workup with [facility] Pulmonologist.  Cytology from 1/28 consistent with metastatic melanoma. Had VT on 2/1 and required defibrillation. Subjective   General  Chart Reviewed: Yes  Family / Caregiver Present: Yes(Spouse)  Diagnosis: Pneumonia  Subjective  Subjective: Patient supine in bed, agreeable to evaluation  Patient Currently in Pain: Denies(Simultaneous filing. User may not have seen previous data.)  Pre Treatment Pain Screening  Intervention List: Patient able to continue with treatment  Vital Signs  Patient Currently in Pain: Denies(Simultaneous filing.  User may not have seen previous data.)  Oxygen Therapy  O2 Device: Nasal cannula  O2 Flow Rate (L/min): 3 L/min  Social/Functional History  Social/Functional History  Lives With: Spouse  Type of Home: House  Home Layout: One level  Home Access: Level entry  Bathroom Shower/Tub: Walk-in shower  Bathroom Toilet: Standard  Bathroom Equipment: Grab bars in shower  Home Equipment: Cane  ADL Assistance: Independent  Homemaking Assistance: Independent  Ambulation Assistance: Independent(using cane x 1 month)  Transfer Assistance: Independent  Active : Yes  Occupation: Retired  Type of occupation: [employer] ([employer])  Leisure & Hobbies: fishing, walking  Additional Comments: Endorses no falls in the last 6 months       Objective   Vision: Impaired  Vision Exceptions: Wears glasses at all times  Hearing: Within functional limits    Orientation  Overall Orientation Status: Within Normal Limits  Observation/Palpation  Posture: Good  Observation: multiple bruises through abdomen Balance  Sitting Balance: Stand by assistance  Standing Balance: Contact guard assistance(with RW, 4L O2, ~80 feet (O2 95% following activity))  Standing Balance  Time: ~3-5 minutes total with RW (static stance and functional mobility)  Wheelchair Bed Transfers  Wheelchair/Bed - Technique: Ambulating  Equipment Used: Bed;Other  Level of Asssistance: Contact guard assistance  ADL  Feeding: Setup  LE Dressing: Moderate assistance;Maximum assistance(donning socks)  Tone RUE  RUE Tone: Normotonic  Tone LUE  LUE Tone: Normotonic  Coordination  Movements Are Fluid And Coordinated: Yes     Bed mobility  Supine to Sit: Contact guard assistance  Scooting: Contact guard assistance  Transfers  Sit to stand: Contact guard assistance  Stand to sit: Contact guard assistance  Vision - Basic Assessment  Prior Vision: Wears glasses all the time  Patient Visual Report: No visual complaint reported.   Cognition  Overall Cognitive Status: WFL  Cognition Comment: lethargic throughout (BP 90/60s throughout, RN aware)  Perception  Overall Perceptual Status: WFL     Sensation  Overall Sensation Status: WFL        LUE AROM (degrees)  LUE AROM : WNL  RUE AROM (degrees)  RUE AROM : WNL  LUE Strength  L Hand General: 4/5  RUE Strength  R Hand General: 4/5                   Plan   Plan  Times per week: 3-5  Times per day: Daily  Current Treatment Recommendations: Strengthening, Balance Training, Functional Mobility Training, Endurance Training, Safety Education & Training, Self-Care / ADL, Equipment Evaluation, Education, & procurement, Patient/Caregiver Education & Training, Home Management Training    AM-PAC Score        AM-[facility] Inpatient Daily Activity Raw Score: 17 (02/04/21 1245)  AM-PAC Inpatient ADL T-Scale Score : 37.26 (02/04/21 1245)  ADL Inpatient CMS 0-100% Score: 50.11 (02/04/21 1245)  ADL Inpatient CMS G-Code Modifier : CK (02/04/21 1245)    Goals  Short term goals  Time Frame for Short term goals: Discharge Short term goal 1: Bed mobility mod I  Short term goal 2: Functional ADL transfers supervision  Short term goal 3: Functional mobility with RW supervision  Short term goal 4: UB ADLs setup, LB ADLs Rhina  Long term goals  Time Frame for Long term goals : LTG=STG  Patient Goals   Patient goals : Home       Therapy Time   Individual Concurrent Group Co-treatment   Time In 1829         Time Out 0457         Minutes 53              Timed Code Treatment Minutes:  38 Minutes    Total Treatment Minutes:  [address] [facility], OTR/L [id number]

## 2021-02-04 NOTE — PROGRESS NOTES
Nephrology Attending  Progress Note        SUMMARY :  We are following this patient for NI/ Hyponatremia    Admitted  With generalized weakness , epistaxis ( High PT ) , dyspnea, cough  Has a known Rt Hilar mass   Had Rt side Thoracocentesis 1/28 2/2: sustained ventricular tachycardia with a heart rate of 180 bpm and had to be ? cardioverted. He has been started on IV amiodarone therapy and has been transferred back  to ICU. SUBJECTIVE:   This am sustained ventricular tachycardia with a heart rate of 180 bpm and had to be ? cardioverted. He has been started on IV amiodarone therapy and has been transferred to ICU. , chest pain        Physical Exam:    VITALS:  /61   Pulse 77   Temp 97.5 °F (36.4 °C) (Temporal)   Resp 30   Ht 5' 9\" (1.753 m)   Wt 207 lb 10.8 oz (94.2 kg)   SpO2 91%   BMI 30.67 kg/m²   BLOOD PRESSURE RANGE:  Systolic (15XDO), WFZ:83 , Min:74 , YLU:194   ; Diastolic (20XDL), GEQ:44, Min:43, Max:76    24HR INTAKE/OUTPUT:      Intake/Output Summary (Last 24 hours) at 2/4/2021 1110  Last data filed at 2/4/2021 0905  Gross per 24 hour   Intake 830 ml   Output 425 ml   Net 405 ml       Gen:  alert, oriented x 3  PERRL , EOM +  Pallor +, No icterus  JVP not raised   CV: IRRR no murmur or rub . Left side AICD - tender  Lungs:B/ L air entry, Reduced Rt base, Dull to percussion rt base, no pleural rub heard  Abd: soft, bowel sounds + , No organomegaly   Ext: Trace leg  edema, no cyanosis  Skin: Warm.   No rash  Neuro: nonfocal.      DATA:    CBC with Differential:    Lab Results   Component Value Date    WBC 9.4 02/02/2021    RBC 3.10 02/02/2021    HGB 8.6 02/02/2021    HCT 26.8 02/02/2021     02/02/2021    MCV 86.5 02/02/2021    MCH 27.8 02/02/2021    MCHC 32.1 02/02/2021    RDW 19.8 02/02/2021    SEGSPCT 68.9 12/09/2020    LYMPHOPCT 9.2 02/01/2021    MONOPCT 8.2 02/01/2021    BASOPCT 0.3 02/01/2021    MONOSABS 0.9 02/01/2021    LYMPHSABS 1.0 02/01/2021    EOSABS 0.0 02/01/2021 BASOSABS 0.0 02/01/2021     CMP:    Lab Results   Component Value Date     02/04/2021    K 4.5 02/04/2021     02/04/2021    CO2 24 02/04/2021    BUN 22 02/04/2021    CREATININE 0.7 02/04/2021    GFRAA >60 02/04/2021    AGRATIO 0.8 02/01/2021    LABGLOM >60 02/04/2021    LABGLOM 92 06/08/2018    GLUCOSE 182 02/04/2021    PROT 5.9 02/01/2021    PROT 6.2 12/08/2012    LABALBU 2.7 02/01/2021    CALCIUM 8.5 02/04/2021    BILITOT 0.6 02/01/2021    ALKPHOS 140 02/01/2021    AST 32 02/01/2021    ALT 32 02/01/2021     Magnesium:    Lab Results   Component Value Date    MG 2.50 02/01/2021     Phosphorus:    Lab Results   Component Value Date    PHOS 2.6 11/11/2020     Uric Acid:  No results found for: LABURIC, URICACID  Troponin:    Lab Results   Component Value Date    TROPONINI 0.04 02/02/2021     U/A:    Lab Results   Component Value Date    COLORU YELLOW 01/01/2021    PROTEINU Negative 01/01/2021    PHUR 5.5 01/01/2021    RBCUA 0 02/13/2020    YEAST 0 02/13/2020    BACTERIA 0 02/13/2020    CLARITYU Clear 01/01/2021    SPECGRAV 1.015 01/01/2021    LEUKOCYTESUR Negative 01/01/2021    UROBILINOGEN 0.2 01/01/2021    BILIRUBINUR Negative 01/01/2021    BLOODU Negative 01/01/2021    GLUCOSEU >=1000 01/01/2021     Lab Results   Component Value Date     02/04/2021    K 4.5 02/04/2021     02/04/2021    CO2 24 02/04/2021    BUN 22 02/04/2021    CREATININE 0.7 02/04/2021    GLUCOSE 182 02/04/2021    CALCIUM 8.5 02/04/2021          IMPRESSION/RECOMMENDATIONS:      Diagnosis and Plan :     1. NI   Non Oliguric   Recovered   back on Furosemide , will reduce dose as less edema   Cr 0.7     2. HTN   Controlled , edema - continue diuretics- switch to po  Restarted ACEI 2/2  On increased dose of  metoprolol     3. Lung Mass - Hilar mass with Lymphadenopathy    4. Pneumonia- treated       5. Persistent Atrial Fibrillation and Vt     7.  Hyponatremia    Na 135    Will sign off , thanks for involving me in his care

## 2021-02-04 NOTE — PROGRESS NOTES
Hospitalist Progress Note      PCP: Ladonna Hart MD    Date of Admission: 1/25/2021    Hospital Course:   51-year-old M with history of ischemic CMP with VT s/p ICD, HTN, h/o melanoma, DM 2, Afib, SYLVIE admitted to the hospital with some weakness. Admitted as inpatient with acute respiratory failure due to post obstructive pneumonia with sepsis POA  . He has right lung mass on outside hospital CT. He declined pulmonary workup opting for resumption of workup with [facility] Pulmonologist.  Cytology from 1/28 consistent with metastatic melanoma. Had VT on 2/1 and required defibrillation. Moved to ICU. Seen by Cardio. Subjective:   Coughing and did not sleep. Has not worked with PT/OT. No SOB. No CP, HA or abdominal pain. No fevers.   Constipated    Medications:  Reviewed    Infusion Medications    lidocaine      sodium chloride      dextrose      sodium chloride       Scheduled Medications    amiodarone  400 mg Oral BID    furosemide  40 mg Oral Daily    metoprolol tartrate  100 mg Oral BID    megestrol  200 mg Oral Daily    lisinopril  5 mg Oral Daily    warfarin  2.5 mg Oral Daily    LORazepam  1 mg Intravenous Once    amiodarone  150 mg Intravenous Once    budesonide-formoterol  2 puff Inhalation BID    ipratropium-albuterol  1 ampule Inhalation Q4H WA    insulin lispro  14 Units Subcutaneous TID WC    insulin glargine  22 Units Subcutaneous Q12H    potassium chloride  40 mEq Oral Daily with breakfast    enoxaparin  1 mg/kg Subcutaneous BID    insulin lispro  0-18 Units Subcutaneous TID WC    insulin lispro  0-9 Units Subcutaneous Nightly    allopurinol  300 mg Oral QPM    finasteride  5 mg Oral Daily    pantoprazole  40 mg Oral QAM AC    atorvastatin  20 mg Oral Nightly    tamsulosin  0.4 mg Oral Nightly    sodium chloride flush  10 mL Intravenous 2 times per day    lactobacillus  1 capsule Oral BID WC ALKPHOS 140*     Recent Labs     02/02/21  0532 02/03/21  0527 02/04/21  0452   INR 1.48* 1.66* 2.04*     Recent Labs     02/01/21  1942 02/02/21  0139 02/02/21  0532   TROPONINI 0.03* 0.03* 0.04*       Urinalysis:      Lab Results   Component Value Date    NITRU Negative 01/01/2021    BACTERIA 0 02/13/2020    RBCUA 0 02/13/2020    BLOODU Negative 01/01/2021    SPECGRAV 1.015 01/01/2021    GLUCOSEU >=1000 01/01/2021       Radiology:  CT HEAD W WO CONTRAST   Final Result   1. No evidence of metastatic disease. 2. Cerebral parenchymal volume loss, commensurate with the patient's age. XR CHEST 1 VIEW   Final Result   Addendum 1 of 1   ADDENDUM:   .         Final      IR GUIDED THORACENTESIS PLEURAL   Final Result   Successful ultrasound guided thoracentesis. IR GUIDED THORACENTESIS PLEURAL   Final Result   Trace right pleural effusion. No safe window for procedure at this time. Thoracentesis was not performed. XR CHEST PORTABLE   Final Result   Stable cardiomegaly with mild central pulmonary congestion which is more   prominent. Questionable early infiltrate or atelectasis along the right lung base which   is more apparent. Assessment/Plan:    Active Hospital Problems    Diagnosis    Supratherapeutic INR [R79.1]    Pneumonia [J18.9]    AICD (automatic cardioverter/defibrillator) present [Z95.810]    Dilated cardiomyopathy ([address] [I42.0]    VT (ventricular tachycardia) ([address] [I47.2]    Persistent atrial fibrillation ([address] [I48.19]    Ischemic cardiomyopathy [I25.5]    GERD without esophagitis [K21.9]    Type 2 diabetes mellitus with diabetic polyneuropathy, without long-term current use of insulin ([address] [E11.42]    Melanoma in situ of lower leg ([address] [D03.70]    SYLVIE (obstructive sleep apnea) [G47.33]    Essential hypertension [I10]     Community acquired Pneumonia- Possibly post obstructive form lung mass. suspected gram-positive/gram-negative COVID-19 negative  DCed cefepime(day 7/7). Completed azithromycin(day 5/5)  -D/Cd vancomycin given negative MRSA culture  -WBC trending down    Acute respiratory failure with hypoxia-due to pneumonia  -Currently on RA. Wean for 02 sats >90%. -Continue inhalors  -Add Robitussin DM PRN     Right metastatic non small cancer  Previously bronchoscopy attempted had a brief cardiac arrest  -Declined further workup by Pulmonologist at this hospital.He would like to complete workup with Pulmonologist at [facility] guided lung bx or bronchoscopy planned.   -Thoracentesis done 1/28/21 at request of patient, cytology with metastatic melanoma  -Continue home coumadin and stop lovenox   -Oncology recommendations noted     Supratherapeutic INR  INR noted noted to be greater than 14 on admission  -On coumadin at home for Afib subtherapeutic. DC lovenox bid as INR > 2  -Cont Coumadin      Chronic moderate systolic heart failure   -Cont PO Lasix and potassium supplementation. Hx VT s/p AICD  -On Amio PO  -EP following    Type 2 diabetes mellitus  Last A1c 10.6  -Management per Endocrinology      Chronic Anemia  Iron studies B12 folate reflect studies consistent with anemia of chronic disease  -Hgb stable. Management per Oncology. Acute kidney injury-due to  prerenal  -Resolved.  Continue to hold lisinopril    Chronic atrial fibrillation s/p pacemaker insertion on the first of this month  Pacemaker site has a small hematoma which was also noticed on discharge  -Continue amiodarone  -Continue lovenox/Coumadin for anticoagulation  -EP recommendations noted    Anxiety  -Vistaril PRN    Insomnia  -Continue ambien PRN    Constipation  -Start Movantik now  -Miralax bid  -Dulcolax supp PRN    DVT Prophylaxis: Coumadin  Diet: DIET CARB CONTROL; Carb Control: 4 carb choices (60 gms)/meal; Daily Fluid Restriction: 1000 ml  Dietary Nutrition Supplements: Diabetic Oral Supplement  Code Status: Prior    PT/OT Eval Status: as needed Dispo -     Discussed with patient, Dr Ashlyn Burnette (EP), nursing and CM. D/W wife at bedside. Will likely need home O2, home PT/OT/VNS/SW/HHA/SLP. Home tomorrow.     Mulugeta Mott MD

## 2021-02-04 NOTE — PROGRESS NOTES
Over two night shifts in a row with patient, I have seen his weakness increase. Rheba Ahumada for him to get to side of bed to use urinal by himself. Needs help pulling self up in bed instead of doing it on his own.

## 2021-02-04 NOTE — PROGRESS NOTES
[facility]   Electrophysiology Progress Note     Admit Date: 1/25/2021     Reason for follow up: VT     HPI and Interval History: 72 y.o. male presented with increasing fatigue, coagulopathy and occasional nosebleeding on 1/25/2021. Patient has a complex medical history including obesity, SYLVIE, poorly controlled diabetes, obesity, hypertension, ventricular tachycardia and recent diagnosis of lung cancer. He was initially seen by EP for atrial fibrillation which was cardioverted. Outpatient Holter monitor revealed ventricular tachycardia and underwent a dual-chamber AICD placement on 12/31/2020 for secondary prevention. On follow-up he had AICD shock on 1/4/2021. Device interrogation at the time revealed atrial fibrillation with rapid ventricular rate which then triggered ventricular tachycardia followed by AICD shock. He was started on amiodarone but he was not taking it because of nausea which turned out to be related to antibiotics therapy. He was later started on p.o. amiodarone therapy. He was found to have lung mass and mediastinal lymphadenopathy and pleural effusion. Per oncology preliminary cytology is consistent with non-small cell lung cancer. He developed sustained ventricular tachycardia with a heart rate of 180 bpm and had to be cardioverted. He has been started on IV amiodarone therapy and has been transferred to ICU. Device setting changed. No VT noted on telemetry. Patient seen and examined. Clinical notes reviewed. Assessment and plan:     - Ventricular tachycardia:    No VT overnight. Amiodarone 400 bid for one week, followed by 400 mg/day for one week, and then 200 mg/day   Needs monitoring for side effects and toxicity. I have discussed the amiodarone therapy including side effects with patient and family member. Monitor LFTs, TSH    Keep K > 4 and Mg > 2        - S/p AICD implantation   Device has been programmed and functions normally. - Persistent atrial fibrillation:    Patient has had cardioversion in the past with recurrence of atrial fibrillation. He was not interested for cardioversion and not a candidate for ablation due to malignancy. Today tele with paced atrial rhythm. Continue with anticoagulation. Monitor the INR closely. He has initially presented with coagulopathy. Amiodarone and warfarin have interactions. - Metastatic melanoma    Oncology following. Discussed with patient. No further EP recommendations. Will sign off. Patient can be discharged home with follow up in office. Active Hospital Problems    Diagnosis Date Noted    Supratherapeutic INR [R79.1] 01/26/2021    Pneumonia [J18.9] 01/25/2021    AICD (automatic cardioverter/defibrillator) present [Z95.810] 12/31/2020    Dilated cardiomyopathy ([address] [I42.0]     VT (ventricular tachycardia) ([address] [I47.2] 12/30/2020    Persistent atrial fibrillation ([address] [I48.19] 12/24/2020    Ischemic cardiomyopathy [I25.5] 12/24/2020    GERD without esophagitis [K21.9] 09/04/2019    Type 2 diabetes mellitus with diabetic polyneuropathy, without long-term current use of insulin ([address] [E11.42] 08/11/2017    Melanoma in situ of lower leg ([address] [D03.70] 02/01/2017    SYLVIE (obstructive sleep apnea) [G47.33] 10/05/2012    Essential hypertension [I10] 10/01/2012       Diagnostic studies:     Echo:   Overall left ventricular systolic function is moderately depressed .   -Ejection fraction is visually estimated to be 40%. E/e'= 7.4 cm   -There is global hypokinesis that appears worse in the inferior and apical   walls.   -Indeterminate diastolic function.   -The aortic valve appears sclerotic but opens well.   -Mild aortic regurgitation.   -Mild tricuspid regurgitation. I independently reviewed the cardiac diagnostic studies, ECG and relevant imaging studies.      Physical Examination:  Vitals:    02/04/21 0815   BP:    Pulse: 77   Resp: 30   Temp: SpO2:       In: 480 [P.O.:480]  Out: 425    Wt Readings from Last 3 Encounters:   21 207 lb 10.8 oz (94.2 kg)   21 214 lb (97.1 kg)   21 210 lb 9.6 oz (95.5 kg)     Temp  Av.1 °F (36.7 °C)  Min: 97.5 °F (36.4 °C)  Max: 98.6 °F (37 °C)  Pulse  Av.4  Min: 60  Max: 77  BP  Min: 74/43  Max: 114/68  SpO2  Av.9 %  Min: 82 %  Max: 99 %    Intake/Output Summary (Last 24 hours) at 2021 1046  Last data filed at 2021 0905  Gross per 24 hour   Intake 830 ml   Output 425 ml   Net 405 ml       I independently reviewed all cardiac tracing from cardiac telemetry. · Constitutional: Oriented. No distress. · Head: Normocephalic and atraumatic. · Mouth/Throat: Oropharynx is clear and moist.   · Eyes: Conjunctivae normal. EOM are normal.   · Neck: Neck supple. No JVD present. · Cardiovascular: Normal rate, regular rhythm, S1&S2. · Pulmonary/Chest: Bilateral respiratory sounds. No rhonchi. · Abdominal: Soft. No tenderness. · Musculoskeletal: No tenderness. No edema    · Lymphadenopathy: Has no cervical adenopathy. · Neurological: Alert and oriented. Follows command, No Gross deficit   · Skin: Skin is warm, No rash noted.    · Psychiatric: Has a normal behavior       Scheduled Meds:   amiodarone  400 mg Oral BID    furosemide  40 mg Oral Daily    metoprolol tartrate  100 mg Oral BID    megestrol  200 mg Oral Daily    lisinopril  5 mg Oral Daily    warfarin  2.5 mg Oral Daily    LORazepam  1 mg Intravenous Once    amiodarone  150 mg Intravenous Once    budesonide-formoterol  2 puff Inhalation BID    ipratropium-albuterol  1 ampule Inhalation Q4H WA    insulin lispro  14 Units Subcutaneous TID WC    insulin glargine  22 Units Subcutaneous Q12H    potassium chloride  40 mEq Oral Daily with breakfast    enoxaparin  1 mg/kg Subcutaneous BID    insulin lispro  0-18 Units Subcutaneous TID WC    insulin lispro  0-9 Units Subcutaneous Nightly  allopurinol  300 mg Oral QPM    finasteride  5 mg Oral Daily    pantoprazole  40 mg Oral QAM AC    atorvastatin  20 mg Oral Nightly    tamsulosin  0.4 mg Oral Nightly    sodium chloride flush  10 mL Intravenous 2 times per day    lactobacillus  1 capsule Oral BID WC     Continuous Infusions:   lidocaine      sodium chloride      dextrose      sodium chloride       PRN Meds:HYDROmorphone, magnesium sulfate, sennosides-docusate sodium, sodium chloride, zolpidem, traMADol, glucose, dextrose, glucagon (rDNA), dextrose, sodium chloride, albuterol sulfate HFA, hydrOXYzine, sodium chloride flush, magnesium hydroxide, acetaminophen, ondansetron     Prior to Admission medications    Medication Sig Start Date End Date Taking?  Authorizing Provider   hydrOXYzine (VISTARIL) 50 MG capsule Take 1 capsule by mouth 3 times daily as needed for Itching or Anxiety 1/14/21 2/13/21  MACIEL Mandujano NP   ondansetron (ZOFRAN) 4 MG tablet Take 1 tablet by mouth 3 times daily as needed for Nausea or Vomiting 1/14/21   MACIEL Mandujano NP   insulin glargine (BASAGLAR KWIKPEN) 100 UNIT/ML injection pen Inject 25 Units into the skin nightly 1/13/21   Rhianna Ramos MD   Insulin Pen Needle (MEIJER PEN NEEDLES) 31G X 6 MM MISC 1 each by Does not apply route daily 1/13/21   Rhianna Ramos MD   pantoprazole (PROTONIX) 40 MG tablet TAKE 1 TABLET BY MOUTH ONE TIME A DAY  1/11/21   Rhianna Ramos MD   allopurinol (ZYLOPRIM) 300 MG tablet TAKE 1 TABLET BY MOUTH ONE TIME A DAY  1/11/21   Rhianna Ramos MD   metoprolol tartrate (LOPRESSOR) 50 MG tablet Take 1.5 tablets by mouth 2 times daily 1/11/21   Joe Hoffmann MD   amiodarone (CORDARONE) 200 MG tablet Take 1 tablet by mouth 2 times daily 200 mg twice daily for 2 weeks    Days,   Then 200 mg daily 1/5/21   Joe Hoffmann MD warfarin (COUMADIN) 5 MG tablet Take 1 tablet by mouth daily Do not resume until 1/7 1/2/21   Jeffreyhossein Arenas, APRN - CNP   albuterol sulfate  (90 Base) MCG/ACT inhaler Inhale 2 puffs into the lungs every 6 hours as needed for Wheezing 12/30/20 12/30/21  Kameron Menjivar MD   glimepiride (AMARYL) 4 MG tablet Take 1 tablet by mouth daily 12/24/20   Altagracia Rogel MD   furosemide (LASIX) 40 MG tablet Take 1 tablet by mouth daily 12/24/20   Altagracia Rogel MD   fluticasone-salmeterol (ADVAIR DISKUS) 250-50 MCG/DOSE AEPB Inhale 1 puff into the lungs every 12 hours 12/14/20   Kameron Menjivar MD   lisinopril (PRINIVIL;ZESTRIL) 5 MG tablet Take 1 tablet by mouth daily 12/4/20   Merline Schwartz, MD   ipratropium-albuterol (DUONEB) 0.5-2.5 (3) MG/3ML SOLN nebulizer solution Inhale 3 mLs into the lungs every 4 hours as needed for Shortness of Breath Dx: Asthma  ICD-10: J45.909 11/25/20   Kameron Menjivar MD   finasteride (PROSCAR) 5 MG tablet Take 5 mg by mouth daily    Historical Provider, MD   tamsulosin (FLOMAX) 0.4 MG capsule Take 0.4 mg by mouth daily    Historical Provider, MD   metFORMIN (GLUCOPHAGE) 1000 MG tablet Take 1 tablet by mouth 2 times daily 7/8/20   Altagracia Rogel MD   simvastatin (ZOCOR) 40 MG tablet TAKE 1 TABLET BY MOUTH AT BEDTIME 7/8/20   Altagracia Rogel MD   FREESTYLE LANCETS MISC 1 each by Does not apply route daily 2/15/17   Altagracia Rogel MD   Blood Glucose Monitoring Suppl (FREESTYLE LITE) CLAUDINE 1 Device by Does not apply route 2 times daily 2/15/17   Altagracia Rogel MD       Review of System:  [x] Full ROS obtained and negative except as mentioned in HPI    Relevant and available labs, and cardiovascular diagnostics reviewed. Reviewed.    Recent Labs     02/02/21  0532 02/03/21  0527 02/04/21  0452   * 133* 135*   K 3.7 4.4 4.5   CL 99 100 101   CO2 25 23 24   BUN 22* 18 22*   CREATININE 0.7* 0.8 0.7*     Recent Labs     02/01/21 1942 02/02/21  0532   WBC 11.1* 9.4   HGB 8.5* 8.6*   HCT 27.2* 26.8*   MCV 86.2 86.5    208     Estimated Creatinine Clearance: 119 mL/min (A) (based on SCr of 0.7 mg/dL (L)). No results found for: BNP    I independently reviewed all cardiac tracing from cardiac telemetry. I independently reviewed relevant and available cardiac diagnostic tests ECG, CXR, Echo, Stress test, Device interrogation, Holter, CT scan. Complex medical condition with multiple medical problems affecting prognosis and outcome of EP interventions    Thank you for allowing me to participate in the care of Lynne Lakhani     All questions and concerns were addressed to the patient/family. Alternatives to my treatment were discussed. I have discussed the above stated plan and the patient verbalized understanding and agreed with the plan. NOTE: This report was transcribed using voice recognition software. Every effort was made to ensure accuracy, however, inadvertent computerized transcription errors may be present.      Loli Wagner MD, MPH  [address]   Office: [phone]  Fax: [phone]

## 2021-02-04 NOTE — ACP (ADVANCE CARE PLANNING)
Advanced Care Planning Note. Purpose of Encounter: Advanced care planning in light of melanoma  Parties In Attendance: Patient, wife, Ayesha Epley RN  Decisional Capacity: Yes  Subjective: Patient is coughing and constipated  Objective: Cr 0.7  Goals of Care Determination: Patient/POA wants full support (CPR, vent, surgery, HD, trach, PEG)  Plan:  Onc/Renal/Cardio/Endocrine/Palliative care consults. Code Status: Full code   Time spent on Advanced care Plannin minutes  Advanced Care Planning Documents: Completed advanced directives on chart, wife is the POA.     Tam Neves MD  2021 1:41 PM

## 2021-02-04 NOTE — PROGRESS NOTES
Physical Therapy    Facility/Department: [facility] ICU  Initial Assessment    NAME: Carol Erwin  : 1955  MRN: [id number]    Date of Service: 2021    Discharge Recommendations: Carol Erwin scored a 17/24 on the AM-PAC short mobility form. Current research shows that an AM-PAC score of 17 or less is typically not associated with a discharge to the patient's home setting. Based on the patient's AM-PAC score and their current functional mobility deficits, it is recommended that the patient have 5-7 sessions per week of Physical Therapy at d/c to increase the patient's independence. At this time, this patient demonstrates the endurance, and/or tolerance for 3 hours of therapy each day, with a treatment frequency of 5-7x/wk. Please see assessment section for further patient specific details. If patient discharges prior to next session this note will serve as a discharge summary. Please see below for the latest assessment towards goals. PT Equipment Recommendations  Equipment Needed: Yes  Mobility Devices: Delora Gory: Rollator (4 Wheeled)  Other: depending on d/c plan    Assessment   Body structures, Functions, Activity limitations: Decreased functional mobility ; Decreased endurance  Assessment: 71 yo male admitted 2021 with pneumonia in setting of metastatic lung CA. Pt presents with poor endurance and weakness from prolonged hospitalization. Pt previously very active and motivated to increase activity and return home safely. Pt will benefit from con't skilled PT to facilitate increased functional mobility.   Treatment Diagnosis: poor endurance and deconditioning  Prognosis: Good  Decision Making: Medium Complexity  History: lung CA, lung mass, pneumonia, pacemaker, cardiac history  Exam: MMT, bed mobility, transfers, gait  Clinical Presentation: evolving PT Education: Goals; General Safety;Gait Training;PT Role;Orientation;Plan of Care; Functional Mobility Training;Equipment;Transfer Training;Energy Conservation  Patient Education: educated on role of PT and d/c planning/recommendation - pt verbalized understanding  Barriers to Learning: none  REQUIRES PT FOLLOW UP: Yes  Activity Tolerance  Activity Tolerance: Patient Tolerated treatment well;Patient limited by endurance  Activity Tolerance: Pt is dealing with poor endurance due to prolonged hospitalization       Patient Diagnosis(es): The primary encounter diagnosis was Pneumonia of right lower lobe due to infectious organism. Diagnoses of Acute respiratory failure with hypoxia ([address] Fatigue, unspecified type, Epistaxis, Anemia, unspecified type, and Status post thoracentesis were also pertinent to this visit. has a past medical history of Abdominal pain, LLQ, Allergic rhinitis, Asthma, Atopic dermatitis, Cancer ([address] Diabetes mellitus ([address] Erectile dysfunction, Generalized osteoarthrosis, involving multiple sites, GERD (gastroesophageal reflux disease), Gynecomastia, Hyperlipidemia, Nasal congestion, Nipple pain, Obstructive sleep apnea syndrome, Pseudo-gout, and Unspecified essential hypertension. has a past surgical history that includes Hand surgery (2011); other surgical history (2002); Finger surgery (Left, 3-1-2013); Foot surgery (Right); Carpal tunnel release (Bilateral, 2013); pr njx dx/ther sbst intrlmnr lmbr/sac w/img gdn (Right, 9/24/2018); lumbar nerve block (Right, 12/12/2018); bronchoscopy (N/A, 12/2/2020); and back surgery. Restrictions  Restrictions/Precautions  Restrictions/Precautions: Up as Tolerated, Fall Risk(AICD placement Simultaneous filing.  User may not have seen previous data.)  Implants present? : Pacemaker(AICD)  Position Activity Restriction Other position/activity restrictions: 55-year-old M with history of ischemic CMP with VT s/p ICD, HTN, h/o melanoma, DM 2, Afib, SYLVIE admitted to the hospital with some weakness. Admitted as inpatient with acute respiratory failure due to post obstructive pneumonia with sepsis POA  . He has right lung mass on outside hospital CT. He declined pulmonary workup opting for resumption of workup with [facility] Pulmonologist.  Cytology from 1/28 consistent with metastatic melanoma. Had VT on 2/1 and required defibrillation. Vision/Hearing  Vision: Impaired  Vision Exceptions: Wears glasses at all times  Hearing: Within functional limits       Subjective  General  Chart Reviewed: Yes  Family / Caregiver Present: Yes(wife Alexia)  Follows Commands: Within Functional Limits  General Comment  Comments: Pt supine in bed, 3L O2, agreeable to PT. Wife at bedside. Subjective  Subjective: \"I am feeling okay. I am just tired. \"  Pain Screening  Patient Currently in Pain: Denies  Vital Signs  Patient Currently in Pain: Denies       Orientation  Orientation  Overall Orientation Status: Within Normal Limits     Social/Functional History  Social/Functional History  Lives With: Spouse  Type of Home: House  Home Layout: One level  Home Access: Level entry  Bathroom Shower/Tub: Walk-in shower  Bathroom Toilet: Standard  Bathroom Equipment: Grab bars in shower  Home Equipment: Cane  ADL Assistance: Independent  Homemaking Assistance: Independent  Ambulation Assistance: Independent(using cane x 1 month)  Transfer Assistance: Independent  Active : Yes  Occupation: Retired  Type of occupation: [employer] ([employer])  Leisure & Hobbies: fishing, walking  Additional Comments: Endorses no falls in the last 6 months     Cognition   Cognition  Overall Cognitive Status: WFL  Cognition Comment: lethargic throughout (BP 90/60s throughout, RN aware)    Objective     Observation/Palpation  Posture: Good  Observation: multiple bruises through abdomen AM-PAC Inpatient T-Scale Score : 42.13 (02/04/21 1246)  Mobility Inpatient CMS 0-100% Score: 50.57 (02/04/21 1246)  Mobility Inpatient CMS G-Code Modifier : CK (02/04/21 1246)          Goals  Short term goals  Time Frame for Short term goals: Discharge  Short term goal 1: Pt to perform bed mobility indep  Short term goal 2: Pt to perform transfer sit <> stand mod I with LRAD  Short term goal 3: Pt will ambulate 150' with mod I and LRAD  Patient Goals   Patient goals :  To return home with spouse       Therapy Time   Individual Concurrent Group Co-treatment   Time In 1138         Time Out 1231         Minutes 53         Timed Code Treatment Minutes: [address] [place], [place] [id number]

## 2021-02-04 NOTE — CARE COORDINATION
Discharge planning note:    Met with patient and his wife and discussed discharge planning. Patient declines any placement. Will not speak of anything except going home. HHC will be arranged through [facility]. Patient will need a 4 wheeled walker  & most likely Home O2 (currently on 3L). Referral to [facility].     Shelby Ragland RN BSN  Case Management  [phone]

## 2021-02-05 ENCOUNTER — APPOINTMENT (OUTPATIENT)
Dept: CT IMAGING | Age: 66
DRG: 871 | End: 2021-02-05
Payer: COMMERCIAL

## 2021-02-05 LAB
ANION GAP SERPL CALCULATED.3IONS-SCNC: 10 MMOL/L (ref 3–16)
BUN BLDV-MCNC: 24 MG/DL (ref 7–20)
CALCIUM SERPL-MCNC: 8.8 MG/DL (ref 8.3–10.6)
CHLORIDE BLD-SCNC: 100 MMOL/L (ref 99–110)
CO2: 23 MMOL/L (ref 21–32)
CREAT SERPL-MCNC: 0.8 MG/DL (ref 0.8–1.3)
GFR AFRICAN AMERICAN: >60
GFR NON-AFRICAN AMERICAN: >60
GLUCOSE BLD-MCNC: 105 MG/DL (ref 70–99)
GLUCOSE BLD-MCNC: 122 MG/DL (ref 70–99)
GLUCOSE BLD-MCNC: 125 MG/DL (ref 70–99)
GLUCOSE BLD-MCNC: 180 MG/DL (ref 70–99)
GLUCOSE BLD-MCNC: 217 MG/DL (ref 70–99)
INR BLD: 2.1 (ref 0.86–1.14)
PERFORMED ON: ABNORMAL
POTASSIUM REFLEX MAGNESIUM: 4.5 MMOL/L (ref 3.5–5.1)
PROTHROMBIN TIME: 24.5 SEC (ref 10–13.2)
SODIUM BLD-SCNC: 133 MMOL/L (ref 136–145)

## 2021-02-05 PROCEDURE — 80048 BASIC METABOLIC PNL TOTAL CA: CPT

## 2021-02-05 PROCEDURE — 6360000004 HC RX CONTRAST MEDICATION: Performed by: INTERNAL MEDICINE

## 2021-02-05 PROCEDURE — 6370000000 HC RX 637 (ALT 250 FOR IP): Performed by: INTERNAL MEDICINE

## 2021-02-05 PROCEDURE — 1200000000 HC SEMI PRIVATE

## 2021-02-05 PROCEDURE — 2700000000 HC OXYGEN THERAPY PER DAY

## 2021-02-05 PROCEDURE — 2580000003 HC RX 258: Performed by: PHYSICIAN ASSISTANT

## 2021-02-05 PROCEDURE — 6370000000 HC RX 637 (ALT 250 FOR IP): Performed by: PHYSICIAN ASSISTANT

## 2021-02-05 PROCEDURE — 71260 CT THORAX DX C+: CPT

## 2021-02-05 PROCEDURE — 85610 PROTHROMBIN TIME: CPT

## 2021-02-05 PROCEDURE — 94640 AIRWAY INHALATION TREATMENT: CPT

## 2021-02-05 PROCEDURE — 94761 N-INVAS EAR/PLS OXIMETRY MLT: CPT

## 2021-02-05 RX ORDER — PROMETHAZINE HYDROCHLORIDE AND CODEINE PHOSPHATE 6.25; 1 MG/5ML; MG/5ML
5 SYRUP ORAL EVERY 4 HOURS PRN
Status: DISCONTINUED | OUTPATIENT
Start: 2021-02-05 | End: 2021-02-08 | Stop reason: HOSPADM

## 2021-02-05 RX ADMIN — ALLOPURINOL 300 MG: 300 TABLET ORAL at 17:31

## 2021-02-05 RX ADMIN — Medication 2 PUFF: at 20:01

## 2021-02-05 RX ADMIN — ZOLPIDEM TARTRATE 5 MG: 5 TABLET ORAL at 23:14

## 2021-02-05 RX ADMIN — Medication 10 ML: at 20:29

## 2021-02-05 RX ADMIN — IOHEXOL 50 ML: 240 INJECTION, SOLUTION INTRATHECAL; INTRAVASCULAR; INTRAVENOUS; ORAL at 14:10

## 2021-02-05 RX ADMIN — AMIODARONE HYDROCHLORIDE 400 MG: 200 TABLET ORAL at 08:26

## 2021-02-05 RX ADMIN — FINASTERIDE 5 MG: 5 TABLET, FILM COATED ORAL at 08:25

## 2021-02-05 RX ADMIN — ATORVASTATIN CALCIUM 20 MG: 20 TABLET, FILM COATED ORAL at 20:28

## 2021-02-05 RX ADMIN — IPRATROPIUM BROMIDE AND ALBUTEROL SULFATE 1 AMPULE: .5; 3 SOLUTION RESPIRATORY (INHALATION) at 09:28

## 2021-02-05 RX ADMIN — NALOXEGOL OXALATE 25 MG: 25 TABLET, FILM COATED ORAL at 08:26

## 2021-02-05 RX ADMIN — AMIODARONE HYDROCHLORIDE 400 MG: 200 TABLET ORAL at 20:28

## 2021-02-05 RX ADMIN — PANTOPRAZOLE SODIUM 40 MG: 40 TABLET, DELAYED RELEASE ORAL at 09:35

## 2021-02-05 RX ADMIN — IPRATROPIUM BROMIDE AND ALBUTEROL SULFATE 1 AMPULE: .5; 3 SOLUTION RESPIRATORY (INHALATION) at 16:26

## 2021-02-05 RX ADMIN — PROMETHAZINE HYDROCHLORIDE AND CODEINE PHOSPHATE 5 ML: 6.25; 1 SOLUTION ORAL at 23:15

## 2021-02-05 RX ADMIN — Medication 1 CAPSULE: at 17:31

## 2021-02-05 RX ADMIN — HYDROCODONE BITARTRATE AND ACETAMINOPHEN 1 TABLET: 5; 325 TABLET ORAL at 14:13

## 2021-02-05 RX ADMIN — FUROSEMIDE 40 MG: 40 TABLET ORAL at 08:26

## 2021-02-05 RX ADMIN — METOPROLOL TARTRATE 100 MG: 50 TABLET, FILM COATED ORAL at 20:27

## 2021-02-05 RX ADMIN — TRAMADOL HYDROCHLORIDE 50 MG: 50 TABLET, FILM COATED ORAL at 23:14

## 2021-02-05 RX ADMIN — WARFARIN SODIUM 2.5 MG: 2.5 TABLET ORAL at 17:31

## 2021-02-05 RX ADMIN — Medication 10 ML: at 09:20

## 2021-02-05 RX ADMIN — Medication 1 CAPSULE: at 08:25

## 2021-02-05 RX ADMIN — POTASSIUM CHLORIDE 40 MEQ: 1500 TABLET, EXTENDED RELEASE ORAL at 08:25

## 2021-02-05 RX ADMIN — Medication 2 PUFF: at 09:28

## 2021-02-05 RX ADMIN — LISINOPRIL 5 MG: 5 TABLET ORAL at 08:26

## 2021-02-05 RX ADMIN — INSULIN LISPRO 14 UNITS: 100 INJECTION, SOLUTION INTRAVENOUS; SUBCUTANEOUS at 11:46

## 2021-02-05 RX ADMIN — IPRATROPIUM BROMIDE AND ALBUTEROL SULFATE 1 AMPULE: .5; 3 SOLUTION RESPIRATORY (INHALATION) at 20:03

## 2021-02-05 RX ADMIN — INSULIN LISPRO 3 UNITS: 100 INJECTION, SOLUTION INTRAVENOUS; SUBCUTANEOUS at 20:29

## 2021-02-05 RX ADMIN — IOPAMIDOL 75 ML: 755 INJECTION, SOLUTION INTRAVENOUS at 16:35

## 2021-02-05 RX ADMIN — METOPROLOL TARTRATE 100 MG: 50 TABLET, FILM COATED ORAL at 08:25

## 2021-02-05 RX ADMIN — INSULIN LISPRO 3 UNITS: 100 INJECTION, SOLUTION INTRAVENOUS; SUBCUTANEOUS at 11:46

## 2021-02-05 RX ADMIN — TAMSULOSIN HYDROCHLORIDE 0.4 MG: 0.4 CAPSULE ORAL at 20:28

## 2021-02-05 ASSESSMENT — PAIN SCALES - GENERAL
PAINLEVEL_OUTOF10: 5
PAINLEVEL_OUTOF10: 3
PAINLEVEL_OUTOF10: 4

## 2021-02-05 ASSESSMENT — PAIN DESCRIPTION - ONSET
ONSET: ON-GOING
ONSET: ON-GOING

## 2021-02-05 ASSESSMENT — PAIN DESCRIPTION - FREQUENCY
FREQUENCY: CONTINUOUS
FREQUENCY: CONTINUOUS

## 2021-02-05 ASSESSMENT — PAIN DESCRIPTION - DESCRIPTORS: DESCRIPTORS: ACHING;DISCOMFORT

## 2021-02-05 ASSESSMENT — PAIN DESCRIPTION - PAIN TYPE: TYPE: CHRONIC PAIN

## 2021-02-05 ASSESSMENT — PAIN DESCRIPTION - PROGRESSION: CLINICAL_PROGRESSION: NOT CHANGED

## 2021-02-05 ASSESSMENT — PAIN DESCRIPTION - LOCATION
LOCATION: BACK;OTHER (COMMENT)
LOCATION: BACK
LOCATION: BACK

## 2021-02-05 ASSESSMENT — PAIN DESCRIPTION - ORIENTATION: ORIENTATION: RIGHT;MID;LOWER

## 2021-02-05 NOTE — ADT AUTH CERT
Pneumonia - Care Day 10 (2/3/2021) by Sean Ramirez, RN       Review Status Review Entered   Completed 2/4/2021 16:35      Criteria Review      Care Day: 10 Care Date: 2/3/2021 Level of Care: ICU    Guideline Day 2    Level Of Care    (X) Floor    2/4/2021 4:34 PM EST by Veronica Siu      ICU    Clinical Status    ( ) * No CO2 retention or acidosis    (X) * No requirement for mechanical ventilation    ( ) * Hypotension absent    2/4/2021 4:34 PM EST by Veronica Siu      periods with BP as low as 74/43    (X) * Afebrile or fever improved    ( ) * No hypoxia on room air or oxygenation improved    2/4/2021 4:34 PM EST by Latclaude James, Eve      O2 @ 3L/NC, Sats 87-95%, RR 20-33    (X) * Mental status improved or at baseline    Activity    (X) * Increased activity    Routes    (X) Oral hydration, medications    (X) Usual diet    Interventions    (X) Pulse oximetry    2/4/2021 4:34 PM EST by Veronica Siu      O2 @ 3L/NC, Sats 87-95%, RR 20-33    (X) Possible oxygen    2/4/2021 4:34 PM EST by Latclaude James, Eve      O2 @ 3L/NC, Sats 87-95%, RR 20-33    * Milestone   Additional Notes   2/3      Oncology   Feels better this morning. Worried about the diagnosis of malignancy      ASSESSMENT AND PLAN       Principal Problem:     Pneumonia   Active Problems:     Essential hypertension     SYLVIE (obstructive sleep apnea)     Melanoma in situ of lower leg (HCC)     Type 2 diabetes mellitus with diabetic polyneuropathy, without long-term current use of insulin (HCC)     GERD without esophagitis     Persistent atrial fibrillation (HCC)     Ischemic cardiomyopathy     VT (ventricular tachycardia) (HCC)     Dilated cardiomyopathy (HCC)     AICD (automatic cardioverter/defibrillator) present     Supratherapeutic INR   Resolved Problems:     * No resolved hospital problems.  *               1.  Metastatic melanoma with lung metastasis and malignant pleural effusion:     -Diagnosed during hospitalization when he presented with supratherapeutic INR and was noted to have pleural effusion in late Jan 2021.   -LDH is elevated.   -Had lengthy discussion with the patient and patient's wife. -We will obtain a PET scan as outpatient   -CT brain with contrast was negative for metastatic disease   -I have ordered next generation sequencing on the pathology specimen which will help us to determine treatment. -Typically treatment will be immunotherapy or targeted agents against BRAF if positive mutation is noted. -Treatments will be done as outpatient.   -He seems to have a lot of cardiac issues-V. tach etc.  Cardiology is following.           1.  Supratherapeutic INR epistaxis:       -Received FFP and vitamin K on 1/26/2021   -INR is 1.48   -Patient had poor appetite for last couple of weeks which might have caused vitamin K deficiency leading to supratherapeutic INR.   - Anticoagulation at the discretion of cardiologist.           3.  Cardiac arrest after bronchoscopy in December 2020, has pacemaker   Cardiology is following.       4.  Anemia:       - Most likely due to blood loss   - anemia of chronic disease   - ferritin high   - Transfuse PRBCs if hemoglobin is less than 8       5. V-tach:       -Cardiology following   -----------------------   CArdio   This am sustained ventricular tachycardia with a heart rate of 180 bpm and had to be ? cardioverted. Guanakito Denis has been started on IV amiodarone therapy and has been transferred to ICU. , chest pain      IMPRESSION/RECOMMENDATIONS:         Diagnosis and Plan :        1. NI    Non Oliguric    Recovered    back on Furosemide , will reduce dose as less edema    Cr 0.8        2. HTN    Controlled , edema - continue diuretics- switch to po   Restart ACEI  , reduce dose of metoprolol 75 bid to 50 bid        3. Lung Mass - Hilar mass with Lymphadenopathy       4. Pneumonia- treated            5.  Persistent Atrial Fibrillation and Vt      7. Hyponatremia                Na 133      --------------------------   EP   Assessment and plan:        - Ventricular tachycardia:                Patient is on IV amiodarone therapy for ventricular tachycardia.               D/c IV amiodarone.                    Change to PO amiodarone 400 bid for one week, followed by 400 mg/day for one week, and then 200 mg/day               I have discussed amiodarone therapy including side effects with patient and he agreed to continue with it.                              Monitor LFTs, TSH                        Device setting was adjusted to detect slower rate VT and treated appropriately.                   Keep K > 4 and Mg > 2                   He has been recently diagnosed with melanoma with lung metastasis and malignant pleural effusion.                       - Persistent atrial fibrillation:                Patient has had cardioversion in the past with recurrence of atrial fibrillation.               Not interested for cardioversion and not a candidate for ablation due to malignancy.             Continue with anticoagulation.                Monitor the INR closely.                    He had coagulopathy.        - Metastatic melanoma                Oncology following.     --------------------------   IM   On Amio gtt.  No SOB.  No CP, HA or abdominal pain.  No fevers. Complaining of pain in back and legs. BP (!) 99/47   Pulse 60   Temp 97.7 °F (36.5 °C) (Temporal)   Resp 27   Ht 5' 9\" (1.753 m)   Wt 205 lb 14.6 oz (93.4 kg)   SpO2 94%   BMI 30.41 kg/m²        General appearance: No apparent distress, appears stated age and cooperative. Obese male,on RA, sitting in chair   HEENT: Pupils equal, round, and reactive to light. Conjunctivae/corneas clear. Neck: Supple, with full range of motion. No jugular venous distention. Trachea midline. Respiratory:  Normal respiratory effort.  Diminished sounds at right lung base,crackles in left lung Cardiovascular: Regular rate and rhythm with normal S1/S2 without murmurs, rubs or gallops. Abdomen: Soft, non-tender, non-distended with normal bowel sounds. Musculoskeletal: No clubbing, cyanosis. 1+ LE  edema bilaterally.  Full range of motion without deformity. Skin: Skin color, texture, turgor normal.  No rashes or lesions. Neurologic:  Neurovascularly intact without any focal sensory/motor deficits. Cranial nerves: II-XII intact, grossly non-focal.   Psychiatric: Alert and oriented, thought content appropriate, normal insight      Community acquired Pneumonia- Possibly post obstructive form lung mass. suspected gram-positive/gram-negative   COVID-19 negative   DCed cefepime(day 7/7). Completed azithromycin(day 5/5)   -D/Cd vancomycin given negative MRSA culture   -WBC trending down       Acute respiratory failure with hypoxia-due to pneumonia   -Currently on RA. Wean for 02 sats >90%. -Continue inhalors       Right metastatic non small cancer   Previously bronchoscopy attempted had a brief cardiac arrest   -Declined further workup by Pulmonologist at this hospital.He would like to complete workup with Pulmonologist at [facility] guided lung bx or bronchoscopy planned.    -Thoracentesis done 1/28/21 at request of patient, cytology with metastatic melanoma   -Continue to hold home coumadin and continue lovenox due to procedures   -Oncology recommendations noted       Supratherapeutic INR   INR noted noted to be greater than 14 on admission   -On coumadin at home for Afib subtherapeutic. Continue lovenox bid   -Cont Coumadin        Chronic moderate systolic heart failure    -Cont PO Lasix and potassium supplementation.       Hx VT s/p AICD   -On Amio gtt   -EP following       Type 2 diabetes mellitus   Last A1c 10.6   -Management per Endocrinology        Chronic Anemia   Iron studies B12 folate reflect studies consistent with anemia of chronic disease   -Hgb stable. Management per Oncology.     Acute kidney injury-due to  prerenal   -Resolved.  Continue to hold lisinopril       Chronic atrial fibrillation s/p pacemaker insertion on the first of this month   Pacemaker site has a small hematoma which was also noticed on discharge   -Continue amiodarone   -Continue lovenox/Coumadin for anticoagulation   -EP recommendations noted       Anxiety   -Vistaril PRN       Insomnia   -Continue ambien PRN           DVT Prophylaxis: Therapeutic lovenox   Diet: DIET CARB CONTROL; Carb Control: 4 carb choices (60 gms)/meal; Daily Fluid Restriction: 1000 ml   Dietary Nutrition Supplements: Diabetic Oral Supplement   Code Status: Prior       PT/OT Eval Status: as needed   -    ------------------------      2/3/2021 05:27   Sodium: 133 (L)   Potassium: 4.4   Chloride: 100   CO2: 23   BUN: 18   Creatinine: 0.8   Anion Gap: 10   GFR Non-: >60   GFR African American: >60   Glucose: 181 (H)   Calcium: 8.4   Prothrombin Time: 19.3 (H)   INR: 1.66 (H)      2/3/2021 08:47   POC Glucose: 172 (H)      2/3/2021 11:59   POC Glucose: 99      2/3/2021 18:09   POC Glucose: 234 (H)      2/3/2021 20:03   POC Glucose: 288 (H)   ------------------------------   Scheduled Medications   · amiodarone 400 mg Oral BID   · [START ON 2/4/2021] furosemide 40 mg Oral Daily   · metoprolol tartrate 100 mg Oral BID   · megestrol 200 mg Oral Daily   · lisinopril 5 mg Oral Daily   · warfarin 2.5 mg Oral Daily   · LORazepam 1 mg Intravenous Once   · amiodarone 150 mg Intravenous Once   · budesonide-formoterol 2 puff Inhalation BID   · ipratropium-albuterol 1 ampule Inhalation Q4H WA   · insulin lispro 14 Units Subcutaneous TID WC   · insulin glargine 22 Units Subcutaneous Q12H   · potassium chloride 40 mEq Oral Daily with breakfast   · enoxaparin 1 mg/kg Subcutaneous BID   · insulin lispro 0-18 Units Subcutaneous TID WC   · insulin lispro 0-9 Units Subcutaneous Nightly   · allopurinol 300 mg Oral QPM   · finasteride 5 mg Oral Daily

## 2021-02-05 NOTE — CARE COORDINATION
Discharge planning note:    Please fax Vish 78 orders to: [facility] S Office  Phone: [phone]  Fax: [phone]      Referrals for Rollator and O2 given to Brian Allen at:    Name:  Blanca Velazquez  Phone:  [phone]  Fax:      [phone]      Wife will be able to transport home. No further discharge needs.     Kathleen Zhao RN BSN  Case Management  [phone]

## 2021-02-05 NOTE — PROGRESS NOTES
Pharmacy to Dose Warfarin    Pharmacy consulted to dose warfarin for afib. INR Goal: 2-3    INR today: 2.10    Assessment/Plan:  - Continue warfarin at 2.5 mg. Pharmacy will continue to follow.     Italia Robb, PharmD, [facility].   [id number]

## 2021-02-05 NOTE — PROGRESS NOTES
Oncology and Hematology Care   Progress Note      2/5/2021 10:08 AM        Name: Christine Merritt . Admitted: 1/25/2021    SUBJECTIVE:      Has intermittent shortness of breath. Has streaky hemoptysis. Has been on warfarin. INR is 2.1. No chest pain  No abdominal pain.     Reviewed interval ancillary notes    Current Medications      promethazine-codeine (PHENERGAN with CODEINE) 6.25-10 MG/5ML syrup 5 mL, Q4H PRN      naloxegol (MOVANTIK) tablet 25 mg, QAM      polyethylene glycol (GLYCOLAX) packet 17 g, BID      bisacodyl (DULCOLAX) suppository 10 mg, Daily PRN      HYDROcodone-acetaminophen (NORCO) 5-325 MG per tablet 1 tablet, Q6H PRN      amiodarone (CORDARONE) tablet 400 mg, BID      furosemide (LASIX) tablet 40 mg, Daily      metoprolol tartrate (LOPRESSOR) tablet 100 mg, BID      megestrol (MEGACE) 40 MG/ML suspension 200 mg, Daily      lisinopril (PRINIVIL;ZESTRIL) tablet 5 mg, Daily      warfarin (COUMADIN) tablet 2.5 mg, Daily      LORazepam (ATIVAN) injection 1 mg, Once      amiodarone (CORDARONE) 150 mg in dextrose 5 % 100 mL bolus, Once      magnesium sulfate 1000 mg in dextrose 5% 100 mL IVPB, PRN      sennosides-docusate sodium (SENOKOT-S) 8.6-50 MG tablet 2 tablet, Daily PRN      budesonide-formoterol (SYMBICORT) 160-4.5 MCG/ACT inhaler 2 puff, BID      ipratropium-albuterol (DUONEB) nebulizer solution 1 ampule, Q4H WA      insulin lispro (1 Unit Dial) 14 Units, TID WC      0.9 % sodium chloride infusion, PRN      insulin glargine (LANTUS;BASAGLAR) injection pen 22 Units, Q12H      potassium chloride (KLOR-CON M) extended release tablet 40 mEq, Daily with breakfast      zolpidem (AMBIEN) tablet 5 mg, Nightly PRN      traMADol (ULTRAM) tablet 50 mg, Q6H PRN      insulin lispro (1 Unit Dial) 0-18 Units, TID WC      insulin lispro (1 Unit Dial) 0-9 Units, Nightly      glucose (GLUTOSE) 40 % oral gel 15 g, PRN      dextrose 50 % IV solution, PRN Pneumonia  Active Problems:    Essential hypertension    SYLVIE (obstructive sleep apnea)    Melanoma in situ of lower leg (HCC)    Type 2 diabetes mellitus with diabetic polyneuropathy, without long-term current use of insulin (HCC)    GERD without esophagitis    Persistent atrial fibrillation (HCC)    Ischemic cardiomyopathy    VT (ventricular tachycardia) (HCC)    Dilated cardiomyopathy ([facility] [address]    AICD (automatic cardioverter/defibrillator) present    Supratherapeutic INR  Resolved Problems:    * No resolved hospital problems. *         1.  Metastatic melanoma with lung metastasis and malignant pleural effusion:    -Diagnosed during hospitalization when he presented with supratherapeutic INR and was noted to have pleural effusion in late Jan 2021.  -LDH is elevated.  -Had lengthy discussion with the patient and patient's wife. -We will obtain a PET scan as outpatient  -CT brain with contrast was negative for metastatic disease  -I have ordered next generation sequencing on the pathology specimen which will help us to determine treatment. -Typically treatment will be immunotherapy or targeted agents against BRAF if positive mutation is noted. -Treatments will be done as outpatient. 2.  Ongoing hemoptysis:    -Will obtain CT chest with contrast.  -We will also obtain CT abdomen and pelvis.  -Discussed with the patient and Dr. Joanna Parrish      1.  Supratherapeutic INR epistaxis:     -Received FFP and vitamin K on 1/26/2021  -INR is 1.48  -Patient had poor appetite for last couple of weeks which might have caused vitamin K deficiency leading to supratherapeutic INR.  -On warfarin.   INR 2.1        3.  Cardiac arrest after bronchoscopy in December 2020, has pacemaker  Cardiology is following.     4.  Anemia:     - Most likely due to blood loss  - anemia of chronic disease  - ferritin high  - Transfuse PRBCs if hemoglobin is less than 8     5. V-tach:    -Cardiology following       Tenzin Campbell MD

## 2021-02-05 NOTE — PROGRESS NOTES
Hospitalist Progress Note      PCP: Ignacio Guardado MD    Date of Admission: 1/25/2021    Hospital Course:   59-year-old M with history of ischemic CMP with VT s/p ICD, HTN, h/o melanoma, DM 2, Afib, SYLVIE admitted to the hospital with some weakness. Admitted as inpatient with acute respiratory failure due to post obstructive pneumonia with sepsis POA  . He has right lung mass on outside hospital CT. He declined pulmonary workup opting for resumption of workup with [facility] Pulmonologist.  Cytology from 1/28 consistent with metastatic melanoma. Had VT on 2/1 and required defibrillation. Moved to ICU. Seen by Cardio. Has complaints of hemoptysis. CT C/A/P with contrast requested, Pulm consulted. Subjective:   Coughing overnight again and did not sleep. Apparently has had hemoptysis. No SOB. No CP, HA or abdominal pain. No fevers. Had BM. Has back and leg pain.     Medications:  Reviewed    Infusion Medications    sodium chloride      dextrose      sodium chloride       Scheduled Medications    naloxegol  25 mg Oral QAM    polyethylene glycol  17 g Oral BID    amiodarone  400 mg Oral BID    furosemide  40 mg Oral Daily    metoprolol tartrate  100 mg Oral BID    megestrol  200 mg Oral Daily    lisinopril  5 mg Oral Daily    warfarin  2.5 mg Oral Daily    LORazepam  1 mg Intravenous Once    amiodarone  150 mg Intravenous Once    budesonide-formoterol  2 puff Inhalation BID    ipratropium-albuterol  1 ampule Inhalation Q4H WA    insulin lispro  14 Units Subcutaneous TID WC    insulin glargine  22 Units Subcutaneous Q12H    potassium chloride  40 mEq Oral Daily with breakfast    insulin lispro  0-18 Units Subcutaneous TID WC    insulin lispro  0-9 Units Subcutaneous Nightly    allopurinol  300 mg Oral QPM    finasteride  5 mg Oral Daily    pantoprazole  40 mg Oral QAM AC    atorvastatin  20 mg Oral Nightly    tamsulosin  0.4 mg Oral Nightly  sodium chloride flush  10 mL Intravenous 2 times per day    lactobacillus  1 capsule Oral BID WC     PRN Meds: promethazine-codeine, bisacodyl, HYDROcodone 5 mg - acetaminophen, magnesium sulfate, sennosides-docusate sodium, sodium chloride, zolpidem, traMADol, glucose, dextrose, glucagon (rDNA), dextrose, sodium chloride, albuterol sulfate HFA, hydrOXYzine, sodium chloride flush, magnesium hydroxide, acetaminophen, ondansetron      Intake/Output Summary (Last 24 hours) at 2/5/2021 1318  Last data filed at 2/5/2021 1230  Gross per 24 hour   Intake 600 ml   Output 200 ml   Net 400 ml       Physical Exam Performed:    BP (!) 101/52   Pulse 60   Temp 97.2 °F (36.2 °C) (Temporal)   Resp 21   Ht 5' 9\" (1.753 m)   Wt 207 lb 14.3 oz (94.3 kg)   SpO2 96%   BMI 30.70 kg/m²      General appearance: No apparent distress, appears stated age and cooperative. Obese male,on RA, sitting in bed  HEENT: Pupils equal, round, and reactive to light. Conjunctivae/corneas clear. Neck: Supple, with full range of motion. No jugular venous distention. Trachea midline. Respiratory:  Normal respiratory effort. Diminished sounds at right lung base,crackles in left lung  Cardiovascular: Regular rate and rhythm with normal S1/S2 without murmurs, rubs or gallops. Abdomen: Soft, non-tender, non-distended with normal bowel sounds. Musculoskeletal: No clubbing, cyanosis. 1+ LE  edema bilaterally. Full range of motion without deformity. Skin: Skin color, texture, turgor normal.  No rashes or lesions. Neurologic:  Neurovascularly intact without any focal sensory/motor deficits. Cranial nerves: II-XII intact, grossly non-focal.  Psychiatric: Alert and oriented, thought content appropriate, normal insight      Labs:   No results for input(s): WBC, HGB, HCT, PLT in the last 72 hours.   Recent Labs     02/03/21  0527 02/04/21  0452 02/05/21  0435   * 135* 133*   K 4.4 4.5 4.5    101 100   CO2 23 24 23   BUN 18 22* 24* CREATININE 0.8 0.7* 0.8   CALCIUM 8.4 8.5 8.8     No results for input(s): AST, ALT, BILIDIR, BILITOT, ALKPHOS in the last 72 hours. Recent Labs     02/03/21  0527 02/04/21  0452 02/05/21  0435   INR 1.66* 2.04* 2.10*     No results for input(s): CKTOTAL, TROPONINI in the last 72 hours. Urinalysis:      Lab Results   Component Value Date    NITRU Negative 01/01/2021    BACTERIA 0 02/13/2020    RBCUA 0 02/13/2020    BLOODU Negative 01/01/2021    SPECGRAV 1.015 01/01/2021    GLUCOSEU >=1000 01/01/2021       Radiology:  CT HEAD W WO CONTRAST   Final Result   1. No evidence of metastatic disease. 2. Cerebral parenchymal volume loss, commensurate with the patient's age. XR CHEST 1 VIEW   Final Result   Addendum 1 of 1   ADDENDUM:   .         Final      IR GUIDED THORACENTESIS PLEURAL   Final Result   Successful ultrasound guided thoracentesis. IR GUIDED THORACENTESIS PLEURAL   Final Result   Trace right pleural effusion. No safe window for procedure at this time. Thoracentesis was not performed. XR CHEST PORTABLE   Final Result   Stable cardiomegaly with mild central pulmonary congestion which is more   prominent. Questionable early infiltrate or atelectasis along the right lung base which   is more apparent.          CT CHEST W CONTRAST    (Results Pending)   CT ABDOMEN PELVIS W IV CONTRAST    (Results Pending)           Assessment/Plan:    Active Hospital Problems    Diagnosis    Supratherapeutic INR [R79.1]    Pneumonia [J18.9]    AICD (automatic cardioverter/defibrillator) present [Z95.810]    Dilated cardiomyopathy ([address] [I42.0]    VT (ventricular tachycardia) ([address] [I47.2]    Persistent atrial fibrillation (HCC) [I48.19]    Ischemic cardiomyopathy [I25.5]    GERD without esophagitis [K21.9]    Type 2 diabetes mellitus with diabetic polyneuropathy, without long-term current use of insulin (HCC) [E11.42]    Melanoma in situ of lower leg ([address] [D03.70]  SYLVIE (obstructive sleep apnea) [G47.33]    Essential hypertension [I10]     Community acquired Pneumonia- Possibly post obstructive form lung mass. suspected gram-positive/gram-negative  COVID-19 negative  DCed cefepime(day 7/7). Completed azithromycin(day 5/5)  -D/Cd vancomycin given negative MRSA culture  -WBC trending down    Acute respiratory failure with hypoxia-due to pneumonia  -Currently on RA. Wean for 02 sats >90%. -Continue inhalors  -Add Phenergan with Codeine syrup; will need home O2 eval    Hemoptysis  -Check CT Chest  -Pulm consulted     R lung mass and malignant effusion with metastatic melanoma  Previously bronchoscopy attempted had a brief cardiac arrest  -Declined further workup by Pulmonologist at this hospital.He would like to complete workup with Pulmonologist at [facility] guided lung bx or bronchoscopy planned.   -Thoracentesis done 1/28/21 at request of patient, cytology with metastatic melanoma  -Continue home coumadin and stop lovenox   -Oncology recommendations noted     Supratherapeutic INR  INR noted noted to be greater than 14 on admission  -On coumadin at home for Afib subtherapeutic. DCed lovenox bid as INR > 2  -Cont Coumadin      Chronic moderate systolic heart failure   -Cont PO Lasix and potassium supplementation. Hx VT s/p AICD  -On Amio PO  -EP following    Type 2 diabetes mellitus  Last A1c 10.6  -Management per Endocrinology      Chronic Anemia  Iron studies B12 folate reflect studies consistent with anemia of chronic disease  -Hgb stable. Management per Oncology. Acute kidney injury-due to  prerenal  -Resolved.  Continue to hold lisinopril    Chronic atrial fibrillation s/p pacemaker insertion on the first of this month  Pacemaker site has a small hematoma which was also noticed on discharge  -Continue amiodarone  -Continue lovenox/Coumadin for anticoagulation  -EP recommendations noted    Anxiety  -Vistaril PRN    Insomnia  -Continue ambien PRN Constipation  -Cont Movantik now  -Miralax bid  -Dulcolax supp PRN    DVT Prophylaxis: Coumadin  Diet: DIET CARB CONTROL; Carb Control: 4 carb choices (60 gms)/meal; Daily Fluid Restriction: 1000 ml  Dietary Nutrition Supplements: Diabetic Oral Supplement  Code Status: Prior    PT/OT Eval Status: as needed    Dispo -     Discussed with patient, Dr Floyd Negrete ([facility]), Dr Izzy Saul ([facility]) nursing and CM. D/W wife at bedside. Will see what CT C/A/P shows. Home tomorrow if stable overnight.     Hua Iraheta MD

## 2021-02-05 NOTE — PROGRESS NOTES
Nutrition Assessment     Type and Reason for Visit: Reassess    Nutrition Recommendations/Plan:   1. Change supplement from Glucerna to Ensure Compact given 1000 mL fluid restriction    Nutrition Assessment:  Pt's nutrition status is stable as evidenced by PO intake % of meals on 4 CCC diet with 1000 mL fluid restriction. Na+ 133. Pt with Glucerna ordered BID; will change to Ensure Compact given fluid restriction. Deemed to be at low nutrition risk at this time. Will continue to monitor for changes in status. Malnutrition Assessment:  Malnutrition Status: At risk for malnutrition (Comment)    Estimated Daily Nutrient Needs:  Energy (kcal): 2750-4001; Weight Used for Energy Requirements:  Current(93 kg)     Protein (g): 79-99 grams; Weight Used for Protein Requirements:  Ideal(66 kg; 1.2-1.5 grams per kg)        Fluid (ml/day):  ; Weight Used for Fluid Requirements:  1 ml/kcal      Nutrition Related Findings: No edema noted. +2.8 liters.       Current Nutrition Therapies:    DIET CARB CONTROL; Carb Control: 4 carb choices (60 gms)/meal; Daily Fluid Restriction: 1000 ml  Dietary Nutrition Supplements: Diabetic Oral Supplement    Anthropometric Measures:  · Height: 5' 9\" (175.3 cm)  · Current Body Wt: 207 lb (93.9 kg)   · BMI: 30.6    Nutrition Diagnosis:   · Unintended weight loss related to inadequate protein-energy intake as evidenced by weight loss greater than or equal to 5% in 1 month      Nutrition Interventions:   Food and/or Nutrient Delivery:  Continue Current Diet, Modify Oral Nutrition Supplement  Nutrition Education/Counseling:  Education not indicated   Coordination of Nutrition Care:  Continue to monitor while inpatient    Goals:  Pt will consistently consume at least 50% of meals and supplements       Nutrition Monitoring and Evaluation:   Behavioral-Environmental Outcomes:  None Identified   Food/Nutrient Intake Outcomes:  Food and Nutrient Intake, Supplement Intake Physical Signs/Symptoms Outcomes:  Weight, Biochemical Data     Discharge Planning:    Continue Oral Nutrition Supplement     Electronically signed by Aryan Ladd RD, LD on 2/5/21 at 9:12 AM EST    Contact: [phone]

## 2021-02-05 NOTE — PROGRESS NOTES
Pt assessment complete. Pt is complaining of some pain at this time but wants to wait till just before going to sleep to get pain meds. VSS. Pt ambulated to bathroom and back to chair without issue. Meds given per MAR. Call light within reach, will continue to monitor.

## 2021-02-06 LAB
ALBUMIN SERPL-MCNC: 2.3 G/DL (ref 3.4–5)
ALP BLD-CCNC: 137 U/L (ref 40–129)
ALT SERPL-CCNC: 31 U/L (ref 10–40)
ANION GAP SERPL CALCULATED.3IONS-SCNC: 9 MMOL/L (ref 3–16)
AST SERPL-CCNC: 44 U/L (ref 15–37)
BILIRUB SERPL-MCNC: 0.4 MG/DL (ref 0–1)
BILIRUBIN DIRECT: <0.2 MG/DL (ref 0–0.3)
BILIRUBIN, INDIRECT: ABNORMAL MG/DL (ref 0–1)
BUN BLDV-MCNC: 24 MG/DL (ref 7–20)
CALCIUM SERPL-MCNC: 8.9 MG/DL (ref 8.3–10.6)
CHLORIDE BLD-SCNC: 99 MMOL/L (ref 99–110)
CO2: 23 MMOL/L (ref 21–32)
CREAT SERPL-MCNC: 0.7 MG/DL (ref 0.8–1.3)
GFR AFRICAN AMERICAN: >60
GFR NON-AFRICAN AMERICAN: >60
GLUCOSE BLD-MCNC: 165 MG/DL (ref 70–99)
GLUCOSE BLD-MCNC: 170 MG/DL (ref 70–99)
GLUCOSE BLD-MCNC: 171 MG/DL (ref 70–99)
GLUCOSE BLD-MCNC: 216 MG/DL (ref 70–99)
GLUCOSE BLD-MCNC: 261 MG/DL (ref 70–99)
HCT VFR BLD CALC: 25.7 % (ref 40.5–52.5)
HEMOGLOBIN: 8.1 G/DL (ref 13.5–17.5)
INR BLD: 3.02 (ref 0.86–1.14)
MCH RBC QN AUTO: 27.5 PG (ref 26–34)
MCHC RBC AUTO-ENTMCNC: 31.5 G/DL (ref 31–36)
MCV RBC AUTO: 87.2 FL (ref 80–100)
PDW BLD-RTO: 21.1 % (ref 12.4–15.4)
PERFORMED ON: ABNORMAL
PLATELET # BLD: 203 K/UL (ref 135–450)
PMV BLD AUTO: 7.1 FL (ref 5–10.5)
POTASSIUM REFLEX MAGNESIUM: 4.6 MMOL/L (ref 3.5–5.1)
PROTHROMBIN TIME: 35.4 SEC (ref 10–13.2)
RBC # BLD: 2.95 M/UL (ref 4.2–5.9)
SODIUM BLD-SCNC: 131 MMOL/L (ref 136–145)
TOTAL PROTEIN: 5.4 G/DL (ref 6.4–8.2)
WBC # BLD: 12.6 K/UL (ref 4–11)

## 2021-02-06 PROCEDURE — 2700000000 HC OXYGEN THERAPY PER DAY

## 2021-02-06 PROCEDURE — 6370000000 HC RX 637 (ALT 250 FOR IP): Performed by: INTERNAL MEDICINE

## 2021-02-06 PROCEDURE — 2580000003 HC RX 258: Performed by: PHYSICIAN ASSISTANT

## 2021-02-06 PROCEDURE — 80076 HEPATIC FUNCTION PANEL: CPT

## 2021-02-06 PROCEDURE — 99233 SBSQ HOSP IP/OBS HIGH 50: CPT | Performed by: INTERNAL MEDICINE

## 2021-02-06 PROCEDURE — 80048 BASIC METABOLIC PNL TOTAL CA: CPT

## 2021-02-06 PROCEDURE — 1200000000 HC SEMI PRIVATE

## 2021-02-06 PROCEDURE — 85610 PROTHROMBIN TIME: CPT

## 2021-02-06 PROCEDURE — 96417 CHEMO IV INFUS EACH ADDL SEQ: CPT

## 2021-02-06 PROCEDURE — 6360000002 HC RX W HCPCS: Performed by: INTERNAL MEDICINE

## 2021-02-06 PROCEDURE — 2580000003 HC RX 258: Performed by: INTERNAL MEDICINE

## 2021-02-06 PROCEDURE — 94640 AIRWAY INHALATION TREATMENT: CPT

## 2021-02-06 PROCEDURE — 94761 N-INVAS EAR/PLS OXIMETRY MLT: CPT

## 2021-02-06 PROCEDURE — 6370000000 HC RX 637 (ALT 250 FOR IP): Performed by: PHYSICIAN ASSISTANT

## 2021-02-06 PROCEDURE — 85027 COMPLETE CBC AUTOMATED: CPT

## 2021-02-06 RX ORDER — FENTANYL 25 UG/H
1 PATCH TRANSDERMAL
Status: DISCONTINUED | OUTPATIENT
Start: 2021-02-06 | End: 2021-02-08 | Stop reason: HOSPADM

## 2021-02-06 RX ORDER — HYDROMORPHONE HYDROCHLORIDE 1 MG/ML
1 INJECTION, SOLUTION INTRAMUSCULAR; INTRAVENOUS; SUBCUTANEOUS
Status: DISCONTINUED | OUTPATIENT
Start: 2021-02-06 | End: 2021-02-07

## 2021-02-06 RX ORDER — AMIODARONE HYDROCHLORIDE 200 MG/1
400 TABLET ORAL DAILY
Status: DISCONTINUED | OUTPATIENT
Start: 2021-02-07 | End: 2021-02-08 | Stop reason: HOSPADM

## 2021-02-06 RX ORDER — NALOXONE HYDROCHLORIDE 0.4 MG/ML
0.4 INJECTION, SOLUTION INTRAMUSCULAR; INTRAVENOUS; SUBCUTANEOUS PRN
Status: DISCONTINUED | OUTPATIENT
Start: 2021-02-06 | End: 2021-02-08 | Stop reason: HOSPADM

## 2021-02-06 RX ORDER — FLUTICASONE PROPIONATE 50 MCG
2 SPRAY, SUSPENSION (ML) NASAL DAILY
Status: DISCONTINUED | OUTPATIENT
Start: 2021-02-06 | End: 2021-02-08 | Stop reason: HOSPADM

## 2021-02-06 RX ADMIN — TAMSULOSIN HYDROCHLORIDE 0.4 MG: 0.4 CAPSULE ORAL at 20:12

## 2021-02-06 RX ADMIN — AMIODARONE HYDROCHLORIDE 400 MG: 200 TABLET ORAL at 10:48

## 2021-02-06 RX ADMIN — IPRATROPIUM BROMIDE AND ALBUTEROL SULFATE 1 AMPULE: .5; 3 SOLUTION RESPIRATORY (INHALATION) at 21:04

## 2021-02-06 RX ADMIN — PANTOPRAZOLE SODIUM 40 MG: 40 TABLET, DELAYED RELEASE ORAL at 06:27

## 2021-02-06 RX ADMIN — Medication 1 CAPSULE: at 10:47

## 2021-02-06 RX ADMIN — INSULIN LISPRO 3 UNITS: 100 INJECTION, SOLUTION INTRAVENOUS; SUBCUTANEOUS at 17:26

## 2021-02-06 RX ADMIN — HYDROMORPHONE HYDROCHLORIDE 1 MG: 1 INJECTION, SOLUTION INTRAMUSCULAR; INTRAVENOUS; SUBCUTANEOUS at 23:38

## 2021-02-06 RX ADMIN — INSULIN LISPRO 3 UNITS: 100 INJECTION, SOLUTION INTRAVENOUS; SUBCUTANEOUS at 10:51

## 2021-02-06 RX ADMIN — ZOLPIDEM TARTRATE 5 MG: 5 TABLET ORAL at 23:38

## 2021-02-06 RX ADMIN — Medication 10 ML: at 20:12

## 2021-02-06 RX ADMIN — FLUTICASONE PROPIONATE 2 SPRAY: 50 SPRAY, METERED NASAL at 12:24

## 2021-02-06 RX ADMIN — MEGESTROL ACETATE 200 MG: 40 SUSPENSION ORAL at 10:48

## 2021-02-06 RX ADMIN — ALLOPURINOL 300 MG: 300 TABLET ORAL at 17:22

## 2021-02-06 RX ADMIN — INSULIN LISPRO 6 UNITS: 100 INJECTION, SOLUTION INTRAVENOUS; SUBCUTANEOUS at 12:32

## 2021-02-06 RX ADMIN — HYDROCODONE BITARTRATE AND ACETAMINOPHEN 1 TABLET: 5; 325 TABLET ORAL at 04:16

## 2021-02-06 RX ADMIN — FUROSEMIDE 40 MG: 40 TABLET ORAL at 10:47

## 2021-02-06 RX ADMIN — FINASTERIDE 5 MG: 5 TABLET, FILM COATED ORAL at 10:47

## 2021-02-06 RX ADMIN — Medication 2 PUFF: at 21:04

## 2021-02-06 RX ADMIN — METOPROLOL TARTRATE 100 MG: 50 TABLET, FILM COATED ORAL at 10:47

## 2021-02-06 RX ADMIN — Medication 10 ML: at 15:13

## 2021-02-06 RX ADMIN — Medication 1 CAPSULE: at 17:22

## 2021-02-06 RX ADMIN — NALOXEGOL OXALATE 25 MG: 25 TABLET, FILM COATED ORAL at 11:14

## 2021-02-06 RX ADMIN — POLYETHYLENE GLYCOL 3350 17 G: 17 POWDER, FOR SOLUTION ORAL at 10:49

## 2021-02-06 RX ADMIN — LISINOPRIL 5 MG: 5 TABLET ORAL at 10:48

## 2021-02-06 RX ADMIN — INSULIN LISPRO 14 UNITS: 100 INJECTION, SOLUTION INTRAVENOUS; SUBCUTANEOUS at 10:52

## 2021-02-06 RX ADMIN — Medication 10 ML: at 10:49

## 2021-02-06 RX ADMIN — INSULIN LISPRO 14 UNITS: 100 INJECTION, SOLUTION INTRAVENOUS; SUBCUTANEOUS at 17:27

## 2021-02-06 RX ADMIN — Medication 2 PUFF: at 08:28

## 2021-02-06 RX ADMIN — INSULIN LISPRO 14 UNITS: 100 INJECTION, SOLUTION INTRAVENOUS; SUBCUTANEOUS at 12:32

## 2021-02-06 RX ADMIN — ATORVASTATIN CALCIUM 20 MG: 20 TABLET, FILM COATED ORAL at 20:12

## 2021-02-06 RX ADMIN — SODIUM CHLORIDE 200 MG: 9 INJECTION, SOLUTION INTRAVENOUS at 15:14

## 2021-02-06 RX ADMIN — HYDROMORPHONE HYDROCHLORIDE 1 MG: 1 INJECTION, SOLUTION INTRAMUSCULAR; INTRAVENOUS; SUBCUTANEOUS at 11:03

## 2021-02-06 RX ADMIN — IPRATROPIUM BROMIDE AND ALBUTEROL SULFATE 1 AMPULE: .5; 3 SOLUTION RESPIRATORY (INHALATION) at 08:28

## 2021-02-06 RX ADMIN — IPRATROPIUM BROMIDE AND ALBUTEROL SULFATE 1 AMPULE: .5; 3 SOLUTION RESPIRATORY (INHALATION) at 15:23

## 2021-02-06 RX ADMIN — METOPROLOL TARTRATE 100 MG: 50 TABLET, FILM COATED ORAL at 22:27

## 2021-02-06 RX ADMIN — POTASSIUM CHLORIDE 40 MEQ: 1500 TABLET, EXTENDED RELEASE ORAL at 10:47

## 2021-02-06 RX ADMIN — IPRATROPIUM BROMIDE AND ALBUTEROL SULFATE 1 AMPULE: .5; 3 SOLUTION RESPIRATORY (INHALATION) at 12:03

## 2021-02-06 RX ADMIN — INSULIN LISPRO 5 UNITS: 100 INJECTION, SOLUTION INTRAVENOUS; SUBCUTANEOUS at 20:13

## 2021-02-06 ASSESSMENT — PAIN DESCRIPTION - LOCATION: LOCATION: BACK;GENERALIZED

## 2021-02-06 ASSESSMENT — PAIN DESCRIPTION - PAIN TYPE
TYPE: CHRONIC PAIN
TYPE: ACUTE PAIN;CHRONIC PAIN

## 2021-02-06 ASSESSMENT — PAIN DESCRIPTION - ORIENTATION
ORIENTATION: MID
ORIENTATION: RIGHT;MID

## 2021-02-06 ASSESSMENT — PAIN DESCRIPTION - PROGRESSION
CLINICAL_PROGRESSION: NOT CHANGED
CLINICAL_PROGRESSION: NOT CHANGED

## 2021-02-06 ASSESSMENT — PAIN DESCRIPTION - FREQUENCY: FREQUENCY: CONTINUOUS

## 2021-02-06 ASSESSMENT — PAIN SCALES - GENERAL
PAINLEVEL_OUTOF10: 6
PAINLEVEL_OUTOF10: 3
PAINLEVEL_OUTOF10: 1
PAINLEVEL_OUTOF10: 5
PAINLEVEL_OUTOF10: 2
PAINLEVEL_OUTOF10: 5

## 2021-02-06 NOTE — PROGRESS NOTES
Pharmacy to Dose Warfarin    Pharmacy consulted to dose warfarin for afib. INR Goal: 2-3    INR today: 3.02    Assessment/Plan:  - INR supratherapeutic today. Large jump from 2.1 likely due to drug interaction with amiodarone  - Will hold warfarin today  - Daily INR ordered    Pharmacy will continue to follow.     Jitendra Mae, PharmD  PGY1 Pharmacy Resident  [id number]

## 2021-02-06 NOTE — PROGRESS NOTES
Chemotherapy consult reviewed with patient and spouse. No questions or concerns are voiced at this time regarding this mediations. Consent has been signed and placed in chart.

## 2021-02-06 NOTE — PROGRESS NOTES
[facility]   Progress Note      2/6/2021 11:57 AM        Name: Lorena Gavin . Admitted: 1/25/2021    SUBJECTIVE:      Events noted. Patient has ongoing shortness of breath. Has minimal hemoptysis. No abdominal pain or back pain.     Reviewed interval ancillary notes    Current Medications      HYDROmorphone HCl PF (DILAUDID) injection 1 mg, Q3H PRN      fentaNYL (DURAGESIC) 25 MCG/HR 1 patch, Q72H      fluticasone (FLONASE) 50 MCG/ACT nasal spray 2 spray, Daily      sodium chloride (OCEAN, BABY AYR) 0.65 % nasal spray 2 spray, PRN      naloxone (NARCAN) injection 0.4 mg, PRN      promethazine-codeine (PHENERGAN with CODEINE) 6.25-10 MG/5ML syrup 5 mL, Q4H PRN      naloxegol (MOVANTIK) tablet 25 mg, QAM      polyethylene glycol (GLYCOLAX) packet 17 g, BID      bisacodyl (DULCOLAX) suppository 10 mg, Daily PRN      amiodarone (CORDARONE) tablet 400 mg, BID      furosemide (LASIX) tablet 40 mg, Daily      metoprolol tartrate (LOPRESSOR) tablet 100 mg, BID      megestrol (MEGACE) 40 MG/ML suspension 200 mg, Daily      lisinopril (PRINIVIL;ZESTRIL) tablet 5 mg, Daily      warfarin (COUMADIN) tablet 2.5 mg, Daily      LORazepam (ATIVAN) injection 1 mg, Once      amiodarone (CORDARONE) 150 mg in dextrose 5 % 100 mL bolus, Once      magnesium sulfate 1000 mg in dextrose 5% 100 mL IVPB, PRN      sennosides-docusate sodium (SENOKOT-S) 8.6-50 MG tablet 2 tablet, Daily PRN      budesonide-formoterol (SYMBICORT) 160-4.5 MCG/ACT inhaler 2 puff, BID      ipratropium-albuterol (DUONEB) nebulizer solution 1 ampule, Q4H WA      insulin lispro (1 Unit Dial) 14 Units, TID WC      0.9 % sodium chloride infusion, PRN      insulin glargine (LANTUS;BASAGLAR) injection pen 22 Units, Q12H      potassium chloride (KLOR-CON M) extended release tablet 40 mEq, Daily with breakfast      zolpidem (AMBIEN) tablet 5 mg, Nightly PRN      insulin lispro (1 Unit Dial) 0-18 Units, TID WC   insulin lispro (1 Unit Dial) 0-9 Units, Nightly      glucose (GLUTOSE) 40 % oral gel 15 g, PRN      dextrose 50 % IV solution, PRN      glucagon (rDNA) injection 1 mg, PRN      dextrose 5 % solution, PRN      0.9 % sodium chloride infusion, PRN      albuterol sulfate  (90 Base) MCG/ACT inhaler 2 puff, Q6H PRN      allopurinol (ZYLOPRIM) tablet 300 mg, QPM      finasteride (PROSCAR) tablet 5 mg, Daily      hydrOXYzine (VISTARIL) capsule 50 mg, TID PRN      pantoprazole (PROTONIX) tablet 40 mg, QAM AC      atorvastatin (LIPITOR) tablet 20 mg, Nightly      tamsulosin (FLOMAX) capsule 0.4 mg, Nightly      sodium chloride flush 0.9 % injection 10 mL, 2 times per day      sodium chloride flush 0.9 % injection 10 mL, PRN      magnesium hydroxide (MILK OF MAGNESIA) 400 MG/5ML suspension 30 mL, Daily PRN      acetaminophen (TYLENOL) tablet 650 mg, Q6H PRN      ondansetron (ZOFRAN) injection 4 mg, Q6H PRN      lactobacillus (CULTURELLE) capsule 1 capsule, BID WC        Objective:  /64   Pulse 67   Temp 97.6 °F (36.4 °C) (Temporal)   Resp 20   Ht 5' 9\" (1.753 m)   Wt 206 lb 12.7 oz (93.8 kg)   SpO2 93%   BMI 30.54 kg/m²     Intake/Output Summary (Last 24 hours) at 2/6/2021 1157  Last data filed at 2/5/2021 1230  Gross per 24 hour   Intake    Output 200 ml   Net -200 ml      Wt Readings from Last 3 Encounters:   02/06/21 206 lb 12.7 oz (93.8 kg)   01/14/21 214 lb (97.1 kg)   01/05/21 210 lb 9.6 oz (95.5 kg)       Conscious alert and oriented. Appears to be in mild to moderate distress. Neck is supple  Lungs: Expiratory wheezes are heard. Abdomen is soft bowel sounds are heard. Extremities no edema.     Labs and Tests:  CBC:   Recent Labs     02/06/21  0434   WBC 12.6*   HGB 8.1*        BMP:    Recent Labs     02/04/21  0452 02/05/21  0435 02/06/21  0434   * 133* 131*   K 4.5 4.5 4.6    100 99   CO2 24 23 23   BUN 22* 24* 24*   CREATININE 0.7* 0.8 0.7* GLUCOSE 182* 105* 170*     Hepatic:   Recent Labs     02/06/21  0433   AST 44*   ALT 31   BILITOT 0.4   ALKPHOS 137*       ASSESSMENT AND PLAN    Principal Problem:    Pneumonia  Active Problems:    Essential hypertension    SYLVIE (obstructive sleep apnea)    Melanoma in situ of lower leg (HCC)    Type 2 diabetes mellitus with diabetic polyneuropathy, without long-term current use of insulin (HCC)    GERD without esophagitis    Persistent atrial fibrillation (HCC)    Ischemic cardiomyopathy    VT (ventricular tachycardia) (HCC)    Dilated cardiomyopathy ([address]    AICD (automatic cardioverter/defibrillator) present    Supratherapeutic INR  Resolved Problems:    * No resolved hospital problems. *           Metastatic melanoma with lung metastasis and malignant pleural effusion:    -Diagnosed during hospitalization when he presented with supratherapeutic INR and was noted to have pleural effusion in late Jan 2021.  -LDH is elevated. -Due to ongoing shortness of breath and hemoptysis, CT chest abdomen and pelvis were obtained on 2/5/2021  -Scans have shown worsening disease in the lungs as well as moderate-sized parapneumonic effusion.  -Significant interval progression of disseminated pulmonary, hepatic, left adrenal, intraperitoneal and soft tissue metastatic disease.    -Had lengthy discussion with the patient as well as patient's wife. -This appears to be rapidly progressive metastatic melanoma with extensive metastatic disease. -BRAF mutation on the cytology is pending.    -Since her there is rapid progression and deterioration of the clinical status, I would recommend treatment with immunotherapy.   -Depending on the availability, will do treatment with ipilimumab plus nivolumab or pembrolizumab either today or tomorrow.  -Discussed with Dr. Antwan Retana and pharmacist.     Supratherapeutic INR epistaxis:     -Received FFP and vitamin K on 1/26/2021  -INR is 1.48 -Patient had poor appetite for last couple of weeks which might have caused vitamin K deficiency leading to supratherapeutic INR.  -On warfarin. INR 2.1       Cardiac arrest after bronchoscopy in December 2020, has pacemaker  Had V. tach during this hospitalization.   Cardiology is following      Anemia:     - Most likely due to blood loss  - anemia of chronic disease  - ferritin high  - Transfuse PRBCs if hemoglobin is less than 8            Lindsay Mai MD

## 2021-02-06 NOTE — PROGRESS NOTES
[address]   Progress Note  Cardiology      Emma Hightower   Admission date:  1/25/2021  CC-f/up for breetach  Subjective:  He feels ok.  Discussed nature of metastatic disease with Dr Walter Juarez     Objective:  Medications/Labs all Reviewed     fentaNYL  1 patch Transdermal Q72H    fluticasone  2 spray Each Nostril Daily    pembrolizumab (KEYTRUDA) IVPB  200 mg Intravenous Once    [START ON 2/7/2021] amiodarone  400 mg Oral Daily    warfarin (COUMADIN) daily dosing (placeholder)   Other RX Placeholder    naloxegol  25 mg Oral QAM    polyethylene glycol  17 g Oral BID    furosemide  40 mg Oral Daily    metoprolol tartrate  100 mg Oral BID    megestrol  200 mg Oral Daily    lisinopril  5 mg Oral Daily    LORazepam  1 mg Intravenous Once    amiodarone  150 mg Intravenous Once    budesonide-formoterol  2 puff Inhalation BID    ipratropium-albuterol  1 ampule Inhalation Q4H WA    insulin lispro  14 Units Subcutaneous TID     insulin glargine  22 Units Subcutaneous Q12H    potassium chloride  40 mEq Oral Daily with breakfast    insulin lispro  0-18 Units Subcutaneous TID WC    insulin lispro  0-9 Units Subcutaneous Nightly    allopurinol  300 mg Oral QPM    finasteride  5 mg Oral Daily    pantoprazole  40 mg Oral QAM AC    atorvastatin  20 mg Oral Nightly    tamsulosin  0.4 mg Oral Nightly    sodium chloride flush  10 mL Intravenous 2 times per day    lactobacillus  1 capsule Oral BID WC       BMP:   Lab Results   Component Value Date     02/06/2021    K 4.6 02/06/2021    CL 99 02/06/2021    CO2 23 02/06/2021    BUN 24 02/06/2021    CREATININE 0.7 02/06/2021    MG 2.50 02/01/2021     CBC:    Lab Results   Component Value Date    WBC 12.6 02/06/2021    RBC 2.95 02/06/2021    HGB 8.1 02/06/2021    HCT 25.7 02/06/2021    MCV 87.2 02/06/2021    RDW 21.1 02/06/2021     02/06/2021      PT/INR:    Lab Results   Component Value Date    INR 3.02 (H) 02/06/2021 PROTIME 35.4 (H) 02/06/2021     Cardiac Enzymes:  No results found for: CKTOTAL, CKMB, CKMBINDEX  Lab Results   Component Value Date    TROPONINI 0.04 (H) 02/02/2021    TROPONINI 0.03 (H) 02/02/2021    TROPONINI 0.03 (H) 02/01/2021     BNP:  No results found for: BNP  FASTING LIPID PANEL:    Lab Results   Component Value Date    CHOL 158 12/09/2020    HDL 34 12/09/2020    TRIG 141 12/09/2020       Physical Examination:    BP (!) 95/56   Pulse 60   Temp 97.2 °F (36.2 °C) (Temporal)   Resp 20   Ht 5' 9\" (1.753 m)   Wt 206 lb 12.7 oz (93.8 kg)   SpO2 95%   BMI 30.54 kg/m²      Respiratory:  · Resp Assessment: Normal respiratory effort  · Resp Auscultation: Clear to auscultation bilaterally   Cardiovascular:  · Auscultation: regular rate and rhythm, normal S1S2, no murmur, rub or gallop  · Palpation:  Nl PMI  · JVP:  normal  · Extremities: No Edema  Abdomen:  · Soft, non-tender  · Normal bowel sounds  Extremities:  ·  No Cyanosis or Clubbing  Neurological/Psychiatric:  · Oriented to time, place, and person  · Non-anxious  Skin Warm and dry    Assessment:    Principal Problem:    Pneumonia    Active Problems:    Essential hypertension      SYLVIE (obstructive sleep apnea)      Melanoma in situ of lower leg (HCC)      Type 2 diabetes mellitus with diabetic polyneuropathy, without long-term current use of insulin (HCC)      GERD without esophagitis        Ischemic cardiomyopathy      VT (ventricular tachycardia) (HCC)  Plan: switch amiodarone to 400 mg daily    Dilated cardiomyopathy ([address]      AICD (automatic cardioverter/defibrillator) present      Supratherapeutic INR  Plan: INR being adjusted,coumadin on hold today    Metastatic melanoma  Plan:  1.  [employer] poor,will reduce amiodarone, family and patient want to pursue treatment options for melanoma

## 2021-02-06 NOTE — PROGRESS NOTES
Pt assessment complete. Pt has complaints of lung pain when breathing on right side, which is not new per patient. Pt becomes short of breath with big position changes but is able to recover quickly. Wheezing noted which position changes, but lung fields are clear to diminished. Medicine given per [facility] VSS. Call light within reach, will continue to monitor.

## 2021-02-06 NOTE — PROGRESS NOTES
Original chemotherapy orders reviewed and acknowledged. Appropriateness of chemotherapy treatment regimen Keytruda for diagnosis of Melanoma was verified. Patient educated on chemotherapy regimen. Acknowledgement of informed consent for chemotherapy obtained. Pt height, weight, and BSA verified. Appropriate dosing calculations of chemotherapy based on height, weight, and BSA verified. Administration: Chemotherapy drug Keytruda independently verified with Rose Mary Chung RN prior to administration. Original order, appropriateness of regimen, drug supplied, height, weight, BSA, dose calculations, expiration dates/times, drug appearance, and two patient identifiers were verified by both RNs. Drug checked for vesicant/irritant status and for risk of hypersensitivity. Most recent laboratory values and allergies, were reviewed. Appropriate IV access available: Peripheral IV placed within the last 24 hours - see IV flowsheet.  + brisk blood return from [facility] and [address] [place] [address] verified correct rate of chemotherapy and maintenance IV fluids. Patient was educated on chemotherapy regimen prior to administration including indication for treatment related to disease & side effects of chemotherapy drug. Patient verbalizes understanding of all instructions.

## 2021-02-06 NOTE — PROGRESS NOTES
Hospitalist Progress Note      PCP: Ladonna Hart MD    Date of Admission: 1/25/2021    Hospital Course:   27-year-old M with history of ischemic CMP with VT s/p ICD, HTN, h/o melanoma, DM 2, Afib, SYLVIE admitted to the hospital with some weakness. Admitted as inpatient with acute respiratory failure due to post obstructive pneumonia with sepsis POA  . He has right lung mass on outside hospital CT. He declined pulmonary workup opting for resumption of workup with [facility] Pulmonologist.  Cytology from 1/28 consistent with metastatic melanoma. Had VT on 2/1 and required defibrillation. Moved to ICU. Seen by Cardio. Has complaints of hemoptysis. CT C/A/P with contrast with   1. Enlarging infiltrating mediastinal bronchogenic carcinoma with right   basilar atelectasis versus pneumonia and moderate-sized parapneumonic   effusion. 2. Significant interval progression of disseminated pulmonary, hepatic, left   adrenal, intraperitoneal and soft tissue metastatic disease     Changed to DNR-CCA/DNI. Will initiate ipilimumab plus nivolumab or pembrolizumab per Onc. Subjective:   Very SOB. Has back and chest pain. No HA or abdominal pain. No fevers. Had BM.      Medications:  Reviewed    Infusion Medications    sodium chloride      dextrose      sodium chloride       Scheduled Medications    fentaNYL  1 patch Transdermal Q72H    fluticasone  2 spray Each Nostril Daily    pembrolizumab (KEYTRUDA) IVPB  200 mg Intravenous Once    [START ON 2/7/2021] amiodarone  400 mg Oral Daily    warfarin (COUMADIN) daily dosing (placeholder)   Other RX Placeholder    naloxegol  25 mg Oral QAM    polyethylene glycol  17 g Oral BID    furosemide  40 mg Oral Daily    metoprolol tartrate  100 mg Oral BID    megestrol  200 mg Oral Daily    lisinopril  5 mg Oral Daily    LORazepam  1 mg Intravenous Once    amiodarone  150 mg Intravenous Once    budesonide-formoterol  2 puff Inhalation BID  ipratropium-albuterol  1 ampule Inhalation Q4H WA    insulin lispro  14 Units Subcutaneous TID     insulin glargine  22 Units Subcutaneous Q12H    potassium chloride  40 mEq Oral Daily with breakfast    insulin lispro  0-18 Units Subcutaneous TID     insulin lispro  0-9 Units Subcutaneous Nightly    allopurinol  300 mg Oral QPM    finasteride  5 mg Oral Daily    pantoprazole  40 mg Oral QAM AC    atorvastatin  20 mg Oral Nightly    tamsulosin  0.4 mg Oral Nightly    sodium chloride flush  10 mL Intravenous 2 times per day    lactobacillus  1 capsule Oral BID      PRN Meds: HYDROmorphone, sodium chloride, naloxone, promethazine-codeine, bisacodyl, magnesium sulfate, sennosides-docusate sodium, sodium chloride, zolpidem, glucose, dextrose, glucagon (rDNA), dextrose, sodium chloride, albuterol sulfate HFA, hydrOXYzine, sodium chloride flush, magnesium hydroxide, acetaminophen, ondansetron      Intake/Output Summary (Last 24 hours) at 2/6/2021 1429  Last data filed at 2/6/2021 1001  Gross per 24 hour   Intake 240 ml   Output    Net 240 ml       Physical Exam Performed:    BP (!) 95/56   Pulse 60   Temp 97.2 °F (36.2 °C) (Temporal)   Resp 20   Ht 5' 9\" (1.753 m)   Wt 206 lb 12.7 oz (93.8 kg)   SpO2 95%   BMI 30.54 kg/m²      General appearance: No apparent distress, appears stated age and cooperative. Obese male,on RA, sitting in bed  HEENT: Pupils equal, round, and reactive to light. Conjunctivae/corneas clear. Neck: Supple, with full range of motion. No jugular venous distention. Trachea midline. Respiratory:  Normal respiratory effort. Diminished sounds on R  Cardiovascular: Regular rate and rhythm with normal S1/S2 without murmurs, rubs or gallops. Abdomen: Soft, non-tender, non-distended with normal bowel sounds. Musculoskeletal: No clubbing, cyanosis. 1+ LE  edema bilaterally. Full range of motion without deformity. Skin: Skin color, texture, turgor normal.  No rashes or lesions. Neurologic:  Neurovascularly intact without any focal sensory/motor deficits. Cranial nerves: II-XII intact, grossly non-focal.  Psychiatric: Alert and oriented, thought content appropriate, normal insight      Labs:   Recent Labs     02/06/21 0434   WBC 12.6*   HGB 8.1*   HCT 25.7*        Recent Labs     02/04/21 0452 02/05/21 0435 02/06/21 0434   * 133* 131*   K 4.5 4.5 4.6    100 99   CO2 24 23 23   BUN 22* 24* 24*   CREATININE 0.7* 0.8 0.7*   CALCIUM 8.5 8.8 8.9     Recent Labs     02/06/21 0433   AST 44*   ALT 31   BILIDIR <0.2   BILITOT 0.4   ALKPHOS 137*     Recent Labs     02/04/21 0452 02/05/21 0435 02/06/21 0434   INR 2.04* 2.10* 3.02*     No results for input(s): Neldon Docker in the last 72 hours. Urinalysis:      Lab Results   Component Value Date    NITRU Negative 01/01/2021    BACTERIA 0 02/13/2020    RBCUA 0 02/13/2020    BLOODU Negative 01/01/2021    SPECGRAV 1.015 01/01/2021    GLUCOSEU >=1000 01/01/2021       Radiology:  CT CHEST ABDOMEN PELVIS W CONTRAST   Final Result   1. Enlarging infiltrating mediastinal bronchogenic carcinoma with right   basilar atelectasis versus pneumonia and moderate-sized parapneumonic   effusion. 2. Significant interval progression of disseminated pulmonary, hepatic, left   adrenal, intraperitoneal and soft tissue metastatic disease         CT HEAD W WO CONTRAST   Final Result   1. No evidence of metastatic disease. 2. Cerebral parenchymal volume loss, commensurate with the patient's age. XR CHEST 1 VIEW   Final Result   Addendum 1 of 1   ADDENDUM:   .         Final      IR GUIDED THORACENTESIS PLEURAL   Final Result   Successful ultrasound guided thoracentesis. IR GUIDED THORACENTESIS PLEURAL   Final Result   Trace right pleural effusion. No safe window for procedure at this time. Thoracentesis was not performed.          XR CHEST PORTABLE   Final Result Stable cardiomegaly with mild central pulmonary congestion which is more   prominent. Questionable early infiltrate or atelectasis along the right lung base which   is more apparent. Assessment/Plan:    Active Hospital Problems    Diagnosis    Supratherapeutic INR [R79.1]    Pneumonia [J18.9]    AICD (automatic cardioverter/defibrillator) present [Z95.810]    Dilated cardiomyopathy ([address] [I42.0]    VT (ventricular tachycardia) ([address] [I47.2]    Persistent atrial fibrillation ([address] [I48.19]    Ischemic cardiomyopathy [I25.5]    GERD without esophagitis [K21.9]    Type 2 diabetes mellitus with diabetic polyneuropathy, without long-term current use of insulin ([address] [E11.42]    Melanoma in situ of lower leg ([address] [D03.70]    SYLVIE (obstructive sleep apnea) [G47.33]    Essential hypertension [I10]     Community acquired Pneumonia- Possibly post obstructive form lung mass. suspected gram-positive/gram-negative  COVID-19 negative  DCed cefepime(day 7/7). Completed azithromycin(day 5/5)  -D/Cd vancomycin given negative MRSA culture  -WBC trending down    Acute respiratory failure with hypoxia-due to pneumonia  -Currently on RA. Wean for 02 sats >90%. -Continue inhalors  -Added Phenergan with Codeine syrup  -This is secondary to metastatic melanoma with compression    Hemoptysis  -From mass  -Will have to reverse AC if bleeding  -He is not a candidate for aggressive intervetion (Vent, bronch)  -DNR-CCA/DNI.     R lung mass and malignant effusion with metastatic melanoma  Previously bronchoscopy attempted had a brief cardiac arrest  -Declined further workup by Pulmonologist at this hospital.He would like to complete workup with Pulmonologist at [facility] guided lung bx or bronchoscopy planned.   -Thoracentesis done 1/28/21 at request of patient, cytology with metastatic melanoma  -Continue home coumadin   -Oncology recommendations noted  -Start immunotherapy today  -Dilaudid IV PRN pain    Supratherapeutic INR  INR noted noted to be greater than 14 on admission  -On coumadin at home for Afib subtherapeutic. DCed lovenox bid as INR > 2  -Cont Coumadin      Chronic moderate systolic heart failure   -Cont PO Lasix and potassium supplementation. Hx VT s/p AICD  -On Amio PO  -EP following    Type 2 diabetes mellitus  Last A1c 10.6  -Management per Endocrinology      Chronic Anemia  Iron studies B12 folate reflect studies consistent with anemia of chronic disease  -Hgb stable. Management per Oncology. Acute kidney injury-due to  prerenal  -Resolved. Continue to hold lisinopril    Chronic atrial fibrillation s/p pacemaker insertion on the first of this month  Pacemaker site has a small hematoma which was also noticed on discharge  -Continue amiodarone  -Continue lovenox/Coumadin for anticoagulation  -EP recommendations noted    Anxiety  -Vistaril PRN    Insomnia  -Continue ambien PRN    Constipation  -Cont Movantik now  -Miralax bid  -Dulcolax supp PRN    DVT Prophylaxis: Coumadin  Diet: DIET CARB CONTROL; Carb Control: 4 carb choices (60 gms)/meal; Daily Fluid Restriction: 1000 ml  Dietary Nutrition Supplements: Low Volume Supplement  Code Status: DNR-CCA    PT/OT Eval Status: as needed    Dispo -     Discussed with patient, Dr Catrachita Granados (Onc), Sandra Dutta PharmD and RN. D/W extensively with wife at bedside. Very poor prognosis. He is aware he may not survive. He will not allow CPR or vent. If he worsens, we need to execute aggressive comfort care to make sure he does not suffer.     Mirtha Aguirre MD

## 2021-02-07 ENCOUNTER — APPOINTMENT (OUTPATIENT)
Dept: GENERAL RADIOLOGY | Age: 66
DRG: 871 | End: 2021-02-07
Payer: COMMERCIAL

## 2021-02-07 LAB
ABO/RH: NORMAL
ANION GAP SERPL CALCULATED.3IONS-SCNC: 7 MMOL/L (ref 3–16)
ANTIBODY SCREEN: NORMAL
BUN BLDV-MCNC: 25 MG/DL (ref 7–20)
CALCIUM SERPL-MCNC: 8.7 MG/DL (ref 8.3–10.6)
CHLORIDE BLD-SCNC: 101 MMOL/L (ref 99–110)
CO2: 24 MMOL/L (ref 21–32)
CREAT SERPL-MCNC: 1 MG/DL (ref 0.8–1.3)
GFR AFRICAN AMERICAN: >60
GFR NON-AFRICAN AMERICAN: >60
GLUCOSE BLD-MCNC: 122 MG/DL (ref 70–99)
GLUCOSE BLD-MCNC: 153 MG/DL (ref 70–99)
GLUCOSE BLD-MCNC: 169 MG/DL (ref 70–99)
GLUCOSE BLD-MCNC: 224 MG/DL (ref 70–99)
GLUCOSE BLD-MCNC: 96 MG/DL (ref 70–99)
INR BLD: 2.16 (ref 0.86–1.14)
INR BLD: 3 (ref 0.86–1.14)
INR BLD: 3.41 (ref 0.86–1.14)
PERFORMED ON: ABNORMAL
PERFORMED ON: NORMAL
POTASSIUM REFLEX MAGNESIUM: 5 MMOL/L (ref 3.5–5.1)
PROTHROMBIN TIME: 25.3 SEC (ref 10–13.2)
PROTHROMBIN TIME: 35.2 SEC (ref 10–13.2)
PROTHROMBIN TIME: 40 SEC (ref 10–13.2)
SODIUM BLD-SCNC: 132 MMOL/L (ref 136–145)
URIC ACID, SERUM: 2.7 MG/DL (ref 3.5–7.2)

## 2021-02-07 PROCEDURE — 6370000000 HC RX 637 (ALT 250 FOR IP): Performed by: INTERNAL MEDICINE

## 2021-02-07 PROCEDURE — 36415 COLL VENOUS BLD VENIPUNCTURE: CPT

## 2021-02-07 PROCEDURE — 86850 RBC ANTIBODY SCREEN: CPT

## 2021-02-07 PROCEDURE — P9017 PLASMA 1 DONOR FRZ W/IN 8 HR: HCPCS

## 2021-02-07 PROCEDURE — 6360000002 HC RX W HCPCS: Performed by: INTERNAL MEDICINE

## 2021-02-07 PROCEDURE — 86901 BLOOD TYPING SEROLOGIC RH(D): CPT

## 2021-02-07 PROCEDURE — P9016 RBC LEUKOCYTES REDUCED: HCPCS

## 2021-02-07 PROCEDURE — 94761 N-INVAS EAR/PLS OXIMETRY MLT: CPT

## 2021-02-07 PROCEDURE — 6370000000 HC RX 637 (ALT 250 FOR IP): Performed by: PHYSICIAN ASSISTANT

## 2021-02-07 PROCEDURE — 86900 BLOOD TYPING SEROLOGIC ABO: CPT

## 2021-02-07 PROCEDURE — 71045 X-RAY EXAM CHEST 1 VIEW: CPT

## 2021-02-07 PROCEDURE — 86923 COMPATIBILITY TEST ELECTRIC: CPT

## 2021-02-07 PROCEDURE — 94640 AIRWAY INHALATION TREATMENT: CPT

## 2021-02-07 PROCEDURE — 2580000003 HC RX 258: Performed by: PHYSICIAN ASSISTANT

## 2021-02-07 PROCEDURE — 2700000000 HC OXYGEN THERAPY PER DAY

## 2021-02-07 PROCEDURE — 85610 PROTHROMBIN TIME: CPT

## 2021-02-07 PROCEDURE — 1200000000 HC SEMI PRIVATE

## 2021-02-07 PROCEDURE — 80048 BASIC METABOLIC PNL TOTAL CA: CPT

## 2021-02-07 PROCEDURE — 84550 ASSAY OF BLOOD/URIC ACID: CPT

## 2021-02-07 RX ORDER — OXYCODONE HYDROCHLORIDE 5 MG/1
5 TABLET ORAL EVERY 4 HOURS PRN
Status: DISCONTINUED | OUTPATIENT
Start: 2021-02-07 | End: 2021-02-08 | Stop reason: HOSPADM

## 2021-02-07 RX ORDER — PHYTONADIONE 5 MG/1
5 TABLET ORAL ONCE
Status: COMPLETED | OUTPATIENT
Start: 2021-02-07 | End: 2021-02-07

## 2021-02-07 RX ORDER — SODIUM CHLORIDE 9 MG/ML
INJECTION, SOLUTION INTRAVENOUS PRN
Status: DISCONTINUED | OUTPATIENT
Start: 2021-02-07 | End: 2021-02-08 | Stop reason: HOSPADM

## 2021-02-07 RX ADMIN — FLUTICASONE PROPIONATE 2 SPRAY: 50 SPRAY, METERED NASAL at 09:03

## 2021-02-07 RX ADMIN — IPRATROPIUM BROMIDE AND ALBUTEROL SULFATE 1 AMPULE: .5; 3 SOLUTION RESPIRATORY (INHALATION) at 21:31

## 2021-02-07 RX ADMIN — Medication 1 CAPSULE: at 09:02

## 2021-02-07 RX ADMIN — AMIODARONE HYDROCHLORIDE 400 MG: 200 TABLET ORAL at 09:02

## 2021-02-07 RX ADMIN — ALLOPURINOL 300 MG: 300 TABLET ORAL at 17:12

## 2021-02-07 RX ADMIN — IPRATROPIUM BROMIDE AND ALBUTEROL SULFATE 1 AMPULE: .5; 3 SOLUTION RESPIRATORY (INHALATION) at 12:16

## 2021-02-07 RX ADMIN — LISINOPRIL 5 MG: 5 TABLET ORAL at 09:03

## 2021-02-07 RX ADMIN — Medication 10 ML: at 09:03

## 2021-02-07 RX ADMIN — NALOXEGOL OXALATE 25 MG: 25 TABLET, FILM COATED ORAL at 09:02

## 2021-02-07 RX ADMIN — Medication 2 PUFF: at 21:31

## 2021-02-07 RX ADMIN — IPRATROPIUM BROMIDE AND ALBUTEROL SULFATE 1 AMPULE: .5; 3 SOLUTION RESPIRATORY (INHALATION) at 08:04

## 2021-02-07 RX ADMIN — PANTOPRAZOLE SODIUM 40 MG: 40 TABLET, DELAYED RELEASE ORAL at 05:30

## 2021-02-07 RX ADMIN — INSULIN LISPRO 14 UNITS: 100 INJECTION, SOLUTION INTRAVENOUS; SUBCUTANEOUS at 13:51

## 2021-02-07 RX ADMIN — INSULIN LISPRO 3 UNITS: 100 INJECTION, SOLUTION INTRAVENOUS; SUBCUTANEOUS at 13:51

## 2021-02-07 RX ADMIN — HYDROMORPHONE HYDROCHLORIDE 1 MG: 1 INJECTION, SOLUTION INTRAMUSCULAR; INTRAVENOUS; SUBCUTANEOUS at 05:30

## 2021-02-07 RX ADMIN — INSULIN LISPRO 14 UNITS: 100 INJECTION, SOLUTION INTRAVENOUS; SUBCUTANEOUS at 09:14

## 2021-02-07 RX ADMIN — POLYETHYLENE GLYCOL 3350 17 G: 17 POWDER, FOR SOLUTION ORAL at 09:02

## 2021-02-07 RX ADMIN — ZOLPIDEM TARTRATE 5 MG: 5 TABLET ORAL at 21:49

## 2021-02-07 RX ADMIN — TAMSULOSIN HYDROCHLORIDE 0.4 MG: 0.4 CAPSULE ORAL at 20:47

## 2021-02-07 RX ADMIN — ACETAMINOPHEN 650 MG: 325 TABLET ORAL at 17:11

## 2021-02-07 RX ADMIN — Medication 10 ML: at 20:46

## 2021-02-07 RX ADMIN — OXYCODONE 5 MG: 5 TABLET ORAL at 21:50

## 2021-02-07 RX ADMIN — MEGESTROL ACETATE 200 MG: 40 SUSPENSION ORAL at 09:02

## 2021-02-07 RX ADMIN — FUROSEMIDE 40 MG: 40 TABLET ORAL at 09:05

## 2021-02-07 RX ADMIN — Medication 2 PUFF: at 02:31

## 2021-02-07 RX ADMIN — POTASSIUM CHLORIDE 40 MEQ: 1500 TABLET, EXTENDED RELEASE ORAL at 09:03

## 2021-02-07 RX ADMIN — IPRATROPIUM BROMIDE AND ALBUTEROL SULFATE 1 AMPULE: .5; 3 SOLUTION RESPIRATORY (INHALATION) at 16:30

## 2021-02-07 RX ADMIN — Medication 2 PUFF: at 08:04

## 2021-02-07 RX ADMIN — INSULIN LISPRO 6 UNITS: 100 INJECTION, SOLUTION INTRAVENOUS; SUBCUTANEOUS at 09:14

## 2021-02-07 RX ADMIN — ATORVASTATIN CALCIUM 20 MG: 20 TABLET, FILM COATED ORAL at 20:46

## 2021-02-07 RX ADMIN — INSULIN LISPRO 14 UNITS: 100 INJECTION, SOLUTION INTRAVENOUS; SUBCUTANEOUS at 17:46

## 2021-02-07 RX ADMIN — FINASTERIDE 5 MG: 5 TABLET, FILM COATED ORAL at 09:03

## 2021-02-07 RX ADMIN — Medication 1 CAPSULE: at 17:12

## 2021-02-07 RX ADMIN — OXYCODONE 5 MG: 5 TABLET ORAL at 13:46

## 2021-02-07 RX ADMIN — OXYCODONE 5 MG: 5 TABLET ORAL at 17:47

## 2021-02-07 RX ADMIN — PHYTONADIONE 5 MG: 5 TABLET ORAL at 13:46

## 2021-02-07 ASSESSMENT — PAIN SCALES - GENERAL
PAINLEVEL_OUTOF10: 7
PAINLEVEL_OUTOF10: 2
PAINLEVEL_OUTOF10: 0
PAINLEVEL_OUTOF10: 6
PAINLEVEL_OUTOF10: 0
PAINLEVEL_OUTOF10: 5

## 2021-02-07 ASSESSMENT — PAIN DESCRIPTION - DESCRIPTORS
DESCRIPTORS: ACHING

## 2021-02-07 ASSESSMENT — PAIN SCALES - WONG BAKER: WONGBAKER_NUMERICALRESPONSE: 0

## 2021-02-07 ASSESSMENT — PAIN DESCRIPTION - ORIENTATION
ORIENTATION: MID

## 2021-02-07 ASSESSMENT — PAIN DESCRIPTION - PROGRESSION
CLINICAL_PROGRESSION: NOT CHANGED

## 2021-02-07 ASSESSMENT — PAIN DESCRIPTION - FREQUENCY
FREQUENCY: CONTINUOUS
FREQUENCY: CONTINUOUS

## 2021-02-07 ASSESSMENT — PAIN DESCRIPTION - LOCATION: LOCATION: BACK;GENERALIZED

## 2021-02-07 ASSESSMENT — PAIN DESCRIPTION - ONSET: ONSET: ON-GOING

## 2021-02-07 ASSESSMENT — PAIN DESCRIPTION - PAIN TYPE
TYPE: ACUTE PAIN;CHRONIC PAIN
TYPE: ACUTE PAIN;CHRONIC PAIN

## 2021-02-07 NOTE — PROGRESS NOTES
Oncology and Hematology Care   Progress Note      2/7/2021 5:18 PM        Name: Lorena Gavin . Admitted: 1/25/2021    SUBJECTIVE:      Doing okay. Shortness of breath is stable. Pain is okay.     Reviewed interval ancillary notes    Current Medications      0.9 % sodium chloride infusion, PRN      oxyCODONE (ROXICODONE) immediate release tablet 5 mg, Q4H PRN      fentaNYL (DURAGESIC) 25 MCG/HR 1 patch, Q72H      fluticasone (FLONASE) 50 MCG/ACT nasal spray 2 spray, Daily      sodium chloride (OCEAN, BABY AYR) 0.65 % nasal spray 2 spray, PRN      naloxone (NARCAN) injection 0.4 mg, PRN      amiodarone (CORDARONE) tablet 400 mg, Daily      promethazine-codeine (PHENERGAN with CODEINE) 6.25-10 MG/5ML syrup 5 mL, Q4H PRN      naloxegol (MOVANTIK) tablet 25 mg, QAM      polyethylene glycol (GLYCOLAX) packet 17 g, BID      bisacodyl (DULCOLAX) suppository 10 mg, Daily PRN      furosemide (LASIX) tablet 40 mg, Daily      metoprolol tartrate (LOPRESSOR) tablet 100 mg, BID      megestrol (MEGACE) 40 MG/ML suspension 200 mg, Daily      lisinopril (PRINIVIL;ZESTRIL) tablet 5 mg, Daily      LORazepam (ATIVAN) injection 1 mg, Once      amiodarone (CORDARONE) 150 mg in dextrose 5 % 100 mL bolus, Once      magnesium sulfate 1000 mg in dextrose 5% 100 mL IVPB, PRN      sennosides-docusate sodium (SENOKOT-S) 8.6-50 MG tablet 2 tablet, Daily PRN      budesonide-formoterol (SYMBICORT) 160-4.5 MCG/ACT inhaler 2 puff, BID      ipratropium-albuterol (DUONEB) nebulizer solution 1 ampule, Q4H WA      insulin lispro (1 Unit Dial) 14 Units, TID WC      0.9 % sodium chloride infusion, PRN      insulin glargine (LANTUS;BASAGLAR) injection pen 22 Units, Q12H      potassium chloride (KLOR-CON M) extended release tablet 40 mEq, Daily with breakfast      zolpidem (AMBIEN) tablet 5 mg, Nightly PRN      insulin lispro (1 Unit Dial) 0-18 Units, TID WC      insulin lispro (1 Unit Dial) 0-9 Units, Nightly   glucose (GLUTOSE) 40 % oral gel 15 g, PRN      dextrose 50 % IV solution, PRN      glucagon (rDNA) injection 1 mg, PRN      dextrose 5 % solution, PRN      0.9 % sodium chloride infusion, PRN      albuterol sulfate  (90 Base) MCG/ACT inhaler 2 puff, Q6H PRN      allopurinol (ZYLOPRIM) tablet 300 mg, QPM      finasteride (PROSCAR) tablet 5 mg, Daily      hydrOXYzine (VISTARIL) capsule 50 mg, TID PRN      pantoprazole (PROTONIX) tablet 40 mg, QAM AC      atorvastatin (LIPITOR) tablet 20 mg, Nightly      tamsulosin (FLOMAX) capsule 0.4 mg, Nightly      sodium chloride flush 0.9 % injection 10 mL, 2 times per day      sodium chloride flush 0.9 % injection 10 mL, PRN      magnesium hydroxide (MILK OF MAGNESIA) 400 MG/5ML suspension 30 mL, Daily PRN      acetaminophen (TYLENOL) tablet 650 mg, Q6H PRN      ondansetron (ZOFRAN) injection 4 mg, Q6H PRN      lactobacillus (CULTURELLE) capsule 1 capsule, BID WC        Objective:  BP (!) 99/43   Pulse 66   Temp 97.4 °F (36.3 °C) (Temporal)   Resp 20   Ht 5' 9\" (1.753 m)   Wt 206 lb 12.7 oz (93.8 kg)   SpO2 94%   BMI 30.54 kg/m²   No intake or output data in the 24 hours ending 02/07/21 1718   Wt Readings from Last 3 Encounters:   02/07/21 206 lb 12.7 oz (93.8 kg)   01/14/21 214 lb (97.1 kg)   01/05/21 210 lb 9.6 oz (95.5 kg)       Conscious alert and oriented. Appears to be in mild to moderate distress. Neck is supple  Lungs: Expiratory wheezes are heard. Abdomen is soft bowel sounds are heard. Extremities no edema.     Labs and Tests:  CBC:   Recent Labs     02/06/21  0434   WBC 12.6*   HGB 8.1*        BMP:    Recent Labs     02/05/21  0435 02/06/21  0434 02/07/21  0447   * 131* 132*   K 4.5 4.6 5.0    99 101   CO2 23 23 24   BUN 24* 24* 25*   CREATININE 0.8 0.7* 1.0   GLUCOSE 105* 170* 169*     Hepatic:   Recent Labs     02/06/21  0433   AST 44*   ALT 31   BILITOT 0.4   ALKPHOS 137*       ASSESSMENT AND PLAN Principal Problem:    Pneumonia  Active Problems:    Essential hypertension    SYLVIE (obstructive sleep apnea)    Melanoma in situ of lower leg (HCC)    Type 2 diabetes mellitus with diabetic polyneuropathy, without long-term current use of insulin (HCC)    GERD without esophagitis    Persistent atrial fibrillation (HCC)    Ischemic cardiomyopathy    VT (ventricular tachycardia) (HCC)    Dilated cardiomyopathy ([facility]    AICD (automatic cardioverter/defibrillator) present    Supratherapeutic INR  Resolved Problems:    * No resolved hospital problems. *           Metastatic melanoma with lung metastasis and malignant pleural effusion:    -Diagnosed during hospitalization when he presented with supratherapeutic INR and was noted to have pleural effusion in late Jan 2021.  -LDH is elevated. -Due to ongoing shortness of breath and hemoptysis, CT chest abdomen and pelvis were obtained on 2/5/2021  -Scans have shown worsening disease in the lungs as well as moderate-sized parapneumonic effusion.  -Significant interval progression of disseminated pulmonary, hepatic, left adrenal, intraperitoneal and soft tissue metastatic disease.    -Had lengthy discussion with the patient as well as patient's wife. -This appears to be rapidly progressive metastatic melanoma with extensive metastatic disease. -BRAF mutation on the cytology is pending.    -Since her there is rapid progression and deterioration of the clinical status,  - Received Pembrolizumab 200 mg IV times once on 2/6/2021. Next dose will be due in 3 weeks    - Will attempt thoracentesis tomorrow or day after depending on INR level for symptomatic relief.  -He would benefit for palliative care services as outpatient.     Supratherapeutic INR epistaxis:     -Received FFP and vitamin K on 1/26/2021  -INR is 1.48  -Patient had poor appetite for last couple of weeks which might have caused vitamin K deficiency leading to supratherapeutic INR.  -On warfarin.   INR 2.1    Cardiac arrest after bronchoscopy in December 2020, has pacemaker    Had V. tach during this hospitalization.   Cardiology is following      Anemia:     - Most likely due to blood loss  - anemia of chronic disease  - ferritin high  - Transfuse PRBCs if hemoglobin is less than 8    Neoplasm related pain:    -Continue fentanyl patch    Discussed with wife.  Jenifer Francois MD

## 2021-02-07 NOTE — PROGRESS NOTES
Chemotherapy given today by 5T RN, Dilaudid PRN added and fentanyl patch for pain. Monitoring BS.   Patient reported hsu but stated he wants his pain med before bed time

## 2021-02-07 NOTE — PROGRESS NOTES
Pt assessment complete. VSS. Pt has pain but would like to wait to talk pain meds right before bed. Wife is tearful when talking about Pt condition changes. Pt and spouse are wanting more details about restrictions such as O2 needs that might keep Pt from being D/C, dicussed that Case Management can help coordinate any needs for discharge. Pt and wife satisfied with this. No other concerns at this time. Meds given per MAR. Call light within reach, will continue to monitor.

## 2021-02-07 NOTE — PLAN OF CARE
Nutrition Problem #1: Unintended weight loss  Intervention: Food and/or Nutrient Delivery: Continue Current Diet, Start Oral Nutrition Supplement  Nutritional Goals: Pt will consistently consume at least 50% of meals and supplements
Problem: Pain:  Goal: Pain level will decrease  Description: Pain level will decrease  Outcome: Ongoing  Note: No report of pain this shift. Problem: Falls - Risk of:  Goal: Will remain free from falls  Description: Will remain free from falls  Outcome: Ongoing  Note: Remains free from falls this shift. Problem: Airway Clearance - Ineffective:  Goal: Clear lung sounds  Description: Clear lung sounds  Outcome: Ongoing  Note: Cough has been non-productive this shift. Problem: Gas Exchange - Impaired:  Goal: Levels of oxygenation will improve  Description: Levels of oxygenation will improve  Outcome: Ongoing  Note: SOB on CPAP without O2 bled in, RA sat 90%, improved to 96% with O2 bled in and rested well on CPAP after O2 added.
Description: Maintenance of adequate weight for body size and type will improve  Outcome: Ongoing     Problem: Physical Regulation:  Goal: Complications related to the disease process, condition or treatment will be avoided or minimized  Description: Complications related to the disease process, condition or treatment will be avoided or minimized  Outcome: Ongoing     Problem: Self-Concept:  Goal: Ability to accept self in the situation and incorporate positive changes in behavior will improve  Description: Ability to accept self in the situation and incorporate positive changes in behavior will improve  Outcome: Ongoing     Problem: Sensory:  Goal: Pain level will decrease  Description: Pain level will decrease  Outcome: Ongoing  Goal: Demonstrations of a stable neurologic state will increase  Description: Demonstrations of a stable neurologic state will increase  Outcome: Ongoing     Problem: Skin Integrity:  Goal: Status of oral mucous membranes will improve  Description: Status of oral mucous membranes will improve  Outcome: Ongoing  Goal: Complications related to intravenous access or infusion will be avoided or minimized  Description: Complications related to intravenous access or infusion will be avoided or minimized  Outcome: Ongoing

## 2021-02-07 NOTE — ACP (ADVANCE CARE PLANNING)
Advanced Care Planning Note. Purpose of Encounter: Advanced care planning in light of melanoma  Parties In Attendance: Patient, wife  Decisional Capacity: Yes  Subjective: Patient is SOB  Objective: Cr 1.0  Goals of Care Determination: Patient/POA wants limited support (No CPR, no vent, no surgery, no HD, no trach, no PEG)  Plan:  Onc/Renal/Cardio/Endocrine/Palliative care consults. IR consult for PleurX catheter. Transfuse FFP and give Vit K PO  Code Status: DNR-CCA/DNI   Time spent on Advanced care Plannin minutes  Advanced Care Planning Documents: Completed advanced directives on chart, wife is the POA.     Candelario Baugh MD  2021 4:11 PM

## 2021-02-07 NOTE — PROGRESS NOTES
Pharmacy to Dose Warfarin     Pharmacy consulted to dose warfarin for afib. INR Goal: 2-3     INR today: 3.41     Assessment/Plan:  - INR supratherapeutic today. Likely due to drug interaction with amiodarone  - Will hold warfarin today. Likely resume tomorrow. - Daily INR ordered     Pharmacy will continue to follow.      Michelle Robison, PharmD  PGY1 Pharmacy Resident  [id number]

## 2021-02-07 NOTE — PROGRESS NOTES
Hospitalist Progress Note      PCP: Gricel Cabrera MD    Date of Admission: 1/25/2021    Hospital Course:   79-year-old M with history of ischemic CMP with VT s/p ICD, HTN, h/o melanoma, DM 2, Afib, SYLVIE admitted to the hospital with some weakness. Admitted as inpatient with acute respiratory failure due to post obstructive pneumonia with sepsis POA  . He has right lung mass on outside hospital CT. He declined pulmonary workup opting for resumption of workup with [facility] Pulmonologist.  Cytology from 1/28 consistent with metastatic melanoma. Had VT on 2/1 and required defibrillation. Moved to ICU. Seen by Cardio. Has complaints of hemoptysis. CT C/A/P with contrast with   1. Enlarging infiltrating mediastinal bronchogenic carcinoma with right   basilar atelectasis versus pneumonia and moderate-sized parapneumonic   effusion. 2. Significant interval progression of disseminated pulmonary, hepatic, left   adrenal, intraperitoneal and soft tissue metastatic disease     Changed to DNR-CCA/DNI. Got Keytruda on 2/6. Got FFP and Vit K on 2/7. IR consulted for R pleurX catheter for malignant effusion. Subjective:   Remains SOB. Tolerable back and chest pain. No HA or abdominal pain. No fevers. Had BM.      Medications:  Reviewed    Infusion Medications    sodium chloride      sodium chloride      dextrose      sodium chloride       Scheduled Medications    fentaNYL  1 patch Transdermal Q72H    fluticasone  2 spray Each Nostril Daily    amiodarone  400 mg Oral Daily    naloxegol  25 mg Oral QAM    polyethylene glycol  17 g Oral BID    furosemide  40 mg Oral Daily    metoprolol tartrate  100 mg Oral BID    megestrol  200 mg Oral Daily    lisinopril  5 mg Oral Daily    LORazepam  1 mg Intravenous Once    amiodarone  150 mg Intravenous Once    budesonide-formoterol  2 puff Inhalation BID    ipratropium-albuterol  1 ampule Inhalation Q4H WA  insulin lispro  14 Units Subcutaneous TID     insulin glargine  22 Units Subcutaneous Q12H    potassium chloride  40 mEq Oral Daily with breakfast    insulin lispro  0-18 Units Subcutaneous TID     insulin lispro  0-9 Units Subcutaneous Nightly    allopurinol  300 mg Oral QPM    finasteride  5 mg Oral Daily    pantoprazole  40 mg Oral QAM AC    atorvastatin  20 mg Oral Nightly    tamsulosin  0.4 mg Oral Nightly    sodium chloride flush  10 mL Intravenous 2 times per day    lactobacillus  1 capsule Oral BID      PRN Meds: sodium chloride, oxyCODONE, sodium chloride, naloxone, promethazine-codeine, bisacodyl, magnesium sulfate, sennosides-docusate sodium, sodium chloride, zolpidem, glucose, dextrose, glucagon (rDNA), dextrose, sodium chloride, albuterol sulfate HFA, hydrOXYzine, sodium chloride flush, magnesium hydroxide, acetaminophen, ondansetron    No intake or output data in the 24 hours ending 02/07/21 1607    Physical Exam Performed:    BP (!) 95/40   Pulse 61   Temp 97.7 °F (36.5 °C) (Temporal)   Resp 20   Ht 5' 9\" (1.753 m)   Wt 206 lb 12.7 oz (93.8 kg)   SpO2 94%   BMI 30.54 kg/m²      General appearance: No apparent distress, appears stated age and cooperative. Obese male,on RA, sitting in bed  HEENT: Pupils equal, round, and reactive to light. Conjunctivae/corneas clear. Neck: Supple, with full range of motion. No jugular venous distention. Trachea midline. Respiratory:  Normal respiratory effort. Diminished sounds on R  Cardiovascular: Regular rate and rhythm with normal S1/S2 without murmurs, rubs or gallops. Abdomen: Soft, non-tender, non-distended with normal bowel sounds. Musculoskeletal: No clubbing, cyanosis. 1+ LE  edema bilaterally. Full range of motion without deformity. Skin: Skin color, texture, turgor normal.  No rashes or lesions. Neurologic:  Neurovascularly intact without any focal sensory/motor deficits.  Cranial nerves: II-XII intact, grossly non-focal. Psychiatric: Alert and oriented, thought content appropriate, normal insight      Labs:   Recent Labs     02/06/21 0434   WBC 12.6*   HGB 8.1*   HCT 25.7*        Recent Labs     02/05/21 0435 02/06/21 0434 02/07/21 0447   * 131* 132*   K 4.5 4.6 5.0    99 101   CO2 23 23 24   BUN 24* 24* 25*   CREATININE 0.8 0.7* 1.0   CALCIUM 8.8 8.9 8.7     Recent Labs     02/06/21 0433   AST 44*   ALT 31   BILIDIR <0.2   BILITOT 0.4   ALKPHOS 137*     Recent Labs     02/05/21 0435 02/06/21 0434 02/07/21 0447   INR 2.10* 3.02* 3.41*     No results for input(s): Towana Lois in the last 72 hours. Urinalysis:      Lab Results   Component Value Date    NITRU Negative 01/01/2021    BACTERIA 0 02/13/2020    RBCUA 0 02/13/2020    BLOODU Negative 01/01/2021    SPECGRAV 1.015 01/01/2021    GLUCOSEU >=1000 01/01/2021       Radiology:  XR CHEST PORTABLE   Final Result   Right pleural effusion with right basilar opacity that could represent   atelectasis or pneumonia         CT CHEST ABDOMEN PELVIS W CONTRAST   Final Result   1. Enlarging infiltrating mediastinal bronchogenic carcinoma with right   basilar atelectasis versus pneumonia and moderate-sized parapneumonic   effusion. 2. Significant interval progression of disseminated pulmonary, hepatic, left   adrenal, intraperitoneal and soft tissue metastatic disease         CT HEAD W WO CONTRAST   Final Result   1. No evidence of metastatic disease. 2. Cerebral parenchymal volume loss, commensurate with the patient's age. XR CHEST 1 VIEW   Final Result   Addendum 1 of 1   ADDENDUM:   .         Final      IR GUIDED THORACENTESIS PLEURAL   Final Result   Successful ultrasound guided thoracentesis. IR GUIDED THORACENTESIS PLEURAL   Final Result   Trace right pleural effusion. No safe window for procedure at this time. Thoracentesis was not performed.          XR CHEST PORTABLE   Final Result Stable cardiomegaly with mild central pulmonary congestion which is more   prominent. Questionable early infiltrate or atelectasis along the right lung base which   is more apparent. IR GUIDED PERC PLEURAL DRAIN W CATH INSERT    (Results Pending)           Assessment/Plan:    Active Hospital Problems    Diagnosis    Supratherapeutic INR [R79.1]    Pneumonia [J18.9]    AICD (automatic cardioverter/defibrillator) present [Z95.810]    Dilated cardiomyopathy ([address] [I42.0]    VT (ventricular tachycardia) ([address] [I47.2]    Persistent atrial fibrillation (HCC) [I48.19]    Ischemic cardiomyopathy [I25.5]    GERD without esophagitis [K21.9]    Type 2 diabetes mellitus with diabetic polyneuropathy, without long-term current use of insulin (HCC) [E11.42]    Melanoma in situ of lower leg (HCC) [D03.70]    SYLVIE (obstructive sleep apnea) [G47.33]    Essential hypertension [I10]     Community acquired Pneumonia- Possibly post obstructive form lung mass. suspected gram-positive/gram-negative  COVID-19 negative  DCed cefepime(day 7/7). Completed azithromycin(day 5/5)  -D/Cd vancomycin given negative MRSA culture  -WBC trending down    Acute respiratory failure with hypoxia-due to pneumonia  -Currently on RA. Wean for 02 sats >90%. -Continue inhalors  -Added Phenergan with Codeine syrup  -This is secondary to metastatic melanoma with compression    Hemoptysis  -From mass  -Improving  -He is not a candidate for aggressive intervetion (Vent, bronch)  -DNR-CCA/DNI.     R lung mass and malignant effusion with metastatic melanoma  Previously bronchoscopy attempted had a brief cardiac arrest  -Declined further workup by Pulmonologist at this hospital.He would like to complete workup with Pulmonologist at [facility] guided lung bx or bronchoscopy planned.   -Thoracentesis done 1/28/21 at request of patient, cytology with metastatic melanoma  -Continue home coumadin   -Oncology recommendations noted -Keytruda given on 2/6  -Dilaudid IV PRN pain  -IR consult for R pleurX catheter on 2/8  -FFP and Vit K to reverse INR today  -Cont Fentanyl patch. Use Oxy IR PRN pain. Avoid Dilaudid.     Supratherapeutic INR  INR noted noted to be greater than 14 on admission  -On coumadin at home for Afib subtherapeutic. DCed lovenox bid as INR > 2  -Hold Coumadin   -Reverse INR for PleurX catheter     Chronic moderate systolic heart failure   -Cont PO Lasix and potassium supplementation. Hx VT s/p AICD  -On Amio PO  -EP following    Type 2 diabetes mellitus  Last A1c 10.6  -Management per Endocrinology      Chronic Anemia  Iron studies B12 folate reflect studies consistent with anemia of chronic disease  -Hgb stable. Management per Oncology. Acute kidney injury-due to  prerenal  -Resolved. Continue to hold lisinopril    Chronic atrial fibrillation s/p pacemaker insertion on the first of this month  Pacemaker site has a small hematoma which was also noticed on discharge  -Continue amiodarone  -Continue lovenox/Coumadin for anticoagulation  -EP recommendations noted    Anxiety  -Vistaril PRN    Insomnia  -Continue ambien PRN    Constipation  -Cont Movantik now  -Miralax bid  -Dulcolax supp PRN    DVT Prophylaxis: Coumadin  Diet: DIET CARB CONTROL; Carb Control: 4 carb choices (60 gms)/meal; Daily Fluid Restriction: 1000 ml  Dietary Nutrition Supplements: Low Volume Supplement  Diet NPO, After Midnight  Code Status: DNR-CCA    PT/OT Eval Status: as needed    Dispo -     Discussed with patient, Dr Catrachita Granados (Onc), Eugene Ashley PharmD resident and ICU RN.    D/W extensively with wife at bedside. It will be best to pursue PleurX catheter tomorrow. Will work to get INR < 1.5 by AM.    Home as soon as ok with all.     Mirtha Aguirre MD

## 2021-02-07 NOTE — PROGRESS NOTES
Pt called out to have Respiratory to give treatment after waking up feeling a little SOB, Pt denies needs for cough medicine at this time O2 saturation 96%, no audible wheezing. Respiratory notified. Call light within reach, will continue to monitor.

## 2021-02-08 ENCOUNTER — TELEPHONE (OUTPATIENT)
Dept: FAMILY MEDICINE CLINIC | Age: 66
End: 2021-02-08

## 2021-02-08 ENCOUNTER — APPOINTMENT (OUTPATIENT)
Dept: INTERVENTIONAL RADIOLOGY/VASCULAR | Age: 66
DRG: 871 | End: 2021-02-08
Payer: COMMERCIAL

## 2021-02-08 ENCOUNTER — APPOINTMENT (OUTPATIENT)
Dept: GENERAL RADIOLOGY | Age: 66
DRG: 871 | End: 2021-02-08
Payer: COMMERCIAL

## 2021-02-08 VITALS
DIASTOLIC BLOOD PRESSURE: 70 MMHG | SYSTOLIC BLOOD PRESSURE: 129 MMHG | WEIGHT: 219.58 LBS | OXYGEN SATURATION: 90 % | HEIGHT: 69 IN | HEART RATE: 61 BPM | BODY MASS INDEX: 32.52 KG/M2 | TEMPERATURE: 98.2 F | RESPIRATION RATE: 22 BRPM

## 2021-02-08 LAB
ANION GAP SERPL CALCULATED.3IONS-SCNC: 8 MMOL/L (ref 3–16)
BASOPHILS ABSOLUTE: 0 K/UL (ref 0–0.2)
BASOPHILS RELATIVE PERCENT: 0.3 %
BLOOD BANK DISPENSE STATUS: NORMAL
BLOOD BANK PRODUCT CODE: NORMAL
BPU ID: NORMAL
BUN BLDV-MCNC: 29 MG/DL (ref 7–20)
CALCIUM SERPL-MCNC: 8.8 MG/DL (ref 8.3–10.6)
CHLORIDE BLD-SCNC: 101 MMOL/L (ref 99–110)
CO2: 23 MMOL/L (ref 21–32)
CREAT SERPL-MCNC: 1.1 MG/DL (ref 0.8–1.3)
DESCRIPTION BLOOD BANK: NORMAL
EOSINOPHILS ABSOLUTE: 0.1 K/UL (ref 0–0.6)
EOSINOPHILS RELATIVE PERCENT: 0.7 %
GFR AFRICAN AMERICAN: >60
GFR NON-AFRICAN AMERICAN: >60
GLUCOSE BLD-MCNC: 134 MG/DL (ref 70–99)
GLUCOSE BLD-MCNC: 141 MG/DL (ref 70–99)
GLUCOSE BLD-MCNC: 154 MG/DL (ref 70–99)
GLUCOSE BLD-MCNC: 164 MG/DL (ref 70–99)
HCT VFR BLD CALC: 24.7 % (ref 40.5–52.5)
HEMOGLOBIN: 7.9 G/DL (ref 13.5–17.5)
INR BLD: 1.96 (ref 0.86–1.14)
LYMPHOCYTES ABSOLUTE: 0.6 K/UL (ref 1–5.1)
LYMPHOCYTES RELATIVE PERCENT: 7.9 %
MCH RBC QN AUTO: 27.6 PG (ref 26–34)
MCHC RBC AUTO-ENTMCNC: 31.8 G/DL (ref 31–36)
MCV RBC AUTO: 86.9 FL (ref 80–100)
MONOCYTES ABSOLUTE: 0.8 K/UL (ref 0–1.3)
MONOCYTES RELATIVE PERCENT: 10.3 %
NEUTROPHILS ABSOLUTE: 6.6 K/UL (ref 1.7–7.7)
NEUTROPHILS RELATIVE PERCENT: 80.8 %
PDW BLD-RTO: 21.3 % (ref 12.4–15.4)
PERFORMED ON: ABNORMAL
PLATELET # BLD: 218 K/UL (ref 135–450)
PMV BLD AUTO: 6.9 FL (ref 5–10.5)
POTASSIUM REFLEX MAGNESIUM: 5.2 MMOL/L (ref 3.5–5.1)
PROTHROMBIN TIME: 22.9 SEC (ref 10–13.2)
RBC # BLD: 2.85 M/UL (ref 4.2–5.9)
SODIUM BLD-SCNC: 132 MMOL/L (ref 136–145)
WBC # BLD: 8.1 K/UL (ref 4–11)

## 2021-02-08 PROCEDURE — 36430 TRANSFUSION BLD/BLD COMPNT: CPT

## 2021-02-08 PROCEDURE — 2580000003 HC RX 258: Performed by: NURSE PRACTITIONER

## 2021-02-08 PROCEDURE — 71045 X-RAY EXAM CHEST 1 VIEW: CPT

## 2021-02-08 PROCEDURE — 6370000000 HC RX 637 (ALT 250 FOR IP): Performed by: INTERNAL MEDICINE

## 2021-02-08 PROCEDURE — C1729 CATH, DRAINAGE: HCPCS

## 2021-02-08 PROCEDURE — 99152 MOD SED SAME PHYS/QHP 5/>YRS: CPT

## 2021-02-08 PROCEDURE — 36415 COLL VENOUS BLD VENIPUNCTURE: CPT

## 2021-02-08 PROCEDURE — 0W9930Z DRAINAGE OF RIGHT PLEURAL CAVITY WITH DRAINAGE DEVICE, PERCUTANEOUS APPROACH: ICD-10-PCS | Performed by: RADIOLOGY

## 2021-02-08 PROCEDURE — 2580000003 HC RX 258: Performed by: PHYSICIAN ASSISTANT

## 2021-02-08 PROCEDURE — 2500000003 HC RX 250 WO HCPCS: Performed by: INTERNAL MEDICINE

## 2021-02-08 PROCEDURE — 94680 O2 UPTK RST&XERS DIR SIMPLE: CPT

## 2021-02-08 PROCEDURE — 2580000003 HC RX 258: Performed by: RADIOLOGY

## 2021-02-08 PROCEDURE — 32550 INSERT PLEURAL CATH: CPT

## 2021-02-08 PROCEDURE — 85610 PROTHROMBIN TIME: CPT

## 2021-02-08 PROCEDURE — 2700000000 HC OXYGEN THERAPY PER DAY

## 2021-02-08 PROCEDURE — 6360000002 HC RX W HCPCS: Performed by: RADIOLOGY

## 2021-02-08 PROCEDURE — 94761 N-INVAS EAR/PLS OXIMETRY MLT: CPT

## 2021-02-08 PROCEDURE — 75989 ABSCESS DRAINAGE UNDER X-RAY: CPT

## 2021-02-08 PROCEDURE — 6370000000 HC RX 637 (ALT 250 FOR IP): Performed by: PHYSICIAN ASSISTANT

## 2021-02-08 PROCEDURE — 85025 COMPLETE CBC W/AUTO DIFF WBC: CPT

## 2021-02-08 PROCEDURE — 6360000002 HC RX W HCPCS: Performed by: INTERNAL MEDICINE

## 2021-02-08 PROCEDURE — 80048 BASIC METABOLIC PNL TOTAL CA: CPT

## 2021-02-08 PROCEDURE — 94640 AIRWAY INHALATION TREATMENT: CPT

## 2021-02-08 RX ORDER — FENTANYL CITRATE 50 UG/ML
INJECTION, SOLUTION INTRAMUSCULAR; INTRAVENOUS
Status: COMPLETED | OUTPATIENT
Start: 2021-02-08 | End: 2021-02-08

## 2021-02-08 RX ORDER — INSULIN LISPRO 200 [IU]/ML
10 INJECTION, SOLUTION SUBCUTANEOUS
Qty: 5 PEN | Refills: 3 | Status: SHIPPED | OUTPATIENT
Start: 2021-02-08

## 2021-02-08 RX ORDER — LACTOBACILLUS RHAMNOSUS GG 10B CELL
1 CAPSULE ORAL 2 TIMES DAILY WITH MEALS
Qty: 60 CAPSULE | Refills: 0 | Status: SHIPPED | OUTPATIENT
Start: 2021-02-08

## 2021-02-08 RX ORDER — MEGESTROL ACETATE 40 MG/ML
200 SUSPENSION ORAL DAILY
Qty: 240 ML | Refills: 0 | Status: SHIPPED | OUTPATIENT
Start: 2021-02-08

## 2021-02-08 RX ORDER — MIDAZOLAM HYDROCHLORIDE 1 MG/ML
INJECTION INTRAMUSCULAR; INTRAVENOUS
Status: COMPLETED | OUTPATIENT
Start: 2021-02-08 | End: 2021-02-08

## 2021-02-08 RX ORDER — PROMETHAZINE HYDROCHLORIDE AND CODEINE PHOSPHATE 6.25; 1 MG/5ML; MG/5ML
5 SYRUP ORAL EVERY 4 HOURS PRN
Qty: 118 ML | Refills: 0 | Status: SHIPPED | OUTPATIENT
Start: 2021-02-08 | End: 2021-02-22

## 2021-02-08 RX ORDER — METOPROLOL TARTRATE 100 MG/1
100 TABLET ORAL 2 TIMES DAILY
Qty: 60 TABLET | Refills: 0 | Status: ON HOLD | OUTPATIENT
Start: 2021-02-08 | End: 2021-02-13 | Stop reason: HOSPADM

## 2021-02-08 RX ORDER — SODIUM CHLORIDE 9 MG/ML
INJECTION, SOLUTION INTRAVENOUS PRN
Status: COMPLETED | OUTPATIENT
Start: 2021-02-08 | End: 2021-02-08

## 2021-02-08 RX ORDER — AMIODARONE HYDROCHLORIDE 400 MG/1
TABLET ORAL
Qty: 59 TABLET | Refills: 0 | Status: SHIPPED | OUTPATIENT
Start: 2021-02-08 | End: 2021-05-09

## 2021-02-08 RX ORDER — SODIUM CHLORIDE 9 MG/ML
INJECTION, SOLUTION INTRAVENOUS PRN
Status: DISCONTINUED | OUTPATIENT
Start: 2021-02-08 | End: 2021-02-08 | Stop reason: HOSPADM

## 2021-02-08 RX ORDER — FLUTICASONE PROPIONATE 50 MCG
2 SPRAY, SUSPENSION (ML) NASAL DAILY
Qty: 1 BOTTLE | Refills: 0 | Status: SHIPPED | OUTPATIENT
Start: 2021-02-08

## 2021-02-08 RX ORDER — ATORVASTATIN CALCIUM 20 MG/1
20 TABLET, FILM COATED ORAL NIGHTLY
Qty: 30 TABLET | Refills: 0 | Status: SHIPPED | OUTPATIENT
Start: 2021-02-08

## 2021-02-08 RX ORDER — LIDOCAINE HYDROCHLORIDE AND EPINEPHRINE BITARTRATE 20; .01 MG/ML; MG/ML
20 INJECTION, SOLUTION SUBCUTANEOUS ONCE
Status: COMPLETED | OUTPATIENT
Start: 2021-02-08 | End: 2021-02-08

## 2021-02-08 RX ORDER — NALOXONE HYDROCHLORIDE 4 MG/.1ML
1 SPRAY NASAL PRN
Qty: 1 EACH | Refills: 0 | Status: SHIPPED | OUTPATIENT
Start: 2021-02-08 | End: 2021-02-08 | Stop reason: SDUPTHER

## 2021-02-08 RX ORDER — HYDROMORPHONE HYDROCHLORIDE 1 MG/ML
0.5 INJECTION, SOLUTION INTRAMUSCULAR; INTRAVENOUS; SUBCUTANEOUS ONCE
Status: COMPLETED | OUTPATIENT
Start: 2021-02-08 | End: 2021-02-08

## 2021-02-08 RX ORDER — OXYCODONE HYDROCHLORIDE 5 MG/1
5 TABLET ORAL EVERY 6 HOURS PRN
Qty: 42 TABLET | Refills: 0 | Status: SHIPPED | OUTPATIENT
Start: 2021-02-08 | End: 2021-02-08

## 2021-02-08 RX ORDER — NALOXONE HYDROCHLORIDE 4 MG/.1ML
1 SPRAY NASAL PRN
Qty: 1 EACH | Refills: 5 | Status: SHIPPED | OUTPATIENT
Start: 2021-02-08 | End: 2021-02-08 | Stop reason: HOSPADM

## 2021-02-08 RX ORDER — LIDOCAINE HYDROCHLORIDE 10 MG/ML
20 INJECTION, SOLUTION EPIDURAL; INFILTRATION; INTRACAUDAL; PERINEURAL ONCE
Status: COMPLETED | OUTPATIENT
Start: 2021-02-08 | End: 2021-02-08

## 2021-02-08 RX ORDER — PROMETHAZINE HYDROCHLORIDE AND CODEINE PHOSPHATE 6.25; 1 MG/5ML; MG/5ML
5 SYRUP ORAL EVERY 4 HOURS PRN
Qty: 118 ML | Refills: 0 | Status: SHIPPED | OUTPATIENT
Start: 2021-02-08 | End: 2021-02-08

## 2021-02-08 RX ORDER — FENTANYL 25 UG/H
1 PATCH TRANSDERMAL
Qty: 5 PATCH | Refills: 0 | Status: SHIPPED | OUTPATIENT
Start: 2021-02-09 | End: 2021-02-24

## 2021-02-08 RX ORDER — MEGESTROL ACETATE 40 MG/ML
200 SUSPENSION ORAL DAILY
Qty: 240 ML | Refills: 0 | Status: SHIPPED | OUTPATIENT
Start: 2021-02-08 | End: 2021-02-08

## 2021-02-08 RX ORDER — POLYETHYLENE GLYCOL 3350 17 G/17G
17 POWDER, FOR SOLUTION ORAL 2 TIMES DAILY
Qty: 60 EACH | Refills: 0 | Status: SHIPPED | OUTPATIENT
Start: 2021-02-08 | End: 2021-03-10

## 2021-02-08 RX ORDER — SENNA AND DOCUSATE SODIUM 50; 8.6 MG/1; MG/1
2 TABLET, FILM COATED ORAL DAILY
Qty: 30 TABLET | Refills: 0 | Status: SHIPPED | OUTPATIENT
Start: 2021-02-08

## 2021-02-08 RX ORDER — FENTANYL 25 UG/H
1 PATCH TRANSDERMAL
Qty: 5 PATCH | Refills: 0 | Status: SHIPPED | OUTPATIENT
Start: 2021-02-09 | End: 2021-02-08

## 2021-02-08 RX ORDER — OXYCODONE HYDROCHLORIDE 5 MG/1
5 TABLET ORAL EVERY 6 HOURS PRN
Qty: 42 TABLET | Refills: 0 | Status: SHIPPED | OUTPATIENT
Start: 2021-02-08 | End: 2021-03-10

## 2021-02-08 RX ORDER — NALOXONE HYDROCHLORIDE 4 MG/.1ML
1 SPRAY NASAL PRN
Qty: 1 EACH | Refills: 0 | Status: SHIPPED | OUTPATIENT
Start: 2021-02-08

## 2021-02-08 RX ADMIN — INSULIN LISPRO 14 UNITS: 100 INJECTION, SOLUTION INTRAVENOUS; SUBCUTANEOUS at 17:58

## 2021-02-08 RX ADMIN — LISINOPRIL 5 MG: 5 TABLET ORAL at 12:04

## 2021-02-08 RX ADMIN — FENTANYL CITRATE 25 MCG: 50 INJECTION, SOLUTION INTRAMUSCULAR; INTRAVENOUS at 11:20

## 2021-02-08 RX ADMIN — IPRATROPIUM BROMIDE AND ALBUTEROL SULFATE 1 AMPULE: .5; 3 SOLUTION RESPIRATORY (INHALATION) at 09:16

## 2021-02-08 RX ADMIN — MEGESTROL ACETATE 200 MG: 40 SUSPENSION ORAL at 14:27

## 2021-02-08 RX ADMIN — SODIUM CHLORIDE: 9 INJECTION, SOLUTION INTRAVENOUS at 09:02

## 2021-02-08 RX ADMIN — IPRATROPIUM BROMIDE AND ALBUTEROL SULFATE 1 AMPULE: .5; 3 SOLUTION RESPIRATORY (INHALATION) at 13:22

## 2021-02-08 RX ADMIN — MIDAZOLAM 0.5 MG: 1 INJECTION INTRAMUSCULAR; INTRAVENOUS at 11:20

## 2021-02-08 RX ADMIN — FENTANYL CITRATE 25 MCG: 50 INJECTION, SOLUTION INTRAMUSCULAR; INTRAVENOUS at 11:30

## 2021-02-08 RX ADMIN — LIDOCAINE HYDROCHLORIDE 8 ML: 10 INJECTION, SOLUTION EPIDURAL; INFILTRATION; INTRACAUDAL; PERINEURAL at 11:15

## 2021-02-08 RX ADMIN — FLUTICASONE PROPIONATE 2 SPRAY: 50 SPRAY, METERED NASAL at 12:51

## 2021-02-08 RX ADMIN — CEFAZOLIN 2000 MG: 1 INJECTION, POWDER, FOR SOLUTION INTRAVENOUS at 11:23

## 2021-02-08 RX ADMIN — FUROSEMIDE 40 MG: 40 TABLET ORAL at 12:50

## 2021-02-08 RX ADMIN — POTASSIUM CHLORIDE 40 MEQ: 1500 TABLET, EXTENDED RELEASE ORAL at 14:26

## 2021-02-08 RX ADMIN — METOPROLOL TARTRATE 100 MG: 50 TABLET, FILM COATED ORAL at 12:05

## 2021-02-08 RX ADMIN — NALOXEGOL OXALATE 25 MG: 25 TABLET, FILM COATED ORAL at 14:28

## 2021-02-08 RX ADMIN — Medication 10 ML: at 09:44

## 2021-02-08 RX ADMIN — MEGESTROL ACETATE 200 MG: 40 SUSPENSION ORAL at 12:52

## 2021-02-08 RX ADMIN — INSULIN LISPRO 3 UNITS: 100 INJECTION, SOLUTION INTRAVENOUS; SUBCUTANEOUS at 14:42

## 2021-02-08 RX ADMIN — INSULIN LISPRO 3 UNITS: 100 INJECTION, SOLUTION INTRAVENOUS; SUBCUTANEOUS at 18:01

## 2021-02-08 RX ADMIN — IPRATROPIUM BROMIDE AND ALBUTEROL SULFATE 1 AMPULE: .5; 3 SOLUTION RESPIRATORY (INHALATION) at 15:38

## 2021-02-08 RX ADMIN — FINASTERIDE 5 MG: 5 TABLET, FILM COATED ORAL at 12:03

## 2021-02-08 RX ADMIN — MIDAZOLAM 0.5 MG: 1 INJECTION INTRAMUSCULAR; INTRAVENOUS at 11:00

## 2021-02-08 RX ADMIN — MIDAZOLAM 0.5 MG: 1 INJECTION INTRAMUSCULAR; INTRAVENOUS at 11:30

## 2021-02-08 RX ADMIN — HYDROMORPHONE HYDROCHLORIDE 0.5 MG: 1 INJECTION, SOLUTION INTRAMUSCULAR; INTRAVENOUS; SUBCUTANEOUS at 14:26

## 2021-02-08 RX ADMIN — LIDOCAINE HYDROCHLORIDE,EPINEPHRINE BITARTRATE 8 ML: 20; .01 INJECTION, SOLUTION INFILTRATION; PERINEURAL at 11:15

## 2021-02-08 RX ADMIN — Medication 2 PUFF: at 09:16

## 2021-02-08 RX ADMIN — AMIODARONE HYDROCHLORIDE 400 MG: 200 TABLET ORAL at 12:03

## 2021-02-08 ASSESSMENT — PAIN SCALES - WONG BAKER
WONGBAKER_NUMERICALRESPONSE: 0
WONGBAKER_NUMERICALRESPONSE: 0

## 2021-02-08 ASSESSMENT — PAIN DESCRIPTION - PROGRESSION: CLINICAL_PROGRESSION: NOT CHANGED

## 2021-02-08 NOTE — PROGRESS NOTES
  insulin lispro (1 Unit Dial) 0-9 Units, Nightly      glucose (GLUTOSE) 40 % oral gel 15 g, PRN      dextrose 50 % IV solution, PRN      glucagon (rDNA) injection 1 mg, PRN      dextrose 5 % solution, PRN      0.9 % sodium chloride infusion, PRN      albuterol sulfate  (90 Base) MCG/ACT inhaler 2 puff, Q6H PRN      allopurinol (ZYLOPRIM) tablet 300 mg, QPM      finasteride (PROSCAR) tablet 5 mg, Daily      hydrOXYzine (VISTARIL) capsule 50 mg, TID PRN      pantoprazole (PROTONIX) tablet 40 mg, QAM AC      atorvastatin (LIPITOR) tablet 20 mg, Nightly      tamsulosin (FLOMAX) capsule 0.4 mg, Nightly      sodium chloride flush 0.9 % injection 10 mL, 2 times per day      sodium chloride flush 0.9 % injection 10 mL, PRN      magnesium hydroxide (MILK OF MAGNESIA) 400 MG/5ML suspension 30 mL, Daily PRN      acetaminophen (TYLENOL) tablet 650 mg, Q6H PRN      ondansetron (ZOFRAN) injection 4 mg, Q6H PRN      lactobacillus (CULTURELLE) capsule 1 capsule, BID WC        Objective:  BP (!) 108/54   Pulse 67   Temp 98.3 °F (36.8 °C) (Temporal)   Resp 20   Ht 5' 9\" (1.753 m)   Wt 219 lb 9.3 oz (99.6 kg)   SpO2 100%   BMI 32.43 kg/m²     Intake/Output Summary (Last 24 hours) at 2/8/2021 0748  Last data filed at 2/7/2021 1905  Gross per 24 hour   Intake 1305 ml   Output    Net 1305 ml      Wt Readings from Last 3 Encounters:   02/08/21 219 lb 9.3 oz (99.6 kg)   01/14/21 214 lb (97.1 kg)   01/05/21 210 lb 9.6 oz (95.5 kg)       Conscious alert and oriented. Appears to be in mild to moderate distress. Neck is supple  Lungs: Expiratory wheezes are heard. Abdomen is soft bowel sounds are heard. Extremities no edema.     Labs and Tests:  CBC:   Recent Labs     02/06/21  0434 02/08/21  0451   WBC 12.6* 8.1   HGB 8.1* 7.9*    218     BMP:    Recent Labs     02/06/21  0434 02/07/21  0447 02/08/21  0451   * 132* 132*   K 4.6 5.0 5.2*   CL 99 101 101   CO2 23 24 23   BUN 24* 25* 29* CREATININE 0.7* 1.0 1.1   GLUCOSE 170* 169* 134*     Hepatic:   Recent Labs     02/06/21  0433   AST 44*   ALT 31   BILITOT 0.4   ALKPHOS 137*       ASSESSMENT AND PLAN    Principal Problem:    Pneumonia  Active Problems:    Essential hypertension    SYLVIE (obstructive sleep apnea)    Melanoma in situ of lower leg (HCC)    Type 2 diabetes mellitus with diabetic polyneuropathy, without long-term current use of insulin (HCC)    GERD without esophagitis    Persistent atrial fibrillation (HCC)    Ischemic cardiomyopathy    VT (ventricular tachycardia) (HCC)    Dilated cardiomyopathy ([address]    AICD (automatic cardioverter/defibrillator) present    Supratherapeutic INR  Resolved Problems:    * No resolved hospital problems. *      Metastatic melanoma with lung metastasis and malignant pleural effusion:     -Diagnosed during hospitalization when he presented with supratherapeutic INR and was noted to have pleural effusion in late Jan 2021.  -LDH is elevated.     -Due to ongoing shortness of breath and hemoptysis, CT chest abdomen and pelvis were obtained on 2/5/2021  -Scans have shown worsening disease in the lungs as well as moderate-sized parapneumonic effusion.  -Significant interval progression of disseminated pulmonary, hepatic, left adrenal, intraperitoneal and soft tissue metastatic disease.     -Had lengthy discussion with the patient as well as patient's wife. -This appears to be rapidly progressive metastatic melanoma with extensive metastatic disease.     -BRAF mutation on the cytology is pending.     -Since her there is rapid progression and deterioration of the clinical status,  - Received Pembrolizumab 200 mg IV times once on 2/6/2021. Next dose will be due in 3 weeks     -Planning for thoracentesis with PleurX catheter today. -He would benefit for palliative care services as outpatient.     Supratherapeutic INR epistaxis:     -Received FFP and vitamin K on 1/26/2021  -INR is 1.96, will receive FFP today. -Patient had poor appetite for last couple of weeks which might have caused vitamin K deficiency leading to supratherapeutic INR.  -On warfarin. INR 1.96     Cardiac arrest after bronchoscopy in December 2020, has pacemaker     Had V. tach during this hospitalization. Cardiology is following      Anemia:     - Most likely due to blood loss  - anemia of chronic disease  - ferritin high  - Hgb 7.9 today, transfuse 1 unit PRBC.     Neoplasm related pain:     -Continue fentanyl patch     Discussed with patient and wife. Rehana Delcid, [facility]  [facility]  [phone]    Patient was seen and examined. Agree with above. Patient will be going home after Pleurx catheter placement today. Will ensure outpatient follow-up.     Lovey Schaumann, MD

## 2021-02-08 NOTE — PROGRESS NOTES
Home Oxygen Evaluation     Patients room air resting oxygen saturation SpO2 83%   Patient's on oxygen at rest SpO2 93% at    1 lpm = SpO2  84%     2 lpm = SpO2  88%    3 lpm = SpO2  93%

## 2021-02-08 NOTE — CARE COORDINATION
Discharge Note:    Met with patient and his wife 2x today.     [address] arranged through:    [employer]  Phone: [phone]  Fax: [phone]  (Confirmed orders received)    Home O2 arranged through    Name:  Alane Riedel  Phone:  [phone]  Fax:      [phone] provided to bedside by:    Name:  Alane Riedel  Phone:  [phone]  Fax:      [phone]    Transportation at 6:30 pm this evening provided by:    [id number]  [phone]  [place] [employer] [facility] provided in packet)    All discharge planning needs met by Case Management    Marlee Forbes RN BSN  Case Management  [phone]

## 2021-02-08 NOTE — ACP (ADVANCE CARE PLANNING)
Advanced Care Planning Note. Purpose of Encounter: Advanced care planning in light of melanoma  Parties In Attendance: Patient, wife  Decisional Capacity: Yes  Subjective: Patient is SOB  Objective: Cr 1.1  Goals of Care Determination: Patient/POA wants limited support (No CPR, no vent, no surgery, no HD, no trach, no PEG)  Plan:  Onc/Renal/Cardio/Endocrine/Palliative care consults. IR consult for PleurX catheter. Transfuse FFP. Home PT/OT/VNS/HHA/SW. DME at home. [address] for palliative care. Code Status: DNR-CCA/DNI   Time spent on Advanced care Plannin minutes  Advanced Care Planning Documents: Completed advanced directives on chart, wife is the POA.     Tam Neves MD  2021 2:48 PM

## 2021-02-08 NOTE — DISCHARGE SUMMARY
Hospital Medicine Discharge Summary    Patient: Tomasz Lowe     Gender: male  : 1955   Age: 72 y.o. MRN: [id number]    Admitting Physician: Renny Suárez MD  Discharge Physician: Sue Wood MD     Code Status: DNR-CCA     Admit Date: 2021   Discharge Date:   21    Disposition:  Home    Discharge Diagnoses: Active Hospital Problems    Diagnosis Date Noted    Supratherapeutic INR [R79.1] 2021    Pneumonia [J18.9] 2021    AICD (automatic cardioverter/defibrillator) present [Z95.810] 2020    Dilated cardiomyopathy ([address] [I42.0]     VT (ventricular tachycardia) ([address] [I47.2] 2020    Persistent atrial fibrillation ([address] [I48.19] 2020    Ischemic cardiomyopathy [I25.5] 2020    GERD without esophagitis [K21.9] 2019    Type 2 diabetes mellitus with diabetic polyneuropathy, without long-term current use of insulin ([address] [E11.42] 2017    Melanoma in situ of lower leg ([address] [D03.70] 2017    SYLVIE (obstructive sleep apnea) [G47.33] 10/05/2012    Essential hypertension [I10] 10/01/2012       Follow-up appointments:  one week    Outpatient to do list: F/U with PCP, Oncology, Cardio, Endocrine, Renal and Pulm at [address] [place] [address] [place] at Discharge:  [facility] AT [place] Course:   70-year-old M with history of ischemic CMP with VT s/p ICD, HTN, h/o melanoma, DM 2, Afib, SYLVIE admitted to the hospital with some weakness. Admitted as inpatient with acute respiratory failure due to post obstructive pneumonia with sepsis POA  . He has right lung mass on outside hospital CT. He declined pulmonary workup opting for resumption of workup with [facility] Pulmonologist.  Cytology from  consistent with metastatic melanoma. Had VT on  and required defibrillation. Moved to ICU. Seen by Cardio. Has complaints of hemoptysis. CT C/A/P with contrast with   1.  Enlarging infiltrating mediastinal bronchogenic carcinoma with right polyethylene glycol (GLYCOLAX) 17 g packet Take 17 g by mouth 2 times daily  Qty: 60 each, Refills: 0      sennosides-docusate sodium (SENOKOT-S) 8.6-50 MG tablet Take 2 tablets by mouth daily  Qty: 30 tablet, Refills: 0      lactobacillus (CULTURELLE) capsule Take 1 capsule by mouth 2 times daily (with meals)  Qty: 60 capsule, Refills: 0      insulin lispro (HUMALOG KWIKPEN) 200 UNIT/ML SOPN pen Inject 10 Units into the skin 3 times daily (with meals)  Qty: 5 pen, Refills: 3      fentaNYL (DURAGESIC) 25 MCG/HR Place 1 patch onto the skin every 72 hours for 15 days. Qty: 5 patch, Refills: 0    Comments: Reduce doses taken as pain becomes manageable  Associated Diagnoses: Metastatic melanoma (HCC)      oxyCODONE (ROXICODONE) 5 MG immediate release tablet Take 1 tablet by mouth every 6 hours as needed for Pain for up to 30 days. Qty: 42 tablet, Refills: 0    Comments: Reduce doses taken as pain becomes manageable  Associated Diagnoses: Metastatic melanoma (HCC)      naloxone (NARCAN) 4 MG/0.1ML LIQD nasal spray 1 spray by Nasal route as needed for Opioid Reversal  Qty: 1 each, Refills: 0    Associated Diagnoses: Metastatic melanoma (HCC)      megestrol (MEGACE) 40 MG/ML suspension Take 5 mLs by mouth daily  Qty: 240 mL, Refills: 0    Associated Diagnoses: Metastatic melanoma (HCC)      promethazine-codeine (PHENERGAN WITH CODEINE) 6.25-10 MG/5ML syrup Take 5 mLs by mouth every 4 hours as needed for Cough for up to 14 days. Qty: 118 mL, Refills: 0    Comments: Reduce doses taken as pain becomes manageable  Associated Diagnoses: Metastatic melanoma ([facility] [address] [facility]           Current Discharge Medication List      CONTINUE these medications which have CHANGED    Details   metoprolol tartrate (LOPRESSOR) 100 MG tablet Take 1 tablet by mouth 2 times daily  Qty: 60 tablet, Refills: 0      amiodarone (PACERONE) 400 MG tablet Take 1 tablet by mouth 2 times daily for 7 days, THEN 1 tablet daily for 7 days, THEN 0.5 tablets daily. Qty: 59 tablet, Refills: 0           Current Discharge Medication List      CONTINUE these medications which have NOT CHANGED    Details   hydrOXYzine (VISTARIL) 50 MG capsule Take 1 capsule by mouth 3 times daily as needed for Itching or Anxiety  Qty: 90 capsule, Refills: 0    Associated Diagnoses: Insomnia, unspecified type      insulin glargine (BASAGLAR KWIKPEN) 100 UNIT/ML injection pen Inject 25 Units into the skin nightly  Qty: 5 pen, Refills: 2      Insulin Pen Needle (MEIJER PEN NEEDLES) 31G X 6 MM MISC 1 each by Does not apply route daily  Qty: 100 each, Refills: 3      pantoprazole (PROTONIX) 40 MG tablet TAKE 1 TABLET BY MOUTH ONE TIME A DAY   Qty: 90 tablet, Refills: 0      allopurinol (ZYLOPRIM) 300 MG tablet TAKE 1 TABLET BY MOUTH ONE TIME A DAY   Qty: 90 tablet, Refills: 0      warfarin (COUMADIN) 5 MG tablet Take 1 tablet by mouth daily Do not resume until 1/7  Qty: 30 tablet, Refills: 5      albuterol sulfate  (90 Base) MCG/ACT inhaler Inhale 2 puffs into the lungs every 6 hours as needed for Wheezing  Qty: 1 Inhaler, Refills: 0      furosemide (LASIX) 40 MG tablet Take 1 tablet by mouth daily  Qty: 90 tablet, Refills: 1      fluticasone-salmeterol (ADVAIR DISKUS) 250-50 MCG/DOSE AEPB Inhale 1 puff into the lungs every 12 hours  Qty: 1 Inhaler, Refills: 6      lisinopril (PRINIVIL;ZESTRIL) 5 MG tablet Take 1 tablet by mouth daily  Qty: 30 tablet, Refills: 1      ipratropium-albuterol (DUONEB) 0.5-2.5 (3) MG/3ML SOLN nebulizer solution Inhale 3 mLs into the lungs every 4 hours as needed for Shortness of Breath Dx: Asthma  ICD-10: J45.909  Qty: 120 vial, Refills: 5      finasteride (PROSCAR) 5 MG tablet Take 5 mg by mouth daily      tamsulosin (FLOMAX) 0.4 MG capsule Take 0.4 mg by mouth daily      FREESTYLE LANCETS MISC 1 each by Does not apply route daily  Qty: 100 each, Refills: 3      Blood Glucose Monitoring Suppl (FREESTYLE LITE) CLAUDINE 1 Device by Does not apply route 2 times daily Qty: 1 Device, Refills: 0           Current Discharge Medication List      STOP taking these medications       ondansetron (ZOFRAN) 4 MG tablet Comments:   Reason for Stopping:         glimepiride (AMARYL) 4 MG tablet Comments:   Reason for Stopping:         metFORMIN (GLUCOPHAGE) 1000 MG tablet Comments:   Reason for Stopping:         simvastatin (ZOCOR) 40 MG tablet Comments:   Reason for Stopping:                 Discharge Exam:    BP (!) 104/56   Pulse 75   Temp 97.8 °F (36.6 °C)   Resp 30   Ht 5' 9\" (1.753 m)   Wt 219 lb 9.3 oz (99.6 kg)   SpO2 94%   BMI 32.43 kg/m²   General appearance: No apparent distress, appears stated age and cooperative. Obese male,on RA, sitting in chair  HEENT: Pupils equal, round, and reactive to light. Conjunctivae/corneas clear. Neck: Supple, with full range of motion. No jugular venous distention. Trachea midline. Respiratory:  Normal respiratory effort. Diminished sounds on R  Cardiovascular: Regular rate and rhythm with normal S1/S2 without murmurs, rubs or gallops. Abdomen: Soft, non-tender, non-distended with normal bowel sounds. Musculoskeletal: No clubbing, cyanosis. 1+ LE  edema bilaterally. Full range of motion without deformity. Skin: Skin color, texture, turgor normal.  No rashes or lesions. Neurologic:  Neurovascularly intact without any focal sensory/motor deficits. Cranial nerves: II-XII intact, grossly non-focal.  Psychiatric: Alert and oriented, thought content appropriate, normal insight    Labs:  For convenience and continuity at follow-up the following most recent labs are provided:    Lab Results   Component Value Date    WBC 8.1 02/08/2021    HGB 7.9 02/08/2021    HCT 24.7 02/08/2021    MCV 86.9 02/08/2021     02/08/2021     02/08/2021    K 5.2 02/08/2021     02/08/2021    CO2 23 02/08/2021    BUN 29 02/08/2021    CREATININE 1.1 02/08/2021    CALCIUM 8.8 02/08/2021    PHOS 2.6 11/11/2020    ALKPHOS 137 02/06/2021 ALT 31 02/06/2021    AST 44 02/06/2021    BILITOT 0.4 02/06/2021    BILIDIR <0.2 02/06/2021    LABALBU 2.3 02/06/2021    LDLCALC 100 12/09/2020    TRIG 141 12/09/2020     Lab Results   Component Value Date    INR 1.96 (H) 02/08/2021    INR 2.16 (H) 02/07/2021    INR 3.00 (H) 02/07/2021       Radiology:  Ct Head W Wo Contrast    Result Date: 1/30/2021  EXAMINATION: CT OF THE HEAD WITH AND WITHOUT CONTRAST  1/30/2021 10:30 am TECHNIQUE: CT of the head/brain was performed without and with the administration of intravenous contrast. Multiplanar reformatted images are provided for review. Dose modulation, iterative reconstruction, and/or weight based adjustment of the mA/kV was utilized to reduce the radiation dose to as low as reasonably achievable. COMPARISON: None. HISTORY: ORDERING SYSTEM PROVIDED HISTORY: NSCLC TECHNOLOGIST PROVIDED HISTORY: Reason for exam:->NSCLC Reason for Exam: NSCLC Acuity: Unknown Type of Exam: Unknown FINDINGS: BRAIN/VENTRICLES: This is a limited study secondary to patient motion artifact. The cerebral and cerebellar parenchyma demonstrate volume loss. There are no areas of acute hemorrhage, mass, or midline shift. There are no abnormal extra-axial fluid collections. The ventricles are proportional to the cerebral sulci. Gray-white differentiation is maintained without evidence of acute infarct. There is an arachnoid cyst along the anterior left middle cranial fossa, extending into the sylvian fissure. No other areas of abnormal enhancement. ORBITS: The visualized portion of the orbits demonstrate no acute abnormality. SINUSES: The visualized paranasal sinuses and mastoid air cells demonstrate no acute abnormality. SOFT TISSUES/SKULL:  The calvarium is intact. There is a right occipital venous [place]. No appreciable scalp soft tissue swelling or areas of abnormal enhancement. 1. No evidence of metastatic disease. 2. Cerebral parenchymal volume loss, commensurate with the patient's age.      Ct Chest Wo Contrast    Result Date: 1/20/2021 Site: [facility] #: [id number] #: [id number]: BTLRCTAccount #: [de-identified] #: [id number] #: [id number]: CT CHEST WO CONTRASTExam Date/Time: 01/20/2021 01:15 PMAdmitting Diagnosis: Cough, persistent, xray nondiagnosticReason for Exam: Cough, persistent, xray nondiagnostic Dictated by: Toro Garcia RACH: 01/20/2021 02:01 PMT: This document is confidential medical information. Unauthorized disclosure or use of this information is prohibited by law. If you are not the intended recipient of this document, please advise us by calling immediately [phone]. Impression/Conclusion below HISTORY:   Cough, persistent, xray nondiagnostic lung nodules, cough Other nonspecific abnormal  finding of lung field COMPARISON: None TECHNIQUE: Noncontrast multiplanar CT images of the chest NOTE:  If there are questions about the content of this report, please contact [address] [place] [address] radiology by calling [phone] FINDINGS: LUNGS/AIRWAYS:  Multiple scattered pulmonary nodules largest measures 13 mm. PLEURA: Mild to moderate right pleural effusion. MEDIASTINUM/BRIANNE:  Right hilar mass, given the lack of IV contrast difficult to get accurate measurements. Mediastinal and subcarinal adenopathy. HEART/PERICARDIUM:  Unremarkable VESSELS:  Unremarkable. No aneurysm CHEST WALL/LOWER NECK:  Unremarkable UPPER ABDOMEN:  Unremarkable BONES:  Unremarkable OTHER:  None  IMPRESSION: Right hilar mass, with subcarinal mediastinal adenopathy, highly concerning for malignancy. Even the lack of IV contrast, these entities are difficult to separate and accurately measure. Innumerable pulmonary nodules, concerning for metastatic disease Mild to moderate right pleural effusion. This report was marked for documented transmission to the ordering physician per [facility]  Radiology department protocol. A radiologist can be reached 24/7 by calling [phone]. SIGNED BY: Bhupendra De Leon MD on 1/20/2021  1:58 PM EXAMINATION: ONE XRAY VIEW OF THE CHEST 1/25/2021 6:19 pm COMPARISON: 12/31/2020. HISTORY: ORDERING SYSTEM PROVIDED HISTORY: cough TECHNOLOGIST PROVIDED HISTORY: Reason for exam:->cough Reason for Exam: cough Acuity: Unknown Type of Exam: Unknown FINDINGS: The heart is mildly enlarged but unchanged. The pulmonary vessels are engorged centrally more prominent. There is some hazy right basilar interstitial opacity which is more apparent. No effusion is seen. There is left pacemaker in place which is unchanged. The bones are intact. Stable cardiomegaly with mild central pulmonary congestion which is more prominent. Questionable early infiltrate or atelectasis along the right lung base which is more apparent. Xr Chest 1 View    Addendum Date: 1/29/2021    ADDENDUM: .     Result Date: 1/29/2021  EXAMINATION: ONE XRAY VIEW OF THE CHEST 1/28/2021 11:16 am COMPARISON: 01/25/2021 HISTORY: ORDERING SYSTEM PROVIDED HISTORY: pleural effusion TECHNOLOGIST PROVIDED HISTORY: Reason for exam:->pleural effusion FINDINGS: Heart is mildly enlarged. The pulmonary vasculature is slightly prominent. Mild right basilar atelectasis present. No pneumothorax seen following diagnostic thoracentesis. Very small amount of pleural fluid suspected on the right. Left subclavian AICD in place. Slight pulmonary vascular congestion. Mild right basilar atelectasis. No pneumothorax following thoracentesis.      Ct Chest Abdomen Pelvis W Contrast    Result Date: 2/5/2021 EXAMINATION: CT OF THE CHEST, ABDOMEN, AND PELVIS WITH CONTRAST 2/5/2021 4:23 pm TECHNIQUE: CT of the chest, abdomen and pelvis was performed with the administration of intravenous contrast. Multiplanar reformatted images are provided for review. Dose modulation, iterative reconstruction, and/or weight based adjustment of the mA/kV was utilized to reduce the radiation dose to as low as reasonably achievable. COMPARISON: 11/11/2020 HISTORY: ORDERING SYSTEM PROVIDED HISTORY: Hemoptysis TECHNOLOGIST PROVIDED HISTORY: Reason for exam:->Hemoptysis Additional Contrast?->Oral FINDINGS: Chest: Mediastinum: There is a bulky infiltrating soft tissue mass within the mediastinum which has undergone interval enlargement. The right paratracheal component of the mass measures 6.7 x 5.1 cm and results in severe compression of the SVC and left brachiocephalic vein. The subcarinal component measures 6.6 by 5.1 cm. There is a 5 cm prevascular lymph node along with 3.3 and 3.9 cm right hilar lymph nodes. Soft tissue also results in mild narrowing of the bronchus intermedius. Lungs/pleura: There are innumerable small noncalcified pulmonary nodules ranging in size up to 1.4 cm in diameter. There is a moderate-sized right pleural effusion with associated underlying right basilar compressive atelectasis. Soft Tissues/Bones: There are innumerable soft tissue metastases demonstrated throughout the chest wall ranging in size up to 2 cm in diameter overlying the left medial trapezius. Abdomen/Pelvis: Organs: There are innumerable low-attenuation soft tissue masses scattered throughout the liver ranging in size up to 6 cm in diameter. The pancreas, spleen, kidneys and right adrenal are unremarkable. There is a 2.4 cm mass within the left adrenal gland. GI/Bowel: There is no bowel dilatation, wall thickening or obstruction. Pelvis: The bladder and prostate are unremarkable. Peritoneum/Retroperitoneum: There is no free air.   A small amount of ascites is present within the dependent pelvis. There are innumerable soft tissue nodules scattered throughout the abdomen, pelvis and retroperitoneum ranging in size up to 2.4 cm in diameter. Bones/Soft Tissues: There has been interval development of innumerable subcutaneous soft tissue nodules scattered throughout the abdomen and pelvis there is no fracture or aggressive osseous lesion. 1. Enlarging infiltrating mediastinal bronchogenic carcinoma with right basilar atelectasis versus pneumonia and moderate-sized parapneumonic effusion. 2. Significant interval progression of disseminated pulmonary, hepatic, left adrenal, intraperitoneal and soft tissue metastatic disease     Ir Guided Thoracentesis Pleural    Result Date: 1/28/2021  PROCEDURE: ULTRASOUNDGUIDED RIGHT THORACENTESIS 1/28/2021 HISTORY: ORDERING SYSTEM PROVIDED HISTORY: pleural effusion TECHNOLOGIST PROVIDED HISTORY: Reason for exam:->pleural effusion Which side should the procedure be performed?->Right TECHNIQUE: Informed consent was obtained after a detailed explanation of the procedure including risks, benefits, and alternatives. In addition, the options to performing CT-guided lung nodule biopsy were discussed with the patient. [place] protocol was performed. The right chest was prepped and draped in sterile fashion and local anesthesia was achieved with lidocaine. A 5 [place]ij[place] needle sheath was advanced under ultrasound guidance into pleural effusion and thoracentesis was performed. The patient tolerated the procedure well. EBL: Minimal, less than 5 cc FINDINGS: A total of 30 cc of bloody fluid was removed. The fluid was sent for cytologic evaluation. Successful ultrasound guided thoracentesis.      Ir Guided Thoracentesis Pleural    Result Date: 1/27/2021 PROCEDURE: Limited ultrasound right chest 1/27/2021 HISTORY: ORDERING SYSTEM PROVIDED HISTORY: Has right hilar mass/nodules needs biopsy and if any fluid to drain, send for analysis TECHNOLOGIST PROVIDED HISTORY: Reason for exam:->Has right hilar mass/nodules needs biopsy and if any fluid to drain, send for analysis Which side should the procedure be performed?->Right Right pleural effusion TECHNIQUE: Dedicated ultrasound was performed the right pleural space for thoracentesis. There is only a trace amount of right pleural fluid with only a small window for thoracentesis. Lung is seen moving within this with each respiration. No safe window for thoracentesis procedure was not performed. FINDINGS: Trace right pleural effusion. Trace right pleural effusion. No safe window for procedure at this time. Thoracentesis was not performed.      Ir Guided Perc Pleural Drain W Cath Insert    Result Date: 2/8/2021 PROCEDURE: IR PERC CATH PLEURAL DRAIN W/IMAG MODERATE CONSCIOUS SEDATION 2/8/2021 HISTORY: ORDERING SYSTEM PROVIDED HISTORY: Malignant effusion TECHNOLOGIST PROVIDED HISTORY: Reason for exam:->Malignant effusion Which side should the procedure be performed?->Right TECHNIQUE: Ultrasound fluoroscopic guidance CONTRAST: None. SEDATION: 1 mgversed and 50 mcg fentanyl were titrated intravenously for moderate sedation monitored under my direction. Total intraservice time of sedation was 25 minutes. The patient's vital signs were monitored throughout the procedure and recorded in the patient's medical record by the nurse. Estimated blood loss: Less than 5 mL. FLUOROSCOPY DOSE AND TYPE OR TIME AND EXPOSURES: 0.3 minutes, 2 images, 5.6 mGy. DESCRIPTION OF PROCEDURE: Informed consent was obtained after a detailed explanation of the procedure including risks, benefits, and alternatives. Universal protocol was observed. Ultrasound was used to evaluate the right pleural space and select an appropriate skin entry site. The right posterior lateral chest was prepped and draped in sterile fashion. 1% lidocaine local anesthesia was used. Using ultrasound guidance, the right pleural space was accessed with a 5 Haitian needle sheath. Wire was advanced through the sheath. A tunnel exit site was determined and the site was anesthetized. A small skin incision was made and the catheter was tunneled to the pleural entry site. Over the wire, the 5 [place] sheath was exchanged for a series of dilators and a peel-away sheath. The PleurX catheter was then advanced through the peel-away sheath into the right pleural space. The entry site was closed with Dermabond. The catheter was secured to the skin using 2-0 silk suture and the site was dressed and bandaged in sterile fashion. The patient tolerated procedure well without immediate complication.   It FINDINGS: Appropriate positioning of the PleurX catheter within the right posterior pleural space. 800 mL serosanguineous fluid was removed. Successful ultrasound fluoroscopic guided placement of a right intrathoracic PleurX catheter. The patient was seen and examined on day of discharge and this discharge summary is in conjunction with any daily progress note from day of discharge. Time Spent on discharge is 1.5 hours  in the examination, evaluation, counseling and review of medications and discharge plan. Note that more than 30 minutes was spent in preparing discharge papers, discussing discharge with patient, medication review, etc.       Signed:    Mirtha Aguirre MD   2/8/2021      Thank you Shira Barlow MD for the opportunity to be involved in this patient's care.  If you have any questions or concerns please feel free to contact me at [address] [place] [address]

## 2021-02-08 NOTE — ADT AUTH CERT
Pneumonia - Care Day 12 (2/5/2021) by Oniel Tafoya RN       Review Status Review Entered   Completed 2/8/2021 08:55      Criteria Review      Care Day: 12 Care Date: 2/5/2021 Level of Care: ICU    Guideline Day 2    Level Of Care    (X) Floor    Clinical Status    ( ) * No CO2 retention or acidosis    (X) * No requirement for mechanical ventilation    (X) * Hypotension absent    (X) * Afebrile or fever improved    ( ) * No hypoxia on room air or oxygenation improved    2/8/2021 8:55 AM EST by Griffin Sepulveda      O2 @ 3-3.5L/min per NC, Sats 92-97%, RR 19-29    (X) * Mental status improved or at baseline    Activity    (X) * Increased activity    Routes    (X) Oral hydration, medications    (X) Usual diet    2/8/2021 8:55 AM EST by Griffin Sepulveda      DIET CARB CONTROL; Carb Control: 4 carb choices (60 gms)/meal; Daily Fluid Restriction: 1000 ml     Dietary Nutrition Supplements: Low Volume Supplement    Interventions    (X) Pulse oximetry    2/8/2021 8:55 AM EST by Griffin Sepulveda      O2 @ 3-3.5L/min per NC, Sats 92-97%, RR 19-29    (X) Possible oxygen    2/8/2021 8:55 AM EST by Eve Castro      O2 @ 3-3.5L/min per NC, Sats 92-97%, RR 19-29    * Milestone   Additional Notes   2/5      Oncology      Has intermittent shortness of breath. Has streaky hemoptysis. Has been on warfarin. Melene Pata is 2.1. No chest pain   No abdominal pain. BP (!) 101/59   Pulse 60   Temp 97.2 °F (36.2 °C) (Temporal)   Resp 25   Ht 5' 9\" (1.753 m)   Wt 207 lb 14.3 oz (94.3 kg)   SpO2 92%   BMI 30.70 kg/m²        Conscious alert oriented.     Moderate distress noted. Expiratory wheezes noted.  Right basal crackles noted. Abdomen is soft bowel sounds are heard   Extremities 1+ edema noted.          1.  Metastatic melanoma with lung metastasis and malignant pleural effusion:     -Diagnosed during hospitalization when he presented with supratherapeutic INR and was noted to have pleural effusion in late Jan 2021.   -LDH is elevated.   -Had lengthy discussion with the patient and patient's wife. -We will obtain a PET scan as outpatient   -CT brain with contrast was negative for metastatic disease   -I have ordered next generation sequencing on the pathology specimen which will help us to determine treatment. -Typically treatment will be immunotherapy or targeted agents against BRAF if positive mutation is noted. -Treatments will be done as outpatient.       2.  Ongoing hemoptysis:       -Will obtain CT chest with contrast.   -We will also obtain CT abdomen and pelvis.   -Discussed with the patient and Dr. Jan Chung           1.  Supratherapeutic INR epistaxis:       -Received FFP and vitamin K on 1/26/2021   -INR is 1.48   -Patient had poor appetite for last couple of weeks which might have caused vitamin K deficiency leading to supratherapeutic INR.   -On warfarin.  INR 2.1           3.  Cardiac arrest after bronchoscopy in December 2020, has pacemaker   Cardiology is following.       4.  Anemia:       - Most likely due to blood loss   - anemia of chronic disease   - ferritin high   - Transfuse PRBCs if hemoglobin is less than 8       5. V-tach:       -Cardiology following   ---------------------------   IM   Coughing overnight again and did not sleep.  Apparently has had hemoptysis.  No SOB.  No CP, HA or abdominal pain.  No fevers.  Had BM.  Has back and leg pain. Community acquired Pneumonia- Possibly post obstructive form lung mass. suspected gram-positive/gram-negative   COVID-19 negative   DCed cefepime(day 7/7). Completed azithromycin(day 5/5)   -D/Cd vancomycin given negative MRSA culture   -WBC trending down       Acute respiratory failure with hypoxia-due to pneumonia   -Currently on RA. Wean for 02 sats >90%.    -Continue inhalors -Add Phenergan with Codeine syrup; will need home O2 eval       Hemoptysis   -Check CT Chest   -Pulm consulted       R lung mass and malignant effusion with metastatic melanoma   Previously bronchoscopy attempted had a brief cardiac arrest   -Declined further workup by Pulmonologist at this hospital.He would like to complete workup with Pulmonologist at [facility] guided lung bx or bronchoscopy planned.    -Thoracentesis done 1/28/21 at request of patient, cytology with metastatic melanoma   -Continue home coumadin and stop lovenox    -Oncology recommendations noted       Supratherapeutic INR   INR noted noted to be greater than 14 on admission   -On coumadin at home for Afib subtherapeutic. DCed lovenox bid as INR > 2   -Cont Coumadin        Chronic moderate systolic heart failure    -Cont PO Lasix and potassium supplementation.       Hx VT s/p AICD   -On Amio PO   -EP following       Type 2 diabetes mellitus   Last A1c 10.6   -Management per Endocrinology        Chronic Anemia   Iron studies B12 folate reflect studies consistent with anemia of chronic disease   -Hgb stable. Management per Oncology.       Acute kidney injury-due to  prerenal   -Resolved.  Continue to hold lisinopril       Chronic atrial fibrillation s/p pacemaker insertion on the first of this month   Pacemaker site has a small hematoma which was also noticed on discharge   -Continue amiodarone   -Continue lovenox/Coumadin for anticoagulation   -EP recommendations noted       Anxiety   -Vistaril PRN       Insomnia   -Continue ambien PRN       Constipation   -Cont Movantik now   -Miralax bid   -Dulcolax supp PRN       DVT Prophylaxis: Coumadin   Diet: DIET CARB CONTROL; Carb Control: 4 carb choices (60 gms)/meal; Daily Fluid Restriction: 1000 ml   Dietary Nutrition Supplements: Diabetic Oral Supplement   Code Status: Prior       PT/OT Eval Status: as needed   ---------------------   CT Chest: 1. Enlarging infiltrating mediastinal bronchogenic carcinoma with right   basilar atelectasis versus pneumonia and moderate-sized parapneumonic   effusion.    2. Significant interval progression of disseminated pulmonary, hepatic, left   adrenal, intraperitoneal and soft tissue metastatic disease   -------------------------------------      2/5/2021 04:35   Sodium: 133 (L)   Potassium: 4.5   Chloride: 100   CO2: 23   BUN: 24 (H)   Creatinine: 0.8   Anion Gap: 10   GFR Non-: >60   GFR African American: >60   Glucose: 105 (H)   Calcium: 8.8   Prothrombin Time: 24.5 (H)   INR: 2.10 (H)      2/5/2021 08:03   POC Glucose: 125 (H)      2/5/2021 11:43   POC Glucose: 180 (H)      2/5/2021 17:36   POC Glucose: 122 (H)      2/5/2021 20:26   POC Glucose: 217 (H)   ------------------------   Scheduled Medications   · naloxegol 25 mg Oral QAM   · polyethylene glycol 17 g Oral BID   · amiodarone 400 mg Oral BID   · furosemide 40 mg Oral Daily   · metoprolol tartrate 100 mg Oral BID   · megestrol 200 mg Oral Daily   · lisinopril 5 mg Oral Daily   · warfarin 2.5 mg Oral Daily   · LORazepam 1 mg Intravenous Once   · amiodarone 150 mg Intravenous Once   · budesonide-formoterol 2 puff Inhalation BID   · ipratropium-albuterol 1 ampule Inhalation Q4H WA   · insulin lispro 14 Units Subcutaneous TID WC   · insulin glargine 22 Units Subcutaneous Q12H   · potassium chloride 40 mEq Oral Daily with breakfast   · insulin lispro 0-18 Units Subcutaneous TID WC   · insulin lispro 0-9 Units Subcutaneous Nightly   · allopurinol 300 mg Oral QPM   · finasteride 5 mg Oral Daily   · pantoprazole 40 mg Oral QAM AC   · atorvastatin 20 mg Oral Nightly   · tamsulosin 0.4 mg Oral Nightly   · sodium chloride flush 10 mL Intravenous 2 times per day   · lactobacillus 1 capsule Oral BID WC          PRN   HYDROcodone-acetaminophen (NORCO) 5-325 MG per tablet 1 tablet x1   iohexol (OMNIPAQUE 240) injection 50 mL x1 iopamidol (ISOVUE-370) 76 % injection 75 mL x1   promethazine-codeine (PHENERGAN with CODEINE) 6.25-10 MG/5ML syrup 5 mL x1   traMADol (ULTRAM) tablet 50 mg x1   zolpidem (AMBIEN) tablet 5 mg x1

## 2021-02-08 NOTE — OP NOTE
Brief Postoperative Note    Lilliana Keen  YOB: 1955  [id number]    Pre-operative Diagnosis: metastatic melanoma with recurrent right pleural effusion and hypoxia. Post-operative Diagnosis: Same    Procedure: US and fluoro R Pleurx catheter placement. Anesthesia: Moderate Sedation    Surgeons/Assistants: Dr. Morris Hernandez    Estimated Blood Loss: less than 5ml     Complications: None    Specimens: Was Obtained: bloody right pleural fluid    Findings: successful placement of a R pleurx intrathoracic catheter.     Electronically signed by Erik Velasco MD on 2/8/2021 at 11:42 AM

## 2021-02-08 NOTE — TELEPHONE ENCOUNTER
Maricarmen Garg from [facility], [facility]. [phone] x [phone] states patient will be d/c from [facility], they are recommending patient to start home care. They are requesting orders for patient to begin care.     Provider out of office    Please advise

## 2021-02-08 NOTE — PRE SEDATION
Sedation Pre-Procedure Note    Patient Name: Lilliana Keen   YOB: 1955  Room/Bed: [facility]-8798/5701-60  Medical Record Number: [id number]  Date: 2/8/2021   Time: 11:13 AM       Indication:  Metastatic melanoma. Recurrent right pleural effusion and hypoxia. Consent: I have discussed with the patient and/or the patient representative the indication, alternatives, and the possible risks and/or complications of the planned procedure and the anesthesia methods. The patient and/or patient representative appear to understand and agree to proceed. Vital Signs:   Vitals:    02/08/21 0929   BP:    Pulse: 77   Resp: 22   Temp:    SpO2:        Past Medical History:   has a past medical history of Abdominal pain, LLQ, Allergic rhinitis, Asthma, Atopic dermatitis, Cancer ([address] Diabetes mellitus ([facility] [address] Erectile dysfunction, Generalized osteoarthrosis, involving multiple sites, GERD (gastroesophageal reflux disease), Gynecomastia, Hyperlipidemia, Nasal congestion, Nipple pain, Obstructive sleep apnea syndrome, Pseudo-gout, and Unspecified essential hypertension. Past Surgical History:   has a past surgical history that includes Hand surgery (2011); other surgical history (2002); Finger surgery (Left, 3-1-2013); Foot surgery (Right); Carpal tunnel release (Bilateral, 2013); pr njx dx/ther sbst intrlmnr lmbr/sac w/img gdn (Right, 9/24/2018); lumbar nerve block (Right, 12/12/2018); bronchoscopy (N/A, 12/2/2020); and back surgery.     Medications:   Scheduled Meds:    fentaNYL  1 patch Transdermal Q72H    fluticasone  2 spray Each Nostril Daily    amiodarone  400 mg Oral Daily    naloxegol  25 mg Oral QAM    polyethylene glycol  17 g Oral BID    furosemide  40 mg Oral Daily    metoprolol tartrate  100 mg Oral BID    megestrol  200 mg Oral Daily    lisinopril  5 mg Oral Daily    LORazepam  1 mg Intravenous Once    amiodarone  150 mg Intravenous Once    budesonide-formoterol  2 puff Inhalation BID  ipratropium-albuterol  1 ampule Inhalation Q4H WA    insulin lispro  14 Units Subcutaneous TID     insulin glargine  22 Units Subcutaneous Q12H    potassium chloride  40 mEq Oral Daily with breakfast    insulin lispro  0-18 Units Subcutaneous TID     insulin lispro  0-9 Units Subcutaneous Nightly    allopurinol  300 mg Oral QPM    finasteride  5 mg Oral Daily    pantoprazole  40 mg Oral QAM AC    atorvastatin  20 mg Oral Nightly    tamsulosin  0.4 mg Oral Nightly    sodium chloride flush  10 mL Intravenous 2 times per day    lactobacillus  1 capsule Oral BID WC     Continuous Infusions:    sodium chloride      sodium chloride      sodium chloride      dextrose      sodium chloride       PRN Meds: sodium chloride, sodium chloride, oxyCODONE, sodium chloride, naloxone, promethazine-codeine, bisacodyl, magnesium sulfate, sennosides-docusate sodium, sodium chloride, zolpidem, glucose, dextrose, glucagon (rDNA), dextrose, sodium chloride, albuterol sulfate HFA, hydrOXYzine, sodium chloride flush, magnesium hydroxide, acetaminophen, ondansetron  Home Meds:   Prior to Admission medications    Medication Sig Start Date End Date Taking? Authorizing Provider   amiodarone (PACERONE) 400 MG tablet Take 1 tablet by mouth 2 times daily for 7 days, THEN 1 tablet daily for 7 days, THEN 0.5 tablets daily.  2/8/21 5/9/21 Yes Harding Cheadle, APRN - CNP   hydrOXYzine (VISTARIL) 50 MG capsule Take 1 capsule by mouth 3 times daily as needed for Itching or Anxiety 1/14/21 2/13/21  MACIEL Sheehan NP   ondansetron (ZOFRAN) 4 MG tablet Take 1 tablet by mouth 3 times daily as needed for Nausea or Vomiting 1/14/21   MACIEL Sheehan NP   insulin glargine [facility]) 100 UNIT/ML injection pen Inject 25 Units into the skin nightly 1/13/21   Nixno Griffin MD Insulin Pen Needle (MEIJER PEN NEEDLES) 31G X 6 MM MISC 1 each by Does not apply route daily 1/13/21   Ignacio Guardado MD   pantoprazole (PROTONIX) 40 MG tablet TAKE 1 TABLET BY MOUTH ONE TIME A DAY  1/11/21   Ignacio Guardado MD   allopurinol (ZYLOPRIM) 300 MG tablet TAKE 1 TABLET BY MOUTH ONE TIME A DAY  1/11/21   Ignacio Guardado MD   metoprolol tartrate (LOPRESSOR) 50 MG tablet Take 1.5 tablets by mouth 2 times daily 1/11/21   Gerri Adorno MD   warfarin (COUMADIN) 5 MG tablet Take 1 tablet by mouth daily Do not resume until 1/7 1/2/21   MACIEL Armas - CNP   albuterol sulfate  (90 Base) MCG/ACT inhaler Inhale 2 puffs into the lungs every 6 hours as needed for Wheezing 12/30/20 12/30/21  Chandler Mock MD   glimepiride (AMARYL) 4 MG tablet Take 1 tablet by mouth daily 12/24/20   Ignacio Guardado MD   furosemide (LASIX) 40 MG tablet Take 1 tablet by mouth daily 12/24/20   Ignacio Guardado MD   fluticasone-salmeterol (ADVAIR DISKUS) 250-50 MCG/DOSE AEPB Inhale 1 puff into the lungs every 12 hours 12/14/20   Chandler Mock MD   lisinopril (PRINIVIL;ZESTRIL) 5 MG tablet Take 1 tablet by mouth daily 12/4/20   Camron Kapadia MD   ipratropium-albuterol (DUONEB) 0.5-2.5 (3) MG/3ML SOLN nebulizer solution Inhale 3 mLs into the lungs every 4 hours as needed for Shortness of Breath Dx: Asthma  ICD-10: J45.909 11/25/20   Chandler Mock MD   finasteride (PROSCAR) 5 MG tablet Take 5 mg by mouth daily    Historical Provider, MD   tamsulosin (FLOMAX) 0.4 MG capsule Take 0.4 mg by mouth daily    Historical Provider, MD   metFORMIN (GLUCOPHAGE) 1000 MG tablet Take 1 tablet by mouth 2 times daily 7/8/20   Ignacio Guardado MD   simvastatin (ZOCOR) 40 MG tablet TAKE 1 TABLET BY MOUTH AT BEDTIME 7/8/20   Ignacio Guardado MD   FREESTYLE LANCETS MISC 1 each by Does not apply route daily 2/15/17   Ignacio Guardado MD Blood Glucose Monitoring Suppl (FREESTYLE LITE) CLAUDINE 1 Device by Does not apply route 2 times daily 2/15/17   Leopold Hope, MD     Coumadin Use Last 7 Days:  no  Antiplatelet drug therapy use last 7 days: no  Other anticoagulant use last 7 days: no  Additional Medication Information:        Pre-Sedation Documentation and Exam:   I have reviewed the patient's history and review of systems.     Mallampati Airway Assessment:  normal neck range of motion    Prior History of Anesthesia Complications:   none    ASA Classification:  Class 3 - A patient with severe systemic disease that limits activity but is not incapacitating    Sedation/ Anesthesia Plan:   intravenous sedation    Medications Planned:   midazolam (Versed) intravenously and fentanyl intravenously    Patient is an appropriate candidate for plan of sedation: yes    Electronically signed by Trini Perez MD on 2/8/2021 at 11:13 AM

## 2021-02-08 NOTE — PROGRESS NOTES
Patient was evaluated today for the diagnosis of Melanoma with lung metasases . I entered a DME order for home oxygen because the diagnosis and testing requires the patient to have supplemental oxygen. Condition will improve or be benefited by oxygen use. The patient is  able to perform good mobility in a home setting and therefore does require the use of a portable oxygen system. The need for this equipment was discussed with the patient and he understands and is in agreement.

## 2021-02-08 NOTE — FLOWSHEET NOTE
provided emotional support, ministerial presence, and prayer with patient and later with wife. Patient referred to himself as of the [address] and was receptive to brief prayer for peace and comfort. Patient remains in pain and is not able to complete advance directives at this time. Patient is , and his wife would be his decision maker as needed. Patient's wife is in denial about his condition and new diagnosis, saying \"I cannot go on without him; he has to fight! \"  Patient and wife agree that they want him to return home, maybe with Hospice.

## 2021-02-08 NOTE — PROGRESS NOTES
Palliative Care:      S: Potential DC home today scheduled at 1800. [facility] for 02 and [facility] management pending. B: Palliative has been following patient and wife daily M-F since initial consult of 2/3/21 to establish a trusting relationship and emotional support ongoing. A: Patient in significant pain after thoracentesis with PleurX catheter placement today.   -Patient received Pembrolizumab 200 mg IV as of 2/6/21. Next dose due in 3 weeks.   -Patient remains with ongoing shortness of breath and hemoptysis. CT showing worsening disease in the lungs as well as moderate-sized parapneumonic effusion on CT scan of chest/abdomen and pelvis on 2/5/21. R: Lengthy discussion today with wife and  (PT) pending DC plans to home.  -Accepting with [facility] support in home. Initially patient is refusing hospital bed. May need to be added after initial night in home due to SOB/Mobility concerns. -Accepting for [facility] to follow in home. Education provided on both palliative and hospice philosophy support. Brochures provided. Agreeable with initial Palliative care as patient wishes to continue with svitlana/Dr. Lenard Thakur as out patient status.   [facility] [facility] [address] [facility] selected. Aurelia Yo agrees to offer further support during in-patient today to wife and patient. Consult faxed to [phone]. Perfect Serve to physician regarding palliative care to follow in home. Update to nurse Mushtaq Maers of today's discussions. Will continue to follow and provide support as needed.

## 2021-02-08 NOTE — CARE COORDINATION
David/Ada received a referral to this patient for a rollator and home 02 @ 3 lpm cont via nc. Home 02 has been arranged for delivery. Pt and spouse aware to call David [phone] to initiate setup. Rollator and portable tank have been delivered to the patients room. Thank you for the referral.  Electronically signed by Caroline Saeed on 2/8/2021 at 4:16 PM  Cell ph# [phone]    NOTE: After 5:00 pm, Weekends, Holidays: Call Ada/[facility] at [phone] to coordinate delivery of home medical equipment.

## 2021-02-09 ENCOUNTER — CARE COORDINATION (OUTPATIENT)
Dept: CASE MANAGEMENT | Age: 66
End: 2021-02-09

## 2021-02-09 ENCOUNTER — TELEPHONE (OUTPATIENT)
Dept: FAMILY MEDICINE CLINIC | Age: 66
End: 2021-02-09

## 2021-02-09 NOTE — PROGRESS NOTES
Follow-up care is a key part of your treatment and safety. Be sure to make and go to all appointments, and call your doctor if you are having problems. It's also a good idea to know your test results and keep a list of the medicines you take. How can you care for yourself at home? · Follow the instructions for taking care of your catheter. Your doctor or nurse will teach you or your caregivers what to do. How to drain fluid  1. Wash your hands well. Clean your work area with a disinfecting wipe or alcohol. Place everything you will need on your work area, including the drain tube and the bag or bottle that will collect the fluid. 2. Wear disposable gloves if they are part of your kit or if your doctor told you to.  3. Make sure the drainage tube is closed. It may have a clip on it. 4. Remove the cap at the end of your catheter. Wipe the end of the catheter and the end of the drain tube with alcohol. 5. Attach the catheter to the drain tube. 6. Make sure the bottle or bag is at least an arm's length below your chest or belly. If your system is vacuum-sealed or uses a pump, you may not need to lower the container. 7. Depending on what type of drainage kit you have, push the plunger or squeeze the pump to start the drainage. Some kits include a clamp you need to release before the drainage starts. 8. Your doctor will tell you how much fluid to drain at one time. In general, don't drain more than 1,000 milliliters (mL) from your chest or more than 2,000 ml from your belly in a 24-hour period. 9. If you feel pain during drainage, you may be able to ease the pain by slowing down the drainage rate. Do this by raising the bag or bottle. Some drainage kits include a clamp you can pinch to slow the drainage. How to empty the drainage bag or bottle  1. Disconnect the drain tube from your catheter. Let all the fluid in the drain tube flow into the bag or bottle. 2. Wipe the end of your catheter with alcohol. Put a new valve cap on the catheter, and make sure it's tight. 3. Tape the end of the catheter to your skin wherever it's most comfortable. 4. Empty the bag or bottle into the toilet or sink. 5. If you spill some fluid on clothes or skin, clean it up with soap and warm water. You may want to use bleach for spills on some surfaces. 6. Keep a drainage record sheet so that your doctor can see how much fluid is coming from the tube. Your doctor or nurse will give you a record sheet. Or you can just write down the date, time, amount, and color of the drainage. Save the record sheet to show to your doctor. How to change the bandage (dressing)  1. Follow your doctor's instructions for how often to change the bandage. 2. Carefully remove the bandage. 3. Check your skin for signs of infection. 4. Clean the skin with an alcohol pad.  5. Open a new dressing kit. 6. Put the first layer of gauze or foam on the skin, under the catheter. Put the second layer on top of that. Put the clear sticky bandage over everything. When should you call for help? Call  911 anytime you think you may need emergency care. For example, call if:    · You passed out (lost consciousness). · You have severe trouble breathing. Call your doctor now or seek immediate medical care if:    · You have new or worse trouble breathing. · You have symptoms of infection, such as:  ? Increased pain, swelling, warmth, or redness. ? Red streaks leading from the area. ? Pus draining from the area. ? A fever. · You have new or worse pain. · The fluid looks different from what you normally see. · The tube comes out of your body. Watch closely for changes in your health, and be sure to contact your doctor if:    · There is little or no fluid draining from the catheter. · You do not get better as expected. Where can you learn more?

## 2021-02-09 NOTE — PROGRESS NOTES
Patients chart was reviewed post Tunneled Pleural Catheter Placement procedure. Pain noted post procedure which was alleviated with pain medication otherwise no complications were noted post procedure.

## 2021-02-09 NOTE — PROGRESS NOTES
Pt given discharge instructions. Pt blood completed. Transferred to home per ambulance service with 02. Wife transported belongings earlier.

## 2021-02-09 NOTE — TELEPHONE ENCOUNTER
Approved for home care and we can send them the directions on how to care for the pleural catheter.   They should be draining this daily

## 2021-02-09 NOTE — CARE COORDINATION
Yo 45 Transitions Initial Follow Up Call    Call within 2 business days of discharge: Yes    Patient: Tomy Israel Patient : 1955   MRN: [id number]  Reason for Admission:   Discharge Date: 21 RARS: Readmission Risk Score: 55      Last Discharge [facility]       Complaint Diagnosis Description Type Department Provider    21 Fatigue Pneumonia of right lower lobe due to infectious organism . .. ED to Hosp-Admission (Discharged) (ADMITTED) [facility] [place] ICU Moris Bishop MD; Jamil Tena. .. Non-face-to-face services provided:  Obtained and reviewed discharge summary and/or continuity of care documents       Challenges to be reviewed by the provider   Additional needs identified to be addressed with provider No  none    Discussed COVID-19 related testing which was available at this time. Test results were negative. Patient informed of results, if available? Yes         Method of communication with provider : none    Advance Care Planning:   Does patient have an Advance Directive:  reviewed and current. Was this a readmission? No  Patient stated reason for admission: weak, tired  Patients top risk factors for readmission: medical condition    Care Transition Nurse (CTN) contacted the family by telephone to perform post hospital discharge assessment. Verified name and  with family as identifiers. Provided introduction to self, and explanation of the CTN role. CTN reviewed discharge instructions, medical action plan and red flags with family who verbalized understanding. Family given an opportunity to ask questions and does not have any further questions or concerns at this time. Were discharge instructions available to patient? Yes. Reviewed appropriate site of care based on symptoms and resources available to patient including: When to call 911. The family agrees to contact the PCP office for questions related to their healthcare.      Medication reconciliation was Services: Yes  Post Acute Services: Home Health (Comment:  [facility])  Do you feel like you have everything you need to keep you well at home?: Yes  Care Transitions Interventions  No Identified Needs         Follow Up  Future Appointments   Date Time Provider Garett Mcrae   4/6/2021  2:00 PM SCHEDULE, VIANEY DEVICE CHECK  [facility]   4/6/2021  2:00 PM Nikunj Betancur MD  [facility]   7/6/2021  9:00 AM SCHEDULE, Curran REMOTE TRANSMISSION  [facility]   12/8/2021  1:20 PM MD David Airas Settler [facility]       Renee Cuevas RN

## 2021-02-09 NOTE — TELEPHONE ENCOUNTER
Per Dr. Ami Grijalva- need to drain daily to get fluid off the lungs, advised home nurse of information. The nurse asked if he should be on the Glimeperide, Metformin and insulins. Advised her, he should only be on the insulin, not the other meds.

## 2021-02-09 NOTE — TELEPHONE ENCOUNTER
Amanda From [facility] [phone] states they started home care for patient, patient has a wound drainage kit but they need orders on how to care for the patient and how to use drainage kit.     Please advise

## 2021-02-10 ENCOUNTER — HOSPITAL ENCOUNTER (INPATIENT)
Age: 66
LOS: 3 days | Discharge: HOSPICE | DRG: 309 | End: 2021-02-13
Attending: EMERGENCY MEDICINE | Admitting: INTERNAL MEDICINE
Payer: COMMERCIAL

## 2021-02-10 ENCOUNTER — APPOINTMENT (OUTPATIENT)
Dept: GENERAL RADIOLOGY | Age: 66
DRG: 309 | End: 2021-02-10
Payer: COMMERCIAL

## 2021-02-10 ENCOUNTER — TELEPHONE (OUTPATIENT)
Dept: OTHER | Facility: CLINIC | Age: 66
End: 2021-02-10

## 2021-02-10 DIAGNOSIS — I47.20 VT (VENTRICULAR TACHYCARDIA): Primary | ICD-10-CM

## 2021-02-10 LAB
ANION GAP SERPL CALCULATED.3IONS-SCNC: 16 MMOL/L (ref 3–16)
BASE EXCESS VENOUS: -2.7 MMOL/L (ref -3–3)
BASOPHILS ABSOLUTE: 0 K/UL (ref 0–0.2)
BASOPHILS RELATIVE PERCENT: 0.3 %
BUN BLDV-MCNC: 40 MG/DL (ref 7–20)
CALCIUM SERPL-MCNC: 8.9 MG/DL (ref 8.3–10.6)
CARBOXYHEMOGLOBIN: 4.5 % (ref 0–1.5)
CHLORIDE BLD-SCNC: 100 MMOL/L (ref 99–110)
CO2: 19 MMOL/L (ref 21–32)
CREAT SERPL-MCNC: 1.2 MG/DL (ref 0.8–1.3)
EKG ATRIAL RATE: 150 BPM
EKG ATRIAL RATE: 60 BPM
EKG DIAGNOSIS: NORMAL
EKG DIAGNOSIS: NORMAL
EKG P-R INTERVAL: 118 MS
EKG P-R INTERVAL: 128 MS
EKG Q-T INTERVAL: 330 MS
EKG Q-T INTERVAL: 472 MS
EKG QRS DURATION: 146 MS
EKG QRS DURATION: 190 MS
EKG QTC CALCULATION (BAZETT): 472 MS
EKG QTC CALCULATION (BAZETT): 479 MS
EKG R AXIS: -31 DEGREES
EKG R AXIS: -57 DEGREES
EKG T AXIS: 123 DEGREES
EKG T AXIS: 158 DEGREES
EKG VENTRICULAR RATE: 127 BPM
EKG VENTRICULAR RATE: 60 BPM
EOSINOPHILS ABSOLUTE: 0.1 K/UL (ref 0–0.6)
EOSINOPHILS RELATIVE PERCENT: 1.4 %
GFR AFRICAN AMERICAN: >60
GFR NON-AFRICAN AMERICAN: >60
GLUCOSE BLD-MCNC: 165 MG/DL (ref 70–99)
GLUCOSE BLD-MCNC: 219 MG/DL (ref 70–99)
GLUCOSE BLD-MCNC: 263 MG/DL (ref 70–99)
HCO3 VENOUS: 21.8 MMOL/L (ref 23–29)
HCT VFR BLD CALC: 30.9 % (ref 40.5–52.5)
HEMOGLOBIN: 9.7 G/DL (ref 13.5–17.5)
INR BLD: 1.82 (ref 0.86–1.14)
LACTIC ACID: 2.9 MMOL/L (ref 0.4–2)
LYMPHOCYTES ABSOLUTE: 1.1 K/UL (ref 1–5.1)
LYMPHOCYTES RELATIVE PERCENT: 10.1 %
MCH RBC QN AUTO: 27.2 PG (ref 26–34)
MCHC RBC AUTO-ENTMCNC: 31.3 G/DL (ref 31–36)
MCV RBC AUTO: 87 FL (ref 80–100)
METHEMOGLOBIN VENOUS: 0.3 %
MONOCYTES ABSOLUTE: 1 K/UL (ref 0–1.3)
MONOCYTES RELATIVE PERCENT: 9.2 %
NEUTROPHILS ABSOLUTE: 8.2 K/UL (ref 1.7–7.7)
NEUTROPHILS RELATIVE PERCENT: 79 %
O2 CONTENT, VEN: 12 VOL %
O2 SAT, VEN: 99 %
O2 THERAPY: ABNORMAL
PCO2, VEN: 35.6 MMHG (ref 40–50)
PDW BLD-RTO: 22.2 % (ref 12.4–15.4)
PERFORMED ON: ABNORMAL
PERFORMED ON: ABNORMAL
PH VENOUS: 7.39 (ref 7.35–7.45)
PLATELET # BLD: 273 K/UL (ref 135–450)
PMV BLD AUTO: 7.2 FL (ref 5–10.5)
PO2, VEN: 122 MMHG (ref 25–40)
POTASSIUM REFLEX MAGNESIUM: 4.5 MMOL/L (ref 3.5–5.1)
PRO-BNP: 1660 PG/ML (ref 0–124)
PROTHROMBIN TIME: 21.2 SEC (ref 10–13.2)
RBC # BLD: 3.55 M/UL (ref 4.2–5.9)
SODIUM BLD-SCNC: 135 MMOL/L (ref 136–145)
TCO2 CALC VENOUS: 51 MMOL/L
TROPONIN: 0.06 NG/ML
TROPONIN: 0.06 NG/ML
WBC # BLD: 10.4 K/UL (ref 4–11)

## 2021-02-10 PROCEDURE — 84484 ASSAY OF TROPONIN QUANT: CPT

## 2021-02-10 PROCEDURE — 36415 COLL VENOUS BLD VENIPUNCTURE: CPT

## 2021-02-10 PROCEDURE — 82803 BLOOD GASES ANY COMBINATION: CPT

## 2021-02-10 PROCEDURE — 2580000003 HC RX 258: Performed by: EMERGENCY MEDICINE

## 2021-02-10 PROCEDURE — 85610 PROTHROMBIN TIME: CPT

## 2021-02-10 PROCEDURE — 2100000000 HC CCU R&B

## 2021-02-10 PROCEDURE — 96374 THER/PROPH/DIAG INJ IV PUSH: CPT

## 2021-02-10 PROCEDURE — 93010 ELECTROCARDIOGRAM REPORT: CPT | Performed by: INTERNAL MEDICINE

## 2021-02-10 PROCEDURE — 83880 ASSAY OF NATRIURETIC PEPTIDE: CPT

## 2021-02-10 PROCEDURE — 2000000000 HC ICU R&B

## 2021-02-10 PROCEDURE — 99284 EMERGENCY DEPT VISIT MOD MDM: CPT

## 2021-02-10 PROCEDURE — 6370000000 HC RX 637 (ALT 250 FOR IP): Performed by: INTERNAL MEDICINE

## 2021-02-10 PROCEDURE — 85025 COMPLETE CBC W/AUTO DIFF WBC: CPT

## 2021-02-10 PROCEDURE — 80048 BASIC METABOLIC PNL TOTAL CA: CPT

## 2021-02-10 PROCEDURE — 6360000002 HC RX W HCPCS: Performed by: EMERGENCY MEDICINE

## 2021-02-10 PROCEDURE — 93005 ELECTROCARDIOGRAM TRACING: CPT | Performed by: EMERGENCY MEDICINE

## 2021-02-10 PROCEDURE — 83605 ASSAY OF LACTIC ACID: CPT

## 2021-02-10 PROCEDURE — 94640 AIRWAY INHALATION TREATMENT: CPT

## 2021-02-10 PROCEDURE — 71045 X-RAY EXAM CHEST 1 VIEW: CPT

## 2021-02-10 RX ORDER — TAMSULOSIN HYDROCHLORIDE 0.4 MG/1
0.4 CAPSULE ORAL DAILY
Status: DISCONTINUED | OUTPATIENT
Start: 2021-02-10 | End: 2021-02-13 | Stop reason: HOSPADM

## 2021-02-10 RX ORDER — MEGESTROL ACETATE 40 MG/ML
200 SUSPENSION ORAL DAILY
Status: DISCONTINUED | OUTPATIENT
Start: 2021-02-10 | End: 2021-02-13 | Stop reason: HOSPADM

## 2021-02-10 RX ORDER — INSULIN LISPRO 100 [IU]/ML
0-6 INJECTION, SOLUTION INTRAVENOUS; SUBCUTANEOUS NIGHTLY
Status: DISCONTINUED | OUTPATIENT
Start: 2021-02-10 | End: 2021-02-13 | Stop reason: HOSPADM

## 2021-02-10 RX ORDER — FUROSEMIDE 40 MG/1
40 TABLET ORAL DAILY
Status: DISCONTINUED | OUTPATIENT
Start: 2021-02-10 | End: 2021-02-13 | Stop reason: HOSPADM

## 2021-02-10 RX ORDER — ALLOPURINOL 300 MG/1
300 TABLET ORAL DAILY
Status: DISCONTINUED | OUTPATIENT
Start: 2021-02-10 | End: 2021-02-13 | Stop reason: HOSPADM

## 2021-02-10 RX ORDER — DEXTROSE MONOHYDRATE 25 G/50ML
12.5 INJECTION, SOLUTION INTRAVENOUS PRN
Status: DISCONTINUED | OUTPATIENT
Start: 2021-02-10 | End: 2021-02-13 | Stop reason: HOSPADM

## 2021-02-10 RX ORDER — ONDANSETRON 2 MG/ML
4 INJECTION INTRAMUSCULAR; INTRAVENOUS EVERY 6 HOURS PRN
Status: DISCONTINUED | OUTPATIENT
Start: 2021-02-10 | End: 2021-02-13 | Stop reason: HOSPADM

## 2021-02-10 RX ORDER — OXYCODONE HYDROCHLORIDE 5 MG/1
5 TABLET ORAL EVERY 6 HOURS PRN
Status: DISCONTINUED | OUTPATIENT
Start: 2021-02-10 | End: 2021-02-13 | Stop reason: HOSPADM

## 2021-02-10 RX ORDER — LACTOBACILLUS RHAMNOSUS GG 10B CELL
1 CAPSULE ORAL 2 TIMES DAILY WITH MEALS
Status: DISCONTINUED | OUTPATIENT
Start: 2021-02-10 | End: 2021-02-13 | Stop reason: HOSPADM

## 2021-02-10 RX ORDER — SODIUM CHLORIDE 0.9 % (FLUSH) 0.9 %
10 SYRINGE (ML) INJECTION EVERY 12 HOURS SCHEDULED
Status: DISCONTINUED | OUTPATIENT
Start: 2021-02-10 | End: 2021-02-13 | Stop reason: HOSPADM

## 2021-02-10 RX ORDER — INSULIN LISPRO 100 [IU]/ML
10 INJECTION, SOLUTION INTRAVENOUS; SUBCUTANEOUS
Status: DISCONTINUED | OUTPATIENT
Start: 2021-02-10 | End: 2021-02-13 | Stop reason: HOSPADM

## 2021-02-10 RX ORDER — HYDROXYZINE PAMOATE 25 MG/1
50 CAPSULE ORAL 3 TIMES DAILY PRN
Status: DISCONTINUED | OUTPATIENT
Start: 2021-02-10 | End: 2021-02-13 | Stop reason: HOSPADM

## 2021-02-10 RX ORDER — WARFARIN SODIUM 5 MG/1
5 TABLET ORAL EVERY EVENING
Status: DISCONTINUED | OUTPATIENT
Start: 2021-02-11 | End: 2021-02-11

## 2021-02-10 RX ORDER — PANTOPRAZOLE SODIUM 40 MG/1
40 TABLET, DELAYED RELEASE ORAL DAILY
Status: DISCONTINUED | OUTPATIENT
Start: 2021-02-10 | End: 2021-02-13 | Stop reason: HOSPADM

## 2021-02-10 RX ORDER — POLYETHYLENE GLYCOL 3350 17 G/17G
17 POWDER, FOR SOLUTION ORAL DAILY PRN
Status: DISCONTINUED | OUTPATIENT
Start: 2021-02-10 | End: 2021-02-13 | Stop reason: HOSPADM

## 2021-02-10 RX ORDER — PROMETHAZINE HYDROCHLORIDE 25 MG/1
12.5 TABLET ORAL EVERY 6 HOURS PRN
Status: DISCONTINUED | OUTPATIENT
Start: 2021-02-10 | End: 2021-02-13 | Stop reason: HOSPADM

## 2021-02-10 RX ORDER — NICOTINE POLACRILEX 4 MG
15 LOZENGE BUCCAL PRN
Status: DISCONTINUED | OUTPATIENT
Start: 2021-02-10 | End: 2021-02-13 | Stop reason: HOSPADM

## 2021-02-10 RX ORDER — FENTANYL 25 UG/H
1 PATCH TRANSDERMAL
Status: DISCONTINUED | OUTPATIENT
Start: 2021-02-10 | End: 2021-02-13 | Stop reason: HOSPADM

## 2021-02-10 RX ORDER — LISINOPRIL 10 MG/1
5 TABLET ORAL DAILY
Status: DISCONTINUED | OUTPATIENT
Start: 2021-02-10 | End: 2021-02-11

## 2021-02-10 RX ORDER — AMIODARONE HYDROCHLORIDE 50 MG/ML
150 INJECTION, SOLUTION INTRAVENOUS ONCE
Status: COMPLETED | OUTPATIENT
Start: 2021-02-10 | End: 2021-02-10

## 2021-02-10 RX ORDER — DEXTROSE MONOHYDRATE 50 MG/ML
100 INJECTION, SOLUTION INTRAVENOUS PRN
Status: DISCONTINUED | OUTPATIENT
Start: 2021-02-10 | End: 2021-02-13 | Stop reason: HOSPADM

## 2021-02-10 RX ORDER — FINASTERIDE 5 MG/1
5 TABLET, FILM COATED ORAL DAILY
Status: DISCONTINUED | OUTPATIENT
Start: 2021-02-10 | End: 2021-02-13 | Stop reason: HOSPADM

## 2021-02-10 RX ORDER — SODIUM CHLORIDE 0.9 % (FLUSH) 0.9 %
10 SYRINGE (ML) INJECTION PRN
Status: DISCONTINUED | OUTPATIENT
Start: 2021-02-10 | End: 2021-02-13 | Stop reason: HOSPADM

## 2021-02-10 RX ORDER — INSULIN LISPRO 100 [IU]/ML
0-12 INJECTION, SOLUTION INTRAVENOUS; SUBCUTANEOUS
Status: DISCONTINUED | OUTPATIENT
Start: 2021-02-10 | End: 2021-02-13 | Stop reason: HOSPADM

## 2021-02-10 RX ORDER — BUDESONIDE AND FORMOTEROL FUMARATE DIHYDRATE 160; 4.5 UG/1; UG/1
2 AEROSOL RESPIRATORY (INHALATION) 2 TIMES DAILY
Status: DISCONTINUED | OUTPATIENT
Start: 2021-02-10 | End: 2021-02-13 | Stop reason: HOSPADM

## 2021-02-10 RX ORDER — METOPROLOL TARTRATE 50 MG/1
100 TABLET, FILM COATED ORAL 2 TIMES DAILY
Status: DISCONTINUED | OUTPATIENT
Start: 2021-02-10 | End: 2021-02-11

## 2021-02-10 RX ORDER — ACETAMINOPHEN 325 MG/1
650 TABLET ORAL EVERY 6 HOURS PRN
Status: DISCONTINUED | OUTPATIENT
Start: 2021-02-10 | End: 2021-02-13 | Stop reason: HOSPADM

## 2021-02-10 RX ORDER — ATORVASTATIN CALCIUM 20 MG/1
20 TABLET, FILM COATED ORAL NIGHTLY
Status: DISCONTINUED | OUTPATIENT
Start: 2021-02-10 | End: 2021-02-13 | Stop reason: HOSPADM

## 2021-02-10 RX ORDER — FLUTICASONE PROPIONATE 50 MCG
2 SPRAY, SUSPENSION (ML) NASAL DAILY
Status: DISCONTINUED | OUTPATIENT
Start: 2021-02-10 | End: 2021-02-13 | Stop reason: HOSPADM

## 2021-02-10 RX ORDER — ACETAMINOPHEN 650 MG/1
650 SUPPOSITORY RECTAL EVERY 6 HOURS PRN
Status: DISCONTINUED | OUTPATIENT
Start: 2021-02-10 | End: 2021-02-13 | Stop reason: HOSPADM

## 2021-02-10 RX ADMIN — INSULIN LISPRO 1 UNITS: 100 INJECTION, SOLUTION INTRAVENOUS; SUBCUTANEOUS at 21:56

## 2021-02-10 RX ADMIN — AMIODARONE HYDROCHLORIDE 0.25 MG/MIN: 50 INJECTION, SOLUTION INTRAVENOUS at 14:17

## 2021-02-10 RX ADMIN — AMIODARONE HYDROCHLORIDE 150 MG: 50 INJECTION, SOLUTION INTRAVENOUS at 14:16

## 2021-02-10 RX ADMIN — INSULIN LISPRO 2 UNITS: 100 INJECTION, SOLUTION INTRAVENOUS; SUBCUTANEOUS at 19:07

## 2021-02-10 RX ADMIN — INSULIN GLARGINE 25 UNITS: 100 INJECTION, SOLUTION SUBCUTANEOUS at 21:56

## 2021-02-10 RX ADMIN — Medication 2 PUFF: at 19:24

## 2021-02-10 RX ADMIN — ATORVASTATIN CALCIUM 20 MG: 20 TABLET, FILM COATED ORAL at 21:55

## 2021-02-10 RX ADMIN — OXYCODONE 5 MG: 5 TABLET ORAL at 19:12

## 2021-02-10 RX ADMIN — INSULIN LISPRO 10 UNITS: 100 INJECTION, SOLUTION INTRAVENOUS; SUBCUTANEOUS at 19:06

## 2021-02-10 ASSESSMENT — PAIN DESCRIPTION - PROGRESSION: CLINICAL_PROGRESSION: NOT CHANGED

## 2021-02-10 ASSESSMENT — PAIN DESCRIPTION - LOCATION
LOCATION: BACK
LOCATION: BACK

## 2021-02-10 ASSESSMENT — PAIN DESCRIPTION - FREQUENCY
FREQUENCY: INTERMITTENT
FREQUENCY: CONTINUOUS

## 2021-02-10 ASSESSMENT — PAIN DESCRIPTION - ONSET: ONSET: GRADUAL

## 2021-02-10 ASSESSMENT — PAIN DESCRIPTION - DESCRIPTORS
DESCRIPTORS: ACHING
DESCRIPTORS: ACHING

## 2021-02-10 ASSESSMENT — PAIN DESCRIPTION - ORIENTATION: ORIENTATION: LOWER

## 2021-02-10 ASSESSMENT — PAIN DESCRIPTION - PAIN TYPE
TYPE: ACUTE PAIN
TYPE: ACUTE PAIN

## 2021-02-10 ASSESSMENT — PAIN SCALES - GENERAL: PAINLEVEL_OUTOF10: 6

## 2021-02-10 NOTE — H&P
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    HAND SURGERY  2011    IR PERC CATH PLEURAL DRAIN W/IMAG  2/8/2021    IR PERC CATH PLEURAL DRAIN W/IMAG 2/8/2021 [facility] SPECIAL PROCEDURES    LUMBAR NERVE BLOCK Right 12/12/2018    RIGHT L4/L5 LUMBAR INTERLAMINAR EPIDURAL STEROID INJECTION WITH FLUOROSCOPY performed by Hira Jules MD at [address] [place] [address]  2002    melenoma right side post    MD Lawson [address] DX/THER SBST INTRLMNR LMBR/SAC W/IMG GDN Right 9/24/2018    RIGHT L4/L5 INTERLAMINAR EPIDURAL STEROID INJECTION WITH FLUOROSCOPY performed by Hira Jules MD at [address] [place] [address]       Medications Prior to Admission:    Prior to Admission medications    Medication Sig Start Date End Date Taking?  Authorizing Provider   atorvastatin (LIPITOR) 20 MG tablet Take 1 tablet by mouth nightly 2/8/21   Tim Curtis MD   metoprolol tartrate (LOPRESSOR) 100 MG tablet Take 1 tablet by mouth 2 times daily 2/8/21   Tim Curtis MD   fluticasone (FLONASE) 50 MCG/ACT nasal spray 2 sprays by Each Nostril route daily 2/8/21   Tim Curtis MD   sodium chloride (OCEAN, BABY AYR) 0.65 % nasal spray 2 sprays by Nasal route as needed for Congestion 2/8/21   Tim Curtis MD   polyethylene glycol (GLYCOLAX) 17 g packet Take 17 g by mouth 2 times daily 2/8/21 3/10/21  Tim Curtis MD   sennosides-docusate sodium (SENOKOT-S) 8.6-50 MG tablet Take 2 tablets by mouth daily 2/8/21   Tim Curtis MD   lactobacillus (CULTURELLE) capsule Take 1 capsule by mouth 2 times daily (with meals) 2/8/21   Tim Crutis MD   insulin lispro (HUMALOG KWIKPEN) 200 UNIT/ML SOPN pen Inject 10 Units into the skin 3 times daily (with meals) 2/8/21   Tim Curtis MD amiodarone (PACERONE) 400 MG tablet Take 1 tablet by mouth 2 times daily for 7 days, THEN 1 tablet daily for 7 days, THEN 0.5 tablets daily. 2/8/21 5/9/21  MACIEL Freeman CNP   fentaNYL (DURAGESIC) 25 MCG/HR Place 1 patch onto the skin every 72 hours for 15 days. 2/9/21 2/24/21  Ifrah Phelps MD   oxyCODONE (ROXICODONE) 5 MG immediate release tablet Take 1 tablet by mouth every 6 hours as needed for Pain for up to 30 days. 2/8/21 3/10/21  Ifrah Phelps MD   naloxone [facility]) 4 MG/0.1ML LIQD nasal spray 1 spray by Nasal route as needed for Opioid Reversal 2/8/21   Ifrah Phelps MD   megestrol (MEGACE) 40 MG/ML suspension Take 5 mLs by mouth daily 2/8/21   Ifrah Phelps MD   promethazine-codeine (PHENERGAN WITH CODEINE) 6.25-10 MG/5ML syrup Take 5 mLs by mouth every 4 hours as needed for Cough for up to 14 days.  2/8/21 2/22/21  Ifrah Phelps MD   hydrOXYzine (VISTARIL) 50 MG capsule Take 1 capsule by mouth 3 times daily as needed for Itching or Anxiety 1/14/21 2/13/21  MACIEL Russell NP   insulin glargine [facility]) 100 UNIT/ML injection pen Inject 25 Units into the skin nightly 1/13/21   Niesha Ramos MD   Insulin Pen Needle (MEIJER PEN NEEDLES) 31G X 6 MM MISC 1 each by Does not apply route daily 1/13/21   Niesha Ramos MD   pantoprazole (PROTONIX) 40 MG tablet TAKE 1 TABLET BY MOUTH ONE TIME A DAY  1/11/21   Niesha Ramos MD   allopurinol (ZYLOPRIM) 300 MG tablet TAKE 1 TABLET BY MOUTH ONE TIME A DAY  1/11/21   Niesha Ramos MD   warfarin (COUMADIN) 5 MG tablet Take 1 tablet by mouth daily Do not resume until 1/7 1/2/21   MACIEL Pepper CNP   albuterol sulfate  (90 Base) MCG/ACT inhaler Inhale 2 puffs into the lungs every 6 hours as needed for Wheezing 12/30/20 12/30/21  Rina Mckeon MD   furosemide (LASIX) 40 MG tablet Take 1 tablet by mouth daily 12/24/20   Niesha Ramos MD fluticasone-salmeterol (ADVAIR DISKUS) 250-50 MCG/DOSE AEPB Inhale 1 puff into the lungs every 12 hours 12/14/20   Kameron Menjivar MD   lisinopril (PRINIVIL;ZESTRIL) 5 MG tablet Take 1 tablet by mouth daily 12/4/20   Merline Schwartz, MD   ipratropium-albuterol (DUONEB) 0.5-2.5 (3) MG/3ML SOLN nebulizer solution Inhale 3 mLs into the lungs every 4 hours as needed for Shortness of Breath Dx: Asthma  ICD-10: J45.909 11/25/20   Kameron Menjivar MD   finasteride (PROSCAR) 5 MG tablet Take 5 mg by mouth daily    Historical Provider, MD   tamsulosin (FLOMAX) 0.4 MG capsule Take 0.4 mg by mouth daily    Historical Provider, MD   FREESTYLE LANCETS MISC 1 each by Does not apply route daily 2/15/17   Altagracia Rogel MD   Blood Glucose Monitoring Suppl (FREESTYLE LITE) CLAUDINE 1 Device by Does not apply route 2 times daily 2/15/17   Altagracia Rogel MD       Allergies:  Levaquin [levofloxacin]    Social History:  The patient currently lives with family    TOBACCO:   reports that he quit smoking about 27 years ago. His smoking use included cigarettes. He has a 10.00 pack-year smoking history. He has never used smokeless tobacco.  ETOH:   reports no history of alcohol use. Family History:  Reviewed in detail and negative for DM, Early CAD, Cancer, CVA. Positive as follows:        Problem Relation Age of Onset    Arthritis Mother     High Blood Pressure Mother     Emphysema Mother     Osteoporosis Mother     Diabetes Father     Heart Disease Father     High Cholesterol Father     Parkinsonism Father     Diabetes Sister        REVIEW OF SYSTEMS:   Positive for chest pains, tachycardia, shortness of breath, diaphoresis and as noted in the HPI. All other systems reviewed and negative. PHYSICAL EXAM:    BP (!) 96/44   Pulse 60   Temp 98 °F (36.7 °C)   Resp 21   SpO2 96%     General appearance: No apparent distress appears stated age and cooperative. HEENT Normal cephalic, atraumatic without obvious deformity. Pupils equal, round, and reactive to light. Extra ocular muscles intact. Conjunctivae/corneas clear. Neck: Supple, No jugular venous distention/bruits. Trachea midline without thyromegaly or adenopathy with full range of motion. Lungs: Clear to auscultation, bilaterally without Rales/Wheezes/Rhonchi with good respiratory effort. Heart: Regular rate and rhythm with Normal S1/S2 without murmurs, rubs or gallops, point of maximum impulse non-displaced  Abdomen: Soft, non-tender or non-distended without rigidity or guarding and positive bowel sounds all four quadrants. Extremities: No clubbing, cyanosis, or edema bilaterally. Full range of motion without deformity and normal gait intact. Skin: Skin color, texture, turgor normal.  No rashes or lesions. Neurologic: Alert and oriented X 3, neurovascularly intact with sensory/motor intact upper extremities/lower extremities, bilaterally. Cranial nerves: II-XII intact, grossly non-focal.  Mental status: Alert, oriented, thought content appropriate. CXR:  I have reviewed the CXR with the following interpretation: Clear lung fields      CBC   Recent Labs     02/08/21  0451 02/10/21  1340   WBC 8.1 10.4   HGB 7.9* 9.7*   HCT 24.7* 30.9*    273      RENAL  Recent Labs     02/08/21  0451 02/10/21  1340   * 135*   K 5.2* 4.5    100   CO2 23 19*   BUN 29* 40*   CREATININE 1.1 1.2     LFT'S  No results for input(s): AST, ALT, ALB, BILIDIR, BILITOT, ALKPHOS in the last 72 hours.   COAG  Recent Labs     02/07/21  1949 02/08/21  0451 02/10/21  1340   INR 2.16* 1.96* 1.82*     CARDIAC ENZYMES  Recent Labs     02/10/21  1340   TROPONINI 0.06*           Active Hospital Problems    Diagnosis Date Noted    AICD (automatic cardioverter/defibrillator) present [Z95.810] 12/31/2020    VT (ventricular tachycardia) ([facility] [facility] [I47.2] 12/30/2020  Type 2 diabetes mellitus with diabetic polyneuropathy, without long-term current use of insulin ([facility] [address] [E11.42] 08/11/2017    SYLVIE (obstructive sleep apnea) [G47.33] 10/05/2012    Pure hypercholesterolemia [E78.00] 10/01/2012         ASSESSMENT/PLAN:  72 y.o. male with history of type 2 diabetes, GERD, hyperlipidemia, obesity with obstructive sleep apnea, gout, hypertension, history of atrial fibrillation, recent diagnosis of metastatic melanoma with mediastinal lymphadenopathy, history of VT status post AICD found to be in VT and responded to amiodarone    Plan:  We will admit to CCU  Cardiology consult  Continue amiodarone drip  Continue anticoagulation  Trend troponins  Glycemic control with insulin sliding scale        DVT Prophylaxis: On Coumadin  Diet: Diabetic  Code Status: DNR CCA/DNI         Mike Marina MD    Thank you Renetta Lynch MD for the opportunity to be involved in this patient's care. If you have any questions or concerns please feel free to contact me at [phone].

## 2021-02-10 NOTE — ED NOTES
Bed: 03  Expected date:   Expected time:   Means of arrival:   Comments:  Khadijah Riley RN  02/10/21 4992

## 2021-02-10 NOTE — CARE COORDINATION
Pt had recent assessment on 01/27/21 with discharge 2 days ago on 02/08/2021. Pt lives with spouse in an apartment. Referral was made to [facility] last admission. Pt was sent home with oxygen and rollator walker from [facility]. Pt was referred to [facility] last discharge but was already active w/[facility]. Spoke with Raghav Ford and pt was not discharged with orders on how often and how much to drain pt's pleurex catheter. Raghav Ford w/[facility] states she has this information and the orders now. [facility] will follow and will be the [facility] agency to assist with discharge at home this time. SW to follow.     Electronically signed by LUDWIG Spence, AKSHAT on 2/10/2021 at 4:22 PM

## 2021-02-10 NOTE — PROGRESS NOTES
Clinical Pharmacy Note: Pharmacy to Dose Warfarin    Pharmacy consulted by Dr. Roger Rodriguez to dose warfarin. Erica Sharma is a 72 y.o. male  is receiving warfarin for indication: afib. INR Goal Range: 2-3  Prior to admission warfarin dosing regimen: 5 mg daily  INR today:   Lab Results   Component Value Date    INR 1.82 02/10/2021       Assessment/Plan:  INR is subtherapeutic on prior to admission dosing regimen. Possible concomitant drug-drug interactions include: amiodarone   Based on today's assessment, dose warfarin 5 mg tomorrow as patient has already taken dose for today. Daily INR is ordered. Pharmacy will continue to monitor and make adjustments to regimen as necessary.      Thank you for the consult,     Nathalie DoeD  PGY1 Pharmacy Resident  [id number]

## 2021-02-10 NOTE — DISCHARGE INSTR - COC
Continuity of Care Form    Patient Name: Teresa Tavarez   :  1955  MRN:  [id number]    Admit date:  2/10/2021  Discharge date:  ***    Code Status Order: Prior   Advance Directives:      Admitting Physician:  No admitting provider for patient encounter.   PCP: Lily Bowden MD    Discharging Nurse: [facility] Unit/Room#: ED-0003/03  Discharging Unit Phone Number: ***    Emergency Contact:   Extended Emergency Contact Information  Primary Emergency Contact: Dannielle Hightower  Address: [address], [address] [place] [address] Phone: [phone]  Mobile Phone: [phone]  Relation: Spouse    Past Surgical History:  Past Surgical History:   Procedure Laterality Date    BACK SURGERY      BRONCHOSCOPY N/A 2020    BRONCHOSCOPY ALVEOLAR LAVAGE performed by Karla Avalos MD at [facility] 2013    FINGER SURGERY Left 3-1-2013    Incision & Drainage of left Thumb Infection    FOOT SURGERY Right     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    HAND SURGERY      IR PERC CATH PLEURAL DRAIN W/IMAG  2021    IR PERC CATH PLEURAL DRAIN W/IMAG 2021 [facility] SPECIAL PROCEDURES    LUMBAR NERVE BLOCK Right 2018    RIGHT L4/L5 LUMBAR INTERLAMINAR EPIDURAL STEROID INJECTION WITH FLUOROSCOPY performed by Miri Aleman MD at [address] [place] [address] right side post    [place] NJX DX/THER SBST INTRLMNR LMBR/SAC W/IMG GDN Right 2018    RIGHT L4/L5 INTERLAMINAR EPIDURAL STEROID INJECTION WITH FLUOROSCOPY performed by Miri Aleman MD at [facility]       Immunization History:   Immunization History   Administered Date(s) Administered    Influenza Vaccine, unspecified formulation 02/10/2017    Influenza Virus Vaccine 2017, 10/01/2018    Influenza, Intradermal, Preservative free 10/05/2012, 2014    Influenza, MDCK Quadv, IM, PF (Flucelvax 4 yrs and older) 01/15/2020 Influenza, Quadv, adjuvanted, 65 yrs +, IM, PF (Fluad) 10/21/2020    Pneumococcal Conjugate 13-valent ([id number]) 08/09/2016    Pneumococcal Polysaccharide ([id number]) 11/14/2011, 10/21/2020    Td, unspecified formulation 08/07/2009    Tdap (Boostrix, Adacel) 02/26/2013, 10/07/2016       Active Problems:  Patient Active Problem List   Diagnosis Code    Essential hypertension I10    Generalized osteoarthrosis, involving multiple sites M15.9    Pure hypercholesterolemia E78.00    SYLVIE (obstructive sleep apnea) G47.33    Allergic rhinitis J30.9    Pseudogout M11.20    Melanoma in situ of lower leg (HCC) D03.70    Type 2 diabetes mellitus with diabetic polyneuropathy, without long-term current use of insulin (HCC) E11.42    Eczema L30.9    Dyshidrotic eczema L30.1    GERD without esophagitis K21.9    Non morbid obesity, unspecified obesity type E66.9    Moderate persistent asthma without complication F03.97    Mediastinal adenopathy R59.0    Multiple lung nodules on CT R91.8    Cardiac arrest (HCC) I46.9    Persistent atrial fibrillation (HCC) I48.19    Ischemic cardiomyopathy I25.5    Paroxysmal atrial fibrillation (HCC) I48.0    VT (ventricular tachycardia) (HCC) I47.2    Atrial fibrillation ([address] I48.91    Dilated cardiomyopathy ([address] I42.0    AICD (automatic cardioverter/defibrillator) present Z95.810    Hyponatremia E87.1    Hypokalemia E87.6    Pneumonia J18.9    Supratherapeutic INR R79.1       Isolation/Infection:   Isolation            No Isolation          Patient Infection Status       Infection Onset Added Last Indicated Last Indicated By Review Planned Expiration Resolved Resolved By    None active    Resolved    COVID-19 Rule Out 01/25/21 01/25/21 01/25/21 COVID-19 (Ordered)   01/26/21 Rule-Out Test Resulted    COVID-19 Rule Out 12/30/20 12/30/20 12/30/20 COVID-19 (Ordered)   12/30/20 Rule-Out Test Resulted    MRSA  03/04/13 03/04/13 Lily Rodriguez RN   12/02/20 Jesus Connor RN 13 wound finger            Nurse Assessment:  Last Vital Signs: BP (!) 80/47   Pulse 60   Temp 98 °F (36.7 °C)   Resp 21   SpO2 95%     Last documented pain score (0-10 scale):    Last Weight:   Wt Readings from Last 1 Encounters:   21 219 lb 9.3 oz (99.6 kg)     Mental Status:  {IP PT MENTAL STATUS:90863}    IV Access:  { MARK IV ACCESS:904010679}    Nursing Mobility/ADLs:  Walking   {CHP DME RSVE:050784207}  Transfer  {CHP DME ZUL}  Bathing  {CHP DME YWKJ:640164887}  Dressing  {CHP DME CHBS:219403889}  Toileting  {CHP DME EZKQ:489916353}  Feeding  {CHP DME PZKV:524958590}  Med Admin  {CHP DME FLEM:860786138}  Med Delivery   { MARK MED Delivery:137419490}    Wound Care Documentation and Therapy:        Elimination:  Continence: Bowel: {YES / WI:40324}  Bladder: {YES / AU:16375}  Urinary Catheter: {Urinary Catheter:573268645}   Colostomy/Ileostomy/Ileal Conduit: {YES / DQ:07650}       Date of Last BM: ***  No intake or output data in the 24 hours ending 02/10/21 1536  No intake/output data recorded.     Safety Concerns:     [address] Safety Concerns:012633386}    Impairments/Disabilities:      [address] Impairments/Disabilities:551713281}    Nutrition Therapy:  Current Nutrition Therapy:   [address] Diet List:038512321}    Routes of Feeding: {CHP DME Other Feedings:363012414}  Liquids: {Slp liquid thickness:67702}  Daily Fluid Restriction: {CHP DME Yes amt example:164310044}  Last Modified Barium Swallow with Video (Video Swallowing Test): {Done Not Done PBBY:108837994}    Treatments at the Time of Hospital Discharge:   Respiratory Treatments: ***  Oxygen Therapy:  {Therapy; copd oxygen:81269}  Ventilator:    { CC Vent FBPN:108929237}    Rehab Therapies: {THERAPEUTIC INTERVENTION:0790732705}  Weight Bearing Status/Restrictions: [facility] Weight Bearin}  Other Medical Equipment (for information only, NOT a DME order):  {EQUIPMENT:088502693} Other Treatments: HOME CARE NURSE TO DRAIN [address] CATHETER EVERY MONDAY AND THURSDAY. NURSE NOT TO DRAIN MORE THAN 500ML . Patient's personal belongings (please select all that are sent with patient):  {CHP DME Belongings:393576888}    RN SIGNATURE:  {Esignature:784667534}    CASE MANAGEMENT/SOCIAL WORK SECTION    Inpatient Status Date: ***    Readmission Risk Assessment Score:  Readmission Risk              Risk of Unplanned Readmission:        30           Discharging to Facility/ Agency   Name: Haim Golden will call for Appointment  Phone: [phone]  Fax: [phone]    Dialysis Facility (if applicable)   Name:  Address:  Dialysis Schedule:  Phone:  Fax:    / signature: {Esignature:757772472}    PHYSICIAN SECTION    Prognosis: Poor    Condition at Discharge: Terminal    Rehab Potential (if transferring to Rehab): Poor    Recommended Labs or Other Treatments After Discharge: Home Care:                                                                                                                PleurX Catheter:                            *Frequency of draining: HOME CARE NURSE TO [address] NURSE NOT TO DRAIN MORE THAN 500ML . *Supplies ordered:___yes____                 *Fluid to be drained: Monitor for continued SOB, call Md following for PleurX if needed. *Change dsg with each draining. *Drain slowly and may need to pre-medicate for pain prior to draining. *Follow up with Oncology for further home care orders. Physician Certification: I certify the above information and transfer of Natalie Partida  is necessary for the continuing treatment of the diagnosis listed and that he requires Hospice for greater 30 days.      Update Admission H&P: No change in H&P

## 2021-02-10 NOTE — FLOWSHEET NOTE
Admitted from ER via stretcher for CP/SOB at home. VT in 's. Pt states no shock delivered from AICD. Recent dx of metastatic melanoma with mediastinal lymphadenopathy. Drain placed to RLL. Pt states not draining much.  First chemo started 2/8 prior to discharge home 2/9

## 2021-02-11 LAB
ANION GAP SERPL CALCULATED.3IONS-SCNC: 11 MMOL/L (ref 3–16)
APTT: 32 SEC (ref 24.2–36.2)
BUN BLDV-MCNC: 35 MG/DL (ref 7–20)
CALCIUM SERPL-MCNC: 8.5 MG/DL (ref 8.3–10.6)
CHLORIDE BLD-SCNC: 103 MMOL/L (ref 99–110)
CO2: 21 MMOL/L (ref 21–32)
CREAT SERPL-MCNC: 1 MG/DL (ref 0.8–1.3)
EKG ATRIAL RATE: 166 BPM
EKG DIAGNOSIS: NORMAL
EKG Q-T INTERVAL: 306 MS
EKG QRS DURATION: 176 MS
EKG QTC CALCULATION (BAZETT): 508 MS
EKG R AXIS: -59 DEGREES
EKG T AXIS: 143 DEGREES
EKG VENTRICULAR RATE: 166 BPM
GFR AFRICAN AMERICAN: >60
GFR NON-AFRICAN AMERICAN: >60
GLUCOSE BLD-MCNC: 160 MG/DL (ref 70–99)
GLUCOSE BLD-MCNC: 275 MG/DL (ref 70–99)
GLUCOSE BLD-MCNC: 356 MG/DL (ref 70–99)
GLUCOSE BLD-MCNC: 82 MG/DL (ref 70–99)
HCT VFR BLD CALC: 29.1 % (ref 40.5–52.5)
HEMOGLOBIN: 9.1 G/DL (ref 13.5–17.5)
INR BLD: 2.58 (ref 0.86–1.14)
MAGNESIUM: 1.7 MG/DL (ref 1.8–2.4)
MAGNESIUM: 1.9 MG/DL (ref 1.8–2.4)
MCH RBC QN AUTO: 27.2 PG (ref 26–34)
MCHC RBC AUTO-ENTMCNC: 31.1 G/DL (ref 31–36)
MCV RBC AUTO: 87.5 FL (ref 80–100)
PDW BLD-RTO: 22 % (ref 12.4–15.4)
PERFORMED ON: ABNORMAL
PLATELET # BLD: 256 K/UL (ref 135–450)
PMV BLD AUTO: 7.2 FL (ref 5–10.5)
POTASSIUM SERPL-SCNC: 3.8 MMOL/L (ref 3.5–5.1)
POTASSIUM SERPL-SCNC: 4.2 MMOL/L (ref 3.5–5.1)
PROTHROMBIN TIME: 30.2 SEC (ref 10–13.2)
RBC # BLD: 3.32 M/UL (ref 4.2–5.9)
SODIUM BLD-SCNC: 135 MMOL/L (ref 136–145)
TROPONIN: 0.07 NG/ML
TROPONIN: 0.08 NG/ML
TROPONIN: 0.1 NG/ML
WBC # BLD: 8.1 K/UL (ref 4–11)

## 2021-02-11 PROCEDURE — 6360000002 HC RX W HCPCS

## 2021-02-11 PROCEDURE — 94660 CPAP INITIATION&MGMT: CPT

## 2021-02-11 PROCEDURE — 2700000000 HC OXYGEN THERAPY PER DAY

## 2021-02-11 PROCEDURE — 84484 ASSAY OF TROPONIN QUANT: CPT

## 2021-02-11 PROCEDURE — 6360000002 HC RX W HCPCS: Performed by: HOSPITALIST

## 2021-02-11 PROCEDURE — 93010 ELECTROCARDIOGRAM REPORT: CPT | Performed by: INTERNAL MEDICINE

## 2021-02-11 PROCEDURE — 93005 ELECTROCARDIOGRAM TRACING: CPT | Performed by: INTERNAL MEDICINE

## 2021-02-11 PROCEDURE — 2580000003 HC RX 258: Performed by: INTERNAL MEDICINE

## 2021-02-11 PROCEDURE — 36415 COLL VENOUS BLD VENIPUNCTURE: CPT

## 2021-02-11 PROCEDURE — 85730 THROMBOPLASTIN TIME PARTIAL: CPT

## 2021-02-11 PROCEDURE — 6370000000 HC RX 637 (ALT 250 FOR IP): Performed by: INTERNAL MEDICINE

## 2021-02-11 PROCEDURE — 2000000000 HC ICU R&B

## 2021-02-11 PROCEDURE — 83735 ASSAY OF MAGNESIUM: CPT

## 2021-02-11 PROCEDURE — P9047 ALBUMIN (HUMAN), 25%, 50ML: HCPCS | Performed by: HOSPITALIST

## 2021-02-11 PROCEDURE — 2500000003 HC RX 250 WO HCPCS: Performed by: HOSPITALIST

## 2021-02-11 PROCEDURE — 94761 N-INVAS EAR/PLS OXIMETRY MLT: CPT

## 2021-02-11 PROCEDURE — 84132 ASSAY OF SERUM POTASSIUM: CPT

## 2021-02-11 PROCEDURE — 85610 PROTHROMBIN TIME: CPT

## 2021-02-11 PROCEDURE — 06HM33Z INSERTION OF INFUSION DEVICE INTO RIGHT FEMORAL VEIN, PERCUTANEOUS APPROACH: ICD-10-PCS | Performed by: HOSPITALIST

## 2021-02-11 PROCEDURE — 80048 BASIC METABOLIC PNL TOTAL CA: CPT

## 2021-02-11 PROCEDURE — 94640 AIRWAY INHALATION TREATMENT: CPT

## 2021-02-11 PROCEDURE — 2580000003 HC RX 258: Performed by: HOSPITALIST

## 2021-02-11 PROCEDURE — 2500000003 HC RX 250 WO HCPCS: Performed by: INTERNAL MEDICINE

## 2021-02-11 PROCEDURE — 85027 COMPLETE CBC AUTOMATED: CPT

## 2021-02-11 RX ORDER — ALBUMIN (HUMAN) 12.5 G/50ML
25 SOLUTION INTRAVENOUS ONCE
Status: COMPLETED | OUTPATIENT
Start: 2021-02-11 | End: 2021-02-11

## 2021-02-11 RX ORDER — POTASSIUM CHLORIDE 7.45 MG/ML
10 INJECTION INTRAVENOUS
Status: DISPENSED | OUTPATIENT
Start: 2021-02-11 | End: 2021-02-11

## 2021-02-11 RX ORDER — WARFARIN SODIUM 2.5 MG/1
2.5 TABLET ORAL EVERY EVENING
Status: DISCONTINUED | OUTPATIENT
Start: 2021-02-11 | End: 2021-02-12 | Stop reason: DRUGHIGH

## 2021-02-11 RX ORDER — ALBUTEROL SULFATE 90 UG/1
2 AEROSOL, METERED RESPIRATORY (INHALATION) EVERY 6 HOURS PRN
Status: DISCONTINUED | OUTPATIENT
Start: 2021-02-11 | End: 2021-02-13 | Stop reason: HOSPADM

## 2021-02-11 RX ORDER — 0.9 % SODIUM CHLORIDE 0.9 %
500 INTRAVENOUS SOLUTION INTRAVENOUS ONCE
Status: COMPLETED | OUTPATIENT
Start: 2021-02-11 | End: 2021-02-11

## 2021-02-11 RX ORDER — LIDOCAINE HYDROCHLORIDE ANHYDROUS AND DEXTROSE MONOHYDRATE .4; 5 G/100ML; G/100ML
INJECTION, SOLUTION INTRAVENOUS
Status: COMPLETED
Start: 2021-02-11 | End: 2021-02-11

## 2021-02-11 RX ORDER — LIDOCAINE HYDROCHLORIDE 20 MG/ML
INJECTION, SOLUTION INTRAVENOUS
Status: COMPLETED
Start: 2021-02-11 | End: 2021-02-11

## 2021-02-11 RX ORDER — MAGNESIUM SULFATE IN WATER 40 MG/ML
2000 INJECTION, SOLUTION INTRAVENOUS ONCE
Status: COMPLETED | OUTPATIENT
Start: 2021-02-11 | End: 2021-02-11

## 2021-02-11 RX ORDER — IPRATROPIUM BROMIDE AND ALBUTEROL SULFATE 2.5; .5 MG/3ML; MG/3ML
1 SOLUTION RESPIRATORY (INHALATION) EVERY 4 HOURS PRN
Status: DISCONTINUED | OUTPATIENT
Start: 2021-02-11 | End: 2021-02-13 | Stop reason: HOSPADM

## 2021-02-11 RX ORDER — AMIODARONE HYDROCHLORIDE 200 MG/1
400 TABLET ORAL 2 TIMES DAILY
Status: DISCONTINUED | OUTPATIENT
Start: 2021-02-11 | End: 2021-02-11

## 2021-02-11 RX ORDER — BENZONATATE 100 MG/1
100 CAPSULE ORAL 3 TIMES DAILY PRN
Status: DISCONTINUED | OUTPATIENT
Start: 2021-02-11 | End: 2021-02-13 | Stop reason: HOSPADM

## 2021-02-11 RX ADMIN — Medication 1 CAPSULE: at 07:22

## 2021-02-11 RX ADMIN — ATORVASTATIN CALCIUM 20 MG: 20 TABLET, FILM COATED ORAL at 20:31

## 2021-02-11 RX ADMIN — SODIUM CHLORIDE 500 ML: 9 INJECTION, SOLUTION INTRAVENOUS at 05:06

## 2021-02-11 RX ADMIN — DEXTROSE MONOHYDRATE 150 MG: 50 INJECTION, SOLUTION INTRAVENOUS at 06:27

## 2021-02-11 RX ADMIN — VASOPRESSIN 0.02 UNITS/MIN: 20 INJECTION INTRAVENOUS at 06:10

## 2021-02-11 RX ADMIN — POTASSIUM CHLORIDE 10 MEQ: 7.46 INJECTION, SOLUTION INTRAVENOUS at 06:17

## 2021-02-11 RX ADMIN — INSULIN GLARGINE 25 UNITS: 100 INJECTION, SOLUTION SUBCUTANEOUS at 20:33

## 2021-02-11 RX ADMIN — Medication 2 PUFF: at 07:53

## 2021-02-11 RX ADMIN — AMIODARONE HYDROCHLORIDE 1 MG/MIN: 50 INJECTION, SOLUTION INTRAVENOUS at 13:20

## 2021-02-11 RX ADMIN — OXYCODONE 5 MG: 5 TABLET ORAL at 01:43

## 2021-02-11 RX ADMIN — Medication 10 ML: at 09:49

## 2021-02-11 RX ADMIN — ALLOPURINOL 300 MG: 300 TABLET ORAL at 07:22

## 2021-02-11 RX ADMIN — INSULIN GLARGINE 25 UNITS: 100 INJECTION, SOLUTION SUBCUTANEOUS at 09:42

## 2021-02-11 RX ADMIN — MEGESTROL ACETATE 200 MG: 40 SUSPENSION ORAL at 07:23

## 2021-02-11 RX ADMIN — INSULIN LISPRO 10 UNITS: 100 INJECTION, SOLUTION INTRAVENOUS; SUBCUTANEOUS at 17:17

## 2021-02-11 RX ADMIN — AMIODARONE HYDROCHLORIDE 1 MG/MIN: 50 INJECTION, SOLUTION INTRAVENOUS at 21:27

## 2021-02-11 RX ADMIN — Medication 2 PUFF: at 12:15

## 2021-02-11 RX ADMIN — Medication 2 PUFF: at 20:38

## 2021-02-11 RX ADMIN — OXYCODONE 5 MG: 5 TABLET ORAL at 07:22

## 2021-02-11 RX ADMIN — INSULIN LISPRO 3 UNITS: 100 INJECTION, SOLUTION INTRAVENOUS; SUBCUTANEOUS at 20:32

## 2021-02-11 RX ADMIN — INSULIN LISPRO 10 UNITS: 100 INJECTION, SOLUTION INTRAVENOUS; SUBCUTANEOUS at 17:15

## 2021-02-11 RX ADMIN — TAMSULOSIN HYDROCHLORIDE 0.4 MG: 0.4 CAPSULE ORAL at 07:22

## 2021-02-11 RX ADMIN — AMIODARONE HYDROCHLORIDE 1 MG/MIN: 50 INJECTION, SOLUTION INTRAVENOUS at 05:50

## 2021-02-11 RX ADMIN — VASOPRESSIN 0.03 UNITS/MIN: 20 INJECTION INTRAVENOUS at 18:42

## 2021-02-11 RX ADMIN — ALBUMIN (HUMAN) 25 G: 0.25 INJECTION, SOLUTION INTRAVENOUS at 06:09

## 2021-02-11 RX ADMIN — IPRATROPIUM BROMIDE AND ALBUTEROL SULFATE 1 AMPULE: .5; 3 SOLUTION RESPIRATORY (INHALATION) at 20:38

## 2021-02-11 RX ADMIN — LIDOCAINE HYDROCHLORIDE 100 MG: 20 INJECTION, SOLUTION INTRAVENOUS at 05:03

## 2021-02-11 RX ADMIN — Medication 10 ML: at 20:32

## 2021-02-11 RX ADMIN — FINASTERIDE 5 MG: 5 TABLET, FILM COATED ORAL at 07:22

## 2021-02-11 RX ADMIN — BENZONATATE 100 MG: 100 CAPSULE ORAL at 18:41

## 2021-02-11 RX ADMIN — PANTOPRAZOLE SODIUM 40 MG: 40 TABLET, DELAYED RELEASE ORAL at 07:22

## 2021-02-11 RX ADMIN — FLUTICASONE PROPIONATE 2 SPRAY: 50 SPRAY, METERED NASAL at 09:41

## 2021-02-11 RX ADMIN — WARFARIN SODIUM 2.5 MG: 2.5 TABLET ORAL at 17:06

## 2021-02-11 RX ADMIN — Medication 1 CAPSULE: at 17:06

## 2021-02-11 RX ADMIN — AMIODARONE HYDROCHLORIDE 150 MG: 50 INJECTION, SOLUTION INTRAVENOUS at 05:07

## 2021-02-11 RX ADMIN — LIDOCAINE HYDROCHLORIDE 1 MG: 4 INJECTION, SOLUTION INTRAVENOUS at 05:02

## 2021-02-11 RX ADMIN — MAGNESIUM SULFATE HEPTAHYDRATE 2000 MG: 40 INJECTION, SOLUTION INTRAVENOUS at 05:04

## 2021-02-11 ASSESSMENT — PAIN SCALES - GENERAL
PAINLEVEL_OUTOF10: 4
PAINLEVEL_OUTOF10: 4
PAINLEVEL_OUTOF10: 0
PAINLEVEL_OUTOF10: 6
PAINLEVEL_OUTOF10: 3

## 2021-02-11 ASSESSMENT — PAIN DESCRIPTION - PAIN TYPE: TYPE: ACUTE PAIN

## 2021-02-11 NOTE — PROGRESS NOTES
V Tach 160's, verbal orders from Hospitalist to give amio bolus and restart gtt, IVF bolus 500 ml being given to support BP 80/42 MAP 51, labs drawn and EKG completed, pt is converting back and forth from V Tach 160's to A Paced 70's, ICD has not fired, will monitor.

## 2021-02-11 NOTE — PROGRESS NOTES
Amio gtt cut in half while in ER due to SBP 80's and MAP of 52 per MD order, then gtt stopped in ER due to same issue of low BP and MAP, remains off at this time post transfer to CVU due to BP 85/45 and MAP 54, pt also has no central line at this time. Remains A paced with HR low 50's, held Lopressor tonight as well, willl montior.

## 2021-02-11 NOTE — PROGRESS NOTES
Tommy REYES, talked to  [facility], gave pt status update, Lido gtt stopped, second bolus Amio given, amio gtt started at 1 mg/min, converted to A Fib at this time, has been A Paced all shift until V Tach at 0400 and now in A Fib, pt is completely asymptomatic, no CP or  SOB, resting well, new TLC central line placed in Rt Groin, /59, HR 82, vasopressin at .02 min/unit, weaning down slowly at this time, see MAR for updates, will monitor.

## 2021-02-11 NOTE — PROCEDURES
Lorena Gavin is a 72 y.o. male patient. 1. VT (ventricular tachycardia) (HCC)      Past Medical History:   Diagnosis Date    Abdominal pain, LLQ     Allergic rhinitis     Asthma     Atopic dermatitis     Cancer ([address]     melanoma    Diabetes mellitus (HCC)     Erectile dysfunction     Generalized osteoarthrosis, involving multiple sites     GERD (gastroesophageal reflux disease)     Gynecomastia     Hyperlipidemia     Nasal congestion     Nipple pain     Obstructive sleep apnea syndrome 10/5/2012    Pseudo-gout     Unspecified essential hypertension      Blood pressure (!) 96/47, pulse 94, temperature 97.1 °F (36.2 °C), temperature source Temporal, resp. rate 25, height 5' 9\" (1.753 m), weight 210 lb 12.2 oz (95.6 kg), SpO2 (!) 83 %. Central Line    Date/Time: 2/11/2021 5:47 AM  Performed by: Krista Olszewski, MD  Authorized by: Krista Olszewski, MD   Consent: The procedure was performed in an emergent situation. Verbal consent obtained. Risks and benefits: risks, benefits and alternatives were discussed  Consent given by: patient  Patient understanding: patient states understanding of the procedure being performed  Patient consent: the patient's understanding of the procedure matches consent given  Procedure consent: procedure consent matches procedure scheduled  Relevant documents: relevant documents present and verified  Test results: test results available and properly labeled  Site marked: the operative site was marked  Imaging studies: imaging studies available  Required items: required blood products, implants, devices, and special equipment available  Patient identity confirmed: verbally with patient and arm band  Time out: Immediately prior to procedure a \"time out\" was called to verify the correct patient, procedure, equipment, support staff and site/side marked as required.   Indications: vascular access    Anesthesia:  Local Anesthetic: lidocaine 1% without epinephrine Sedation:  Patient sedated: no    Preparation: skin prepped with ChloraPrep  Skin prep agent dried: skin prep agent completely dried prior to procedure  Sterile barriers: all five maximum sterile barriers used - cap, mask, sterile gown, sterile gloves, and large sterile sheet  Hand hygiene: hand hygiene performed prior to central venous catheter insertion  Location details: right femoral  Patient position: flat  Catheter type: triple lumen  Pre-procedure: landmarks identified  Ultrasound guidance: yes  Sterile ultrasound techniques: sterile gel and sterile probe covers were used  Number of attempts: 3  Successful placement: yes  Post-procedure: line sutured and dressing applied  Assessment: blood return through all ports and free fluid flow  Patient tolerance: patient tolerated the procedure well with no immediate complications  Comments: Estimated Blood Loss ~ 5 ml             [facility], MD  2/11/2021

## 2021-02-11 NOTE — PROGRESS NOTES
Pharmacy to Dose Warfarin    Pharmacy consulted to dose warfarin for Afib. INR Goal: 2-3    INR today: 2.58    Assessment/Plan:  - INR therapeutic today  - Large jump in INR from yesterday from 1.82 to 2.58. Will give reduced dose of warfarin 2.5 mg tonight  - Patient is currently on an amiodarone gtt which can interact with warfarin to increase INR  - Daily INR ordered    Pharmacy will continue to follow.     Jeff Sarmiento, PharmD, BCPS  Clinical Pharmacist  [id number]

## 2021-02-12 ENCOUNTER — NURSE ONLY (OUTPATIENT)
Dept: CARDIOLOGY CLINIC | Age: 66
End: 2021-02-12
Payer: COMMERCIAL

## 2021-02-12 DIAGNOSIS — I47.20 VT (VENTRICULAR TACHYCARDIA): ICD-10-CM

## 2021-02-12 DIAGNOSIS — I48.0 PAROXYSMAL ATRIAL FIBRILLATION (HCC): ICD-10-CM

## 2021-02-12 DIAGNOSIS — I46.9 CARDIAC ARREST (HCC): ICD-10-CM

## 2021-02-12 DIAGNOSIS — I25.5 ISCHEMIC CARDIOMYOPATHY: ICD-10-CM

## 2021-02-12 DIAGNOSIS — I42.0 DILATED CARDIOMYOPATHY (HCC): ICD-10-CM

## 2021-02-12 DIAGNOSIS — Z95.810 AICD (AUTOMATIC CARDIOVERTER/DEFIBRILLATOR) PRESENT: ICD-10-CM

## 2021-02-12 LAB
ANION GAP SERPL CALCULATED.3IONS-SCNC: 11 MMOL/L (ref 3–16)
BASOPHILS ABSOLUTE: 0 K/UL (ref 0–0.2)
BASOPHILS RELATIVE PERCENT: 0.3 %
BUN BLDV-MCNC: 31 MG/DL (ref 7–20)
CALCIUM SERPL-MCNC: 8.8 MG/DL (ref 8.3–10.6)
CHLORIDE BLD-SCNC: 98 MMOL/L (ref 99–110)
CO2: 22 MMOL/L (ref 21–32)
CREAT SERPL-MCNC: 1 MG/DL (ref 0.8–1.3)
EOSINOPHILS ABSOLUTE: 0 K/UL (ref 0–0.6)
EOSINOPHILS RELATIVE PERCENT: 0.2 %
GFR AFRICAN AMERICAN: >60
GFR NON-AFRICAN AMERICAN: >60
GLUCOSE BLD-MCNC: 186 MG/DL (ref 70–99)
GLUCOSE BLD-MCNC: 194 MG/DL (ref 70–99)
GLUCOSE BLD-MCNC: 287 MG/DL (ref 70–99)
GLUCOSE BLD-MCNC: 317 MG/DL (ref 70–99)
HCT VFR BLD CALC: 27.5 % (ref 40.5–52.5)
HEMOGLOBIN: 8.6 G/DL (ref 13.5–17.5)
INR BLD: 4 (ref 0.86–1.14)
LYMPHOCYTES ABSOLUTE: 1.2 K/UL (ref 1–5.1)
LYMPHOCYTES RELATIVE PERCENT: 10.9 %
MAGNESIUM: 1.7 MG/DL (ref 1.8–2.4)
MCH RBC QN AUTO: 27.2 PG (ref 26–34)
MCHC RBC AUTO-ENTMCNC: 31.4 G/DL (ref 31–36)
MCV RBC AUTO: 86.6 FL (ref 80–100)
MONOCYTES ABSOLUTE: 1 K/UL (ref 0–1.3)
MONOCYTES RELATIVE PERCENT: 9.5 %
NEUTROPHILS ABSOLUTE: 8.6 K/UL (ref 1.7–7.7)
NEUTROPHILS RELATIVE PERCENT: 79.1 %
PDW BLD-RTO: 22.2 % (ref 12.4–15.4)
PERFORMED ON: ABNORMAL
PHOSPHORUS: 2.9 MG/DL (ref 2.5–4.9)
PLATELET # BLD: 276 K/UL (ref 135–450)
PMV BLD AUTO: 7.1 FL (ref 5–10.5)
POTASSIUM SERPL-SCNC: 4.9 MMOL/L (ref 3.5–5.1)
PROTHROMBIN TIME: 47 SEC (ref 10–13.2)
RBC # BLD: 3.18 M/UL (ref 4.2–5.9)
SODIUM BLD-SCNC: 131 MMOL/L (ref 136–145)
TROPONIN: 0.07 NG/ML
WBC # BLD: 10.9 K/UL (ref 4–11)

## 2021-02-12 PROCEDURE — 94761 N-INVAS EAR/PLS OXIMETRY MLT: CPT

## 2021-02-12 PROCEDURE — 84484 ASSAY OF TROPONIN QUANT: CPT

## 2021-02-12 PROCEDURE — 6370000000 HC RX 637 (ALT 250 FOR IP): Performed by: INTERNAL MEDICINE

## 2021-02-12 PROCEDURE — 80048 BASIC METABOLIC PNL TOTAL CA: CPT

## 2021-02-12 PROCEDURE — 2580000003 HC RX 258: Performed by: INTERNAL MEDICINE

## 2021-02-12 PROCEDURE — 6370000000 HC RX 637 (ALT 250 FOR IP): Performed by: NURSE PRACTITIONER

## 2021-02-12 PROCEDURE — 99254 IP/OBS CNSLTJ NEW/EST MOD 60: CPT | Performed by: NURSE PRACTITIONER

## 2021-02-12 PROCEDURE — 93283 PRGRMG EVAL IMPLANTABLE DFB: CPT | Performed by: INTERNAL MEDICINE

## 2021-02-12 PROCEDURE — 2700000000 HC OXYGEN THERAPY PER DAY

## 2021-02-12 PROCEDURE — 84100 ASSAY OF PHOSPHORUS: CPT

## 2021-02-12 PROCEDURE — 85610 PROTHROMBIN TIME: CPT

## 2021-02-12 PROCEDURE — 83735 ASSAY OF MAGNESIUM: CPT

## 2021-02-12 PROCEDURE — 2500000003 HC RX 250 WO HCPCS: Performed by: INTERNAL MEDICINE

## 2021-02-12 PROCEDURE — 6360000002 HC RX W HCPCS: Performed by: HOSPITALIST

## 2021-02-12 PROCEDURE — 94640 AIRWAY INHALATION TREATMENT: CPT

## 2021-02-12 PROCEDURE — 2000000000 HC ICU R&B

## 2021-02-12 PROCEDURE — 2580000003 HC RX 258: Performed by: HOSPITALIST

## 2021-02-12 PROCEDURE — 85025 COMPLETE CBC W/AUTO DIFF WBC: CPT

## 2021-02-12 RX ORDER — MIDODRINE HYDROCHLORIDE 5 MG/1
5 TABLET ORAL
Status: DISCONTINUED | OUTPATIENT
Start: 2021-02-12 | End: 2021-02-13 | Stop reason: HOSPADM

## 2021-02-12 RX ORDER — AMIODARONE HYDROCHLORIDE 200 MG/1
400 TABLET ORAL 2 TIMES DAILY
Status: DISCONTINUED | OUTPATIENT
Start: 2021-02-12 | End: 2021-02-13 | Stop reason: HOSPADM

## 2021-02-12 RX ORDER — METOPROLOL TARTRATE 50 MG/1
100 TABLET, FILM COATED ORAL 2 TIMES DAILY
Status: DISCONTINUED | OUTPATIENT
Start: 2021-02-12 | End: 2021-02-12

## 2021-02-12 RX ADMIN — OXYCODONE 5 MG: 5 TABLET ORAL at 22:15

## 2021-02-12 RX ADMIN — Medication 1 CAPSULE: at 09:13

## 2021-02-12 RX ADMIN — FINASTERIDE 5 MG: 5 TABLET, FILM COATED ORAL at 09:13

## 2021-02-12 RX ADMIN — Medication 10 ML: at 09:12

## 2021-02-12 RX ADMIN — VASOPRESSIN 0.03 UNITS/MIN: 20 INJECTION INTRAVENOUS at 04:35

## 2021-02-12 RX ADMIN — TAMSULOSIN HYDROCHLORIDE 0.4 MG: 0.4 CAPSULE ORAL at 09:13

## 2021-02-12 RX ADMIN — Medication 2 PUFF: at 18:09

## 2021-02-12 RX ADMIN — INSULIN GLARGINE 25 UNITS: 100 INJECTION, SOLUTION SUBCUTANEOUS at 22:20

## 2021-02-12 RX ADMIN — IPRATROPIUM BROMIDE AND ALBUTEROL SULFATE 1 AMPULE: .5; 3 SOLUTION RESPIRATORY (INHALATION) at 08:36

## 2021-02-12 RX ADMIN — ACETAMINOPHEN 650 MG: 325 TABLET ORAL at 04:38

## 2021-02-12 RX ADMIN — Medication 1 CAPSULE: at 16:01

## 2021-02-12 RX ADMIN — INSULIN LISPRO 10 UNITS: 100 INJECTION, SOLUTION INTRAVENOUS; SUBCUTANEOUS at 16:01

## 2021-02-12 RX ADMIN — INSULIN LISPRO 8 UNITS: 100 INJECTION, SOLUTION INTRAVENOUS; SUBCUTANEOUS at 12:25

## 2021-02-12 RX ADMIN — OXYCODONE 5 MG: 5 TABLET ORAL at 01:35

## 2021-02-12 RX ADMIN — AMIODARONE HYDROCHLORIDE 400 MG: 200 TABLET ORAL at 10:49

## 2021-02-12 RX ADMIN — PANTOPRAZOLE SODIUM 40 MG: 40 TABLET, DELAYED RELEASE ORAL at 09:13

## 2021-02-12 RX ADMIN — INSULIN LISPRO 10 UNITS: 100 INJECTION, SOLUTION INTRAVENOUS; SUBCUTANEOUS at 09:12

## 2021-02-12 RX ADMIN — INSULIN LISPRO 2 UNITS: 100 INJECTION, SOLUTION INTRAVENOUS; SUBCUTANEOUS at 16:00

## 2021-02-12 RX ADMIN — ALLOPURINOL 300 MG: 300 TABLET ORAL at 09:13

## 2021-02-12 RX ADMIN — FUROSEMIDE 40 MG: 40 TABLET ORAL at 09:13

## 2021-02-12 RX ADMIN — FLUTICASONE PROPIONATE 2 SPRAY: 50 SPRAY, METERED NASAL at 09:12

## 2021-02-12 RX ADMIN — METOPROLOL TARTRATE 100 MG: 50 TABLET, FILM COATED ORAL at 10:49

## 2021-02-12 RX ADMIN — Medication 2 PUFF: at 08:36

## 2021-02-12 RX ADMIN — IPRATROPIUM BROMIDE AND ALBUTEROL SULFATE 1 AMPULE: .5; 3 SOLUTION RESPIRATORY (INHALATION) at 18:09

## 2021-02-12 RX ADMIN — MEGESTROL ACETATE 200 MG: 40 SUSPENSION ORAL at 09:13

## 2021-02-12 RX ADMIN — INSULIN LISPRO 1 UNITS: 100 INJECTION, SOLUTION INTRAVENOUS; SUBCUTANEOUS at 22:19

## 2021-02-12 RX ADMIN — INSULIN LISPRO 10 UNITS: 100 INJECTION, SOLUTION INTRAVENOUS; SUBCUTANEOUS at 12:25

## 2021-02-12 RX ADMIN — ATORVASTATIN CALCIUM 20 MG: 20 TABLET, FILM COATED ORAL at 22:15

## 2021-02-12 RX ADMIN — AMIODARONE HYDROCHLORIDE 400 MG: 200 TABLET ORAL at 22:00

## 2021-02-12 RX ADMIN — INSULIN GLARGINE 25 UNITS: 100 INJECTION, SOLUTION SUBCUTANEOUS at 09:12

## 2021-02-12 RX ADMIN — IPRATROPIUM BROMIDE AND ALBUTEROL SULFATE 1 AMPULE: .5; 3 SOLUTION RESPIRATORY (INHALATION) at 22:56

## 2021-02-12 RX ADMIN — BENZONATATE 100 MG: 100 CAPSULE ORAL at 14:56

## 2021-02-12 RX ADMIN — Medication 10 ML: at 22:22

## 2021-02-12 RX ADMIN — POLYETHYLENE GLYCOL 3350 17 G: 17 POWDER, FOR SOLUTION ORAL at 16:02

## 2021-02-12 RX ADMIN — AMIODARONE HYDROCHLORIDE 1 MG/MIN: 50 INJECTION, SOLUTION INTRAVENOUS at 04:35

## 2021-02-12 RX ADMIN — MIDODRINE HYDROCHLORIDE 5 MG: 5 TABLET ORAL at 16:01

## 2021-02-12 ASSESSMENT — PAIN DESCRIPTION - PAIN TYPE: TYPE: CHRONIC PAIN

## 2021-02-12 ASSESSMENT — PAIN SCALES - GENERAL
PAINLEVEL_OUTOF10: 6
PAINLEVEL_OUTOF10: 0
PAINLEVEL_OUTOF10: 7
PAINLEVEL_OUTOF10: 0
PAINLEVEL_OUTOF10: 8
PAINLEVEL_OUTOF10: 0

## 2021-02-12 NOTE — PROGRESS NOTES
Pharmacy to Dose Warfarin    Pharmacy consulted to dose warfarin for Afib. INR Goal: 2-3    INR today: 4    Assessment/Plan:  - INR supratherapeutic today despite reduction in dose yesterday  - Patient is now on PO amodarone  - Will hold warfarin tonight. A warfarin placeholder has been ordered which requires no action from nursing  - Daily INR ordered2    Pharmacy will continue to follow.     Sejal Carroll, PharmD, BCPS  Clinical Pharmacist  [id number]

## 2021-02-12 NOTE — CONSULTS
[address]   Electrophysiology Nurse Practitioner  Consult  Date: 2/12/2021  Date of admission: 2/10/2021  1:39 PM  Reason for Admission: VT (ventricular tachycardia) ([facility] [I47.2]    Consult Requesting Physician: Joseph Salazar MD    -Reason for Consultation: VT    Chief Complaint   Patient presents with    Chest Pain     in by [facility] with chest pain, EMS called code Stemi, patient with profuse sweating on arrival, heart rate 151 on arrival        HISTORY OF PRESENT ILLNESS: History obtained from patient and medical record. Vandana Grimes is a 72 y.o. male with a past medical history of asthma, SYLVIE, HLD, DM, HTN, VT s/p AICD, lung cancer,  and obesity.      He was initially seen by EP for atrial fibrillation which was cardioverted. Outpatient Holter monitor revealed ventricular tachycardia and underwent a dual-chamber AICD placement on 12/31/2020 for secondary prevention. On follow-up he had AICD shock on 1/4/2021. Device interrogation at the time revealed atrial fibrillation with rapid ventricular rate which then triggered ventricular tachycardia followed by AICD shock. He was started on amiodarone but he was not taking it because of nausea which turned out to be related to antibiotics therapy. He was later started on PO amiodarone therapy.     He was found to have lung mass and mediastinal lymphadenopathy and pleural effusion. Per oncology preliminary cytology is consistent with non-small cell lung cancer. He had pleurx chest tube placed and was discharged home. On 2/10/21, he was readmitted with chest pain and SOB, found to be in VT. His AICD did not shock him and he was started on IV amiodarone gtt. He was found to have metastatic melanoma and decided to pursue hospice care. EP consulted about recurrent VT. Interval Hx: Today, he is being seen for VT. His wife is at the bedside. He is currently AV-paced. No VT since yesterday morning. We had a long discussion about his VT and his device settings given their decision to pursue hospice care. He remains on vasopressin for BP support. Patient seen and examined. Clinical notes reviewed. Telemetry reviewed. No new complaints today. No major events overnight. Denies having chest pain, palpitations, shortness of breath, orthopnea, cough, or dizziness at the time of this visit. Allergies: Allergies   Allergen Reactions    Levaquin [Levofloxacin] Diarrhea     Home Meds:  Prior to Visit Medications    Medication Sig Taking? Authorizing Provider   atorvastatin (LIPITOR) 20 MG tablet Take 1 tablet by mouth nightly  Mirtha Aguirre MD   metoprolol tartrate (LOPRESSOR) 100 MG tablet Take 1 tablet by mouth 2 times daily  Mirtha Aguirre MD   fluticasone (FLONASE) 50 MCG/ACT nasal spray 2 sprays by Each Nostril route daily  Mirtha Aguirre MD   sodium chloride (OCEAN, BABY AYR) 0.65 % nasal spray 2 sprays by Nasal route as needed for Alexandra Lopez MD   polyethylene glycol (GLYCOLAX) 17 g packet Take 17 g by mouth 2 times daily  Mirtha Aguirre MD   sennosides-docusate sodium (SENOKOT-S) 8.6-50 MG tablet Take 2 tablets by mouth daily  Mirtha Aguirre MD   lactobacillus (CULTURELLE) capsule Take 1 capsule by mouth 2 times daily (with meals)  Mirtha Aguirre MD   insulin lispro (HUMALOG KWIKPEN) 200 UNIT/ML SOPN pen Inject 10 Units into the skin 3 times daily (with meals)  Mirtha Aguirre MD   amiodarone (PACERONE) 400 MG tablet Take 1 tablet by mouth 2 times daily for 7 days, THEN 1 tablet daily for 7 days, THEN 0.5 tablets daily. MACIEL Richey CNP   fentaNYL (DURAGESIC) 25 MCG/HR Place 1 patch onto the skin every 72 hours for 15 days.   Mirtha Aguirre MD oxyCODONE (ROXICODONE) 5 MG immediate release tablet Take 1 tablet by mouth every 6 hours as needed for Pain for up to 30 days. Lindsay Torres MD   naloxone [facility]) 4 MG/0.1ML LIQD nasal spray 1 spray by Nasal route as needed for Opioid Reversal  Lindsay Torres MD   megestrol (MEGACE) 40 MG/ML suspension Take 5 mLs by mouth daily  Lindsay Torres MD   promethazine-codeine (PHENERGAN WITH CODEINE) 6.25-10 MG/5ML syrup Take 5 mLs by mouth every 4 hours as needed for Cough for up to 14 days.   Lindsay Torres MD   hydrOXYzine (VISTARIL) 50 MG capsule Take 1 capsule by mouth 3 times daily as needed for Itching or Anxiety  MACIEL Street - NP   insulin glargine (BASAGLAR KWIKPEN) 100 UNIT/ML injection pen Inject 25 Units into the skin nightly  Jorge Rodgers MD   Insulin Pen Needle (MEIJER PEN NEEDLES) 31G X 6 MM MISC 1 each by Does not apply route daily  Jorge Rodgers MD   pantoprazole (PROTONIX) 40 MG tablet TAKE 1 TABLET BY MOUTH ONE TIME A DAY   Jorge Rodgers MD   allopurinol (ZYLOPRIM) 300 MG tablet TAKE 1 TABLET BY MOUTH ONE TIME A DAY   Jorge Rodgers MD   warfarin (COUMADIN) 5 MG tablet Take 1 tablet by mouth daily Do not resume until 1/7  MACIEL Urbina - CNP   albuterol sulfate  (90 Base) MCG/ACT inhaler Inhale 2 puffs into the lungs every 6 hours as needed for Wheezing  Cynthia Bermeo MD   furosemide (LASIX) 40 MG tablet Take 1 tablet by mouth daily  Jorge Rodgers MD   fluticasone-salmeterol (ADVAIR DISKUS) 250-50 MCG/DOSE AEPB Inhale 1 puff into the lungs every 12 hours  Cynthia Bermeo MD   lisinopril (PRINIVIL;ZESTRIL) 5 MG tablet Take 1 tablet by mouth daily  Luis Kemp MD   ipratropium-albuterol (DUONEB) 0.5-2.5 (3) MG/3ML SOLN nebulizer solution Inhale 3 mLs into the lungs every 4 hours as needed for Shortness of Breath Dx: Asthma  ICD-10: Mahnaz Seals MD finasteride (PROSCAR) 5 MG tablet Take 5 mg by mouth daily  Historical Provider, MD   tamsulosin (FLOMAX) 0.4 MG capsule Take 0.4 mg by mouth daily  Historical Provider, MD   FREESTYLE LANCETS MISC 1 each by Does not apply route daily  Stephani Angulo MD   Blood Glucose Monitoring Suppl (FREESTYLE LITE) CLAUDINE 1 Device by Does not apply route 2 times daily  Stephani Angulo MD      Past Medical History:  Past Medical History:   Diagnosis Date    Abdominal pain, LLQ     Allergic rhinitis     Asthma     Atopic dermatitis     [address] ([facility] [address]     melanoma    Diabetes mellitus ([facility] [address]     Erectile dysfunction     Generalized osteoarthrosis, involving multiple sites     GERD (gastroesophageal reflux disease)     Gynecomastia     Hyperlipidemia     Nasal congestion     Nipple pain     Obstructive sleep apnea syndrome 10/5/2012    Pseudo-gout     Unspecified essential hypertension       Past Surgical History:    has a past surgical history that includes Hand surgery (2011); other surgical history (2002); Finger surgery (Left, 3-1-2013); Foot surgery (Right); Carpal tunnel release (Bilateral, 2013); pr njx dx/ther sbst intrlmnr lmbr/sac w/img gdn (Right, 9/24/2018); lumbar nerve block (Right, 12/12/2018); bronchoscopy (N/A, 12/2/2020); back surgery; and IR GUIDED PERC PLEURAL DRAIN W CATH INSERT (2/8/2021). Social History:  Reviewed. reports that he quit smoking about 27 years ago. His smoking use included cigarettes. He has a 10.00 pack-year smoking history. He has never used smokeless tobacco. He reports that he does not drink alcohol or use drugs. Family History:  Reviewed. family history includes Arthritis in his mother; Diabetes in his father and sister; Emphysema in his mother; Heart Disease in his father; High Blood Pressure in his mother; High Cholesterol in his father; Osteoporosis in his mother; Parkinsonism in his father.      Review of System: · Constitutional: Negative for fever, night sweats, chills, weight changes, or weakness  · Skin: Negative for rash, dry skin, pruritus, bruising, bleeding, blood clots, or changes in skin pigment  · HEENT: Negative for vision changes, ringing in the ears, sore throat, dysphagia, or swollen lymph nodes  · Respiratory: Reviewed in HPI  · Cardiovascular: Reviewed in HPI  · Gastrointestinal: Negative for abdominal pain, N/V/D, constipation, or black/tarry stools  · Genito-Urinary: Negative for dysuria, incontinence, urgency, or hematuria  · Musculoskeletal: Negative for joint swelling, muscle pain, or injuries  · Neurological/Psych: Negative for confusion, seizures, headaches, balance issues or TIA-like symptoms. No anxiety, depression, or insomnia    Physical Examination:  Vitals:    02/12/21 0822   BP: 139/68   Pulse: 64   Resp: 20   Temp: 97.4 °F (36.3 °C)   SpO2: 93%      In: 1620 [P.O.:1200; I.V.:420]  Out: 1250    Wt Readings from Last 3 Encounters:   02/12/21 214 lb 8.1 oz (97.3 kg)   02/08/21 219 lb 9.3 oz (99.6 kg)   01/14/21 214 lb (97.1 kg)     Telemetry: Personally Reviewed  - AV paced  · Constitutional: Cooperative and in no apparent distress, and appears well nourished  · Skin: Warm and pink; no pallor, cyanosis, bruising, or clubbing  · HEENT: Symmetric and normocephalic. PERRL, EOM intact. Conjunctiva pink with clear sclera. Mucus membranes pink and moist. Teeth intact. Thyroid smooth without nodules or goiter. · Cardiovascular: Regular rate and rhythm. S1/S2 present without murmurs, rubs, or gallops. Peripheral pulses 2+, capillary refill < 3 seconds. No peripheral edema, no elevation of JVP  · Respiratory: Respirations symmetric and unlabored. Lungs clear to auscultation bilaterally, no wheezing, crackles, or rhonchi  · Gastrointestinal: Abdomen soft and rotund. Bowel sounds normoactive in all quadrants without tenderness or masses. · Musculoskeletal: Bilateral upper and lower extremity strength 5/5 with full ROM  · Neurologic/Psych: Awake and orientated to person, place and time. Calm affect, appropriate mood    Pertinent labs, diagnostic, device, and imaging results reviewed as a part of this visit    Labs:  BMP:   Recent Labs     02/10/21  1340 21  0410 21  1115   * 135*  --    K 4.5 3.8 4.2    103  --    CO2 19* 21  --    BUN 40* 35*  --    CREATININE 1.2 1.0  --    MG  --  1.70* 1.90     Estimated Creatinine Clearance: 85 mL/min (based on SCr of 1 mg/dL). CBC:   Recent Labs     02/10/21  1340 21  0410 21  0915   WBC 10.4 8.1 10.9   HGB 9.7* 9.1* 8.6*   HCT 30.9* 29.1* 27.5*   MCV 87.0 87.5 86.6    256 276     Thyroid:   Lab Results   Component Value Date    TSH 3.21 2016     Lipids:  Lab Results   Component Value Date    CHOL 158 2020    HDL 34 2020    TRIG 141 2020     LFTS:   Lab Results   Component Value Date    ALT 31 2021    AST 44 2021    ALKPHOS 137 2021    PROT 5.4 2021    PROT 6.2 2012    AGRATIO 0.8 2021    BILITOT 0.4 2021     Cardiac Enzymes:   Lab Results   Component Value Date    TROPONINI 0.07 2021    TROPONINI 0.10 2021    TROPONINI 0.08 2021     Coags:   Lab Results   Component Value Date    PROTIME 30.2 2021    INR 2.58 2021       EC21  WCT    ECHO: 20   -Overall left ventricular systolic function is moderately depressed .   -Ejection fraction is visually estimated to be 40%.  E/e'= 7.4 cm   -There is global hypokinesis that appears worse in the inferior and apical walls.   -Indeterminate diastolic function.   -The aortic valve appears sclerotic but opens well.   -Mild aortic regurgitation.   -Mild tricuspid regurgitation.     Cath: 12/3/20  Anatomy:   LM-nml   LAD-nml  Cx-distal 20%  OM- nml  RCA-dominant, prox 20%  RPDA- nml  LVEF- 40%  LVG- global hypo  LVEDP- 9     Impression  ~Coronary Angiography w/ nonobstructive CAD  ~LVG with LVEF of 40 and no regional wall motion abnormalities    Problem List:   Patient Active Problem List    Diagnosis Date Noted    Supratherapeutic INR 01/26/2021    Pneumonia 01/25/2021    Hypokalemia     Hyponatremia     AICD (automatic cardioverter/defibrillator) present 12/31/2020    Dilated cardiomyopathy ([address]     Paroxysmal atrial fibrillation ([address] 12/30/2020    VT (ventricular tachycardia) ([address] 12/30/2020    Atrial fibrillation ([address] 12/30/2020    Persistent atrial fibrillation ([address] 12/24/2020    Ischemic cardiomyopathy 12/24/2020    Cardiac arrest ([address] 12/02/2020    Mediastinal adenopathy     Multiple lung nodules on CT     Moderate persistent asthma without complication 36/92/0749    GERD without esophagitis 09/04/2019    Non morbid obesity, unspecified obesity type 09/04/2019    Dyshidrotic eczema 06/06/2019    Eczema 04/04/2018    Type 2 diabetes mellitus with diabetic polyneuropathy, without long-term current use of insulin ([address] 08/11/2017    Melanoma in situ of lower leg ([address] 02/01/2017    Pseudogout 02/08/2016    Allergic rhinitis 04/03/2013    SYLVIE (obstructive sleep apnea) 10/05/2012    Essential hypertension 10/01/2012    Generalized osteoarthrosis, involving multiple sites 10/01/2012    Pure hypercholesterolemia 10/01/2012        Assessment and Plan:     1. VT   - No recurrence since yesterday morning   - Stop gtt and continue  mg BID x1 week, then 400 mg daily, then 200 mg daily   - Resume home BB once BP stable   - Keep K+ >4 and mag >2     - We had a long discussion about the possibility of recurrent VT upon discharge and his wishes about his device settings given that he is planning to go home with hospice    ~ Pt/wife will discuss this further and let us know if he would like ICD settings adjusted    2.  Sick Sinus Syndrome  - S/p [employer] AICD (12/20, Dr. Antonia Melton) - I reviewed device interrogation. Device functioning normally. 3. Persistent Atrial Fibrillation  - Currently in AV paced  - Continue PO amiodarone as above  - Resume home BB (metoprolol 100 mg BID) once BP stable  - [id number] score:high. High risk for stroke and thromboembolism. Anticoagulation is recommended. Risk of bleeding was discussed.  ~ On Coumadin; INRs monitored by [facility] anti-coagulation [facility]    - Afib risk factors including age, HTN, obesity, inactivity and SYLVIE were discussed with patient. Risk factor modification recommended    4. Hypotension   - Unstable, remains on vasopressin   - Wean off as tolerated    5. Metastatic Melanoma   - S/p pleurx CT   - Plans for hospice at d/c    No further EP recommendations. EP will sign off. Please call if there are any questions. Thank you for consult. All pertinent information and plan of care discussed with the EP physician. All questions and concerns were addressed to the patient/family. Alternatives to my treatment were discussed. I have discussed the above stated plan and the patient verbalized understanding and agreed with the plan. Discussed plan with patient and nurse. The patient was seen for >35 minutes. I reviewed interval history, physical exam, review of data including labs, imaging, development and implementation of treatment plan and coordination of complex care. Thank you for allowing to us to participate in the care of Dov Carlton.     MACIEL [facility]  [address]   Office: [phone]

## 2021-02-12 NOTE — ADT AUTH CERT
HEENT: Pupils equal, round, and reactive to light. Conjunctivae/corneas clear. Neck: Supple, with full range of motion. No jugular venous distention. Trachea midline. Respiratory:  Normal respiratory effort. Clear to auscultation, bilaterally without Rales/Wheezes/Rhonchi. Cardiovascular: Regular rate and rhythm with normal S1/S2 without murmurs, rubs or gallops. Abdomen: Soft, non-tender, non-distended with normal bowel sounds. Musculoskeletal: No clubbing, cyanosis or edema bilaterally.  Full range of motion without deformity. Skin: Skin color, texture, turgor normal.  No rashes or lesions. Neurologic:  Neurovascularly intact without any focal sensory/motor deficits.  Cranial nerves: II-XII intact, grossly non-focal.   Psychiatric: Alert and oriented, thought content appropriate, normal insight   Capillary Refill: Brisk,< 3 seconds    Peripheral Pulses: +2 palpable, equal bilaterally       Assessment/Plan:       Active Hospital Problems     Diagnosis   · AICD (automatic cardioverter/defibrillator) present [Z95.810]   · VT (ventricular tachycardia) ([facility]) [I47.2]   · Type 2 diabetes mellitus with diabetic polyneuropathy, without long-term current use of insulin (HCC) [E11.42]   · SYLVIE (obstructive sleep apnea) [G47.33]   · Pure hypercholesterolemia [E78.00]       Ventricular tachycardia   Patient has AICD   Currently on amnio drip   Awaiting further cardiology recommendations       Pleural effusion   Recurrent   Status post Pleurx catheter   Patient with history of metastatic melanoma including mediastinal metastasis.       Metastatic melanoma   Poor prognosis   Discussed goals of care with the patient   He insists he wants to be home for his final hours   We will try to optimize him for discharge over the next several days with plan to discharge home with hospice services       DVT Prophylaxis: Patient on warfarin   Diet: DIET CARB CONTROL; Carb Control: 5 carb choices (75 gms)/meal Code Status: DNR-CCA   -------------------------   Procedure: Central line placed   -------------      2/11/2021 01:44   Troponin: 0.07 (H)      2/11/2021 04:10   Sodium: 135 (L)   Potassium: 3.8   Chloride: 103   CO2: 21   BUN: 35 (H)   Creatinine: 1.0   Anion Gap: 11   GFR Non-: >60   GFR African American: >60   Magnesium: 1.70 (L)   Glucose: 82   Calcium: 8.5   Troponin: 0.08 (H)   WBC: 8.1   RBC: 3.32 (L)   Hemoglobin Quant: 9.1 (L)   Hematocrit: 29.1 (L)   MCV: 87.5   MCH: 27.2   MCHC: 31.1   MPV: 7.2   RDW: 22.0 (H)   Platelet Count: 084   Prothrombin Time: 30.2 (H)   INR: 2.58 (H)   aPTT: 32.0      2/11/2021 11:15   Potassium: 4.2   Magnesium: 1.90   Troponin: 0.10 (H)      2/11/2021 11:33   POC Glucose: 160 (H)      2/11/2021 17:12   POC Glucose: 356 (H)      2/11/2021 20:31   POC Glucose: 275 (H)   -------------------   EKG above   ----------   Scheduled Medications   · warfarin 2.5 mg Oral QPM   · allopurinol 300 mg Oral Daily   · atorvastatin 20 mg Oral Nightly   · fentaNYL 1 patch Transdermal Q72H   · finasteride 5 mg Oral Daily   · fluticasone 2 spray Each Nostril Daily   · furosemide 40 mg Oral Daily   · insulin glargine 25 Units Subcutaneous BID   · insulin lispro (1 Unit Dial) 10 Units Subcutaneous TID WC   · lactobacillus 1 capsule Oral BID WC   · megestrol 200 mg Oral Daily   · pantoprazole 40 mg Oral Daily   · tamsulosin 0.4 mg Oral Daily   · sodium chloride flush 10 mL Intravenous 2 times per day   · insulin lispro 0-12 Units Subcutaneous TID WC   · insulin lispro 0-6 Units Subcutaneous Nightly   · budesonide-formoterol 2 puff Inhalation BID         albumin human 25 % IV solution 25 g x1   0.9 % sodium chloride bolus Dose: 500 mL x1   amiodarone (CORDARONE) 150 mg in dextrose 5 % 100 mL bolus x2   magnesium sulfate 2000 mg in 50 mL IVPB premix x1      Infusions      amiodarone (CORDARONE) 450 mg in dextrose 5 % 250 mL infusion    Rate: 33.3 mL/hr Dose: 1 mg/min vasopressin 20 Units in dextrose 5 % 100 mL infusion    Rate: 9 mL/hr Dose: 0.03 Units/min      PRN   albuterol sulfate  (90 Base) MCG/ACT inhaler 2 puff x1   benzonatate (TESSALON) capsule 100 mg x1   ipratropium-albuterol (DUONEB) nebulizer solution 1 ampule x1   oxyCODONE (ROXICODONE) immediate release tablet 5 mg x2

## 2021-02-12 NOTE — PROGRESS NOTES
Hospitalist Progress Note      PCP: Stephani Angulo MD    Date of Admission: 2/10/2021    Chief Complaint: Ventricular tachycardia    Hospital Course: The patient is a 72 y.o. male with history of type 2 diabetes, GERD, hyperlipidemia, obesity with obstructive sleep apnea, gout, hypertension, history of atrial fibrillation, recent diagnosis of metastatic melanoma with mediastinal lymphadenopathy, history of VT status post AICD. Last admission  he had VT requiring defibrillation. Today he developed chest pains with associated shortness of breath and diaphoresis in the setting of his ventricular tachycardia. He reports that his AICD did not go off. In the ER he was found to be in VT in the 160s and was given a bolus dose of IV amiodarone with improvement of heart rate into the 70s. He was discussed with the ER provider who explained that cardiology saw the patient in the ER recommending continued amiodarone drip. At the time of my evaluation he is stable with soft BP. He has peripheral edema and reports orthopnea. Early morning 2/11/2021 patient with ventricular tachycardia, was started on amiodarone drip. Central line was placed by nocturnist in order to initiate pressure support with vasopressin. Subjective: Patient seen and examined at bedside. States he is feeling better.       Medications:  Reviewed    Infusion Medications    vasopressin (Septic Shock) infusion 0.02 Units/min (02/12/21 1129)    dextrose       Scheduled Medications    amiodarone  400 mg Oral BID    metoprolol tartrate  100 mg Oral BID    warfarin (COUMADIN) daily dosing (placeholder)   Other RX Placeholder    allopurinol  300 mg Oral Daily    atorvastatin  20 mg Oral Nightly    fentaNYL  1 patch Transdermal Q72H    finasteride  5 mg Oral Daily    fluticasone  2 spray Each Nostril Daily    furosemide  40 mg Oral Daily    insulin glargine  25 Units Subcutaneous BID  insulin lispro (1 Unit Dial)  10 Units Subcutaneous TID     lactobacillus  1 capsule Oral BID     megestrol  200 mg Oral Daily    pantoprazole  40 mg Oral Daily    tamsulosin  0.4 mg Oral Daily    sodium chloride flush  10 mL Intravenous 2 times per day    insulin lispro  0-12 Units Subcutaneous TID     insulin lispro  0-6 Units Subcutaneous Nightly    budesonide-formoterol  2 puff Inhalation BID     PRN Meds: ipratropium-albuterol, albuterol sulfate HFA, benzonatate, hydrOXYzine, oxyCODONE, sodium chloride flush, promethazine **OR** ondansetron, polyethylene glycol, acetaminophen **OR** acetaminophen, glucose, dextrose, glucagon (rDNA), dextrose      Intake/Output Summary (Last 24 hours) at 2/12/2021 1447  Last data filed at 2/12/2021 1224  Gross per 24 hour   Intake 1860 ml   Output 1250 ml   Net 610 ml       Physical Exam Performed:    /63   Pulse 60   Temp 97 °F (36.1 °C) (Temporal)   Resp 23   Ht 5' 9\" (1.753 m)   Wt 214 lb 8.1 oz (97.3 kg)   SpO2 95%   BMI 31.68 kg/m²     General appearance: No apparent distress, appears stated age and cooperative. HEENT: Pupils equal, round, and reactive to light. Conjunctivae/corneas clear. Neck: Supple, with full range of motion. No jugular venous distention. Trachea midline. Respiratory:  Normal respiratory effort. Clear to auscultation, bilaterally without Rales/Wheezes/Rhonchi. Cardiovascular: Regular rate and rhythm with normal S1/S2 without murmurs, rubs or gallops. Abdomen: Soft, non-tender, non-distended with normal bowel sounds. Musculoskeletal: No clubbing, cyanosis or edema bilaterally. Full range of motion without deformity. Skin: Skin color, texture, turgor normal.  No rashes or lesions. Neurologic:  Neurovascularly intact without any focal sensory/motor deficits.  Cranial nerves: II-XII intact, grossly non-focal.  Psychiatric: Alert and oriented, thought content appropriate, normal insight Capillary Refill: Brisk,< 3 seconds   Peripheral Pulses: +2 palpable, equal bilaterally       Labs:   Recent Labs     02/10/21  1340 02/11/21  0410 02/12/21  0915   WBC 10.4 8.1 10.9   HGB 9.7* 9.1* 8.6*   HCT 30.9* 29.1* 27.5*    256 276     Recent Labs     02/10/21  1340 02/11/21  0410 02/11/21  1115 02/12/21  0915   * 135*  --  131*   K 4.5 3.8 4.2 4.9    103  --  98*   CO2 19* 21  --  22   BUN 40* 35*  --  31*   CREATININE 1.2 1.0  --  1.0   CALCIUM 8.9 8.5  --  8.8   PHOS  --   --   --  2.9     No results for input(s): AST, ALT, BILIDIR, BILITOT, ALKPHOS in the last 72 hours. Recent Labs     02/10/21  1340 02/11/21  0410 02/12/21  0915   INR 1.82* 2.58* 4.00*     Recent Labs     02/11/21  0410 02/11/21  1115 02/12/21  0442   TROPONINI 0.08* 0.10* 0.07*       Urinalysis:      Lab Results   Component Value Date    NITRU Negative 01/01/2021    BACTERIA 0 02/13/2020    RBCUA 0 02/13/2020    BLOODU Negative 01/01/2021    SPECGRAV 1.015 01/01/2021    GLUCOSEU >=1000 01/01/2021       Radiology:  XR CHEST PORTABLE   Final Result   Right basilar opacity could represent atelectatic changes or pneumonia                 Assessment/Plan:    Active Hospital Problems    Diagnosis    AICD (automatic cardioverter/defibrillator) present [Z95.810]    VT (ventricular tachycardia) (HCC) [I47.2]    Type 2 diabetes mellitus with diabetic polyneuropathy, without long-term current use of insulin (HCC) [E11.42]    SYLVIE (obstructive sleep apnea) [G47.33]    Pure hypercholesterolemia [E78.00]     Ventricular tachycardia  Patient has AICD  Currently on amnio drip  Appreciate cardiology recommendations    Pleural effusion  Recurrent  Status post Pleurx catheter  Patient with history of metastatic melanoma including mediastinal metastasis.     Metastatic melanoma  Poor prognosis  Discussed goals of care with the patient  He insists he wants to be home for his final hours We will try to optimize him for discharge over the next several days with plan to discharge home with hospice services    DVT Prophylaxis: Patient on warfarin  Diet: DIET CARB CONTROL; Carb Control: 5 carb choices (75 gms)/meal  Code Status: [facility]      Electronically signed by Dora Self MD on 2/12/2021 at 2:47 PM

## 2021-02-12 NOTE — PROGRESS NOTES
700cc was removed from pluer-x drain today, tolerated well, no difficulties noted. Oxygen still at 2 liters nc. Amio gtt cont'd at 1mg/hr, vasopressin at .03 for b/p support. Patient did have 2 episodes of SVT, was able to come out of it. Small runs of VT. Last part of shift atrial paced around 60. OOB to chair. No further changes noted. Support given to wife at bedside.

## 2021-02-12 NOTE — PROGRESS NOTES
Hospitalist Progress Note      PCP: Nayan Narvaez MD    Date of Admission: 2/10/2021    Chief Complaint: Ventricular tachycardia    Hospital Course: The patient is a 72 y.o. male with history of type 2 diabetes, GERD, hyperlipidemia, obesity with obstructive sleep apnea, gout, hypertension, history of atrial fibrillation, recent diagnosis of metastatic melanoma with mediastinal lymphadenopathy, history of VT status post AICD. Last admission  he had VT requiring defibrillation. Today he developed chest pains with associated shortness of breath and diaphoresis in the setting of his ventricular tachycardia. He reports that his AICD did not go off. In the ER he was found to be in VT in the 160s and was given a bolus dose of IV amiodarone with improvement of heart rate into the 70s. He was discussed with the ER provider who explained that cardiology saw the patient in the ER recommending continued amiodarone drip. At the time of my evaluation he is stable with soft BP. He has peripheral edema and reports orthopnea. Early morning 2/11/2021 patient with ventricular tachycardia, was started on amiodarone drip. Central line was placed by nocturnist in order to initiate pressure support with vasopressin. Subjective: Patient seen and examined at bedside. States he is feeling better.       Medications:  Reviewed    Infusion Medications    amiodarone 450mg/250ml D5W infusion 1 mg/min (02/11/21 1320)    vasopressin (Septic Shock) infusion 0.03 Units/min (02/11/21 1842)    dextrose       Scheduled Medications    warfarin  2.5 mg Oral QPM    allopurinol  300 mg Oral Daily    atorvastatin  20 mg Oral Nightly    fentaNYL  1 patch Transdermal Q72H    finasteride  5 mg Oral Daily    fluticasone  2 spray Each Nostril Daily    furosemide  40 mg Oral Daily    insulin glargine  25 Units Subcutaneous BID    insulin lispro (1 Unit Dial)  10 Units Subcutaneous TID WC  lactobacillus  1 capsule Oral BID     megestrol  200 mg Oral Daily    pantoprazole  40 mg Oral Daily    tamsulosin  0.4 mg Oral Daily    sodium chloride flush  10 mL Intravenous 2 times per day    insulin lispro  0-12 Units Subcutaneous TID     insulin lispro  0-6 Units Subcutaneous Nightly    budesonide-formoterol  2 puff Inhalation BID     PRN Meds: ipratropium-albuterol, albuterol sulfate HFA, benzonatate, hydrOXYzine, oxyCODONE, sodium chloride flush, promethazine **OR** ondansetron, polyethylene glycol, acetaminophen **OR** acetaminophen, glucose, dextrose, glucagon (rDNA), dextrose      Intake/Output Summary (Last 24 hours) at 2/11/2021 2004  Last data filed at 2/11/2021 1633  Gross per 24 hour   Intake 1090 ml   Output 1525 ml   Net -435 ml       Physical Exam Performed:    BP (!) 121/55   Pulse 60   Temp 98 °F (36.7 °C) (Temporal)   Resp 22   Ht 5' 9\" (1.753 m)   Wt 210 lb 12.2 oz (95.6 kg)   SpO2 (!) 86%   BMI 31.12 kg/m²     General appearance: No apparent distress, appears stated age and cooperative. HEENT: Pupils equal, round, and reactive to light. Conjunctivae/corneas clear. Neck: Supple, with full range of motion. No jugular venous distention. Trachea midline. Respiratory:  Normal respiratory effort. Clear to auscultation, bilaterally without Rales/Wheezes/Rhonchi. Cardiovascular: Regular rate and rhythm with normal S1/S2 without murmurs, rubs or gallops. Abdomen: Soft, non-tender, non-distended with normal bowel sounds. Musculoskeletal: No clubbing, cyanosis or edema bilaterally. Full range of motion without deformity. Skin: Skin color, texture, turgor normal.  No rashes or lesions. Neurologic:  Neurovascularly intact without any focal sensory/motor deficits.  Cranial nerves: II-XII intact, grossly non-focal.  Psychiatric: Alert and oriented, thought content appropriate, normal insight  Capillary Refill: Brisk,< 3 seconds Peripheral Pulses: +2 palpable, equal bilaterally       Labs:   Recent Labs     02/10/21  1340 02/11/21  0410   WBC 10.4 8.1   HGB 9.7* 9.1*   HCT 30.9* 29.1*    256     Recent Labs     02/10/21  1340 02/11/21  0410 02/11/21  1115   * 135*  --    K 4.5 3.8 4.2    103  --    CO2 19* 21  --    BUN 40* 35*  --    CREATININE 1.2 1.0  --    CALCIUM 8.9 8.5  --      No results for input(s): AST, ALT, BILIDIR, BILITOT, ALKPHOS in the last 72 hours. Recent Labs     02/10/21  1340 02/11/21  0410   INR 1.82* 2.58*     Recent Labs     02/11/21  0144 02/11/21  0410 02/11/21  1115   TROPONINI 0.07* 0.08* 0.10*       Urinalysis:      Lab Results   Component Value Date    NITRU Negative 01/01/2021    BACTERIA 0 02/13/2020    RBCUA 0 02/13/2020    BLOODU Negative 01/01/2021    SPECGRAV 1.015 01/01/2021    GLUCOSEU >=1000 01/01/2021       Radiology:  XR CHEST PORTABLE   Final Result   Right basilar opacity could represent atelectatic changes or pneumonia                 Assessment/Plan:    Active Hospital Problems    Diagnosis    AICD (automatic cardioverter/defibrillator) present [Z95.810]    VT (ventricular tachycardia) (HCC) [I47.2]    Type 2 diabetes mellitus with diabetic polyneuropathy, without long-term current use of insulin (HCC) [E11.42]    SYLVIE (obstructive sleep apnea) [G47.33]    Pure hypercholesterolemia [E78.00]     Ventricular tachycardia  Patient has AICD  Currently on amnio drip  Awaiting further cardiology recommendations    Pleural effusion  Recurrent  Status post Pleurx catheter  Patient with history of metastatic melanoma including mediastinal metastasis.     Metastatic melanoma  Poor prognosis  Discussed goals of care with the patient  He insists he wants to be home for his final hours  We will try to optimize him for discharge over the next several days with plan to discharge home with hospice services    DVT Prophylaxis: Patient on warfarin Diet: DIET CARB CONTROL; Carb Control: 5 carb choices (75 gms)/meal  Code Status: DNR-CCA      Electronically signed by Betty Koch MD on 2/11/2021 at 8:04 PM

## 2021-02-12 NOTE — CARE COORDINATION
Elsa Bowden with Palliative care assisting this case. Patient will discharge home tomorrow most likely with [facility] at home. Pt wants to pass at home. Elsa Bowden made the referral to [facility] [address] already. Discharge Plan:  Home w/[facility] possibly tomorrow.     Electronically signed by LUDWIG Garcia, AKSHAT on 2/12/2021 at 12:30 PM

## 2021-02-13 VITALS
HEIGHT: 69 IN | SYSTOLIC BLOOD PRESSURE: 118 MMHG | RESPIRATION RATE: 19 BRPM | WEIGHT: 216.49 LBS | HEART RATE: 67 BPM | OXYGEN SATURATION: 98 % | DIASTOLIC BLOOD PRESSURE: 59 MMHG | TEMPERATURE: 97.9 F | BODY MASS INDEX: 32.07 KG/M2

## 2021-02-13 LAB
ANION GAP SERPL CALCULATED.3IONS-SCNC: 11 MMOL/L (ref 3–16)
BASOPHILS ABSOLUTE: 0 K/UL (ref 0–0.2)
BASOPHILS RELATIVE PERCENT: 0.3 %
BUN BLDV-MCNC: 28 MG/DL (ref 7–20)
CALCIUM SERPL-MCNC: 8.8 MG/DL (ref 8.3–10.6)
CHLORIDE BLD-SCNC: 99 MMOL/L (ref 99–110)
CO2: 22 MMOL/L (ref 21–32)
CREAT SERPL-MCNC: 0.8 MG/DL (ref 0.8–1.3)
EOSINOPHILS ABSOLUTE: 0 K/UL (ref 0–0.6)
EOSINOPHILS RELATIVE PERCENT: 0.2 %
GFR AFRICAN AMERICAN: >60
GFR NON-AFRICAN AMERICAN: >60
GLUCOSE BLD-MCNC: 116 MG/DL (ref 70–99)
GLUCOSE BLD-MCNC: 135 MG/DL (ref 70–99)
GLUCOSE BLD-MCNC: 186 MG/DL (ref 70–99)
HCT VFR BLD CALC: 27.9 % (ref 40.5–52.5)
HEMOGLOBIN: 8.8 G/DL (ref 13.5–17.5)
INR BLD: 3.47 (ref 0.86–1.14)
LYMPHOCYTES ABSOLUTE: 1 K/UL (ref 1–5.1)
LYMPHOCYTES RELATIVE PERCENT: 8 %
MAGNESIUM: 1.6 MG/DL (ref 1.8–2.4)
MCH RBC QN AUTO: 27.2 PG (ref 26–34)
MCHC RBC AUTO-ENTMCNC: 31.5 G/DL (ref 31–36)
MCV RBC AUTO: 86.2 FL (ref 80–100)
MONOCYTES ABSOLUTE: 1.2 K/UL (ref 0–1.3)
MONOCYTES RELATIVE PERCENT: 8.9 %
NEUTROPHILS ABSOLUTE: 10.7 K/UL (ref 1.7–7.7)
NEUTROPHILS RELATIVE PERCENT: 82.6 %
PDW BLD-RTO: 21.8 % (ref 12.4–15.4)
PERFORMED ON: ABNORMAL
PERFORMED ON: ABNORMAL
PHOSPHORUS: 2.6 MG/DL (ref 2.5–4.9)
PLATELET # BLD: 286 K/UL (ref 135–450)
PMV BLD AUTO: 6.9 FL (ref 5–10.5)
POTASSIUM SERPL-SCNC: 4.1 MMOL/L (ref 3.5–5.1)
PROTHROMBIN TIME: 40.8 SEC (ref 10–13.2)
RBC # BLD: 3.23 M/UL (ref 4.2–5.9)
SODIUM BLD-SCNC: 132 MMOL/L (ref 136–145)
TROPONIN: 0.06 NG/ML
WBC # BLD: 13 K/UL (ref 4–11)

## 2021-02-13 PROCEDURE — 80048 BASIC METABOLIC PNL TOTAL CA: CPT

## 2021-02-13 PROCEDURE — 2700000000 HC OXYGEN THERAPY PER DAY

## 2021-02-13 PROCEDURE — 84484 ASSAY OF TROPONIN QUANT: CPT

## 2021-02-13 PROCEDURE — 2580000003 HC RX 258: Performed by: INTERNAL MEDICINE

## 2021-02-13 PROCEDURE — 84100 ASSAY OF PHOSPHORUS: CPT

## 2021-02-13 PROCEDURE — 6370000000 HC RX 637 (ALT 250 FOR IP): Performed by: INTERNAL MEDICINE

## 2021-02-13 PROCEDURE — 85610 PROTHROMBIN TIME: CPT

## 2021-02-13 PROCEDURE — 6370000000 HC RX 637 (ALT 250 FOR IP): Performed by: NURSE PRACTITIONER

## 2021-02-13 PROCEDURE — 85025 COMPLETE CBC W/AUTO DIFF WBC: CPT

## 2021-02-13 PROCEDURE — 83735 ASSAY OF MAGNESIUM: CPT

## 2021-02-13 PROCEDURE — 94640 AIRWAY INHALATION TREATMENT: CPT

## 2021-02-13 PROCEDURE — 94761 N-INVAS EAR/PLS OXIMETRY MLT: CPT

## 2021-02-13 RX ORDER — MIDODRINE HYDROCHLORIDE 5 MG/1
5 TABLET ORAL
Qty: 90 TABLET | Refills: 3 | Status: SHIPPED | OUTPATIENT
Start: 2021-02-13

## 2021-02-13 RX ADMIN — FLUTICASONE PROPIONATE 2 SPRAY: 50 SPRAY, METERED NASAL at 09:01

## 2021-02-13 RX ADMIN — INSULIN GLARGINE 25 UNITS: 100 INJECTION, SOLUTION SUBCUTANEOUS at 09:01

## 2021-02-13 RX ADMIN — INSULIN LISPRO 2 UNITS: 100 INJECTION, SOLUTION INTRAVENOUS; SUBCUTANEOUS at 11:57

## 2021-02-13 RX ADMIN — Medication 2 PUFF: at 08:00

## 2021-02-13 RX ADMIN — MIDODRINE HYDROCHLORIDE 5 MG: 5 TABLET ORAL at 08:56

## 2021-02-13 RX ADMIN — OXYCODONE 5 MG: 5 TABLET ORAL at 05:35

## 2021-02-13 RX ADMIN — MIDODRINE HYDROCHLORIDE 5 MG: 5 TABLET ORAL at 12:01

## 2021-02-13 RX ADMIN — Medication 10 ML: at 08:57

## 2021-02-13 RX ADMIN — ALLOPURINOL 300 MG: 300 TABLET ORAL at 08:57

## 2021-02-13 RX ADMIN — FUROSEMIDE 40 MG: 40 TABLET ORAL at 08:57

## 2021-02-13 RX ADMIN — FINASTERIDE 5 MG: 5 TABLET, FILM COATED ORAL at 08:56

## 2021-02-13 RX ADMIN — MEGESTROL ACETATE 200 MG: 40 SUSPENSION ORAL at 08:57

## 2021-02-13 RX ADMIN — TAMSULOSIN HYDROCHLORIDE 0.4 MG: 0.4 CAPSULE ORAL at 08:56

## 2021-02-13 RX ADMIN — BENZONATATE 100 MG: 100 CAPSULE ORAL at 05:35

## 2021-02-13 RX ADMIN — PANTOPRAZOLE SODIUM 40 MG: 40 TABLET, DELAYED RELEASE ORAL at 08:57

## 2021-02-13 RX ADMIN — INSULIN LISPRO 10 UNITS: 100 INJECTION, SOLUTION INTRAVENOUS; SUBCUTANEOUS at 11:58

## 2021-02-13 RX ADMIN — Medication 1 CAPSULE: at 08:56

## 2021-02-13 RX ADMIN — INSULIN LISPRO 10 UNITS: 100 INJECTION, SOLUTION INTRAVENOUS; SUBCUTANEOUS at 09:00

## 2021-02-13 RX ADMIN — AMIODARONE HYDROCHLORIDE 400 MG: 200 TABLET ORAL at 08:57

## 2021-02-13 ASSESSMENT — PAIN DESCRIPTION - LOCATION: LOCATION: BACK

## 2021-02-13 ASSESSMENT — PAIN SCALES - GENERAL
PAINLEVEL_OUTOF10: 6
PAINLEVEL_OUTOF10: 0

## 2021-02-14 NOTE — DISCHARGE SUMMARY
Hospital Medicine Discharge Summary    Patient ID: Vandana Grimes      Patient's PCP: Tatiana Parish MD    Admit Date: 2/10/2021     Discharge Date: 2/13/2021      Admitting Physician: Rodney Yoon MD     Discharge Physician: Tila Cortes MD     Discharge Diagnoses: Active Hospital Problems    Diagnosis    AICD (automatic cardioverter/defibrillator) present [Z95.810]    VT (ventricular tachycardia) (HCC) [I47.2]    Type 2 diabetes mellitus with diabetic polyneuropathy, without long-term current use of insulin (HCC) [E11.42]    SYLVIE (obstructive sleep apnea) [G47.33]    Pure hypercholesterolemia [E78.00]       The patient was seen and examined on day of discharge and this discharge summary is in conjunction with any daily progress note from day of discharge. Hospital Course: The patient is a 74 y. o. male with history of type 2 diabetes, GERD, hyperlipidemia, obesity with obstructive sleep apnea, gout, hypertension, history of atrial fibrillation, recent diagnosis of metastatic melanoma with mediastinal lymphadenopathy, history of VT status post AICD.  Last admission  he had VT requiring defibrillation.  Today he developed chest pains with associated shortness of breath and diaphoresis in the setting of his ventricular tachycardia.  He reports that his AICD did not go off.  In the ER he was found to be in VT in the 160s and was given a bolus dose of IV amiodarone with improvement of heart rate into the 70s.  He was discussed with the ER provider who explained that cardiology saw the patient in the ER recommending continued amiodarone drip. At the time of my evaluation he is stable with soft BP.  He has peripheral edema and reports orthopnea. Early morning 2/11/2021 patient with ventricular tachycardia, was started on amiodarone drip. Central line was placed by nocturnist in order to initiate pressure support with vasopressin.   Patient was seen in ICU setting and expressed that he is desire is to be home in his final moments. He was transitioned from male drip to amiodarone 400 twice daily with adequate rate control. He was started on midodrine for pressure support. He is discharging home with hospice services. Physical Exam Performed:     BP (!) 118/59   Pulse 67   Temp 97.9 °F (36.6 °C) (Temporal)   Resp 19   Ht 5' 9\" (1.753 m)   Wt 216 lb 7.9 oz (98.2 kg)   SpO2 98%   BMI 31.97 kg/m²       General appearance:  No apparent distress, appears stated age and cooperative. HEENT:  Normal cephalic, atraumatic without obvious deformity. Pupils equal, round, and reactive to light. Extra ocular muscles intact. Conjunctivae/corneas clear. Neck: Supple, with full range of motion. No jugular venous distention. Trachea midline. Respiratory:  Normal respiratory effort. Clear to auscultation, bilaterally without Rales/Wheezes/Rhonchi. Cardiovascular:  Regular rate and rhythm with normal S1/S2 without murmurs, rubs or gallops. Abdomen: Soft, non-tender, non-distended with normal bowel sounds. Musculoskeletal:  No clubbing, cyanosis or edema bilaterally. Full range of motion without deformity. Skin: Skin color, texture, turgor normal.  No rashes or lesions. Neurologic:  Neurovascularly intact without any focal sensory/motor deficits. Cranial nerves: II-XII intact, grossly non-focal.  Psychiatric:  Alert and oriented, thought content appropriate, normal insight  Capillary Refill: Brisk,< 3 seconds   Peripheral Pulses: +2 palpable, equal bilaterally       Labs:  For convenience and continuity at follow-up the following most recent labs are provided:      CBC:    Lab Results   Component Value Date    WBC 13.0 02/13/2021    HGB 8.8 02/13/2021    HCT 27.9 02/13/2021     02/13/2021       Renal:    Lab Results   Component Value Date     02/13/2021    K 4.1 02/13/2021    K 4.5 02/10/2021    CL 99 02/13/2021    CO2 22 02/13/2021    BUN 28 02/13/2021    CREATININE 0.8 02/13/2021    CALCIUM 8.8 02/13/2021    PHOS 2.6 02/13/2021         Significant Diagnostic Studies    Radiology:   XR CHEST PORTABLE   Final Result   Right basilar opacity could represent atelectatic changes or pneumonia                Consults:     IP CONSULT TO PHARMACY  IP CONSULT TO RESPIRATORY CARE  IP CONSULT TO PALLIATIVE CARE  IP CONSULT TO CARDIOLOGY  IP CONSULT TO HOSPICE    Disposition: Home with home hospice    Condition at Discharge: Terminal    Discharge Instructions/Follow-up: Follow-up with hospice services    Code Status: DNR CC    Activity: activity as tolerated    Diet: regular diet      Discharge Medications:     Discharge Medication List as of 2/13/2021  2:11 PM           Details   midodrine (PROAMATINE) 5 MG tablet Take 1 tablet by mouth 3 times daily (with meals), Disp-90 tablet, R-3Normal              Details   atorvastatin (LIPITOR) 20 MG tablet Take 1 tablet by mouth nightly, Disp-30 tablet, R-0Normal      fluticasone (FLONASE) 50 MCG/ACT nasal spray 2 sprays by Each Nostril route daily, Disp-1 Bottle, R-0Normal      sodium chloride (OCEAN, BABY AYR) 0.65 % nasal spray 2 sprays by Nasal route as needed for Congestion, Disp-1 Bottle, R-0Normal      polyethylene glycol (GLYCOLAX) 17 g packet Take 17 g by mouth 2 times daily, Disp-60 each, R-0Normal      sennosides-docusate sodium (SENOKOT-S) 8.6-50 MG tablet Take 2 tablets by mouth daily, Disp-30 tablet, R-0Normal      lactobacillus (CULTURELLE) capsule Take 1 capsule by mouth 2 times daily (with meals), Disp-60 capsule, R-0Normal      insulin lispro (HUMALOG KWIKPEN) 200 UNIT/ML SOPN pen Inject 10 Units into the skin 3 times daily (with meals), Disp-5 pen, R-3Normal      amiodarone (PACERONE) 400 MG tablet Take 1 tablet by mouth 2 times daily for 7 days, THEN 1 tablet daily for 7 days, THEN 0.5 tablets daily. , Disp-59 tablet, R-0Normal      fentaNYL (DURAGESIC) 25 MCG/HR Place 1 patch onto the skin every 72 hours for 15 days. , Disp-5 patch, R-0Print      oxyCODONE (ROXICODONE) 5 MG immediate release tablet Take 1 tablet by mouth every 6 hours as needed for Pain for up to 30 days. , Disp-42 tablet, R-0Print      naloxone (NARCAN) 4 MG/0.1ML LIQD nasal spray 1 spray by Nasal route as needed for Opioid Reversal, Disp-1 each, R-0Print      megestrol (MEGACE) 40 MG/ML suspension Take 5 mLs by mouth daily, Disp-240 mL, R-0Print      promethazine-codeine (PHENERGAN WITH CODEINE) 6.25-10 MG/5ML syrup Take 5 mLs by mouth every 4 hours as needed for Cough for up to 14 days. , Disp-118 mL, R-0Print      hydrOXYzine (VISTARIL) 50 MG capsule Take 1 capsule by mouth 3 times daily as needed for Itching or Anxiety, Disp-90 capsule, R-0Normal      insulin glargine (BASAGLAR KWIKPEN) 100 UNIT/ML injection pen Inject 25 Units into the skin nightly, Disp-5 pen, R-2Normal      Insulin Pen Needle (MEIJER PEN NEEDLES) 31G X 6 MM MISC DAILY Starting Wed 1/13/2021, Disp-100 each, R-3, Normal      pantoprazole (PROTONIX) 40 MG tablet TAKE 1 TABLET BY MOUTH ONE TIME A DAY , Disp-90 tablet, R-0Normal      allopurinol (ZYLOPRIM) 300 MG tablet TAKE 1 TABLET BY MOUTH ONE TIME A DAY , Disp-90 tablet, R-0Normal      warfarin (COUMADIN) 5 MG tablet Take 1 tablet by mouth daily Do not resume until 1/7, Disp-30 tablet, R-5Print      albuterol sulfate  (90 Base) MCG/ACT inhaler Inhale 2 puffs into the lungs every 6 hours as needed for Wheezing, Disp-1 Inhaler, R-0Normal      furosemide (LASIX) 40 MG tablet Take 1 tablet by mouth daily, Disp-90 tablet, R-1Normal      fluticasone-salmeterol (ADVAIR DISKUS) 250-50 MCG/DOSE AEPB Inhale 1 puff into the lungs every 12 hours, Disp-1 Inhaler, R-6Normal      ipratropium-albuterol (DUONEB) 0.5-2.5 (3) MG/3ML SOLN nebulizer solution Inhale 3 mLs into the lungs every 4 hours as needed for Shortness of Breath Dx: Asthma  ICD-10: J45.909, Disp-120 vial,R-5Normal      finasteride (PROSCAR) 5 MG tablet Take 5 mg by mouth dailyHistorical Med tamsulosin (FLOMAX) 0.4 MG capsule Take 0.4 mg by mouth dailyHistorical Med      FREESTYLE LANCETS MISC DAILY Starting 2/15/2017, Until Discontinued, Disp-100 each, R-3, Normal      Blood Glucose Monitoring Suppl (FREESTYLE LITE) CLAUDINE 2 TIMES DAILY Starting 2/15/2017, Until Discontinued, Disp-1 Device, R-0, Normal             Time Spent on discharge is more than 30 minutes in the examination, evaluation, counseling and review of medications and discharge plan.       Signed:    Electronically signed by June Gaston MD on 2/14/2021 at 6:14 PM

## 2021-02-16 NOTE — PROGRESS NOTES
[facility] Device Check ICD Off/Patient transferred to Hospice  Rep Checked Device   Device shows normal function
DIFFICULTY BREATHING/WHEEZING/COUGH

## 2021-02-16 NOTE — ED PROVIDER NOTES
Emergency Department Encounter    Patient: Dov Carlton  MRN: [id number]  : 1955  Date of Evaluation: 2/15/2021  ED Provider:  Kayode Gimenez    Triage Chief Complaint:   Chest Pain (in by [facility] with chest pain, EMS called code Stemi, patient with profuse sweating on arrival, heart rate 151 on arrival )    [place]:  Dov Carlton is a 72 y.o. male that presents to the ER for evaluation of acute ventricular tachycardia persistent chest pain diaphoresis and severe distress on arrival.  Pale diaphoretic. Is a very complex past medical history including recent metastatic melanoma and peritoneal metastases with pleurocentesis followed by ventricular tachycardia atrial fibrillation and AICD implantation. Patient is in ventricular tachycardia on arrival, with heart rates of 1 50-1 70.     ROS - see HPI, below listed is current ROS at time of my eval:  General:  No fevers, + weakness  Eyes:  no discharge  ENT:  No sore throat, no nasal congestion  Cardiovascular:  + chest pain, + palpitations  Respiratory:  No shortness of breath, no cough, no wheezing  Gastrointestinal:  No pain, no nausea, no vomiting, no diarrhea  Musculoskeletal:  No muscle pain, no joint pain  Skin:  No rash, no pruritis  Neurologic:  No speech problems, no headache, no extremity numbness, no extremity tingling, no extremity weakness  Psychiatric:  No anxiety  Genitourinary:  No dysuria, no hematuria  Endocrine:  No unexpected weight gain, no unexpected weight loss  Extremities:  no edema, no pain    Past Medical History:   Diagnosis Date    Abdominal pain, LLQ     Allergic rhinitis     Asthma     Atopic dermatitis     Cancer ([facility] [address]     melanoma    Diabetes mellitus ([address]     Erectile dysfunction     Generalized osteoarthrosis, involving multiple sites     GERD (gastroesophageal reflux disease)     Gynecomastia     Hyperlipidemia     Nasal congestion     Nipple pain     Obstructive sleep apnea syndrome 10/5/2012  Pseudo-gout     Unspecified essential hypertension      Past Surgical History:   Procedure Laterality Date    BACK SURGERY      BRONCHOSCOPY N/A 2020    BRONCHOSCOPY ALVEOLAR LAVAGE performed by Miheala Serrano MD at [facility] Bilateral 2013    FINGER SURGERY Left 3-1-2013    Incision & Drainage of left Thumb Infection    FOOT SURGERY Right     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    HAND SURGERY  2011    IR PERC CATH PLEURAL DRAIN W/IMAG  2021    IR PERC CATH PLEURAL DRAIN W/IMAG 2021 [facility] PROCEDURES    LUMBAR NERVE BLOCK Right 2018    RIGHT L4/L5 LUMBAR INTERLAMINAR EPIDURAL STEROID INJECTION WITH FLUOROSCOPY performed by Adan Talavera MD at [address] [place] [address]      melenoma right side post    [facility] [employer] [address] DX/THER SBST INTRLMNR LMBR/SAC W/IMG GDN Right 2018    RIGHT L4/L5 INTERLAMINAR EPIDURAL STEROID INJECTION WITH FLUOROSCOPY performed by Adan Talavera MD at [address] [place] [address] History   Problem Relation Age of Onset    Arthritis Mother     High Blood Pressure Mother     Emphysema Mother     Osteoporosis Mother     Diabetes Father     Heart Disease Father     High Cholesterol Father     Parkinsonism Father     Diabetes Sister      Social History     Socioeconomic History    Marital status:      Spouse name: Not on file    Number of children: Not on file    Years of education: Not on file    Highest education level: Not on file   Occupational History    Occupation:    Social Needs    Financial resource strain: Not on file    Food insecurity     Worry: Not on file     Inability: Not on file    Transportation needs     Medical: Not on file     Non-medical: Not on file   Tobacco Use    Smoking status: Former Smoker     Packs/day: 1.00     Years: 10.00     Pack years: 10.00     Types: Cigarettes     Quit date: 4/3/1993     Years since quittin.8    Smokeless tobacco: Never Used  Tobacco comment: quit 25 years ago   Substance and Sexual Activity    Alcohol use: No    Drug use: No    Sexual activity: Yes     Partners: Female   Lifestyle    Physical activity     Days per week: Not on file     Minutes per session: Not on file    Stress: Not on file   Relationships    Social connections     Talks on phone: Not on file     Gets together: Not on file     Attends Latter-day service: Not on file     Active member of club or organization: Not on file     Attends meetings of clubs or organizations: Not on file     Relationship status: Not on file    Intimate partner violence     Fear of current or ex partner: Not on file     Emotionally abused: Not on file     Physically abused: Not on file     Forced sexual activity: Not on file   Other Topics Concern    Not on file   Social History Narrative    Not on file     No current facility-administered medications for this encounter.       Current Outpatient Medications   Medication Sig Dispense Refill    midodrine (PROAMATINE) 5 MG tablet Take 1 tablet by mouth 3 times daily (with meals) 90 tablet 3    atorvastatin (LIPITOR) 20 MG tablet Take 1 tablet by mouth nightly 30 tablet 0    fluticasone (FLONASE) 50 MCG/ACT nasal spray 2 sprays by Each Nostril route daily 1 Bottle 0    sodium chloride (OCEAN, BABY AYR) 0.65 % nasal spray 2 sprays by Nasal route as needed for Congestion 1 Bottle 0    polyethylene glycol (GLYCOLAX) 17 g packet Take 17 g by mouth 2 times daily 60 each 0    sennosides-docusate sodium (SENOKOT-S) 8.6-50 MG tablet Take 2 tablets by mouth daily 30 tablet 0    lactobacillus (CULTURELLE) capsule Take 1 capsule by mouth 2 times daily (with meals) 60 capsule 0    insulin lispro (HUMALOG KWIKPEN) 200 UNIT/ML SOPN pen Inject 10 Units into the skin 3 times daily (with meals) 5 pen 3  amiodarone (PACERONE) 400 MG tablet Take 1 tablet by mouth 2 times daily for 7 days, THEN 1 tablet daily for 7 days, THEN 0.5 tablets daily. 59 tablet 0    fentaNYL (DURAGESIC) 25 MCG/HR Place 1 patch onto the skin every 72 hours for 15 days. 5 patch 0    oxyCODONE (ROXICODONE) 5 MG immediate release tablet Take 1 tablet by mouth every 6 hours as needed for Pain for up to 30 days. 42 tablet 0    naloxone (NARCAN) 4 MG/0.1ML LIQD nasal spray 1 spray by Nasal route as needed for Opioid Reversal 1 each 0    megestrol (MEGACE) 40 MG/ML suspension Take 5 mLs by mouth daily 240 mL 0    promethazine-codeine (PHENERGAN WITH CODEINE) 6.25-10 MG/5ML syrup Take 5 mLs by mouth every 4 hours as needed for Cough for up to 14 days.  118 mL 0    insulin glargine (BASAGLAR KWIKPEN) 100 UNIT/ML injection pen Inject 25 Units into the skin nightly 5 pen 2    Insulin Pen Needle (MEIJER PEN NEEDLES) 31G X 6 MM MISC 1 each by Does not apply route daily 100 each 3    pantoprazole (PROTONIX) 40 MG tablet TAKE 1 TABLET BY MOUTH ONE TIME A DAY  90 tablet 0    allopurinol (ZYLOPRIM) 300 MG tablet TAKE 1 TABLET BY MOUTH ONE TIME A DAY  90 tablet 0    warfarin (COUMADIN) 5 MG tablet Take 1 tablet by mouth daily Do not resume until 1/7 30 tablet 5    albuterol sulfate  (90 Base) MCG/ACT inhaler Inhale 2 puffs into the lungs every 6 hours as needed for Wheezing 1 Inhaler 0    furosemide (LASIX) 40 MG tablet Take 1 tablet by mouth daily 90 tablet 1    fluticasone-salmeterol (ADVAIR DISKUS) 250-50 MCG/DOSE AEPB Inhale 1 puff into the lungs every 12 hours 1 Inhaler 6    ipratropium-albuterol (DUONEB) 0.5-2.5 (3) MG/3ML SOLN nebulizer solution Inhale 3 mLs into the lungs every 4 hours as needed for Shortness of Breath Dx: Asthma  ICD-10: J45.909 120 vial 5    finasteride (PROSCAR) 5 MG tablet Take 5 mg by mouth daily      tamsulosin (FLOMAX) 0.4 MG capsule Take 0.4 mg by mouth daily  FREESTYLE LANCETS MISC 1 each by Does not apply route daily 100 each 3    Blood Glucose Monitoring Suppl (FREESTYLE LITE) CLAUDINE 1 Device by Does not apply route 2 times daily 1 Device 0     Allergies   Allergen Reactions    Levaquin [Levofloxacin] Diarrhea       Nursing Notes Reviewed    Physical Exam:  Triage VS:    ED Triage Vitals   Enc Vitals Group      BP 02/10/21 1342 (!) 125/108      Pulse 02/10/21 1342 151      Resp 02/10/21 1342 22      Temp 02/10/21 1342 98 °F (36.7 °C)      Temp Source 02/10/21 1637 Temporal      SpO2 02/10/21 1342 97 %      Weight 02/10/21 1637 210 lb 8.6 oz (95.5 kg)      Height 02/10/21 1637 5' 9\" (1.753 m)      Head Circumference --       Peak Flow --       Pain Score --       Pain Loc --       Pain Edu? --       Excl. in [address]? --        My pulse ox interpretation is  normal    General appearance:  + acute distress. Skin: Pale, diaphoretic  Eye:  Extraocular movements intact. Ears, nose, mouth and throat:  Oral mucosa moist   Neck:  Trachea midline. Extremity:  No swelling. Normal ROM     Heart:  Regular rate and rhythm, normal S1 & S2, no extra heart sounds. Perfusion:  intact  Respiratory:  Lungs clear to auscultation bilaterally. Respirations nonlabored. Abdominal:  Normal bowel sounds. Soft. Nontender. Non distended. Neurological:  Alert and oriented times 3.              Psychiatric:  Appropriate    I have reviewed and interpreted all of the currently available lab results from this visit (if applicable):  Results for orders placed or performed during the hospital encounter of 02/10/21   CBC Auto Differential   Result Value Ref Range    WBC 10.4 4.0 - 11.0 K/uL    RBC 3.55 (L) 4.20 - 5.90 M/uL    Hemoglobin 9.7 (L) 13.5 - 17.5 g/dL    Hematocrit 30.9 (L) 40.5 - 52.5 %    MCV 87.0 80.0 - 100.0 fL    MCH 27.2 26.0 - 34.0 pg    MCHC 31.3 31.0 - 36.0 g/dL    RDW 22.2 (H) 12.4 - 15.4 %    Platelets 255 941 - 913 K/uL    MPV 7.2 5.0 - 10.5 fL Neutrophils % 79.0 %    Lymphocytes % 10.1 %    Monocytes % 9.2 %    Eosinophils % 1.4 %    Basophils % 0.3 %    Neutrophils Absolute 8.2 (H) 1.7 - 7.7 K/uL    Lymphocytes Absolute 1.1 1.0 - 5.1 K/uL    Monocytes Absolute 1.0 0.0 - 1.3 K/uL    Eosinophils Absolute 0.1 0.0 - 0.6 K/uL    Basophils Absolute 0.0 0.0 - 0.2 K/uL   Basic Metabolic Panel w/ Reflex to MG   Result Value Ref Range    Sodium 135 (L) 136 - 145 mmol/L    Potassium reflex Magnesium 4.5 3.5 - 5.1 mmol/L    Chloride 100 99 - 110 mmol/L    CO2 19 (L) 21 - 32 mmol/L    Anion Gap 16 3 - 16    Glucose 263 (H) 70 - 99 mg/dL    BUN 40 (H) 7 - 20 mg/dL    CREATININE 1.2 0.8 - 1.3 mg/dL    GFR Non-African American >60 >60    GFR African American >60 >60    Calcium 8.9 8.3 - 10.6 mg/dL   Troponin   Result Value Ref Range    Troponin 0.06 (H) <0.01 ng/mL   Brain Natriuretic Peptide   Result Value Ref Range    Pro-BNP 1,660 (H) 0 - 124 pg/mL   Protime-INR   Result Value Ref Range    Protime 21.2 (H) 10.0 - 13.2 sec    INR 1.82 (H) 0.86 - 1.14   Blood Gas, Venous   Result Value Ref Range    pH, [place] 7.395 7.350 - 7.450    pCO2, [place] 35.6 (L) 40.0 - 50.0 mmHg    pO2, [place] 122.0 (H) 25.0 - 40.0 mmHg    HCO3, Venous 21.8 (L) 23.0 - 29.0 mmol/L    Base Excess, [place] -2.7 -3.0 - 3.0 mmol/L    O2 Sat, [place] 99 Not Established %    Carboxyhemoglobin 4.5 (H) 0.0 - 1.5 %    MetHgb, [place] 0.3 <1.5 %    TC02 (Calc), [place] 51 Not Established mmol/L    O2 Content, [place] 12 Not Established VOL %    O2 Therapy Unknown    Lactic Acid, Plasma   Result Value Ref Range    Lactic Acid 2.9 (H) 0.4 - 2.0 mmol/L   Troponin   Result Value Ref Range    Troponin 0.06 (H) <0.01 ng/mL   Troponin   Result Value Ref Range    Troponin 0.07 (H) <0.01 ng/mL   Troponin   Result Value Ref Range    Troponin 0.10 (H) <0.01 ng/mL   Magnesium   Result Value Ref Range    Magnesium 1.70 (L) 1.80 - 2.40 mg/dL   Basic metabolic panel   Result Value Ref Range    Sodium 135 (L) 136 - 145 mmol/L Potassium 3.8 3.5 - 5.1 mmol/L    Chloride 103 99 - 110 mmol/L    CO2 21 21 - 32 mmol/L    Anion Gap 11 3 - 16    Glucose 82 70 - 99 mg/dL    BUN 35 (H) 7 - 20 mg/dL    CREATININE 1.0 0.8 - 1.3 mg/dL    GFR Non-African American >60 >60    GFR African American >60 >60    Calcium 8.5 8.3 - 10.6 mg/dL   APTT   Result Value Ref Range    aPTT 32.0 24.2 - 36.2 sec   Protime-INR   Result Value Ref Range    Protime 30.2 (H) 10.0 - 13.2 sec    INR 2.58 (H) 0.86 - 1.14   CBC   Result Value Ref Range    WBC 8.1 4.0 - 11.0 K/uL    RBC 3.32 (L) 4.20 - 5.90 M/uL    Hemoglobin 9.1 (L) 13.5 - 17.5 g/dL    Hematocrit 29.1 (L) 40.5 - 52.5 %    MCV 87.5 80.0 - 100.0 fL    MCH 27.2 26.0 - 34.0 pg    MCHC 31.1 31.0 - 36.0 g/dL    RDW 22.0 (H) 12.4 - 15.4 %    Platelets 288 387 - 739 K/uL    MPV 7.2 5.0 - 10.5 fL   Troponin   Result Value Ref Range    Troponin 0.08 (H) <0.01 ng/mL   Potassium   Result Value Ref Range    Potassium 4.2 3.5 - 5.1 mmol/L   Magnesium   Result Value Ref Range    Magnesium 1.90 1.80 - 2.40 mg/dL   Troponin   Result Value Ref Range    Troponin 0.07 (H) <0.01 ng/mL   Protime-INR   Result Value Ref Range    Protime 47.0 (H) 10.0 - 13.2 sec    INR 4.00 (H) 0.86 - 3.98   Basic Metabolic Panel   Result Value Ref Range    Sodium 131 (L) 136 - 145 mmol/L    Potassium 4.9 3.5 - 5.1 mmol/L    Chloride 98 (L) 99 - 110 mmol/L    CO2 22 21 - 32 mmol/L    Anion Gap 11 3 - 16    Glucose 287 (H) 70 - 99 mg/dL    BUN 31 (H) 7 - 20 mg/dL    CREATININE 1.0 0.8 - 1.3 mg/dL    GFR Non-African American >60 >60    GFR African American >60 >60    Calcium 8.8 8.3 - 10.6 mg/dL   CBC Auto Differential   Result Value Ref Range    WBC 10.9 4.0 - 11.0 K/uL    RBC 3.18 (L) 4.20 - 5.90 M/uL    Hemoglobin 8.6 (L) 13.5 - 17.5 g/dL    Hematocrit 27.5 (L) 40.5 - 52.5 %    MCV 86.6 80.0 - 100.0 fL    MCH 27.2 26.0 - 34.0 pg    MCHC 31.4 31.0 - 36.0 g/dL    RDW 22.2 (H) 12.4 - 15.4 %    Platelets 994 979 - 840 K/uL    MPV 7.1 5.0 - 10.5 fL POC Glucose 219 (H) 70 - 99 mg/dl    Performed on ACCU-CHEK    POCT Glucose   Result Value Ref Range    POC Glucose 165 (H) 70 - 99 mg/dl    Performed on ACCU-CHEK    POCT Glucose   Result Value Ref Range    POC Glucose 160 (H) 70 - 99 mg/dl    Performed on ACCU-CHEK    POCT Glucose   Result Value Ref Range    POC Glucose 356 (H) 70 - 99 mg/dl    Performed on ACCU-CHEK    POCT Glucose   Result Value Ref Range    POC Glucose 275 (H) 70 - 99 mg/dl    Performed on ACCU-CHEK    POCT Glucose   Result Value Ref Range    POC Glucose 317 (H) 70 - 99 mg/dl    Performed on ACCU-CHEK    POCT Glucose   Result Value Ref Range    POC Glucose 194 (H) 70 - 99 mg/dl    Performed on ACCU-CHEK    POCT Glucose   Result Value Ref Range    POC Glucose 186 (H) 70 - 99 mg/dl    Performed on ACCU-CHEK    POCT Glucose   Result Value Ref Range    POC Glucose 116 (H) 70 - 99 mg/dl    Performed on ACCU-CHEK    POCT Glucose   Result Value Ref Range    POC Glucose 186 (H) 70 - 99 mg/dl    Performed on ACCU-CHEK    EKG 12 Lead   Result Value Ref Range    Ventricular Rate 127 BPM    Atrial Rate 150 BPM    P-R Interval 128 ms    QRS Duration 190 ms    Q-T Interval 330 ms    QTc Calculation (Bazett) 479 ms    R Axis -57 degrees    T Axis 158 degrees    Diagnosis       Wide QRS tachycardiaConfirmed by Salas Jauregui ([id number]) on 2/10/2021 4:36:50 PM   EKG 12 Lead   Result Value Ref Range    Ventricular Rate 60 BPM    Atrial Rate 60 BPM    P-R Interval 118 ms    QRS Duration 146 ms    Q-T Interval 472 ms    QTc Calculation (Bazett) 472 ms    R Axis -31 degrees    T Axis 123 degrees    Diagnosis       Electronic atrial pacemakerLeft axis deviationLeft bundle branch blockConfirmed by Salas Jauregui ([id number]) on 2/10/2021 3:48:35 PM   EKG 12 Lead   Result Value Ref Range    Ventricular Rate 166 BPM    Atrial Rate 166 BPM    QRS Duration 176 ms    Q-T Interval 306 ms    QTc Calculation (Bazett) 508 ms    R Axis -59 degrees    T Axis 143 degrees Diagnosis       Wide QRS tachycardiaConfirmed by [facility] ([id number]) on 2/11/2021 2:09:12 PM      Radiographs (if obtained):  Radiologist's Report Reviewed:  Xr Chest Portable    Result Date: 2/10/2021  EXAMINATION: ONE XRAY VIEW OF THE CHEST 2/10/2021 1:43 pm COMPARISON: 02/08/2021 HISTORY: ORDERING SYSTEM PROVIDED HISTORY: sob TECHNOLOGIST PROVIDED HISTORY: Reason for exam:->sob Reason for Exam: chest pains Acuity: Acute Type of Exam: Initial FINDINGS: Transvenous pacer remains in place. Stable cardiomegaly. Right basilar opacity. No pneumothorax. No pleural effusion. Right basilar opacity could represent atelectatic changes or pneumonia         EKG (if obtained): (All EKG's are interpreted by myself in the absence of a cardiologist)  Wide-complex tachycardia    MDM:  Patient presents to the ER for evaluation of recurrent ventricular tachycardia, he received a bolus of 150 mg of amiodarone with conversion to sinus rhythm. He had marked improvement in his perfusion, he was seen by his electrophysiologist in the emergency department he did develop some transient hypotension after amiodarone infusion this was initially stopped with mild fluid resuscitation. He will be admitted to hospital for further evaluation of his recurrent ventricular tachycardia and possible ablation. No severe hypoxia improved perfusion after amiodarone, device interrogation on arrival.    Total critical care time provided today was 31 minutes. This excludes seperately billable procedures and family discussion time. Critical care time provided for obtaining history, conducting a physical exam, performing and monitoring interventions, ordering, collecting and interpreting tests, and establishing medical decision-making. There was a potential for life/limb threatening pathology requiring close evaluation and intervention with concern for patient decompensation. Clinical Impression:  1.  VT (ventricular tachycardia) ([address] Disposition referral (if applicable):  No follow-up provider specified. Disposition medications (if applicable):  Discharge Medication List as of 2/13/2021  2:11 PM      START taking these medications    Details   midodrine (PROAMATINE) 5 MG tablet Take 1 tablet by mouth 3 times daily (with meals), Disp-90 tablet, R-3Normal             Comment: Please note this report has been produced using speech recognition software and may contain errors related to that system including errors in grammar, punctuation, and spelling, as well as words and phrases that may be inappropriate. Efforts were made to edit the dictations.        Laura Pereira MD  37/33/76 [id number]

## 2021-02-18 ENCOUNTER — TELEPHONE (OUTPATIENT)
Dept: PULMONOLOGY | Age: 66
End: 2021-02-18

## 2021-02-18 NOTE — TELEPHONE ENCOUNTER
Patients wife would like a return call to discuss the issues pt is having per previous encounter 1/27/21. [phone]

## 2021-02-18 NOTE — TELEPHONE ENCOUNTER
Pt's spouse calling due to issues pt is having with O2 and bipap. Explained to spouse that I would try and help her however she insisted that pt needed a virtual appointment. Appointment was scheduled. For pt.

## 2021-02-22 NOTE — TELEPHONE ENCOUNTER
Patients wife called, pt passed away. Patients wife would like a return call to discuss patients usage.  [phone]

## 2023-10-03 NOTE — CONSULTS
Palliative Care:       Visit at bedside with patient and wife. Discussed overall physical status and patient wishes for next steps in his desire for comfort. Patient verbalizing approval for hospice consult for comfort care. Patient hopes to go home tomorrow 2/13/21. Hospice preferred: [address]. Discussed code status. Patient in agreement for [facility] and to deactivate ICD today. Patient/wife requesting to have a follow up consultation with Financial counseling Axel Silver. Referral sent and pending. Patient A/O X3. Wife accepting of patient wishes. Dr. Donya Wong notified of patient request and came to bedside to provide additional support. Orders placed. Notified Litzy Wisdom NP Cardiology regarding ICD to be deactivated. Consult submitted to [facility] @ 1230. Pending intake. TRISH Carter and Nurse Tremayne Echeverria notified of these discussions. Will continue to provide support as needed. Methotrexate Pregnancy And Lactation Text: This medication is Pregnancy Category X and is known to cause fetal harm. This medication is excreted in breast milk.

## (undated) DEVICE — STANDARD HYPODERMIC NEEDLE,POLYPROPYLENE HUB: Brand: MONOJECT

## (undated) DEVICE — CHLORAPREP 26ML ORANGE

## (undated) DEVICE — NEEDLE EPI L3.5IN OD20GA CLR POLYCARB HUB WNG N DEHP TUOHY

## (undated) DEVICE — PORT VLV 2 W NDL FREE SMRTSITE

## (undated) DEVICE — UNIVERSAL BLOCK TRAY: Brand: AVANOS*

## (undated) DEVICE — STERILE POLYISOPRENE POWDER-FREE SURGICAL GLOVES: Brand: PROTEXIS

## (undated) DEVICE — PEN: MARKING STD 100/CS: Brand: MEDICAL ACTION INDUSTRIES

## (undated) DEVICE — SINGLE USE SUCTION VALVE MAJ-209: Brand: SINGLE USE SUCTION VALVE (STERILE)

## (undated) DEVICE — PROCEDURE KIT ENDOSCP CUST

## (undated) DEVICE — Device: Brand: BALLOON

## (undated) DEVICE — SYRINGE MED 3ML CLR PLAS STD N CTRL LUERLOCK TIP DISP

## (undated) DEVICE — TOWEL,OR,DSP,ST,BLUE,STD,4/PK,20PK/CS: Brand: MEDLINE

## (undated) DEVICE — SYRINGE MED 10ML POLYPR LUERSLIP TIP FLAT TOP W/O SFTY DISP

## (undated) DEVICE — NEEDLE ASPIR 19GA HISTOLOGY FLX VIZISHOT 2

## (undated) DEVICE — MACROBORE EXTN SET W/ 1 SMRTSITE NEEDLE-FREE VLV PRT

## (undated) DEVICE — DISPOSABLE OR TOWEL: Brand: CARDINAL HEALTH

## (undated) DEVICE — Device: Brand: JELCO

## (undated) DEVICE — SINGLE USE BIOPSY VALVE MAJ-210: Brand: SINGLE USE BIOPSY VALVE (STERILE)

## (undated) DEVICE — Device: Brand: MEDEX

## (undated) DEVICE — SKIN MARKER REGULAR TIP WITH RULER CAP AND LABELS: Brand: DEVON

## (undated) DEVICE — CONMED CHANNEL MASTER PULMONARY AND PEDIATRIC CLEANING BRUSH, 160 CM X 2.0 MM: Brand: CONMED

## (undated) DEVICE — SYRINGE MED 50ML LUERLOCK TIP

## (undated) DEVICE — GOWN AURORA NONREINF LG: Brand: MEDLINE INDUSTRIES, INC.

## (undated) DEVICE — MEDIA CONTRAST RX ISOVUE-300 61% 30ML VIALS

## (undated) DEVICE — NEEDLE SPNL 22GA L3.5IN BLK HUB S STL REG WALL FIT STYL W/